# Patient Record
Sex: MALE | Race: WHITE | NOT HISPANIC OR LATINO | Employment: OTHER | URBAN - METROPOLITAN AREA
[De-identification: names, ages, dates, MRNs, and addresses within clinical notes are randomized per-mention and may not be internally consistent; named-entity substitution may affect disease eponyms.]

---

## 2023-09-28 ENCOUNTER — LAB REQUISITION (OUTPATIENT)
Dept: LAB | Facility: CLINIC | Age: 73
End: 2023-09-28
Payer: COMMERCIAL

## 2023-09-28 DIAGNOSIS — D48.9 NEOPLASM OF UNCERTAIN BEHAVIOR, UNSPECIFIED: ICD-10-CM

## 2023-09-28 PROCEDURE — 88305 TISSUE EXAM BY PATHOLOGIST: CPT | Mod: TC,ORL | Performed by: DERMATOLOGY

## 2023-09-28 PROCEDURE — 88305 TISSUE EXAM BY PATHOLOGIST: CPT | Mod: 26 | Performed by: DERMATOLOGY

## 2023-10-03 LAB
PATH REPORT.COMMENTS IMP SPEC: ABNORMAL
PATH REPORT.COMMENTS IMP SPEC: ABNORMAL
PATH REPORT.COMMENTS IMP SPEC: YES
PATH REPORT.FINAL DX SPEC: ABNORMAL
PATH REPORT.GROSS SPEC: ABNORMAL
PATH REPORT.MICROSCOPIC SPEC OTHER STN: ABNORMAL
PATH REPORT.RELEVANT HX SPEC: ABNORMAL

## 2024-01-29 ENCOUNTER — TRANSFERRED RECORDS (OUTPATIENT)
Dept: HEALTH INFORMATION MANAGEMENT | Facility: CLINIC | Age: 74
End: 2024-01-29
Payer: COMMERCIAL

## 2024-02-22 ENCOUNTER — TELEPHONE (OUTPATIENT)
Dept: ORTHOPEDICS | Facility: CLINIC | Age: 74
End: 2024-02-22
Payer: COMMERCIAL

## 2024-02-22 NOTE — TELEPHONE ENCOUNTER
Left Voicemail (1st Attempt) for the patient to call back and schedule the following:    Appointment type: New hip   Provider: Dr. Dudley  Return date: Next available

## 2024-02-22 NOTE — TELEPHONE ENCOUNTER
Writer received the following in an email from Dr. Dudley's admin:    Lauro Austin,     I just received a call from patient Kwabena Solorio, MRN [id number].  He has a referral from Dr. Seay in the system.  He was just calling inquiring about scheduling the appointment.  It looks like it is to Dr. Dudley.      His phone number is [phone].    - Norma    1/31/24 referral was for   Diagnosis   Z96.641 (ICD-10-CM) - History of total hip arthroplasty, right       Writer passing on to CC's to schedule this patient with Dr. Dudley.    Geovanna Gongora LPN

## 2024-03-05 ENCOUNTER — TRANSFERRED RECORDS (OUTPATIENT)
Dept: HEALTH INFORMATION MANAGEMENT | Facility: CLINIC | Age: 74
End: 2024-03-05
Payer: COMMERCIAL

## 2024-03-05 LAB
CREATININE (EXTERNAL): 0.9 MG/DL (ref 0.73–1.18)
GFR ESTIMATED (EXTERNAL): >60 ML/MIN/1.73M2
GLUCOSE (EXTERNAL): 88 MG/DL (ref 70–105)
POTASSIUM (EXTERNAL): 4.4 MMOL/L (ref 3.5–5.1)

## 2024-03-07 ENCOUNTER — TRANSCRIBE ORDERS (OUTPATIENT)
Dept: OTHER | Age: 74
End: 2024-03-07

## 2024-03-07 DIAGNOSIS — M41.50 DEGENERATIVE SCOLIOSIS IN ADULT PATIENT: ICD-10-CM

## 2024-03-07 DIAGNOSIS — M47.27 LUMBOSACRAL SPONDYLOSIS WITH RADICULOPATHY: Primary | ICD-10-CM

## 2024-03-07 DIAGNOSIS — M47.16 LUMBAR SPONDYLOSIS WITH MYELOPATHY: ICD-10-CM

## 2024-03-08 NOTE — PROGRESS NOTES
[facility]  Orthopaedic Surgery, Joint Replacement Consultation  by Rd Dudley M.D.    Kwabena Solorio MRN# [id number]    YOB: 1950     Requesting physician: No ref. provider found  No primary care provider on file.            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 He would benefit from operative management to convert to a different bearing surface and bone grafting of his pelvis defect.     Plan:  We discussed the imaging results and the diagnosis with the patient and family.  We discussed that the recommendation would be for operative treatment with conversion of his metal hip resurfacing to a total hip arthroplasty with a different bearing surface.  We would also try to bone graft the pelvis defect and debrided the soft tissue mass with this procedure, although we may need to stage this.  Patient understands and agrees with this plan.  We will work to get him scheduled for surgery.      Osvaldo Chan MD    Attending MD (Dr. Rd Dudley) Attestation :  This patient was seen and evaluated by me including a history, exam, and interpretation of all imaging and/or lab data.  I formulated the treatment plan along with either a physician's assistant (PA-C) or training physician (resident/fellow), who also saw the patient. That individual or a scribe has documented the visit in the attached note which I approve.    MD Tory Jesus Professor  Oncology and Adult Reconstructive Surgery  Dept Orthopaedic Surgery, [facility]  [phone] office, [phone] pager  [email or web address]           For additional information and frequently asked questions regarding joint replacements, scan the QR code image below on your phone camera or go to:  [email or web address]               History of Present Illness:   73 year old male presenting to clinic for evaluation and management of his right hip. He has a history of a right hip metal-on-metal resurfacing done in 2007.  He reports that the hip has been functioning well for him since that surgery.  More recently, he developed some lumbar spine related symptoms and when being worked up for this it was noted that there was an area of periacetabular osteolysis about the right hip.  He reports that he is very active lifestyle.  He does have some stiffness/soreness in the morning when he starts up however it does not bother him very much during the day.  He denies issues related to his wound such as drainage or erythema.  He does report some numbness tingling in both lower extremities associated with his lumbar spine issues.      Current symptoms:  Problem: Right hip pain   Onset and duration: last few months, but has had back problems for a few years.  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 Size 46 femoral head, 52-mm, acetabulum, Tobramycin Simplex cement.  ( Keyur Balderrama MD ) [facility]  3/18/2010, TKA, [facility]  3/22/2013, Angel/Akin Bunionectomy, bilateral feet (R. Sticha) [facility]  3/10/2014, screw removal right foot (R. Sticha) [facility]  5/2/2014, screw removal left foot, (R. Sticha), [facility]  2/27/2020, Right CMC arthroplasty and Left hand thumb trigger finger release (JAQUI Prasad), [facility]    date, procedure, (Surgeon), [facility]             "   Physical Exam:     EXAMINATION pertinent findings:   PSYCH: Pleasant, healthy-appearing, alert, oriented x3, cooperative. Normal mood and affect.  VITAL SIGNS: Height 1.64 m (5' 4.57\"), weight 78 kg (172 lb)..  Reviewed nursing intake notes.   Body mass index is 29.01 kg/m .  RESP: non labored breathing   ABD: benign, soft, non-tender, no acute peritoneal findings  SKIN: grossly normal   LYMPHATIC: grossly normal, no adenopathy, no extremity edema  NEURO: grossly normal , no motor deficits  VASCULAR: satisfactory perfusion of all extremities   MUSCULOSKELETAL:   He is ambulatory without antalgic gait. Incision is well healed without signs of infection.  He has full range of motion with hip flexion to 90 degrees without associated pain. Negative BARI and FADIR testing. Palpable pulses distally.                Data:   All laboratory data reviewed  All imaging studies reviewed by me    Imaging tests:    X-ray and CT imaging of the right hip were reviewed and this shows patient is status post right hip resurfacing.  There is osteolysis over the anterosuperior aspect of the acetabular cup.  There is an anterior based soft tissue mass likely associated with the metal-on-metal bearing.      DATA for DOCUMENTATION:         Past Medical History:   There is no problem list on file for this patient.    No past medical history on file.    Also see scanned health assessment forms.       Past Surgical History:   No past surgical history on file.         Social History:     Social History     Socioeconomic History    Marital status:      Spouse name: Not on file    Number of children: Not on file    Years of education: Not on file    Highest education level: Not on file   Occupational History    Not on file   Tobacco Use    Smoking status: Not on file    Smokeless tobacco: Not on file   Substance and Sexual Activity    Alcohol use: Not on file    Drug use: Not on file    Sexual activity: Not on file   Other Topics " Concern    Not on file   Social History Narrative    Not on file     Social Determinants of Health     Financial Resource Strain: Not on file   Food Insecurity: Not on file   Transportation Needs: Not on file   Physical Activity: Not on file   Stress: Not on file   Social Connections: Not on file   Interpersonal Safety: Not on file   Housing Stability: Not on file            Family History:     No family history on file.         Medications:     No current outpatient medications on file.     No current facility-administered medications for this visit.              Review of Systems:   A comprehensive 10 point review of systems (constitutional, ENT, cardiac, peripheral vascular, lymphatic, respiratory, GI, , Musculoskeletal, skin, Neurological) was performed and found to be negative except as described in this note.       HOOS Hip Dysfunction & Osteoarthritis Outcome Questionnaire         No data to display                       [unfilled]    KOOS Knee Survey Assessment         No data to display                       Promis 10 Assessment        3/21/2024    12:41 PM   PROMIS 10   In general, would you say your health is: Very good   In general, would you say your quality of life is: Good   In general, how would you rate your physical health? Very good   In general, how would you rate your mental health, including your mood and your ability to think? Very good   In general, how would you rate your satisfaction with your social activities and relationships? Very good   In general, please rate how well you carry out your usual social activities and roles Good   To what extent are you able to carry out your everyday physical activities such as walking, climbing stairs, carrying groceries, or moving a chair? Mostly   In the past 7 days, how often have you been bothered by emotional problems such as feeling anxious, depressed, or irritable? Never   In the past 7 days, how would you rate your fatigue on average? None    In the past 7 days, how would you rate your pain on average, where 0 means no pain, and 10 means worst imaginable pain? 1   In general, would you say your health is: 4   In general, would you say your quality of life is: 3   In general, how would you rate your physical health? 4   In general, how would you rate your mental health, including your mood and your ability to think? 4   In general, how would you rate your satisfaction with your social activities and relationships? 4   In general, please rate how well you carry out your usual social activities and roles. (This includes activities at home, at work and in your community, and responsibilities as a parent, child, spouse, employee, friend, etc.) 3   To what extent are you able to carry out your everyday physical activities such as walking, climbing stairs, carrying groceries, or moving a chair? 4   In the past 7 days, how often have you been bothered by emotional problems such as feeling anxious, depressed, or irritable? 1   In the past 7 days, how would you rate your fatigue on average? 1   In the past 7 days, how would you rate your pain on average, where 0 means no pain, and 10 means worst imaginable pain? 1   Global Mental Health Score 16   Global Physical Health Score 17   PROMIS TOTAL - SUBSCORES 33              Or[facility] [place] Knee Questionnaire         No data to display                         See intake form completed by patient

## 2024-03-16 NOTE — TELEPHONE ENCOUNTER
"Imaging DISC Received  April 12, 2024 10:01 AM ABT   Action: Imaging disc from [facility] received and uploaded to PACS.    06/22/20: XR Hip RT  06/22/20: XR Knee LT  06/23/22: XR Hip RT  06/23/22: XR Knee LT  01/29/24: XR Hip RT     Action March 20, 2024 9:20 AM MT   Action Taken Sent a request for imaging from [facility].  [employer] Tracking #: [id number]  Called [facility] and [facility] for imaging to be pushed.      Action March 21, 2024 9:26 AM MT   Action Taken Called [facility] to follow up, tt: rep Valeria will push imaging over again.        DIAGNOSIS: Ostial lysis around right acetabular component.  Z96.641 - History of total hip arthroplasty, right   APPOINTMENT DATE: 03/21/2024   NOTES STATUS DETAILS   OFFICE NOTE from referring provider Care Everywhere 1/29/2024 - Hang Seay MD -  [facility]   OFFICE NOTE from other specialist Care Everywhere 03/23/2022 - Keyur Balderrama MD -[facility]    05/21/2019 - Víctor Daniel DPT - [facility]   OPERATIVE REPORT Care Everywhere 03/27/2007 - RIGHT CHRISS  \"Click on the encounter for 03/02/2007 - Historic Notes - Generic Department - Keyur Balderrama      EMG (for Spine) Care Everywhere 12/21/2022 - [facility]   LABS     DEXA PACS [facility]:  03/18/2024 - Hips/Spine   MRI PACS [facility]:  01/22/2024 - L Spine  05/03/2018 - RT Hip     CT SCAN PACS [facility]:  02/02/2024 - Chest  02/02/2024 - Abd/Pel    [facility]:  05/18/2017 - Chest/Abd/Pel     XRAYS (IMAGES & REPORTS) PACS [facility]:  01/10/2024 - L Spine    [facility]: PENDING IMAGE DISK!  01/29/2024, 06/23/2022, 06/22/2020, 05/01/2018 - RT Hip       "

## 2024-03-21 ENCOUNTER — OFFICE VISIT (OUTPATIENT)
Dept: ORTHOPEDICS | Facility: CLINIC | Age: 74
End: 2024-03-21
Payer: COMMERCIAL

## 2024-03-21 ENCOUNTER — PRE VISIT (OUTPATIENT)
Dept: ORTHOPEDICS | Facility: CLINIC | Age: 74
End: 2024-03-21

## 2024-03-21 VITALS — BODY MASS INDEX: 28.66 KG/M2 | WEIGHT: 172 LBS | HEIGHT: 65 IN

## 2024-03-21 DIAGNOSIS — T84.098A MECHANICAL COMPLICATION OF PROSTHETIC HIP IMPLANT, INITIAL ENCOUNTER (H): Primary | ICD-10-CM

## 2024-03-21 DIAGNOSIS — Z96.649 MECHANICAL COMPLICATION OF PROSTHETIC HIP IMPLANT, INITIAL ENCOUNTER (H): Primary | ICD-10-CM

## 2024-03-21 PROCEDURE — 99204 OFFICE O/P NEW MOD 45 MIN: CPT | Mod: GC | Performed by: ORTHOPAEDIC SURGERY

## 2024-03-21 RX ORDER — FERROUS SULFATE 325(65) MG
325 TABLET ORAL
COMMUNITY
Start: 2023-01-27

## 2024-03-21 RX ORDER — NAPROXEN 250 MG/1
500 TABLET ORAL 2 TIMES DAILY
Status: ON HOLD | COMMUNITY
Start: 2024-03-12 | End: 2024-05-13

## 2024-03-21 RX ORDER — PREDNISONE 5 MG/1
5 TABLET ORAL
COMMUNITY
Start: 2024-02-06

## 2024-03-21 RX ORDER — SODIUM PHOSPHATE,MONO-DIBASIC 19G-7G/118
1 ENEMA (ML) RECTAL EVERY MORNING
COMMUNITY

## 2024-03-21 RX ORDER — ASCORBIC ACID 500 MG
500 TABLET ORAL EVERY MORNING
COMMUNITY

## 2024-03-21 RX ORDER — GABAPENTIN 300 MG/1
900 CAPSULE ORAL AT BEDTIME
Status: ON HOLD | COMMUNITY
Start: 2024-01-10 | End: 2024-05-13

## 2024-03-21 RX ORDER — AMOXICILLIN 500 MG/1
2000 CAPSULE ORAL ONCE
COMMUNITY
Start: 2024-01-26

## 2024-03-21 RX ORDER — AMPICILLIN TRIHYDRATE 500 MG
1000 CAPSULE ORAL
COMMUNITY

## 2024-03-21 RX ORDER — AMPICILLIN TRIHYDRATE 250 MG
1000 CAPSULE ORAL EVERY MORNING
COMMUNITY

## 2024-03-21 RX ORDER — PRAMIPEXOLE DIHYDROCHLORIDE 0.12 MG/1
0.25 TABLET ORAL AT BEDTIME
COMMUNITY
Start: 2023-10-27

## 2024-03-21 RX ORDER — KETOCONAZOLE 20 MG/G
CREAM TOPICAL
COMMUNITY
Start: 2023-09-28

## 2024-03-21 RX ORDER — LORAZEPAM 1 MG/1
.5-1 TABLET ORAL
Status: ON HOLD | COMMUNITY
Start: 2024-03-05 | End: 2024-05-13

## 2024-03-21 RX ORDER — PSYLLIUM SEED
1 PACKET (EA) ORAL EVERY MORNING
COMMUNITY

## 2024-03-21 ASSESSMENT — HOOS JR: HOOS JR TOTAL INTERVAL SCORE: 100

## 2024-03-21 NOTE — NURSING NOTE
Pre-Op Teaching was done in person at the clinic.    Teaching Flowsheet   Relevant Diagnosis: Pre-Op Teaching  Teaching Topic: Revision R CHRISS Pre-Op     Person(s) involved in teaching:   Patient and sister     Motivation Level:  Asks Questions: Yes  Eager to Learn: Yes  Cooperative: Yes  Receptive (willing/able to accept information): Yes  Any cultural factors/Caodaism beliefs that may influence understanding or compliance? No     Patient demonstrates understanding of the following:  Reason for the appointment, diagnosis and treatment plan: Yes  Knowledge of proper use of medications and conditions for which they are ordered (with special attention to potential side effects or drug interactions): Yes  Which situations necessitate calling provider and whom to contact: Yes- discussed the stoplight tool to help assist with this.      Teaching Concerns Addressed:      Proper use of surgical scrub explain: Yes    Nutritional needs and diet plan: Yes  Pain management techniques: Yes  Wound Care: Yes  How and/when to access community resources: Yes  Need for pre-op with in 30 days: Yes- asked that pre-op be done between 25-20 days before surgery is scheduled.   -I asked them to ensure they go over their daily medications during this visit and discuss what medications need to be stopped before surgery and when. If you are doing a pre-op with your PCP and they are not within the [facility], I ask them to fax it to our pre-op office. Patient verbalized understanding.      Instructional Materials Used/Given:  2 bottle of chlorhexidine, a surgery packet, and a joint booklet given to patient in clinic.      - Important contact info/ phone numbers: emphasizing clinic number and after hours number  - Map/ location of surgery  - Showering instructions  - Stop light tool  - Your Guide to Joint Replacement Booklet   - Antibiotic post op before every dentist appointment or procedure for the rest of their life.     Additionally  the following was discussed with patient:  - Sister, will be driving the patient to surgery and able to pick the patient up after discharge and staying with them for 4-5 days after surgery.  - Need to schedule a dentist appointment and get done any recommended dental work prior to surgery.   - Online joint replacement call and the use of [employer] to send educational messages. Asked patient to sign up for join class during visit and patient declined to sign up at this time and stated will sign up later. Sheet with sign up instruction given to patient.   - Told patient to check in with insurance to check about coverage.     Authorization to Share Protected Health Information- Person to person communication signed by patient and authorized the following person or people:   Earlene Thapa, sister: [phone]  Teresa Anna Marie, sister: [phone]  Kathrin Schultz, sister: [phone]    Discussed that TCU placement post-operatively cannot be guaranteed. We cannot arrange placement before. Eligibility is evaluated post-operatively. Bed availability is limited. I told patient if they are eligible and if they have insurance coverage, finding a bed/placement is not guaranteed. I told them the  at the hospital will work with TCUs to determine bed availability at time of discharge.  Because of this we need them to have plans arranged for after surgery to have someone who is  able to pick the patient up after discharge and staying with them for 4-5 days after surgery.        -Next step: Schedule a surgery date and schedule a Pre-Op appointment with PAC    Time spent with patient: 15 minutes.

## 2024-03-21 NOTE — LETTER
3/21/2024         RE: Kwabena Solorio  [address]  [address]  [facility] [address]        Dear Colleague,    Thank you for referring your patient, Kwabena Solorio, to the [facility] [place]. Please see a copy of my visit note below.        [facility]  Orthopaedic Surgery, Joint Replacement Consultation  by Rd Dudley M.D.    Kwabena Solorio MRN# [id number]    YOB: 1950     Requesting physician: No ref. provider found  No primary care provider on file.            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 He would benefit from operative management to convert to a different bearing surface and bone grafting of his pelvis defect.     Plan:  We discussed the imaging results and the diagnosis with the patient and family.  We discussed that the recommendation would be for operative treatment with conversion of his metal hip resurfacing to a total hip arthroplasty with a different bearing surface.  We would also try to bone graft the pelvis defect and debrided the soft tissue mass with this procedure, although we may need to stage this.  Patient understands and agrees with this plan.  We will work to get him scheduled for surgery.      Osvaldo Chan MD    Attending MD (Dr. Rd Dudley) Attestation :  This patient was seen and evaluated by me including a history, exam, and interpretation of all imaging and/or lab data.  I formulated the treatment plan along with either a physician's assistant (PA-C) or training physician (resident/fellow), who also saw the patient. That individual or a scribe has documented the visit in the attached note which I approve.    MD Tory Jesus Family Professor  Oncology and Adult Reconstructive Surgery  Dept Orthopaedic Surgery, [facility]  [phone] office,  [phone] pager  [email or web address]           For additional information and frequently asked questions regarding joint replacements, scan the QR code image below on your phone camera or go to:  [email or web address]              History of Present Illness:   73 year old male presenting to clinic for evaluation and management of his right hip. He has a history of a right hip metal-on-metal resurfacing done in 2007.  He reports that the hip has been functioning well for him since that surgery.  More recently, he developed some lumbar spine related symptoms and when being worked up for this it was noted that there was an area of periacetabular osteolysis about the right hip.  He reports that he is very active lifestyle.  He does have some stiffness/soreness in the morning when he starts up however it does not bother him very much during the day.  He denies issues related to his wound such as drainage or erythema.  He does report some numbness tingling in both lower extremities associated with his lumbar spine issues.      Current symptoms:  Problem: Right hip pain   Onset and duration: last few months, but has had back problems for a few years.  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 Size 46 femoral head, 52-mm, acetabulum, Tobramycin Simplex cement.  ( Keyur Balderrama MD ) [facility]  3/22/2013, Angel/Akin Bunionectomy, bilateral feet (MARIA A Valentin) [facility]  3/10/2014, screw removal right  "foot (MARIA A Valentin) [facility]  5/2/2014, screw removal left foot, (MARIA A Valentin), [facility]  2/27/2020, Right CMC arthroplasty and Left hand thumb trigger finger release (JAQUI Prasad[facility]    date, procedure, (Surgeon), hospital               Physical Exam:     EXAMINATION pertinent findings:   PSYCH: Pleasant, healthy-appearing, alert, oriented x3, cooperative. Normal mood and affect.  VITAL SIGNS: Height 1.64 m (5' 4.57\"), weight 78 kg (172 lb)..  Reviewed nursing intake notes.   Body mass index is 29.01 kg/m .  RESP: non labored breathing   ABD: benign, soft, non-tender, no acute peritoneal findings  SKIN: grossly normal   LYMPHATIC: grossly normal, no adenopathy, no extremity edema  NEURO: grossly normal , no motor deficits  VASCULAR: satisfactory perfusion of all extremities   MUSCULOSKELETAL:   He is ambulatory without antalgic gait. Incision is well healed without signs of infection.  He has full range of motion with hip flexion to 90 degrees without associated pain. Negative BARI and FADIR testing. Palpable pulses distally.                Data:   All laboratory data reviewed  All imaging studies reviewed by me    Imaging tests:    X-ray and CT imaging of the right hip were reviewed and this shows patient is status post right hip resurfacing.  There is osteolysis over the anterosuperior aspect of the acetabular cup.  There is an anterior based soft tissue mass likely associated with the metal-on-metal bearing.      DATA for DOCUMENTATION:         Past Medical History:   There is no problem list on file for this patient.    No past medical history on file.    Also see scanned health assessment forms.       Past Surgical History:   No past surgical history on file.         Social History:     Social History     Socioeconomic History    Marital status:      Spouse name: Not on file    Number of children: Not on file    Years of education: Not on file    Highest education level: Not on file "   Occupational History    Not on file   Tobacco Use    Smoking status: Not on file    Smokeless tobacco: Not on file   Substance and Sexual Activity    Alcohol use: Not on file    Drug use: Not on file    Sexual activity: Not on file   Other Topics Concern    Not on file   Social History Narrative    Not on file     Social Determinants of Health     Financial Resource Strain: Not on file   Food Insecurity: Not on file   Transportation Needs: Not on file   Physical Activity: Not on file   Stress: Not on file   Social Connections: Not on file   Interpersonal Safety: Not on file   Housing Stability: Not on file            Family History:     No family history on file.         Medications:     No current outpatient medications on file.     No current facility-administered medications for this visit.              Review of Systems:   A comprehensive 10 point review of systems (constitutional, ENT, cardiac, peripheral vascular, lymphatic, respiratory, GI, , Musculoskeletal, skin, Neurological) was performed and found to be negative except as described in this note.       HOOS Hip Dysfunction & Osteoarthritis Outcome Questionnaire         No data to display                       [unfilled]    KOOS Knee Survey Assessment         No data to display                       Promis 10 Assessment        3/21/2024    12:41 PM   PROMIS 10   In general, would you say your health is: Very good   In general, would you say your quality of life is: Good   In general, how would you rate your physical health? Very good   In general, how would you rate your mental health, including your mood and your ability to think? Very good   In general, how would you rate your satisfaction with your social activities and relationships? Very good   In general, please rate how well you carry out your usual social activities and roles Good   To what extent are you able to carry out your everyday physical activities such as walking, climbing stairs,  carrying groceries, or moving a chair? Mostly   In the past 7 days, how often have you been bothered by emotional problems such as feeling anxious, depressed, or irritable? Never   In the past 7 days, how would you rate your fatigue on average? None   In the past 7 days, how would you rate your pain on average, where 0 means no pain, and 10 means worst imaginable pain? 1   In general, would you say your health is: 4   In general, would you say your quality of life is: 3   In general, how would you rate your physical health? 4   In general, how would you rate your mental health, including your mood and your ability to think? 4   In general, how would you rate your satisfaction with your social activities and relationships? 4   In general, please rate how well you carry out your usual social activities and roles. (This includes activities at home, at work and in your community, and responsibilities as a parent, child, spouse, employee, friend, etc.) 3   To what extent are you able to carry out your everyday physical activities such as walking, climbing stairs, carrying groceries, or moving a chair? 4   In the past 7 days, how often have you been bothered by emotional problems such as feeling anxious, depressed, or irritable? 1   In the past 7 days, how would you rate your fatigue on average? 1   In the past 7 days, how would you rate your pain on average, where 0 means no pain, and 10 means worst imaginable pain? 1   Global Mental Health Score 16   Global Physical Health Score 17   PROMIS TOTAL - SUBSCORES 33              Orth[facility] [place] Knee Questionnaire         No data to display                         See intake form completed by patient

## 2024-03-22 NOTE — NURSING NOTE
- A call was placed to the patient.     - I let him know they were unable to do 3d reconstruction with previous CT so a new one is needed.     - I told him this was ordered and scheduled for the same day as his appointment with Dr. Merino April 2nd with an arrival time of 1:25pn. He was thankful this could be done the same day.     - Patient verbalized understanding of plan and all questions were answered. Call back number to clinic was given and patient was told to call if they had an further questions.

## 2024-03-26 ENCOUNTER — MYC MEDICAL ADVICE (OUTPATIENT)
Dept: ORTHOPEDICS | Facility: CLINIC | Age: 74
End: 2024-03-26
Payer: COMMERCIAL

## 2024-03-26 ENCOUNTER — TELEPHONE (OUTPATIENT)
Dept: ORTHOPEDICS | Facility: CLINIC | Age: 74
End: 2024-03-26
Payer: COMMERCIAL

## 2024-03-26 ENCOUNTER — HOSPITAL ENCOUNTER (INPATIENT)
Facility: CLINIC | Age: 74
Setting detail: SURGERY ADMIT
End: 2024-03-26
Attending: ORTHOPAEDIC SURGERY | Admitting: ORTHOPAEDIC SURGERY
Payer: COMMERCIAL

## 2024-03-26 DIAGNOSIS — Z96.641 STATUS POST TOTAL REPLACEMENT OF RIGHT HIP: ICD-10-CM

## 2024-03-26 DIAGNOSIS — Z96.649 STATUS POST REVISION OF TOTAL HIP REPLACEMENT: Primary | ICD-10-CM

## 2024-03-26 NOTE — TELEPHONE ENCOUNTER
Patient is scheduled for surgery with Dr. Dudley    Spoke with: patient    Date of Surgery: 4/23/24    Location: [facility]    Post op: 5/20/24    Pre op with Provider complete    H&P: Scheduled with PAC 4/3/24    Additional imaging/appointments: N/A    Surgery packet: received     Additional comments: on cx list for earlier surgery date        Phyllis Guevara CMA on 3/26/2024 at 11:47 AM

## 2024-03-26 NOTE — TELEPHONE ENCOUNTER
FUTURE VISIT INFORMATION      SURGERY INFORMATION:  Date: 24  Location: [facility]  Surgeon:  Rd Dudley MD   Anesthesia Type:  choice  Procedure: REVISION, TOTAL ARTHROPLASTY, HIP, possible debridement of anterior soft tissue mass, possible bone grafting of acetabular bone defect   Consult: ov 3/21/24    RECORDS REQUESTED FROM:       Primary Care Provider: Keyur Augustin MD - [facility]    Most recent EKG+ Tracin22- [facility]

## 2024-03-29 NOTE — TELEPHONE ENCOUNTER
"DIAGNOSIS: LUMBAR SPINE   APPOINTMENT DATE: 04/02/2024   NOTES STATUS DETAILS   OFFICE NOTE from referring provider Internal 03/21/2024 - Rd Dudley MD - [facility]   OFFICE NOTE from other specialist Care Everywhere 01/29/2024 - Hang Seay MD -  [facility]    03/23/2022 - Keyur Balderrama MD -[facility]    05/21/2019 - Víctor Daniel DPT - [facility]   OPERATIVE REPORT Care Everywhere 03/27/2007 - RIGHT CHRISS  \"Click on the encounter for 03/02/2007 - Historic Notes - Generic Department - Keyur Balderrama      EMG (for Spine) Care Everywhere 12/21/2022 - [facility]   LABS     DEXA PACS [facility]:  03/18/2024 - Hips/Spine   MRI PACS [facility]:  01/22/2024 - L Spine  05/03/2018 - RT Hip     CT SCAN PACS [facility]:  02/02/2024 - Chest  02/02/2024 - Abd/Pel    [facility]:  05/18/2017 - Chest/Abd/Pel     XRAYS (IMAGES & REPORTS) PACS [facility]:  01/10/2024 - L Spine         "

## 2024-04-01 DIAGNOSIS — M54.50 LUMBAR PAIN: Primary | ICD-10-CM

## 2024-04-02 ENCOUNTER — ANCILLARY PROCEDURE (OUTPATIENT)
Dept: CT IMAGING | Facility: CLINIC | Age: 74
End: 2024-04-02
Attending: ORTHOPAEDIC SURGERY
Payer: COMMERCIAL

## 2024-04-02 ENCOUNTER — OFFICE VISIT (OUTPATIENT)
Dept: ORTHOPEDICS | Facility: CLINIC | Age: 74
End: 2024-04-02
Payer: COMMERCIAL

## 2024-04-02 ENCOUNTER — PRE VISIT (OUTPATIENT)
Dept: ORTHOPEDICS | Facility: CLINIC | Age: 74
End: 2024-04-02

## 2024-04-02 ENCOUNTER — ANCILLARY PROCEDURE (OUTPATIENT)
Dept: GENERAL RADIOLOGY | Facility: CLINIC | Age: 74
End: 2024-04-02
Attending: ORTHOPAEDIC SURGERY
Payer: COMMERCIAL

## 2024-04-02 ENCOUNTER — DOCUMENTATION ONLY (OUTPATIENT)
Dept: OTHER | Facility: CLINIC | Age: 74
End: 2024-04-02

## 2024-04-02 VITALS — HEIGHT: 65 IN | BODY MASS INDEX: 28.32 KG/M2 | WEIGHT: 170 LBS

## 2024-04-02 DIAGNOSIS — Z96.649 MECHANICAL COMPLICATION OF PROSTHETIC HIP IMPLANT, INITIAL ENCOUNTER (H): ICD-10-CM

## 2024-04-02 DIAGNOSIS — M47.27 LUMBOSACRAL SPONDYLOSIS WITH RADICULOPATHY: ICD-10-CM

## 2024-04-02 DIAGNOSIS — M54.50 LUMBAR PAIN: ICD-10-CM

## 2024-04-02 DIAGNOSIS — M47.16 LUMBAR SPONDYLOSIS WITH MYELOPATHY: ICD-10-CM

## 2024-04-02 DIAGNOSIS — T84.098A MECHANICAL COMPLICATION OF PROSTHETIC HIP IMPLANT, INITIAL ENCOUNTER (H): ICD-10-CM

## 2024-04-02 DIAGNOSIS — M41.50 DEGENERATIVE SCOLIOSIS IN ADULT PATIENT: ICD-10-CM

## 2024-04-02 PROCEDURE — 73700 CT LOWER EXTREMITY W/O DYE: CPT | Mod: RT | Performed by: RADIOLOGY

## 2024-04-02 PROCEDURE — 99207 CT HIP RIGHT W/O CONTRAST: CPT | Performed by: RADIOLOGY

## 2024-04-02 PROCEDURE — 99214 OFFICE O/P EST MOD 30 MIN: CPT | Performed by: ORTHOPAEDIC SURGERY

## 2024-04-02 PROCEDURE — 72110 X-RAY EXAM L-2 SPINE 4/>VWS: CPT | Performed by: STUDENT IN AN ORGANIZED HEALTH CARE EDUCATION/TRAINING PROGRAM

## 2024-04-02 NOTE — LETTER
4/2/2024         RE: Kwabena Solorio  [address]  [address]  [facility] [address]    Dear Colleague,    Thank you for referring your patient, Kwabena Solorio, to the [facility] [place]. Please see a copy of my visit note below.    Spine Surgery Clinic Visit      Chief Complaint:   Consult (Lumbar pain referred by Dr. Dudley. Pt reports weakness in bilateral thighs the past few months. )      Interval HPI:  Symptom Profile Including: location of symptoms, onset, severity, exacerbating/alleviating factors, previous treatments:          Respectfully,  Aaron Lan DO  Spine Fellow      Spine Surgery Consultation    REFERRING PHYSICIAN: Keyur Augustin   PRIMARY CARE PHYSICIAN: Keyur Augustin           Chief Complaint:   Consult (Lumbar pain referred by Dr. Dudley. Pt reports weakness in bilateral thighs the past few months. )      History of Present Illness:  Symptom Profile Including: location of symptoms, onset, severity, exacerbating/alleviating factors, previous treatments:      Kwabena Solorio is a 73 year old male who presents today for evaluation of his bilateral buttock and left lower extremity radicular symptoms.  Is been going on for many months.  He has sought different modalities of treatment including anti-inflammatories, Tylenol, gabapentin she is also seeing a pain specialist who has recommended a spinal cord stimulator he has gone through the workup process for that but has not yet had a trial or implantation of a spinal cord stimulator.  His pain is predominantly in the left leg he denies any associated numbness or tingling or weakness.  He additionally has some back pain but he has been tolerating this for quite some time.  He is overall extremely active and over the years has been continuing to participate in marathons and cross-country skiing unfortunately was unable to participate this year due to increase in leg and buttock pain.  This pain is worse with  ambulation and somewhat improved by leaning forward.  Denies any bowel or bladder incontinence or saddle anesthesia at this time.  Has not had any balance issues.  He remains very active demonstrating his core strength by performing a plank on the floor during examination.            Past Medical History:   No past medical history on file.         Past Surgical History:   No past surgical history on file.         Social History:     Social History     Tobacco Use    Smoking status: Not on file    Smokeless tobacco: Not on file   Substance Use Topics    Alcohol use: Not on file            Family History:   No family history on file.         Allergies:   No Known Allergies         Medications:     Current Outpatient Medications   Medication Sig Dispense Refill    amoxicillin (AMOXIL) 500 MG capsule TAKE 4 CAPS 1 HOUR BEFORE DENTAL APPT      calcium-magnesium (CALMAG) 500-250 MG TABS per tablet Take 1 tablet by mouth 2 times daily      Cholecalciferol (D 1000) 25 MCG (1000 UT) CAPS Take 1,000 Units by mouth      ferrous sulfate (FEROSUL) 325 (65 Fe) MG tablet 1.5 pills daily.      gabapentin (NEURONTIN) 300 MG capsule Take 3 capsules by mouth at bedtime      glucosamine-chondroitin 500-400 MG CAPS per capsule Take 1 capsule by mouth every morning      ketoconazole (NIZORAL) 2 % external cream Apply thin layer twice daily as needed for rash on the face.      naproxen (NAPROSYN) 250 MG tablet       Omega-3 Fatty Acids (SUPER OMEGA-3) 1000 MG CAPS Take 1,000 mg by mouth      omeprazole (PRILOSEC) 20 MG DR capsule Take 1 capsule by mouth daily before breakfast      pramipexole (MIRAPEX) 0.125 MG tablet Take 0.125-0.25 mg by mouth      predniSONE (DELTASONE) 5 MG tablet Take 5.5 mg by mouth      Psyllium (METAMUCIL 4 IN 1 FIBER) 55.6 % POWD Mix 1 rounded teaspoon in at least 8 ounces of fluid and drink by mouth once daily.      vitamin B-12 (CYANOCOBALAMIN) 500 MCG tablet Take 1,000 mcg by mouth      vitamin C (ASCORBIC  "ACID) 500 MG tablet Take 500 mg by mouth      LORazepam (ATIVAN) 1 MG tablet Take 1 mg by mouth      LUTEIN PO Take 1 tablet by mouth every morning       No current facility-administered medications for this visit.             Review of Systems:   A focused musculoskeletal and neurologic ROS was performed with pertinent positives and negatives noted in the HPI.  Additional systems were also reviewed and are documented at the bottom of the note.         Physical Exam:   Vitals: Ht 1.64 m (5' 4.57\")   Wt 77.1 kg (170 lb)   BMI 28.67 kg/m    Musculoskeletal, Neurologic, and Spine:     Mild tenderness to palpation of the paraspinal musculature bilaterally  Patient has full active flexion extension of the spine without significant pain  Gross sensation intact bilateral lower extremities  Negative straight leg raise, seated  Patellar tendon reflex 1+ bilaterally no clonus    Performs a sit up and plank without difficulty during exam, ambulates without assistive device    Motor -        LOWER EXTREMITY Left Right   Hip flexion 5/5 5/5   Knee flexion 5/5 5/5   Knee extension 5/5 5/5   Ankle dorsiflexion 5/5 5/5   Ankle plantarflexion 5/5 5/5   Great toe extension 5/5 5/5            Imaging:   We ordered and independently reviewed new radiographs at this clinic visit. The results were discussed with the patient. Findings include:   Degenerative scoliosis of the lumbar spine with associated degenerative disc disease worse at L2 -3 3-4 and 4 -5.  No obvious instability on flexion-extension films today.  Vacuum disc at L4-5 and L5  S1          MRI of the spine, lumbar reviewed dated 1/31 demonstrates no obvious fractures dislocations.  There is some stenosis of the spine throughout with diffuse facet hypertrophy.  Of note the right L3-4 and 4 5 foramina have significant stenosis as well as the left L5-S1 foramina.  The right facet hypertrophy at the L3-4 level is probably the most significant and results and associated " central canal stenosis as well as foraminal.  The nerve root appears impinged at L5-S1 on the left there is a prominent osteophyte off the posterior aspect of the vertebral body that is impinging upon the nerve root.     Assessment and Plan:     73 year old male with spondylosis, lumbar spine stenosis, significant right L3-4 and 4 5 foraminal stenosis without obvious radiculopathy as well as left L5-S1 foraminal stenosis with radiculopathy.  Patient has tried multiple conservative measures but has not yet tried image guided injections.     L5-S1 transforaminal injection will need to be scheduled around his upcoming hip revision   Continue with core strengthening and home PT exercises as tolerated  Continue with gabapentin  Tylenol/ibuprofen as needed    Discussed with patient that we could try steroid injection to see what kind of relief he receives with a image guided injection.  This could be beneficial both on a therapeutic level and a diagnostic level to localize the L5-S1 level as a symptomatic level if we were able to correlate clinically his improvement or lack of improvement after injection.  Of note he is scheduled for a hip replacement on April 23 and would like to get this done after the hip replacement given associated increased chance of infection.  He would like to try and schedule this approximately 1 month after his hip replacement if possible.  Once he has his injection performed we can follow-up via a virtual visit 2 weeks after to evaluate how this done for him.    Respectfully,  Efren Merino MD  Spine Surgery   [facility]

## 2024-04-02 NOTE — PROGRESS NOTES
Spine Surgery Consultation    REFERRING PHYSICIAN: Keyur Augustin   PRIMARY CARE PHYSICIAN: Keyur Augustin           Chief Complaint:   Consult (Lumbar pain referred by Dr. Dudley. Pt reports weakness in bilateral thighs the past few months. )      History of Present Illness:  Symptom Profile Including: location of symptoms, onset, severity, exacerbating/alleviating factors, previous treatments:      Kwabena Solorio is a 73 year old male who presents today for evaluation of his bilateral buttock and left lower extremity radicular symptoms.  Is been going on for many months.  He has sought different modalities of treatment including anti-inflammatories, Tylenol, gabapentin she is also seeing a pain specialist who has recommended a spinal cord stimulator he has gone through the workup process for that but has not yet had a trial or implantation of a spinal cord stimulator.  His pain is predominantly in the left leg he denies any associated numbness or tingling or weakness.  He additionally has some back pain but he has been tolerating this for quite some time.  He is overall extremely active and over the years has been continuing to participate in marathons and cross-country skiing unfortunately was unable to participate this year due to increase in leg and buttock pain.  This pain is worse with ambulation and somewhat improved by leaning forward.  Denies any bowel or bladder incontinence or saddle anesthesia at this time.  Has not had any balance issues.  He remains very active demonstrating his core strength by performing a plank on the floor during examination.            Past Medical History:   No past medical history on file.         Past Surgical History:   No past surgical history on file.         Social History:     Social History     Tobacco Use    Smoking status: Not on file    Smokeless tobacco: Not on file   Substance Use Topics    Alcohol use: Not on file            Family History:   No family history  "on file.         Allergies:   No Known Allergies         Medications:     Current Outpatient Medications   Medication Sig Dispense Refill    amoxicillin (AMOXIL) 500 MG capsule TAKE 4 CAPS 1 HOUR BEFORE DENTAL APPT      calcium-magnesium (CALMAG) 500-250 MG TABS per tablet Take 1 tablet by mouth 2 times daily      Cholecalciferol (D 1000) 25 MCG (1000 UT) CAPS Take 1,000 Units by mouth      ferrous sulfate (FEROSUL) 325 (65 Fe) MG tablet 1.5 pills daily.      gabapentin (NEURONTIN) 300 MG capsule Take 3 capsules by mouth at bedtime      glucosamine-chondroitin 500-400 MG CAPS per capsule Take 1 capsule by mouth every morning      ketoconazole (NIZORAL) 2 % external cream Apply thin layer twice daily as needed for rash on the face.      naproxen (NAPROSYN) 250 MG tablet       Omega-3 Fatty Acids (SUPER OMEGA-3) 1000 MG CAPS Take 1,000 mg by mouth      omeprazole (PRILOSEC) 20 MG DR capsule Take 1 capsule by mouth daily before breakfast      pramipexole (MIRAPEX) 0.125 MG tablet Take 0.125-0.25 mg by mouth      predniSONE (DELTASONE) 5 MG tablet Take 5.5 mg by mouth      Psyllium (METAMUCIL 4 IN 1 FIBER) 55.6 % POWD Mix 1 rounded teaspoon in at least 8 ounces of fluid and drink by mouth once daily.      vitamin B-12 (CYANOCOBALAMIN) 500 MCG tablet Take 1,000 mcg by mouth      vitamin C (ASCORBIC ACID) 500 MG tablet Take 500 mg by mouth      LORazepam (ATIVAN) 1 MG tablet Take 1 mg by mouth      LUTEIN PO Take 1 tablet by mouth every morning       No current facility-administered medications for this visit.             Review of Systems:   A focused musculoskeletal and neurologic ROS was performed with pertinent positives and negatives noted in the HPI.  Additional systems were also reviewed and are documented at the bottom of the note.         Physical Exam:   Vitals: Ht 1.64 m (5' 4.57\")   Wt 77.1 kg (170 lb)   BMI 28.67 kg/m    Musculoskeletal, Neurologic, and Spine:     Mild tenderness to palpation of the " paraspinal musculature bilaterally  Patient has full active flexion extension of the spine without significant pain  Gross sensation intact bilateral lower extremities  Negative straight leg raise, seated  Patellar tendon reflex 1+ bilaterally no clonus    Performs a sit up and plank without difficulty during exam, ambulates without assistive device    Motor -        LOWER EXTREMITY Left Right   Hip flexion 5/5 5/5   Knee flexion 5/5 5/5   Knee extension 5/5 5/5   Ankle dorsiflexion 5/5 5/5   Ankle plantarflexion 5/5 5/5   Great toe extension 5/5 5/5            Imaging:   We ordered and independently reviewed new radiographs at this clinic visit. The results were discussed with the patient. Findings include:   Degenerative scoliosis of the lumbar spine with associated degenerative disc disease worse at L2 -3 3-4 and 4 -5.  No obvious instability on flexion-extension films today.  Vacuum disc at L4-5 and L5  S1          MRI of the spine, lumbar reviewed dated 1/31 demonstrates no obvious fractures dislocations.  There is some stenosis of the spine throughout with diffuse facet hypertrophy.  Of note the right L3-4 and 4 5 foramina have significant stenosis as well as the left L5-S1 foramina.  The right facet hypertrophy at the L3-4 level is probably the most significant and results and associated central canal stenosis as well as foraminal.  The nerve root appears impinged at L5-S1 on the left there is a prominent osteophyte off the posterior aspect of the vertebral body that is impinging upon the nerve root.     Assessment and Plan:     73 year old male with spondylosis, lumbar spine stenosis, significant right L3-4 and 4 5 foraminal stenosis without obvious radiculopathy as well as left L5-S1 foraminal stenosis with radiculopathy.  Patient has tried multiple conservative measures but has not yet tried image guided injections.     L5-S1 transforaminal injection will need to be scheduled around his upcoming hip  revision   Continue with core strengthening and home PT exercises as tolerated  Continue with gabapentin  Tylenol/ibuprofen as needed    Discussed with patient that we could try steroid injection to see what kind of relief he receives with a image guided injection.  This could be beneficial both on a therapeutic level and a diagnostic level to localize the L5-S1 level as a symptomatic level if we were able to correlate clinically his improvement or lack of improvement after injection.  Of note he is scheduled for a hip replacement on April 23 and would like to get this done after the hip replacement given associated increased chance of infection.  He would like to try and schedule this approximately 1 month after his hip replacement if possible.  Once he has his injection performed we can follow-up via a virtual visit 2 weeks after to evaluate how this done for him.      Respectfully,  Efren Merino MD  Spine Surgery  [facility]

## 2024-04-02 NOTE — NURSING NOTE
"Reason For Visit:   Chief Complaint   Patient presents with    Consult     Lumbar pain referred by Dr. Dudley. Pt reports weakness in bilateral thighs the past few months.        Primary MD: Keyur Augustin  Ref. MD: Toni Dudley    ?  No  Occupation na.  Currently working? No.  Work status?  Retired.  Date of injury: chronic  Type of injury: back.  Date of surgery: na  Type of surgery: na.  Smoker: No  Request smoking cessation information: No      Ht 1.64 m (5' 4.57\")   Wt 77.1 kg (170 lb)   BMI 28.67 kg/m      Pain Assessment  Patient Currently in Pain: Yes  0-10 Pain Scale: 6 (with walking)  Primary Pain Location: Back (lower back and glutes and someimtes radiating to left leg)  "

## 2024-04-02 NOTE — PROGRESS NOTES
Spine Surgery Clinic Visit      Chief Complaint:   Consult (Lumbar pain referred by Dr. Dudley. Pt reports weakness in bilateral thighs the past few months. )      Interval HPI:  Symptom Profile Including: location of symptoms, onset, severity, exacerbating/alleviating factors, previous treatments:          Respectfully,  Aaron Lan, DO  Spine Fellow

## 2024-04-03 ENCOUNTER — VIRTUAL VISIT (OUTPATIENT)
Dept: SURGERY | Facility: CLINIC | Age: 74
End: 2024-04-03
Payer: COMMERCIAL

## 2024-04-03 ENCOUNTER — PRE VISIT (OUTPATIENT)
Dept: SURGERY | Facility: CLINIC | Age: 74
End: 2024-04-03

## 2024-04-03 VITALS — WEIGHT: 170 LBS | BODY MASS INDEX: 28.32 KG/M2 | HEIGHT: 65 IN

## 2024-04-03 DIAGNOSIS — Z01.818 PREOP EXAMINATION: Primary | ICD-10-CM

## 2024-04-03 DIAGNOSIS — Z96.649 MECHANICAL COMPLICATION OF PROSTHETIC HIP IMPLANT, INITIAL ENCOUNTER (H): ICD-10-CM

## 2024-04-03 DIAGNOSIS — T84.098A MECHANICAL COMPLICATION OF PROSTHETIC HIP IMPLANT, INITIAL ENCOUNTER (H): ICD-10-CM

## 2024-04-03 PROCEDURE — 99205 OFFICE O/P NEW HI 60 MIN: CPT | Mod: 95 | Performed by: PHYSICIAN ASSISTANT

## 2024-04-03 RX ORDER — ZOLPIDEM TARTRATE 10 MG/1
5-10 TABLET ORAL
Status: ON HOLD | COMMUNITY
Start: 2024-03-05 | End: 2024-05-13

## 2024-04-03 ASSESSMENT — PAIN SCALES - GENERAL: PAINLEVEL: NO PAIN (1)

## 2024-04-03 ASSESSMENT — ENCOUNTER SYMPTOMS
SEIZURES: 0
DYSRHYTHMIAS: 1

## 2024-04-03 ASSESSMENT — LIFESTYLE VARIABLES: TOBACCO_USE: 0

## 2024-04-03 NOTE — PROGRESS NOTES
Kwabena is a 73 year old who is being evaluated via a billable video visit.    How would you like to obtain your AVS? MyChart  If the video visit is dropped, the invitation should be resent by: Text to cell phone: [phone]    Subjective   Kwabena is a 73 year old, presenting for the following health issues:  Pre-Op Exam      HPI         Physical Exam

## 2024-04-03 NOTE — OR NURSING
PRE-OP RN -     PLEASE ADVISE OR CIRCULATOR pt needs smaller diameter of catheter if placing carl catheter due to past trauma to urethra - per pt.      Pt has Pacemaker (will bring info card) - [employer]    Metal in right hip, left knee and bilateral feet

## 2024-04-03 NOTE — H&P
Pre-Operative H & P     CC:  Preoperative exam to assess for increased cardiopulmonary risk while undergoing surgery and anesthesia.    Date of Encounter: 4/3/2024  Primary Care Physician:  Keyur Augustin     Reason for visit:   Encounter Diagnoses   Name Primary?    Mechanical complication of prosthetic hip implant, initial encounter  (H24) Yes    Preop examination        HPI  Kwabena Solorio is a 73 year old male who presents for pre-operative H & P in preparation for  Procedure Information       Case: [id number] Date/Time: 04/23/24 1000    Procedures:       REVISION, TOTAL ARTHROPLASTY, HIP, (Right: Hip)      possible debridement of anterior soft tissue mass, possible bone grafting of acetabular bone defect (Right: Hip)    Anesthesia type: Choice    Diagnosis: Mechanical complication of prosthetic hip implant, initial encounter  (H24) [T84.098A, Z96.649]    Pre-op diagnosis: Mechanical complication of prosthetic hip implant, initial encounter  (H24) [T84.098A, Z96.649]    Location:  OR  /  OR    Providers: Rd Dudley MD            Patient is being evaluated for comorbid conditions of complete heart block, s/p [employer] pacemaker, right hydroneprhosis, lumbar scoliosis w/ radiculopathy, s/p left TKA, polymyalgia rheumatica, BPH.     Mr. Solorio is s/p right CHRISS in 2007. 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 He was counseled by Dr. Dudley. He would benefit from operative management to convert to a different bearing surface and bone grafting of his pelvis defect. He now presents for the above procedure.      History is obtained from the patient and chart review. He is accompanied by his sister.    Hx of abnormal bleeding or anti-platelet use: denies      Past Medical History  Past Medical History:   Diagnosis Date    BPH (benign prostatic hyperplasia)     Cardiac pacemaker in situ      Complete heart block (H)     Hydronephrosis, right     Lumbar radiculopathy     Lumbar scoliosis     Restless legs syndrome (RLS)        Past Surgical History  Past Surgical History:   Procedure Laterality Date    APPENDECTOMY      AS TOTAL KNEE ARTHROPLASTY Left 2010    CARPAL TUNNEL RELEASE RT/LT      CATARACT EXTRACTION      COLONOSCOPY      EP ICD      FOOT SURGERY Bilateral     HAND SURGERY      HC TOTAL HIP ARTHROPLASTY Right 2007    HEMORRHOIDECTOMY      HERNIA REPAIR      NASAL FRACTURE SURGERY      TONSILLECTOMY & ADENOIDECTOMY      urinary tract surgery         Prior to Admission Medications  Current Outpatient Medications   Medication Sig Dispense Refill    calcium-magnesium (CALMAG) 500-250 MG TABS per tablet Take 1 tablet by mouth every morning With ZINC      Cholecalciferol (D 1000) 25 MCG (1000 UT) CAPS Take 1,000 Units by mouth daily (with lunch)      ferrous sulfate (FEROSUL) 325 (65 Fe) MG tablet Take 325 mg by mouth daily (with breakfast)      gabapentin (NEURONTIN) 300 MG capsule Take 3 capsules by mouth at bedtime      glucosamine-chondroitin 500-400 MG CAPS per capsule Take 1 capsule by mouth every morning      ketoconazole (NIZORAL) 2 % external cream Apply thin layer twice daily as needed for rash on the face.      LORazepam (ATIVAN) 1 MG tablet Take 1 mg by mouth every 6 hours as needed for anxiety      LUTEIN PO Take 1 tablet by mouth every morning      naproxen (NAPROSYN) 250 MG tablet Take 250 mg by mouth 2 times daily (with meals)      Omega-3 Fatty Acids (SUPER OMEGA-3) 1000 MG CAPS Take 1,000 mg by mouth every morning      omeprazole (PRILOSEC) 20 MG DR capsule Take 1 capsule by mouth daily before breakfast      pramipexole (MIRAPEX) 0.125 MG tablet Take 0.125-0.25 mg by mouth at bedtime      predniSONE (DELTASONE) 5 MG tablet Take 5 mg by mouth daily (with lunch)      Psyllium (METAMUCIL 4 IN 1 FIBER) 55.6 % POWD Take 1 Scoop by mouth every morning      vitamin B-12 (CYANOCOBALAMIN)  500 MCG tablet Take 1,000 mcg by mouth daily (with lunch)      vitamin C (ASCORBIC ACID) 500 MG tablet Take 500 mg by mouth every morning      zolpidem (AMBIEN) 10 MG tablet Take 5-10 mg by mouth nightly as needed for sleep      amoxicillin (AMOXIL) 500 MG capsule TAKE 4 CAPS 1 HOUR BEFORE DENTAL APPT (Patient not taking: Reported on 4/3/2024)         Allergies  No Known Allergies    Social History  Social History     Socioeconomic History    Marital status:      Spouse name: Not on file    Number of children: Not on file    Years of education: Not on file    Highest education level: Not on file   Occupational History    Not on file   Tobacco Use    Smoking status: Never    Smokeless tobacco: Never   Substance and Sexual Activity    Alcohol use: Not Currently     Comment: quit 1990's    Drug use: Never    Sexual activity: Not on file   Other Topics Concern    Not on file   Social History Narrative    Not on file     Social Determinants of Health     Financial Resource Strain: Not on file   Food Insecurity: Not on file   Transportation Needs: Not on file   Physical Activity: Not on file   Stress: Not on file   Social Connections: Not on file   Interpersonal Safety: Not on file   Housing Stability: Not on file       Family History  Family History   Problem Relation Age of Onset    Anesthesia Reaction No family hx of     Deep Vein Thrombosis (DVT) No family hx of        Review of Systems  The complete review of systems is negative other than noted in the HPI or here.     Anesthesia Evaluation   Pt has had prior anesthetic.     No history of anesthetic complications       ROS/MED HX  ENT/Pulmonary:  - neg pulmonary ROS  (-) tobacco use, asthma, sleep apnea and recent URI   Neurologic: Comment: RLS     (-) no seizures and no CVA   Cardiovascular:     (+)  - -   -  - -              pacemaker, Reason placed: complete block. type: [employer],        dysrhythmias, 3rd Deg Heart Block,        Previous cardiac  testing   Echo: Date: 2021 Results:  * The estimated ejection fraction is 50%.     * Left ventricular systolic function is low normal.     * Normal right ventricular systolic function.     * There is mild mitral regurgitation with multiple regurgitant jets..     * The pulmonary artery systolic pressures are normal,     * estimated pulmonary arterial systolic pressure is  31 mmHg, plus RAP.     * The IVC is normal in size (< 2.1 cm), > 50% respiratory variance, RA   pressure normal at 3 mmHg.     * There is no pericardial effusion visualized.     Stress Test:  Date: Results:    ECG Reviewed:  Date: Results:    Cath:  Date: Results:      METS/Exercise Tolerance: 4 - Raking leaves, gardening Comment: Activity limited by back pain. Able to walk 30 min bid w/ walking sticks. Bicycles regularly.    Hematologic:     (+)       history of blood transfusion, no previous transfusion reaction,     (-) history of blood clots   Musculoskeletal: Comment: S/p right CHRISS    S/p left TKA    Has hardware in foot; s/p B/L surgeries    Polymyalgia rheumatica    Lumbar spondylosis & significant foraminal stenosis w/ radiculopathy. Followed by Dr. Merino.        GI/Hepatic:     (+) GERD, Asymptomatic on medication,               (-) liver disease   Renal/Genitourinary: Comment: Moderate right hydronephrosis, incidental finding. Under surveillance. Asymptomatic. Last seen by urology 3/28/24.    Baseline creatinine 0.8-0.9    Prior injury 2/2 carl insertion for knee surgery.          Endo:     (+)            Chronic steroid usage for Arthritis. Date most recently used: daily prednisone for PMR.     (-) Type II DM   Psychiatric/Substance Use:  - neg psychiatric ROS     Infectious Disease:  - neg infectious disease ROS     Malignancy:   (+) Malignancy, History of Skin.Skin CA Remission status post Surgery.      Other:  - neg other ROS          Virtual visit -  No vitals were obtained    Physical Exam  Constitutional: Awake, alert,  cooperative, no apparent distress, and appears stated age.  HENT: Normocephalic  Respiratory: non labored breathing   Neurologic: Awake, alert, oriented to name, place and time.   Neuropsychiatric: Calm, cooperative. Normal affect.      Prior Labs/Diagnostic Studies   All labs and imaging personally reviewed     Echocardiogram -  please see in ROS above       The patient's records and results personally reviewed by this provider.     Outside records reviewed from: Care Everywhere      Assessment    Kwabena Solorio is a 73 year old male seen as a PAC referral for risk assessment and optimization for anesthesia.    Plan/Recommendations  Pt will be optimized for the proposed procedure.  See below for details on the assessment, risk, and preoperative recommendations    NEUROLOGY  - No history of TIA, CVA or seizure    -Post Op delirium risk factors:  No risk identified    ENT  - No current airway concerns.  Will need to be reassessed day of surgery.  Mallampati: Unable to assess  TM: Unable to assess    CARDIAC  - No history of CAD, Hypertension, and Afib  - complete heart block, s/p [employer] pacemaker.  - METS (Metabolic Equivalents)  Activity limited by back pain. Able to walk 30 min bid w/ walking sticks. Bicycles regularly.     Patient performs 4 or more METS exercise without symptoms            Total Score: 0      RCRI-Very low risk: Class 1 0.4% complication rate            Total Score: 0        PULMONARY  DEQUAN Low Risk            Total Score: 2    DEQUAN: Over 50 ys old    DEQUAN: Male      - Denies asthma or inhaler use  - Tobacco History    History   Smoking Status    Never   Smokeless Tobacco    Never       GI  - GERD  Controlled on medications: prilosec  PONV Medium Risk  Total Score: 2           1 AN PONV: Patient is not a current smoker    1 AN PONV: Intended Post Op Opioids        /RENAL  - Baseline Creatinine  0.8-0.9  - Hx prior urethral injury 2/2 carl insertion for knee surgery. Typically uses  "pediatric sized catheter.  - Moderate right hydronephrosis, incidental finding. Under surveillance. Asymptomatic. Last seen by urology 3/28/24.      ENDOCRINE    - BMI: Estimated body mass index is 28.67 kg/m  as calculated from the following:    Height as of this encounter: 1.64 m (5' 4.57\").    Weight as of this encounter: 77.1 kg (170 lb).  Overweight (BMI 25.0-29.9)  - No history of Diabetes Mellitus  - On daily prednisone. Consider stress dose in perioperative period, if indicated.      HEME  VTE Low Risk 0.5%            Total Score: 3    VTE: Greater than 59 yrs old    VTE: Male      - No history of abnormal bleeding or antiplatelet use.      MSK  Patient is NOT Frail            Total Score: 0      - S/p right CHRISS  - S/p left TKA  - Has hardware in foot; s/p B/L surgeries  - Polymyalgia rheumatica, on prednisone  - Lumbar spondylosis & significant foraminal stenosis w/ radiculopathy. Followed by Dr. Merino.      The patient is aware that the final anesthesia plan will be decided by the assigned anesthesia provider on the date of service.      The patient is optimized for their procedure. AVS with information on surgery time/arrival time, meds and NPO status given by nursing staff. No further diagnostic testing indicated.    Please refer to the physical examination documented by the anesthesiologist in the anesthesia record on the day of surgery.    Video-Visit Details    Type of service:  Video Visit    Provider received verbal consent for a Video Visit from the patient? Yes   Video Start Time:  0933  Video End Time: 1010    Originating Location (pt. Location): Home    Distant Location (provider location):  Off-site  Mode of Communication:  Video Conference via V[employer]er  On the day of service:     Prep time: 14 minutes  Visit time: 37 minutes  Documentation time: 13 minutes  ------------------------------------------  Total time: 64 minutes      Geovanna Romero PA-C  Preoperative Assessment Center  [facility]" [facility]  Clinic and Surgery Center  Phone: [phone]  Fax: [phone]

## 2024-04-03 NOTE — PATIENT INSTRUCTIONS
Preparing for Your Surgery      Name:  Kwabena Solorio   MRN:  [id number]   :  1950   Today's Date:  4/3/2024       Arriving for surgery:  Surgery date:  24  Arrival time:  07:30 am    Please come to:     Please come to:      [facility] [address]  [address] [place]  [address]  The Green Ramp for patients and visitors is located beneath the [facility]. The parking facility entrance is at the intersection of [address] [place] [address] and [address]. Patients and visitors who self-park will receive the reduced hospital parking rate (no ticket validation needed).  [employer] parking, located at the [facility] main entrance on [address] [place] [address], is available Monday - Friday from 7 am to 3:30 pm.  Discounted parking pass options can be purchased from  attendants during business hours.  -Check in at the security desk in the [facility] ([facility]) Lobby. They will direct you to the correct elevators.  -Proceed to the 3rd floor, check in at the Adult Surgery Waiting Lounge. [phone]  If you are in need of directions, a wheelchair or escort please stop at the Information Desk in the lobby.  Inform the information person that you are here for surgery; a wheelchair and escort to [address] will be provided.   An escort to the Adult Surgery Waiting Lounge will be provided.    What can I eat or drink?  -  You may eat and drink normally up to 8 hours prior to arrival time. (Until 11:30 pm the evening prior to your surgery)  -  You may have clear liquids until 2 hours prior to arrival time. (Until 05:30 am)    Examples of clear liquids:  Water  Clear broth  Juices (apple, white grape, white cranberry  and cider) without pulp  Noncarbonated, powder based beverages  (lemonade and Papa-Aid)  Sodas (Sprite, 7-Up, ginger ale and seltzer)  Coffee or tea (without milk or  cream)  Gatorade    -  No Alcohol or cannabis products for at least 24 hours before surgery.     Which medicines can I take?    Hold Aspirin for 7 days before surgery.   Hold Multivitamins for 7 days before surgery.  Hold Supplements (including Omega 3 Fatty Acids) for 7 days before surgery.  Hold Ibuprofen (Advil, Motrin) for 1 day(s) before surgery--unless otherwise directed by surgeon.  Hold Naproxen (Aleve) for 4 days before surgery.    -  PLEASE TAKE these medications the day of surgery:  Gabapentin (Neurontin)  Omeprazole (Prilosec)  Prednisone (Deltasone)    How do I prepare myself?  - Please take 2 showers (one the night prior to surgery and one the morning of surgery) using Scrubcare or Hibiclens soap.    Use this soap only from the neck to your toes.     Leave the soap on your skin for one minute--then rinse thoroughly.      You may use your own shampoo and conditioner. No other hair products.   - Please remove all jewelry and body piercings.  - No lotions, deodorants or fragrance.  - No makeup or fingernail polish.   - Bring your ID and insurance card.    -If you use a CPAP machine, please bring the CPAP machine, tubing, and mask to hospital.    -If you have a Deep Brain Stimulator, Spinal Cord Stimulator, or any Neuro Stimulator device---you must bring the remote control to the hospital.      ALL PATIENTS GOING HOME THE SAME DAY OF SURGERY ARE REQUIRED TO HAVE A RESPONSIBLE ADULT TO DRIVE AND BE IN ATTENDANCE WITH THEM FOR 24 HOURS FOLLOWING SURGERY.    Covid testing policy as of 12/06/2022  Your surgeon will notify and schedule you for a COVID test if one is needed before surgery--please direct any questions or COVID symptoms to your surgeon      Questions or Concerns:    - For any questions regarding the day of surgery or your hospital stay, please contact the Pre Admission Nursing Office at [phone].       - If you have health changes between today and your surgery, please call your surgeon.        - For questions after surgery, please call your surgeons office.           Current Visitor Guidelines    You may have 2 visitors in the pre op area.    Visiting hours: 8 a.m. to 8:30 p.m.    Patients confirmed or suspected to have symptoms of COVID 19 or flu:     No visitors allowed for adult patients.   Children (under age 18) can have 1 named visitor.     People who are sick or showing symptoms of COVID 19 or flu:    Are not allowed to visit patients--we can only make exceptions in special situations.       Please follow these guidelines for your visit:          Please maintain social distance          Masking is optional--however at times you may be asked to wear a mask for the safety of yourself and others     Clean your hands with alcohol hand . Do this when you arrive at and leave the building and patient room,    And again after you touch your mask or anything in the room.     Go directly to and from the room you are visiting.     Stay in the patient s room during your visit. Limit going to other places in the hospital as much as possible     Leave bags and jackets at home or in the car.     For everyone s health, please don t come and go during your visit. That includes for smoking   during your visit.

## 2024-04-04 ENCOUNTER — PREP FOR PROCEDURE (OUTPATIENT)
Dept: PALLIATIVE MEDICINE | Facility: CLINIC | Age: 74
End: 2024-04-04
Payer: COMMERCIAL

## 2024-04-04 DIAGNOSIS — M47.27 SPONDYLOSIS OF LUMBOSACRAL SPINE WITH RADICULOPATHY: Primary | ICD-10-CM

## 2024-04-04 DIAGNOSIS — M47.16 LUMBAR SPONDYLOSIS WITH MYELOPATHY: ICD-10-CM

## 2024-04-04 DIAGNOSIS — M41.50 DEGENERATIVE SCOLIOSIS IN ADULT PATIENT: ICD-10-CM

## 2024-04-04 NOTE — TELEPHONE ENCOUNTER
Scheduled for LUMBAR RADHA with Dr. Dietz on 5/23/24 AT 11AM at [facility].   Advised to arrive one hour early and have a .  Order sent to Dr. Dietz for signature so case can be added  Bhavani See CMA-Surgery Scheduler

## 2024-04-08 ENCOUNTER — HOSPITAL ENCOUNTER (OUTPATIENT)
Facility: CLINIC | Age: 74
End: 2024-04-08
Attending: ANESTHESIOLOGY | Admitting: ANESTHESIOLOGY
Payer: COMMERCIAL

## 2024-04-15 RX ORDER — TRANEXAMIC ACID 650 MG/1
1950 TABLET ORAL ONCE
Status: CANCELLED | OUTPATIENT
Start: 2024-04-15 | End: 2024-04-15

## 2024-04-15 RX ORDER — CEFAZOLIN SODIUM/WATER 2 G/20 ML
2 SYRINGE (ML) INTRAVENOUS SEE ADMIN INSTRUCTIONS
Status: CANCELLED | OUTPATIENT
Start: 2024-04-15

## 2024-04-15 RX ORDER — ACETAMINOPHEN 325 MG/1
975 TABLET ORAL ONCE
Status: CANCELLED | OUTPATIENT
Start: 2024-04-15 | End: 2024-04-15

## 2024-04-15 RX ORDER — CEFAZOLIN SODIUM/WATER 2 G/20 ML
2 SYRINGE (ML) INTRAVENOUS
Status: CANCELLED | OUTPATIENT
Start: 2024-04-15

## 2024-04-15 RX ORDER — CELECOXIB 200 MG/1
400 CAPSULE ORAL ONCE
Status: CANCELLED | OUTPATIENT
Start: 2024-04-15 | End: 2024-04-15

## 2024-04-16 ENCOUNTER — PREP FOR PROCEDURE (OUTPATIENT)
Dept: OTHER | Facility: CLINIC | Age: 74
End: 2024-04-16
Payer: COMMERCIAL

## 2024-04-17 NOTE — PROGRESS NOTES
SURGERY PLAN (PRE-OP PLAN)     Patient Position (indicated by x):     Supine      Supine with torso rolled up on a bump      Floppy lateral on torso length bean bag      Lateral decubitus, bean bag, full length   X  Lateral decubitus, Wixson hip positioner      Safety paddle side supports x 2 clamped to side rail      Lithotomy, both legs in yellow padded leg apodaca      Lithotomy, single leg in yellow padded leg apodaca      Prone on blanket rolls/round gel pad      Prone on Antonio (arched) frame on Stanislav table      Single thigh in orange arthroscopy clamp      Beach chair semi recumbent      Spider limb positioner      Arm out on radiolucent arm table      Split drape with top bar      Revision CHRISS drape with plastic side bags for leg      Extremity drape      Shoulder pack drape      Laparotomy drape      Lomeli catheter            Part 1: Extraction setup (indicated by x):                                                                     x Pulse lavage: - shower spray tip                           - Long intramedullary tip   x CamVac disposable plastic suction tip (debris)   X  Osteotomes     Power drill, oscillating saw     Reciprocating saw (double edged serrated saw blade)     Tibia implant extractor (Innomed)   x Biomet offset bone impactor (white handle)     IM canal cement removal tools (Deneen set)   x Angled curettes (Dudley set)     Flexible osteotomes     Single prong PCL retractor   x Large hammer   x Triple antibiotic irrigation x 3 liters     Metal ruler (6 inch)   x Betadine prep solution, 1 bottle, full strength, sterile      Acetabular reamers      [employer] explant system       X  S[employer]N Boyd Hip Resurfacing Acetabular Cup Extraction Kit      Universal stem extractor      Dot extractor set   X  Suction tip with debris trap (clear plastic disposable)   X  Biomet offset implant impactor (white handle in Dudley's cart)      Deneen IM canal set      Specimens and cultures (indicated by x):    X5  Tissue cultures, aerobic and anaerobic without gram stain      Frozen section      pathology specimens - fresh      pathology specimens - formalin         Part 2: 2nd surgeon will make the cement mold on back table while 1st surgeon debrides the knee  (indicated by x):  x Rush rods and moira cutter   x Metal ruler   x Cement mixing bowl ([employer] green non-vacuum)    x Cement mixing tower and injector gun   x New drapes/hip stockinette (large white cotton)/large Ioban drape   x New Gowns/Gloves for surgical team   x suction   x Irrigation bulb    x Bovie   x Coban wrap   x Major Ortho instrument pan   x Scalpel #10 blade   x KEYUR #15 drain, small bulb reservoir   x Sutures: 0 PDS x 2 (taper needle), 2-0 PDS x 2, 3-0 PDS skin closure, 2-0 silk for anchoring drain.   X   Dall Miles beaded cerclage cable, green cable  x2 , insertion instruments HOLD      Implant Extraction/Cement spacer tools (indicated by x):     x  G21 spacer    x  Low viscosity PMMA w/ tobramycin and Vanco 3 g /package PMMA      Yo Rods, large + moira cutter         Plan: Removal of right hip resurfacing arthroplasty, Irrigation and debridement, and Insertion of antibiotic spacer     Osvaldo Chan MD  Adult Joint Reconstruction Fellow  Dept Orthopaedic Surgery, [facility]

## 2024-04-18 ENCOUNTER — TRANSFERRED RECORDS (OUTPATIENT)
Dept: HEALTH INFORMATION MANAGEMENT | Facility: CLINIC | Age: 74
End: 2024-04-18
Payer: COMMERCIAL

## 2024-04-19 ENCOUNTER — TELEPHONE (OUTPATIENT)
Dept: ORTHOPEDICS | Facility: CLINIC | Age: 74
End: 2024-04-19
Payer: COMMERCIAL

## 2024-04-19 NOTE — TELEPHONE ENCOUNTER
Contacted patient to advise that per Dr. Dudley he will have to defer his surgery until a later date due to his healing wounds on his feet.  Patient stated that he did have some drainage on the dressings yesterday.  This morning he had clean and dry dressings.  Patient also has an upcoming steroid injection into his spine that might have to be rescheduled.      Advised that Jaleesa will give him a call to get the surgery rescheduled early next week when she is back in the office.

## 2024-04-19 NOTE — TELEPHONE ENCOUNTER
----- Message from Rd Dudley MD sent at 4/18/2024  6:00 PM CDT -----  Regarding: RE: Wounds  We should defer his 4/23 surgery until his nailbeds have completely healed.  This likely will take a month.  Please re-schedule and notify patient.  Thank you  ----- Message -----  From: Vania Renee ATC  Sent: 4/18/2024   4:15 PM CDT  To: Kaz Bowser PA-C; Violetta Nguyen RN; #  Subject: FW: Wounds                                       Hi Jaleesa Mccurdy is out and April is covering for her but I wanted to also put this on your radar.  Let us know if there is anything that we can to do help.  I have included Dr. Dudley.  He is on his virtual visits right now and I am head ing out here in a min.    Vania  ----- Message -----  From: Aaron Crow ATC  Sent: 4/18/2024   3:49 PM CDT  To: Vania Renee ATC; Faith Ayala LPN  Subject: FW: Wounds                                         ----- Message -----  From: Hesham Johnson RN  Sent: 4/18/2024   3:29 PM CDT  To: Jaleesa Manley RN; [facility]  Subject: Wounds                                           Hello,     I spoke with Kwabena today and he mentioned that he had 3 toe nails on his left foot permanently removed in [place] on 3/19/24. He is experiencing weeping from the toe nail bed as expected per [facility] podiatry. The drainage is clear and thin with no redness or swelling to the area. He feels well otherwise. He's been doing BID dressing changes with silver sulfADIAZINE. Sounds like it is healing but healing slowly.  He is not my patient but he was given my contact info. I wanted to make sure that Dr. Dudley was aware but It appears that Jaleesa is OOO. Is someone able to pass this along for review and follow up with the patient regarding any guidance from Dr. Dudley?    Revision R-CHRISS  DOS 4/23/24      Thanks,   Hesham Johnson  Ortho Navigator   [phone]

## 2024-04-24 ENCOUNTER — TELEPHONE (OUTPATIENT)
Dept: ORTHOPEDICS | Facility: CLINIC | Age: 74
End: 2024-04-24
Payer: COMMERCIAL

## 2024-04-24 DIAGNOSIS — T56.894A TOXIC EFFECT OF OTHER METALS, UNDETERMINED, INITIAL ENCOUNTER: ICD-10-CM

## 2024-04-24 DIAGNOSIS — T56.2X4A TOXIC EFFECT OF CHROMIUM AND ITS COMPOUNDS, UNDETERMINED, INITIAL ENCOUNTER: ICD-10-CM

## 2024-04-24 DIAGNOSIS — Z96.641 STATUS POST TOTAL REPLACEMENT OF RIGHT HIP: Primary | ICD-10-CM

## 2024-04-24 NOTE — TELEPHONE ENCOUNTER
- A call was placed to the patient.     - Appointment scheduled for tomorrow with Kaz for aspiration @ 12 and lab appointment scheduled as well.     - Lab orders placed.     - Patient was hoping to reschedule spine injection. I told him I would reach out to Dr. Wilber Garcia nurse and ask her to send a message to reschedule.     - Patient verbalized understanding of plan and all questions were answered. Call back number to clinic was given and patient was told to call if they had an further questions.

## 2024-04-24 NOTE — TELEPHONE ENCOUNTER
Patient is rescheduled for surgery with Dr. Dudley    Spoke with: patient    Date of Surgery: 5/21/24    Location: [place]    Post op: 6/10/24    Pre op with Provider complete    H&P: Scheduled with PAC 4/30/24    Additional imaging/appointments: N/A    Surgery packet: received     Additional comments: N/A        Phyllis Guevara CMA on 4/24/2024 at 10:30 AM

## 2024-04-24 NOTE — RESULT ENCOUNTER NOTE
Dear Kwabena Solorio:    I did review your CT scan which is useful for planning your surgery.  In addition, it would best to also obtain blood tests for metal ion levels (Co, Chr, Ti) and also aspirate your right hip for joint fluid testing as well (cell count, diff, cultures, alpha defensin, Co/Chr/Ti levels) .    @Jaleesa, would you please arrange above at least 2 weeks before his upcoming surgery date?    Best regards,    Rd Dudley MD  [facility] Family Professor  Oncology and Adult Reconstructive Surgery  Dept Orthopaedic Surgery, [facility]  [phone] office  [email or web address]

## 2024-04-24 NOTE — TELEPHONE ENCOUNTER
FUTURE VISIT INFORMATION      SURGERY INFORMATION:  Date: 24  Location: [facility] or  Surgeon:  Rd Dudley MD   Anesthesia Type:  choice  Procedure: REVISION, TOTAL ARTHROPLASTY, HIP, possible debridement of anterior soft tissue mass, possible bone grafting of acetabular bone defect     RECORDS REQUESTED FROM:         Primary Care Provider: Keyur Augustin MD - [facility]     Most recent EKG+ Tracin22- Radha

## 2024-04-24 NOTE — TELEPHONE ENCOUNTER
----- Message from Rd Dudley MD sent at 4/24/2024 11:33 AM CDT -----  Dear Kwabena Solorio:    I did review your CT scan which is useful for planning your surgery.  In addition, it would best to also obtain blood tests for metal ion levels (Co, Chr, Ti) and also aspirate your right hip for joint fluid testing as well (cell count, diff, cultures, alpha defensin, Co/Chr/Ti levels) .    @Jaleesa, would you please arrange above at least 2 weeks before his upcoming surgery date?    Best regards,    MD Tory Jesus Family Professor  Oncology and Adult Reconstructive Surgery  Dept Orthopaedic Surgery, [facility]  [phone] office  [email or web address]

## 2024-04-25 ENCOUNTER — LAB (OUTPATIENT)
Dept: LAB | Facility: CLINIC | Age: 74
End: 2024-04-25
Payer: COMMERCIAL

## 2024-04-25 ENCOUNTER — OFFICE VISIT (OUTPATIENT)
Dept: ORTHOPEDICS | Facility: CLINIC | Age: 74
End: 2024-04-25
Payer: COMMERCIAL

## 2024-04-25 DIAGNOSIS — Z96.641 STATUS POST TOTAL REPLACEMENT OF RIGHT HIP: Primary | ICD-10-CM

## 2024-04-25 DIAGNOSIS — T56.894A TOXIC EFFECT OF OTHER METALS, UNDETERMINED, INITIAL ENCOUNTER: ICD-10-CM

## 2024-04-25 DIAGNOSIS — T56.2X4A TOXIC EFFECT OF CHROMIUM AND ITS COMPOUNDS, UNDETERMINED, INITIAL ENCOUNTER: ICD-10-CM

## 2024-04-25 DIAGNOSIS — Z96.641 STATUS POST TOTAL REPLACEMENT OF RIGHT HIP: ICD-10-CM

## 2024-04-25 LAB
APPEARANCE FLD: ABNORMAL
CELL COUNT BODY FLUID SOURCE: ABNORMAL
COLOR FLD: ABNORMAL
LYMPHOCYTES NFR FLD MANUAL: 2 %
Lab: NORMAL
MONOS+MACROS NFR FLD MANUAL: 9 %
NEUTS BAND NFR FLD MANUAL: 89 %
PERFORMING LABORATORY: NORMAL
SPECIMEN STATUS: NORMAL
TEST NAME: NORMAL
WBC # FLD AUTO: ABNORMAL /UL

## 2024-04-25 PROCEDURE — 89050 BODY FLUID CELL COUNT: CPT | Performed by: PHYSICIAN ASSISTANT

## 2024-04-25 PROCEDURE — 83018 HEAVY METAL QUAN EACH NES: CPT | Performed by: PATHOLOGY

## 2024-04-25 PROCEDURE — 99000 SPECIMEN HANDLING OFFICE-LAB: CPT | Performed by: PATHOLOGY

## 2024-04-25 PROCEDURE — 87102 FUNGUS ISOLATION CULTURE: CPT | Performed by: PHYSICIAN ASSISTANT

## 2024-04-25 PROCEDURE — 99214 OFFICE O/P EST MOD 30 MIN: CPT | Mod: 25 | Performed by: PHYSICIAN ASSISTANT

## 2024-04-25 PROCEDURE — 87075 CULTR BACTERIA EXCEPT BLOOD: CPT | Performed by: PHYSICIAN ASSISTANT

## 2024-04-25 PROCEDURE — 20611 DRAIN/INJ JOINT/BURSA W/US: CPT | Mod: RT | Performed by: PHYSICIAN ASSISTANT

## 2024-04-25 PROCEDURE — 82495 ASSAY OF CHROMIUM: CPT | Mod: 90 | Performed by: PATHOLOGY

## 2024-04-25 PROCEDURE — 36415 COLL VENOUS BLD VENIPUNCTURE: CPT | Performed by: PATHOLOGY

## 2024-04-25 PROCEDURE — 87186 SC STD MICRODIL/AGAR DIL: CPT | Performed by: PHYSICIAN ASSISTANT

## 2024-04-25 RX ORDER — LIDOCAINE HYDROCHLORIDE 10 MG/ML
5 INJECTION, SOLUTION INFILTRATION; PERINEURAL
Status: DISCONTINUED | OUTPATIENT
Start: 2024-04-25 | End: 2024-05-08

## 2024-04-25 RX ORDER — SILVER SULFADIAZINE 1 %
CREAM (GRAM) TOPICAL 2 TIMES DAILY
Status: ON HOLD | COMMUNITY
Start: 2024-03-19 | End: 2024-05-13

## 2024-04-25 RX ADMIN — LIDOCAINE HYDROCHLORIDE 5 ML: 10 INJECTION, SOLUTION INFILTRATION; PERINEURAL at 14:22

## 2024-04-25 NOTE — PROGRESS NOTES
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 Dudley.           Data:   All imaging studies reviewed by me            Procedure:   The injection was performed using sterile technique.  Under ultrasound guidance a 3.5 inch 18-gauge needle was used to enter the femoracetabular joint.  Anterior approach was used, needle placement was visualized and documented with ultrasound.  Ultrasound was necessary due to ensure intra-articular positioning and to avoid vital adjacent structures.  Aspiration performed long axis to the probe.  The needle was repositioned 3 times a liter of the obtain 1 mL of cloudy synovial fluid. images were permanently stored for the patient's record.    There were complications.  The patient tolerated the procedure well.  There was negligible bleeding     Large Joint Injection/Arthocentesis: R hip joint    Date/Time: 4/25/2024 2:22 PM    Performed by: Kaz Bowser PA-C  Authorized by: Kaz Bowser PA-C    Indications:  Pain  Needle Size:  18 G  Approach:  Anterior  Location:  Hip      Site:  R hip joint  Medications:  5 mL lidocaine 1 %  Aspirate amount (mL):  1  Aspirate comment:  Cloudy synovial fluid  Aspirate analysis: sent for lab analysis    Outcome:  Tolerated well, no immediate complications  Procedure discussed: discussed risks, benefits, and alternatives    Consent Given by:  Patient  Timeout: timeout called immediately prior to procedure    Prep: patient was prepped and draped in usual sterile fashion              Assessment and Plan:   Assessment:  73 year old male s/p metal on metal hip resurfacing in 2007 now with  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 Dudley.  Patient tolerated the procedure well     Plan:  After consent was given the procedure was performed as above.  The patient tolerated it well.  A Band-Aid was replaced which the patient can remove this evening and start showering tomorrrow.  I instructed them to watch for signs of infection including erythema, discharge and increased pain.  If they have any concerns I instructed them to call.  All questions were answered and we will reach out to the patient with lab results of the aspiration.     Kaz oBwser PA-C  Physician Assistant   Oncology and Adult Reconstructive Surgery  Dept Orthopaedic Surgery, [facility]

## 2024-04-25 NOTE — NURSING NOTE
[facility] [address] [address]  [phone]  Dept: [phone]  ______________________________________________________________________________    Patient: Kwabena Solorio   : 1950   MRN: [id number]   2024    INVASIVE PROCEDURE SAFETY CHECKLIST    Date: 2024   Procedure:Right hip aspiration USG  Patient Name: Kwabena Solorio  MRN: [id number]  YOB: 1950    Action: Complete sections as appropriate. Any discrepancy results in a HARD COPY until resolved.     PRE PROCEDURE:  Patient ID verified with 2 identifiers (name and  or MRN): Yes  Procedure and site verified with patient/designee (when able): Yes  Accurate consent documentation in medical record: Yes  H&P (or appropriate assessment) documented in medical record: NA  H&P must be up to 20 days prior to procedure and updates within 24 hours of procedure as applicable: NA  Relevant diagnostic and radiology test results appropriately labeled and displayed as applicable: Yes  Procedure site(s) marked with provider initials: NA    TIMEOUT:  Time-Out performed immediately prior to starting procedure, including verbal and active participation of all team members addressing the following:Yes  * Correct patient identify  * Confirmed that the correct side and site are marked  * An accurate procedure consent form  * Agreement on the procedure to be done  * Correct patient position  * Relevant images and results are properly labeled and appropriately displayed  * The need to administer antibiotics or fluids for irrigation purposes during the procedure as applicable   * Safety precautions based on patient history or medication use    DURING PROCEDURE: Verification of correct person, site, and procedures any time the responsibility for care of the patient is transferred to another member of the care team.       The following medications were given:         Prior  to injection, verified patient identity using patient's name and date of birth.  Due to injection administration, patient instructed to remain in clinic for 15 minutes  afterwards, and to report any adverse reaction to me immediately.    Aspiration was preformed   Medication Name: Lidocaine NDC 11819-808-49      Scribed by Vania Renee, KATIANA for Kaz Bowser on April 25, 2024 at 2:01p based on the provider's statements to me.     Vania Renee, ATC

## 2024-04-25 NOTE — LETTER
4/25/2024         RE: Kwabena Solorio  [address]   [address]  [place] [address]        Dear Colleague,    Thank you for referring your patient, Kwabena Solorio, to the [facility] [place]. Please see a copy of my visit note below.        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 Dudley.           Data:   All imaging studies reviewed by me            Procedure:   The injection was performed using sterile technique.  Under ultrasound guidance a 3.5 inch 18-gauge needle was used to enter the femoracetabular joint.  Anterior approach was used, needle placement was visualized and documented with ultrasound.  Ultrasound was necessary due to ensure intra-articular positioning and to avoid vital adjacent structures.  Aspiration performed long axis to the probe.  The needle was repositioned 3 times a liter of the obtain 1 mL of cloudy synovial fluid. images were permanently stored for the patient's record.    There were complications.  The patient tolerated the procedure well.  There was negligible bleeding     Large Joint Injection/Arthocentesis: R hip joint    Date/Time: 4/25/2024 2:22 PM    Performed by: Kaz Bowser PA-C  Authorized by: Kaz Bowser PA-C    Indications:  Pain  Needle Size:  18 G  Approach:  Anterior  Location:  Hip      Site:  R hip joint  Medications:  5 mL lidocaine 1 %  Aspirate amount (mL):  1  Aspirate comment:  Cloudy synovial fluid  Aspirate analysis: sent for lab analysis    Outcome:  Tolerated well, no immediate complications  Procedure discussed: discussed risks, benefits, and alternatives    Consent  Given by:  Patient  Timeout: timeout called immediately prior to procedure    Prep: patient was prepped and draped in usual sterile fashion              Assessment and Plan:   Assessment:  73 year old male s/p metal on metal hip resurfacing in 2007 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 Dudley.  Patient tolerated the procedure well     Plan:  After consent was given the procedure was performed as above.  The patient tolerated it well.  A Band-Aid was replaced which the patient can remove this evening and start showering tomorrrow.  I instructed them to watch for signs of infection including erythema, discharge and increased pain.  If they have any concerns I instructed them to call.  All questions were answered and we will reach out to the patient with lab results of the aspiration.     Kaz Bowser PA-C  Physician Assistant   Oncology and Adult Reconstructive Surgery  Dept Orthopaedic Surgery, [facility]

## 2024-04-27 ENCOUNTER — MYC MEDICAL ADVICE (OUTPATIENT)
Dept: ORTHOPEDICS | Facility: CLINIC | Age: 74
End: 2024-04-27
Payer: COMMERCIAL

## 2024-04-27 LAB
COBALT SERPL-MCNC: 4.2 UG/L
CR SERPL-MCNC: 3.7 UG/L

## 2024-04-29 ENCOUNTER — VIRTUAL VISIT (OUTPATIENT)
Dept: ORTHOPEDICS | Facility: CLINIC | Age: 74
End: 2024-04-29
Payer: COMMERCIAL

## 2024-04-29 ENCOUNTER — PREP FOR PROCEDURE (OUTPATIENT)
Dept: ORTHOPEDICS | Facility: CLINIC | Age: 74
End: 2024-04-29
Payer: COMMERCIAL

## 2024-04-29 VITALS — WEIGHT: 170 LBS | HEIGHT: 65 IN | BODY MASS INDEX: 28.32 KG/M2

## 2024-04-29 DIAGNOSIS — T84.50XA INFECTION OF PROSTHETIC JOINT, INITIAL ENCOUNTER (H): Primary | ICD-10-CM

## 2024-04-29 DIAGNOSIS — Z96.649 INFECTION OF PROSTHETIC TOTAL HIP JOINT, SUBSEQUENT ENCOUNTER: Primary | ICD-10-CM

## 2024-04-29 DIAGNOSIS — T84.59XD INFECTION OF PROSTHETIC TOTAL HIP JOINT, SUBSEQUENT ENCOUNTER: Primary | ICD-10-CM

## 2024-04-29 PROCEDURE — 99215 OFFICE O/P EST HI 40 MIN: CPT | Mod: 95 | Performed by: ORTHOPAEDIC SURGERY

## 2024-04-29 RX ORDER — CELECOXIB 100 MG/1
400 CAPSULE ORAL ONCE
Status: CANCELLED | OUTPATIENT
Start: 2024-04-29 | End: 2024-04-29

## 2024-04-29 RX ORDER — ACETAMINOPHEN 325 MG/1
975 TABLET ORAL ONCE
Status: CANCELLED | OUTPATIENT
Start: 2024-04-29 | End: 2024-04-29

## 2024-04-29 RX ORDER — TRANEXAMIC ACID 650 MG/1
1950 TABLET ORAL ONCE
Status: CANCELLED | OUTPATIENT
Start: 2024-04-29 | End: 2024-04-29

## 2024-04-29 RX ORDER — CEFAZOLIN SODIUM 2 G/50ML
2 SOLUTION INTRAVENOUS SEE ADMIN INSTRUCTIONS
Status: CANCELLED | OUTPATIENT
Start: 2024-04-29

## 2024-04-29 RX ORDER — CEFAZOLIN SODIUM 2 G/50ML
2 SOLUTION INTRAVENOUS
Status: CANCELLED | OUTPATIENT
Start: 2024-04-29

## 2024-04-29 ASSESSMENT — PAIN SCALES - GENERAL: PAINLEVEL: NO PAIN (0)

## 2024-04-29 NOTE — LETTER
4/29/2024       RE: Kwabena Solorio  [address]  [address]  [facility] [address]    Dear Colleague,    Thank you for referring your patient, Kwabena Solorio, to the [facility] [place]. Please see a copy of my visit note below.    Virtual Visit Details    Type of service:  Video Visit   Video Start Time:  1200  Video End Time: 1230  Non video work time 15 min  Originating Location (pt. Location): Home    Distant Location (provider location):  On-site  Platform used for Video Visit: [facility]  Orthopaedic Surgery, Joint Replacement Consultation  by Rd Dudley M.D.     Kwabena Solorio MRN# [id number]     YOB: 1950      Requesting physician: No ref. provider found  No primary care provider on file.      Background history:  DX:  PMR on prednisone     TREATMENTS:  10/9/1979, [facility]  3/27/07, Right hip arthroplasty, [place] Hip Resurfacing. Size 46 femoral head, 52-mm, acetabulum, Tobramycin Simplex cement.  ( Keyur Balderrama MD ) [facility]  3/22/2013, Angel/Akin Bunionectomy, bilateral feet (R. Sticha) [facility]  3/10/2014, screw removal right foot (R. Sticha) [facility]  5/2/2014, screw removal left foot, (R. Sticha), [facility]  2/27/2020, Right CMC arthroplasty and Left hand thumb trigger finger release (JAQUI Prasad), [facility]  4/25/2024, R hip aspirate: WBC 21k, PMNs 89%, Culture Enterococcus faecalis, serum Chr 3.7 nl, serum Co 4.2 elevated.      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    Given this finding it is my recommendation that he undergo a two-stage exchange arthroplasty.  This would involve removal of his BHR surface replacement and placement of a temporary unipolar antibiotic spacer implant and maintaining a  toe-touch weightbearing status.  I outlined the course of treatment for prolonged antibiotic therapy followed by an antibiotic holiday followed by repeat aspiration and testing and then finally proceeding with second stage reimplantation as long as his infection has been cleared.    They have a good understanding all questions along the risk benefits and alternatives were discussed today.  Will plan on proceeding with surgery as soon as possible.  Meanwhile patient developed problems such as fever, chills, swelling, delirium, drainage or increased pain, I instructed him to report to the emergency room and notify us.    MD Tory Jesus Family Professor  Oncology and Adult Reconstructive Surgery  Dept Orthopaedic Surgery, [facility]  [phone] office, [phone] pager  [email or web address]

## 2024-04-29 NOTE — NURSING NOTE
Is the patient currently in the state of [place]? YES    Visit mode:VIDEO    If the visit is dropped, the patient can be reconnected by: VIDEO VISIT: Send to e-mail at: [email or web address]    Will anyone else be joining the visit? YES: How would they like to receive their invitation? Text to cell phone: [phone] and [phone].   (If patient encounters technical issues they should call [phone]    How would you like to obtain your AVS? MyChart    Are changes needed to the allergy or medication list? No    Are refills needed on medications prescribed by this physician? NO    Reason for visit: JOYCE PALACIOS

## 2024-04-29 NOTE — TELEPHONE ENCOUNTER
- A call was placed to the patient.     - Appointment scheduled for today @ 12:00 for a video visit with Dr. Dudley.      - Patient verbalized understanding of plan and all questions were answered. Call back number to clinic was given and patient was told to call if they had an further questions.

## 2024-04-29 NOTE — PROGRESS NOTES
Virtual Visit Details    Type of service:  Video Visit   Video Start Time:  1200  Video End Time: 1230  Non video work time 15 min  Originating Location (pt. Location): Home    Distant Location (provider location):  On-site  Platform used for Video Visit: [facility]  Orthopaedic Surgery, Joint Replacement Consultation  by Rd Dudley M.D.     Kwabena Solorio MRN# [id number]     YOB: 1950      Requesting physician: No ref. provider found  No primary care provider on file.      Background history:  DX:  PMR on prednisone     TREATMENTS:  10/9/1979, left knee arthroscpy medial menisectomy, ([place]), [facility]  3/27/07, Right hip arthroplasty, [place] Hip Resurfacing. Size 46 femoral head, 52-mm, acetabulum, Tobramycin Simplex cement.  ( Keyur Balderrama MD ) [facility]  3/18/2010, L TKA, Jaquan NexGen LPS Flex, 10mm insert, (Keyur Balderrama) [facility]  3/22/2013, Angel/Akin Bunionectomy, bilateral feet (R. Sticha) [facility]  3/10/2014, screw removal right foot ([facility]. Sticha[facility]  5/2/2014, screw removal left foot, (R. Sticha), [facility]  2/27/2020, Right CMC arthroplasty and Left hand thumb trigger finger release (JAQUI Prasad), [facility]  4/25/2024, R hip aspirate: WBC 21k, PMNs 89%, Culture Enterococcus faecalis, serum Chr 3.7 nl, serum Co 4.2 elevated.      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    Given this finding it is my recommendation that he undergo a two-stage exchange arthroplasty.  This would involve removal of his BHR surface replacement and placement of a temporary unipolar antibiotic spacer implant and maintaining a toe-touch weightbearing status.  I outlined the course of treatment for prolonged antibiotic therapy followed by an antibiotic holiday followed by repeat aspiration and testing and  then finally proceeding with second stage reimplantation as long as his infection has been cleared.    They have a good understanding all questions along the risk benefits and alternatives were discussed today.  Will plan on proceeding with surgery as soon as possible.  Meanwhile patient developed problems such as fever, chills, swelling, delirium, drainage or increased pain, I instructed him to report to the emergency room and notify us.    Rd Dudley MD  [facility] Family Professor  Oncology and Adult Reconstructive Surgery  Dept Orthopaedic Surgery, [facility]  [phone] office, [phone] pager  [email or web address]

## 2024-04-30 ENCOUNTER — PRE VISIT (OUTPATIENT)
Dept: SURGERY | Facility: CLINIC | Age: 74
End: 2024-04-30

## 2024-04-30 ENCOUNTER — ANESTHESIA EVENT (OUTPATIENT)
Dept: SURGERY | Facility: CLINIC | Age: 74
DRG: 466 | End: 2024-04-30
Payer: COMMERCIAL

## 2024-04-30 ENCOUNTER — VIRTUAL VISIT (OUTPATIENT)
Dept: SURGERY | Facility: CLINIC | Age: 74
End: 2024-04-30
Payer: COMMERCIAL

## 2024-04-30 VITALS — WEIGHT: 170 LBS | HEIGHT: 65 IN | BODY MASS INDEX: 28.32 KG/M2

## 2024-04-30 DIAGNOSIS — Z01.818 PREOP EXAMINATION: Primary | ICD-10-CM

## 2024-04-30 DIAGNOSIS — T84.098D MECHANICAL COMPLICATION OF PROSTHETIC HIP IMPLANT, SUBSEQUENT ENCOUNTER: ICD-10-CM

## 2024-04-30 DIAGNOSIS — Z96.649 MECHANICAL COMPLICATION OF PROSTHETIC HIP IMPLANT, SUBSEQUENT ENCOUNTER: ICD-10-CM

## 2024-04-30 LAB — BACTERIA SNV CULT: ABNORMAL

## 2024-04-30 PROCEDURE — 99204 OFFICE O/P NEW MOD 45 MIN: CPT | Mod: 95 | Performed by: NURSE PRACTITIONER

## 2024-04-30 ASSESSMENT — ENCOUNTER SYMPTOMS
ORTHOPNEA: 0
DYSRHYTHMIAS: 1

## 2024-04-30 NOTE — H&P
Pre-Operative H & P     CC:  Preoperative exam to assess for increased cardiopulmonary risk while undergoing surgery and anesthesia.    Date of Encounter: 4/30/2024  Primary Care Physician:  Keyur Augustin     Reason for visit:   Encounter Diagnoses   Name Primary?     Preop examination Yes     Mechanical complication of prosthetic hip implant, subsequent encounter        HPI  Kwabena Solorio is a 73 year old male who presents for pre-operative H & P in preparation for  Procedure Information       Case: [id number] Date/Time: 05/07/24 1430    Procedures:       explant Right surface replacement Total Hip Arthroplasty , (Right: Hip)      vanco/tobra cement spacer implantation (Right: Hip)    Anesthesia type: Choice    Diagnosis: Infection of prosthetic total hip joint, subsequent encounter [T84.59XD, Z96.649]    Pre-op diagnosis: Infection of prosthetic total hip joint, subsequent encounter [T84.59XD, Z96.649]    Location: [facility] / [facility]    Providers: Rd Dudley MD            Kwabena Solorio is a 73 year old male with pacemaker in place for complete heart block, polymyalgia rheumatica, RLS, GERD, BPH, chronic back pain, insomnia and history of skin cancer that has a 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  He has very minimal complaints in regards to the joint., but an abnormality was originally noted this past winter on lumbar imaging done at an outside facility.  He was subsequently referred to [facility] ortho and a CT of the right hip on 4/2/24 noted a right acetabular osteolysis and a large right iliopsoas bursal effusion.  More recent testing as ordered by Dr. Dudley has confirmed prosthetic joint infection.  He consulted with Dr. Dudley virtually yesterday to discuss.  The above listed procedure has now been planned.     History is obtained from the patient and chart review    Hx of abnormal bleeding or anti-platelet use: none      Past Medical History  Past Medical History:    Diagnosis Date     BPH (benign prostatic hyperplasia)      Cardiac pacemaker in situ      Complete heart block (H)      Hydronephrosis, right      Lumbar radiculopathy      Lumbar scoliosis      Restless legs syndrome (RLS)        Past Surgical History  Past Surgical History:   Procedure Laterality Date     APPENDECTOMY       AS TOTAL KNEE ARTHROPLASTY Left 2010     CARPAL TUNNEL RELEASE RT/LT       CATARACT EXTRACTION       COLONOSCOPY       EP ICD       FOOT SURGERY Bilateral      HAND SURGERY       HC TOTAL HIP ARTHROPLASTY Right 2007     HEMORRHOIDECTOMY       HERNIA REPAIR       NASAL FRACTURE SURGERY       TONSILLECTOMY & ADENOIDECTOMY       urinary tract surgery         Prior to Admission Medications  Current Outpatient Medications   Medication Sig Dispense Refill     calcium-magnesium (CALMAG) 500-250 MG TABS per tablet Take 1 tablet by mouth every morning With ZINC       Cholecalciferol (D 1000) 25 MCG (1000 UT) CAPS Take 1,000 Units by mouth daily (with lunch)       ferrous sulfate (FEROSUL) 325 (65 Fe) MG tablet Take 325 mg by mouth daily (with breakfast)       gabapentin (NEURONTIN) 300 MG capsule Take 3 capsules by mouth at bedtime       glucosamine-chondroitin 500-400 MG CAPS per capsule Take 1 capsule by mouth every morning       LORazepam (ATIVAN) 1 MG tablet Take 1 mg by mouth every 6 hours as needed for anxiety       LUTEIN PO Take 1 tablet by mouth every morning       naproxen (NAPROSYN) 250 MG tablet Take 250 mg by mouth 2 times daily (with meals)       Omega-3 Fatty Acids (SUPER OMEGA-3) 1000 MG CAPS Take 1,000 mg by mouth every morning       omeprazole (PRILOSEC) 20 MG DR capsule Take 1 capsule by mouth daily before breakfast       pramipexole (MIRAPEX) 0.125 MG tablet Take 0.125-0.25 mg by mouth at bedtime       predniSONE (DELTASONE) 5 MG tablet Take 5 mg by mouth daily (with lunch)       Psyllium (METAMUCIL 4 IN 1 FIBER) 55.6 % POWD Take 1 Scoop by mouth every morning       vitamin B-12  (CYANOCOBALAMIN) 500 MCG tablet Take 1,000 mcg by mouth daily (with lunch)       vitamin C (ASCORBIC ACID) 500 MG tablet Take 500 mg by mouth every morning       zolpidem (AMBIEN) 10 MG tablet Take 5-10 mg by mouth nightly as needed for sleep       amoxicillin (AMOXIL) 500 MG capsule Dental       ketoconazole (NIZORAL) 2 % external cream Apply thin layer twice daily as needed for rash on the face.       SSD 1 % external cream          Allergies  No Known Allergies    Social History  Social History     Socioeconomic History     Marital status: Single     Spouse name: Not on file     Number of children: 0     Years of education: Not on file     Highest education level: Not on file   Occupational History     Occupation: retired   Tobacco Use     Smoking status: Never     Passive exposure: Past     Smokeless tobacco: Never   Substance and Sexual Activity     Alcohol use: Not Currently     Comment: quit 1990's     Drug use: Never     Sexual activity: Not on file   Other Topics Concern     Not on file   Social History Narrative     Not on file     Social Determinants of Health     Financial Resource Strain: Not on file   Food Insecurity: Not on file   Transportation Needs: Not on file   Physical Activity: Not on file   Stress: Not on file   Social Connections: Not on file   Interpersonal Safety: Not on file   Housing Stability: Not on file       Family History  Family History   Problem Relation Age of Onset     Anesthesia Reaction No family hx of      Deep Vein Thrombosis (DVT) No family hx of        Review of Systems  The complete review of systems is negative other than noted in the HPI or here.   Anesthesia Evaluation   Pt has had prior anesthetic.     No history of anesthetic complications       ROS/MED HX  ENT/Pulmonary:  - neg pulmonary ROS  (-) recent URI   Neurologic: Comment: RLS        Cardiovascular:     (+)  - -   -  - -              pacemaker, Reason placed: complete heart block. type: [employer]  [employer] MRI, settings: DDDR ,       dysrhythmias, 3rd Deg Heart Block,        Previous cardiac testing   Echo: Date: 2021 Results:  * The estimated ejection fraction is 50%.     * Left ventricular systolic function is low normal.     * Normal right ventricular systolic function.     * There is mild mitral regurgitation with multiple regurgitant jets..     * The pulmonary artery systolic pressures are normal,     * estimated pulmonary arterial systolic pressure is  31 mmHg, plus RAP.     * The IVC is normal in size (< 2.1 cm), > 50% respiratory variance, RA   pressure normal at 3 mmHg.     * There is no pericardial effusion visualized.     Stress Test:  Date: Results:    ECG Reviewed:  Date: Results:    Cath:  Date: Results:   (-) ALMARAZ and orthopnea/PND   METS/Exercise Tolerance: 4 - Raking leaves, gardening Comment: Activity limited by back pain. Able to walk 30 min bid w/ walking sticks. Bicycles regularly - up to 30-60 mins BID       MXP4 skis in the winter       Hematologic:     (+)      anemia, history of blood transfusion, no previous transfusion reaction,     (-) history of blood clots   Musculoskeletal: Comment: S/p right CHRISS 2007- confirmed infection.  Rare hip/groin pain.      S/p left TKA    Has hardware in foot; s/p B/L surgeries    Polymyalgia rheumatica - on prednisone    Chronic back pain - Lumbar spondylosis & significant foraminal stenosis w/ radiculopathy. Followed by Dr. Merino.        GI/Hepatic: Comment: Intermittent constipation    (+) GERD, Asymptomatic on medication,               (-) liver disease   Renal/Genitourinary: Comment: Moderate right hydronephrosis, incidental finding. Under surveillance. Asymptomatic. Last seen by urology 3/28/24.    Baseline creatinine 0.8-0.9    Prior injury 2/2 carl insertion for knee surgery.        (+)        BPH,      Endo:     (+)            Chronic steroid usage for Arthritis. Date most recently used: daily prednisone for PMR.     (-) Type II  DM   Psychiatric/Substance Use: Comment: insomnia      Infectious Disease:  - neg infectious disease ROS     Malignancy:   (+) Malignancy, History of Skin.Skin CA Remission status post Surgery.      Other: Comment: Left foot - three toenails removed 3/19/24.  The big toe still has a little drainage.  Using SSD cream and dry dressing.  Was discussed with Dr. Dudley by patient at most recent visit.    - neg other ROS    (+)  , H/O Chronic Pain,         Virtual visit -  No vitals were obtained    Physical Exam  Constitutional: Awake, alert, cooperative, no apparent distress, and appears stated age.  Eyes: Pupils equal  HENT: Normocephalic  Respiratory: non labored breathing   Neurologic: Awake, alert, oriented to name, place and time.   Neuropsychiatric: Calm, cooperative. Normal affect.      Prior Labs/Diagnostic Studies   All labs and imaging personally reviewed     EKG/ stress test - if available please see in ROS above     LIVER FUNCTION PANEL  Order: [id number]   suggestion  Information displayed in this report may not trend or trigger automated decision support.     Component  Ref Range & Units 2 wk ago   Albumin  3.5 - 5.0 g/dL 3.7   Total Protein  6.4 - 8.3 g/dL 6.1 Low    Globulin  g/dL 2.4   Albumin/Globulin Ratio 1.5   Aspartate Aminotransferase (AST)  5 - 34 U/L 37 High    Alanine Aminotransferase (ALT)  0 - 55 U/L 32   Alkaline Phosphatase  40 - 150 U/L 74   Bilirubin, Total  0.2 - 1.2 mg/dL 0.3   Bilirubin, Indirect  0.0 - 0.9 mg/dL >=0.2   Bilirubin, Direct  0.0 - 0.5 mg/dL <=0.1     Specimen Collected: 04/10/24  9:51 AM    Performed by: [facility] Last Resulted: 04/10/24 10:20 AM   Received From: [facility]  Result Received: 04/18/24  3:23 PM    View Encounter        Received Information  BASIC METABOLIC PANEL  Order: [id number]   suggestion  Information displayed in this report may not trend or trigger automated decision support.     Component  Ref Range & Units 2 wk ago    Sodium  136 - 145 mmol/L 138   Potassium  3.5 - 5.1 mmol/L 4.0   Chloride  98 - 107 mmol/L 105   CO2  20 - 31 mmol/L 25   Anion Gap  10.0 - 20.0 mmol/L 12.0   Creatinine  0.73 - 1.18 mg/dL 0.90   Blood Urea Nitrogen  7.0 - 26.0 mg/dL 22.2   BUN/Creatinine Ratio  ratio 24.67   Calcium, Total  8.4 - 10.2 mg/dL 8.8   Glucose  70 - 105 mg/dL 87   eGFR  >=60 mL/min/1.73m(2) >60   Comment: Calculation based on the Chronic Kidney Disease Epidemiology Collaboration (CKD-EPI) equation refit without adjustment for race.   eCrCl (Rx) - Adults  mL/min 81.7   Fasting Status Yes     Specimen Collected: 04/10/24  9:51 AM    Performed by: [facility] Last Resulted: 04/10/24 10:20 AM   Received From: [facility]  Result Received: 04/18/24  3:23 PM    View Encounter        Received Information   Contains abnormal data COMPLETE BLOOD COUNT AND DIFFERENTIAL  Order: [id number]   suggestion  Information displayed in this report may not trend or trigger automated decision support.     Component  Ref Range & Units 2 wk ago   WBC Count, Corrected  4.5 - 11.0 10(3)/uL 5.0   RBC Count  4.50 - 5.90 10(6)/uL 4.08 Low    Hemoglobin  13.5 - 17.5 g/dL 12.8 Low    Hematocrit  37.0 - 53.0 % 36.6 Low    MCV  80.0 - 100.0 fL 89.7   MCH  26.0 - 34.0 pg 31.4   MCHC  32.0 - 36.0 g/dL 35.0   RDW  11.5 - 13.1 % 12.6   Platelets  150 - 450 10(3)/uL 267   MPV  6.5 - 10.0 fL 8.7   % Neutrophils  35.0 - 77.0 % 62.2   % Lymphocytes  24.0 - 44.0 % 23.8 Low    % Monocytes  7.0 - 13.0 % 10.6   % Eosinophils  0.0 - 3.0 % 2.6   % Basophils  0.0 - 1.0 % 0.4   % Immature Granulocytes  % 0.4   Abs Neutrophils  1.5 - 8.5 10(3)/uL 3.1   Abs Lymphocytes  1.1 - 5.0 10(3)/uL 1.2   Abs Monocytes  0.1 - 0.7 10(3)/uL 0.5   Abs Eosinophils  0.0 - 0.3 10(3)/uL 0.1   Abs Basophils  0.0 - 0.1 10(3)/uL 0.0   Abs Immature Granulocytes  10(3)/uL 0.02     Specimen Collected: 04/10/24  9:51 AM    Performed by: [facility] Last Resulted: 04/10/24  9:56  "AM   [facility]        The patient's records and results personally reviewed by this provider.     Outside records reviewed from: Care Everywhere      Assessment    Kwabena Solorio is a 73 year old male seen as a PAC referral for risk assessment and optimization for anesthesia.    Plan/Recommendations  Pt will be optimized for the proposed procedure.  See below for details on the assessment, risk, and preoperative recommendations    NEUROLOGY  - No history of TIA, CVA or seizure    -Post Op delirium risk factors:  Age    ENT  - No current airway concerns.  Will need to be reassessed day of surgery.  Mallampati: Unable to assess  TM: Unable to assess    CARDIAC  - No history of CAD, Hypertension, and Afib  - complete heart block, s/p [employer] pacemaker.   - last echocardiogram as above.    - METS (Metabolic Equivalents)  Patient performs 4 or more METS exercise without symptoms             Total Score: 0      RCRI-Very low risk: Class 1 0.4% complication rate             Total Score: 0        PULMONARY    DEQUAN Low Risk             Total Score: 2    DEQUAN: Over 50 ys old    DEQUAN: Male      - Denies asthma or inhaler use  - Tobacco History    History   Smoking Status     Never   Smokeless Tobacco     Never       GI  - GERD is well controlled with prilosec.   PONV Medium Risk  Total Score: 2           1 AN PONV: Patient is not a current smoker    1 AN PONV: Intended Post Op Opioids        /RENAL  - Hx prior urethral injury 2/2 carl insertion for knee surgery. Typically uses pediatric sized catheter.  - Moderate right hydronephrosis, incidental finding. Under surveillance. Asymptomatic. Last seen by urology 3/28/24.  - BPH - no meds and thinks he may have slight retention. Monitor closely post-op.     ENDOCRINE    - BMI: Estimated body mass index is 28.67 kg/m  as calculated from the following:    Height as of this encounter: 1.64 m (5' 4.57\").    Weight as of this encounter: " 77.1 kg (170 lb).  Overweight (BMI 25.0-29.9)  - No history of Diabetes Mellitus    - on daily prednisone for PMR.  Stress dose steroids as per anesthesia if indicated.    HEME  VTE Low Risk 0.5%             Total Score: 3    VTE: Greater than 59 yrs old    VTE: Male      - No history of abnormal bleeding or antiplatelet use.  - Chronic anemia  Recommend perioperative use of blood conservation techniques intraoperatively and close monitoring for postoperative bleeding.  - will order updated CBC and T&S for DOS based on patient's home location.     MSK  Patient is NOT Frail             Total Score: 0      - S/p right CHRISS - with noted prosthesis complication/infection.  Surgery planned as above.   - S/p left TKA  - Has hardware in foot; s/p B/L surgeries  - Polymyalgia rheumatica, on prednisone  - chronic back pain - Lumbar spondylosis & significant foraminal stenosis w/ radiculopathy. Followed by Dr. Merino.  Consider cautious positioning.       Skin  - left great toe nailbed with ongoing drainage s/p prior nail removal.  Dr. Dudley aware per patient report.  He has been applying SSD cream and a dry dressing daily .  Nursing to monitor and manage during admission.  Consult wound care if indicated.         Different anesthesia methods/types have been discussed with the patient, but they are aware that the final plan will be decided by the assigned anesthesia provider on the date of service.    The patient is optimized for their procedure. AVS with information on surgery time/arrival time, meds and NPO status given by nursing staff. No further diagnostic testing indicated.    Please refer to the physical examination documented by the anesthesiologist in the anesthesia record on the day of surgery.    Video-Visit Details    Type of service:  Video Visit    Provider received verbal consent for a Video Visit from the patient? Yes   Video Start Time:  1054  Video End Time: 1121    Originating Location (pt. Location):  Home    Distant Location (provider location):  Off-site  Mode of Communication:  Video Conference via Automation Alley    On the day of service:     Prep time: 11 minutes  Visit time: 27 minutes  Documentation time: 15 minutes  ------------------------------------------  Total time: 53 minutes      GIORGI Andre CNP  Preoperative Assessment Center  [facility]  Clinic and Surgery Center  Phone: [phone]  Fax: [phone]

## 2024-04-30 NOTE — ADDENDUM NOTE
Addended by: NEHEMIAS ZELAYA on: 4/30/2024 11:41 AM     Modules accepted: Orders, Level of Service

## 2024-04-30 NOTE — PATIENT INSTRUCTIONS
Preparing for Your Surgery      Name:  Kwabena Solorio   MRN:  [id number]   :  1950   Today's Date:  2024       Arriving for surgery:  Surgery date:  24  Arrival time:  12.00PM    Please come to:     Please come to:       [facility] [address]   [address] [place]  [address]     The Green Ramp for patients and visitors is beneath the [facility]. The parking facility entrance is at the intersection of [address] [place] [address] and [address]. Patients and visitors who self-park will receive the reduced hospital parking rate (no ticket validation needed).     [employer] parking, located at the [facility] main entrance on [address] [place] [address], is available Monday - Friday from 7 am to 3:30 pm.     Discounted parking pass options can be purchased from  attendants during business hours.     -Check in at the security desk in the [facility] ([facility] [place])   Lobby. They will direct you to the correct elevators.   -Proceed to the 3rd floor, Adult Surgery Waiting Lounge. [phone]     If you need directions, a wheelchair or escort please stop at the Information Desk in the lobby.  Inform the information person you are here for surgery; a wheelchair and escort to [address] will be provided.   An escort to the Adult Surgery Waiting Lounge will be provided. .    What can I eat or drink?  -  You may eat and drink normally up to 8 hours prior to arrival time. (Until 4.00AM)  -  You may have clear liquids until 2 hours prior to arrival time. (Until 10.00AM)    Examples of clear liquids:  Water  Clear broth  Juices (apple, white grape, white cranberry  and cider) without pulp  Noncarbonated, powder based beverages  (lemonade and Papa-Aid)  Sodas (Sprite, 7-Up, ginger ale and seltzer)  Coffee or tea (without milk or cream)  Gatorade    -  No Alcohol or cannabis products for at  least 24 hours before surgery.     Which medicines can I take?    Hold Aspirin for 7 days before surgery.   Hold Multivitamins for 7 days before surgery.  Hold Supplements for 7 days before surgery.  Hold Ibuprofen (Advil, Motrin) for 3 day(s) before surgery--unless otherwise directed by surgeon.  Hold Naproxen (Naprosyn, Aleve) for 4 days before surgery.    -  DO NOT take these medications the day of surgery:  Vitamins, Supplements. Iron, External cream, lotions, oils.    -  PLEASE TAKE these medications the day of surgery:  Gabapentin, Prednisone.     How do I prepare myself?  - Please take 2 showers (one the night prior to surgery and one the morning of surgery) using Scrubcare or Hibiclens soap.    Use this soap only from the neck to your toes.     Leave the soap on your skin for one minute--then rinse thoroughly.      You may use your own shampoo and conditioner. No other hair products.   - Please remove all jewelry and body piercings.  - No lotions, deodorants or fragrance.  - No makeup or fingernail polish.   - Bring your ID and insurance card.    -If you use a CPAP machine, please bring the CPAP machine, tubing, and mask to hospital.    -If you have a Deep Brain Stimulator, Spinal Cord Stimulator, or any Neuro Stimulator device---you must bring the remote control to the hospital.      ALL PATIENTS GOING HOME THE SAME DAY OF SURGERY ARE REQUIRED TO HAVE A RESPONSIBLE ADULT TO DRIVE AND BE IN ATTENDANCE WITH THEM FOR 24 HOURS FOLLOWING SURGERY.    Covid testing policy as of 12/06/2022  Your surgeon will notify and schedule you for a COVID test if one is needed before surgery--please direct any questions or COVID symptoms to your surgeon      Questions or Concerns:    - For any questions regarding the day of surgery or your hospital stay, please contact the Pre Admission Nursing Office at [phone].       - If you have health changes between today and your surgery, please call your surgeon.       - For  questions after surgery, please call your surgeons office.           Current Visitor Guidelines    You may have 2 visitors in the pre op area.    Visiting hours: 8 a.m. to 8:30 p.m.    Patients confirmed or suspected to have symptoms of COVID 19 or flu:     No visitors allowed for adult patients.   Children (under age 18) can have 1 named visitor.     People who are sick or showing symptoms of COVID 19 or flu:    Are not allowed to visit patients--we can only make exceptions in special situations.       Please follow these guidelines for your visit:          Please maintain social distance          Masking is optional--however at times you may be asked to wear a mask for the safety of yourself and others     Clean your hands with alcohol hand . Do this when you arrive at and leave the building and patient room,    And again after you touch your mask or anything in the room.     Go directly to and from the room you are visiting.     Stay in the patient s room during your visit. Limit going to other places in the hospital as much as possible     Leave bags and jackets at home or in the car.     For everyone s health, please don t come and go during your visit. That includes for smoking   during your visit.           OPTIMAL RECOVERY AFTER SURGERY        Begin hydrating yourself by drinking at least 8-10 glasses of clear liquids for 24 hours before surgery:      Suggested clear liquids:   Water    Clear Juices   Clear Broth   Non- carbonated beverages    (Crystal Light or Papa Aid)   Sodas    (Sprite, 7 up, ginger ale, seltzer)   Gatorade              Drink clear liquids up until 4 hours before your surgery.       We would like you to purchase a drink such as Gatorade or Ensure Clear (not the milkshake type).  Drink this before bedtime and the morning of surgery drink between 8-10 ounces or until you feel hydrated.        Keeping well hydrated leads to your veins being plump, you wake up faster, and you are  less likely to be nauseated. Start drinking water as soon as you can after surgery and advance to clear liquids and food as tolerated.  IV fluids contain salt, drinking fluids will minimize the amount of IV fluids you need and decrease the amount of salt you get.                 The most common reason for the patient to be readmitted is dehydration. Staying hydrated after you go home from the hospital is very important.  Ensure or Ensure Clear are good options to keep you hydrated.

## 2024-05-03 LAB — MISCELLANEOUS TEST 1 (ARUP): NORMAL

## 2024-05-06 NOTE — PROGRESS NOTES
SURGERY PLAN (PRE-OP PLAN)    Hold antibiotics until cultures obtained  TXA OK.      Patient Position (indicated by x):    Supine     Supine with torso rolled up on a bump     Floppy lateral on torso length bean bag     Lateral decubitus, bean bag, full length   x  Lateral decubitus, Wixson hip positioner     Safety paddle side supports x 2 clamped to side rail     Lithotomy, both legs in yellow padded leg apodaca     Lithotomy, single leg in yellow padded leg apodaca     Prone on blanket rolls/round gel pad     Prone on Antonio (arched) frame on Stanislav table     Single thigh in orange arthroscopy clamp     Beach chair semi recumbent     Spider limb positioner     Arm out on radiolucent arm table     Split drape with top bar   x  Revision CHRISS drape with plastic side bags for leg     Extremity drape     Shoulder pack drape     Laparotomy drape     Lomeli catheter          General Equipment Requests (indicated by x):      C-Arm with C-Armor drape     C-Arm (video capable, Akita 9900 model)     O-Arm with Stealth imaging     Fracture Table     Stanislav XR Table     SurgiGraphic 6000 (diving board) fluoro table     Cell saver     Luis Enrique Biopsy trephine set w/ K-wire & pituitary rongeurs     Small pituitary rongeur   x  Luis Enrique's angled curettes, narrow shaft   x Reciprocating saw, single and double blade options     Midas Vini Medtronic román, electric motor     Phenol 5%     [place] BMAC stem cell   x  Vancomycin 12 grams powder bottle     Zometa 4 mg vials     Depo Medrol steroid     Blunt Pelvic Retractor (.55, Blunt Hohmann with  slight bend)     (1) Portable hand held radiation detector machine for sentinel node biopsy and (2) Lymphazurin   x  [place] Osteotomes     Trauma/Fixation Requests (indicated by x):    Large Frag AO plates     Small Frag AO plates     Locking periarticular plates - AO     Locking periarticular prox humerus plate - Estes/Nephew     Pelvic recon plates (curved & straight), 3.5 & 4.5 mm     Jaquan  diaz-prosthetic fracture plate     DHS hip screw     George Gamma nail     AO, DCS 95 degree plate/screw     AO, 95 degree condylar blade plate     AO, 3.0, 3.5, 4.0, 4.5 mm Cannulated screws     AO, 6.5, 7.0, 7.3 mm Cannulated screws- Stainless Steel     AO, 6.5 mm Cannulated screws- Titanium     Jaquan cerclage cable plates     AO IM nails     AO Large Ex Fix     AO Small Ex Fix     Large pelvic bone clamps     Large fx reduction forcepts - AO     Bone clamps - Large (Stanislav, Whitney, Tatum)     Bone clamps - Medium (Stanislav)     Carbofix carbon fiber plates for:          CHRISS Requests (indicated by x):    Biomet ECHO Bimetric ingrowth stem     Biomet Integral cemented stem     Biomet RB cup, 28+32 heads     Biomet Bipolar cup     Biomet Constrained Freedom liner with heads     [place] BHR resurfacing CHRISS with "[employer] navigation unit (need 2 weeks advance notice)     [email or web address] S-ROM long stems     George Restoration modular long stem     Biomet SONJA long stems, both interlocked and splined stems     Biomet Regenerex TM cup + augments     George Tritanium TM cup +  augments     George GAP II acet cage + cemented poly cup     Biomet OSS prox femur replacement CHRISS     Biomet OSS Compress fixation     IM flexible reamers + guidewire, < 9 mm     IM flexible reamers + guidewire, > 9 mm   X  X  X  X  X    Jaquan Spacer One femoral stem or G21 femoral stem molds  Tobra PMMA x 4 packs  Cement gun  Jaquan cup explant osteotomes, 28 mm and 46 mm centering head with 52 mm short and long blades       Allograft: distal femur     Allograft: proximal tibia     Bone mill     Allograft cancellous chips     Allograft cancellous crushed     Debbie Young Troch Claw + cable, insertion instruments     Debbie Young beaded cerclage cable, green cable  x2 , insertion instruments     Jaquan CTR Troch cable plate     AO pelvic instruments and clamps (angled) Blunt Hohmann & angled Osborne, large bone reduction forceps       [place] specific Requests (indicated by x):   x Pistol  Pituitary with teeth    Extra large hammer    Small bone drill with Phraxis drill adapter    [place] west non-curved, flat blade retractor set    Metal cup (not plastic)    Long Shepherds crook type retractor    Drill bit set - box    Long flat rectangular femoral elevator    Small bone hook    Large black handle bone hook    Dall Miles cerclage cable with insertion instruments, in room (on standby)               Specimens and cultures (indicated by x):   x  Tissue cultures, aerobic and anaerobic without gram stain x 5     Frozen section     pathology specimens - fresh     pathology specimens - formalin           Infected CHRISS removal and articulating PMMA spacer (2 set ups) revised 2018-03-20    Patient Position (indicated by x):  x  Supine with bump under buttock   x  Revision CHRISS drape        x  Extremity drape     Lomeli catheter           Part 1: Extraction setup (indicated by x):        x Pulse lavage: - shower spray tip                          - Long intramedullary tip   x CamVac disposable plastic suction tip (debris)    Intramedullary flexible reamers   x Power drill, oscillating saw   x Reciprocating saw (double edged serrated saw blade)    Tibia implant extractor (Innomed)   x Biomet offset bone impactor (white handle)    IM canal cement removal tools (Harvey set)   x Angled curettes (Dudley set)    Flexible osteotomes   x Single prong PCL retractor   x Large hammer   x Triple antibiotic irrigation x 3 liters   x Metal ruler (6 inch)   x Betadine prep solution, 1 bottle, full strength, sterile   x  90 degree Angled Alireza retractors             Specimens and cultures (indicated by x):   x  Tissue cultures, aerobic and anaerobic without gram stain     Frozen section     pathology specimens - fresh     pathology specimens - formalin           Part 2: 2nd surgeon will make the cement mold on back table while 1st surgeon debrides the knee  (indicated by  "x):  x Rush rods and moira cutter   x Big metal specimen cup   x Vancomycin powder 6 vials (1g/vial powder)   x Palacos LV+G, Low viscosity gentamycin PMMA x 6 boxes   x Biomet spacer 1 articulating spacer molds   xxx Biomet TKA femoral trials   x Metal ruler    Cement mixing bowl (DeepFlex green non-vacuum)    Cement mixing tower and injector gun (for stemmed implants)   x New drapes/hip stockinette (large white cotton)/large Ioban drape   x New Gowns/Gloves for surgical team   x suction   x Irrigation bulb    x Bovie   x Coban wrap   x Major Ortho instrument pan   x Single prong PCL retractor   x Angled 90 degree Alireza reactors   x Reciprocating saw blade (to cut out PCL box). Surgeon can hold the \"dirty\" saw using gloved hand with new sterile saw blade attached   x Scalpel #10 blade   x KEYUR #15 drain, small bulb reservoir   x Sutures: 0 PDS x 2 (taper needle), 2-0 PDS x 2, 3-0 PDS skin closure, 2-0 silk for anchoring drain.     Leopold intramedullary long handle cement removal tools - for stemmed implants       Implant Extraction/Cement spacer tools (indicated by x):     x  Biomet Articulating knee spacer molds with trials   x  Palacos low viscosity PMMA w/ tobramycin and Vanco 1 g vial/package PMMA     Yo Rods, large + moira cutter        "

## 2024-05-07 ENCOUNTER — APPOINTMENT (OUTPATIENT)
Dept: GENERAL RADIOLOGY | Facility: CLINIC | Age: 74
DRG: 466 | End: 2024-05-07
Attending: STUDENT IN AN ORGANIZED HEALTH CARE EDUCATION/TRAINING PROGRAM
Payer: COMMERCIAL

## 2024-05-07 ENCOUNTER — HOSPITAL ENCOUNTER (INPATIENT)
Facility: CLINIC | Age: 74
LOS: 6 days | Discharge: NURSING, REHAB OR LONG-TERM CARE | DRG: 466 | End: 2024-05-13
Attending: ORTHOPAEDIC SURGERY | Admitting: ORTHOPAEDIC SURGERY
Payer: COMMERCIAL

## 2024-05-07 ENCOUNTER — ANESTHESIA (OUTPATIENT)
Dept: SURGERY | Facility: CLINIC | Age: 74
DRG: 466 | End: 2024-05-07
Payer: COMMERCIAL

## 2024-05-07 DIAGNOSIS — F51.01 PRIMARY INSOMNIA: ICD-10-CM

## 2024-05-07 DIAGNOSIS — L29.9 ITCHING: ICD-10-CM

## 2024-05-07 DIAGNOSIS — T84.51XD INFECTION ASSOCIATED WITH INTERNAL RIGHT HIP PROSTHESIS, SUBSEQUENT ENCOUNTER: ICD-10-CM

## 2024-05-07 DIAGNOSIS — K59.01 SLOW TRANSIT CONSTIPATION: ICD-10-CM

## 2024-05-07 DIAGNOSIS — R21 RASH: ICD-10-CM

## 2024-05-07 DIAGNOSIS — Z96.649 INFECTION OF PROSTHETIC TOTAL HIP JOINT, SUBSEQUENT ENCOUNTER: Primary | ICD-10-CM

## 2024-05-07 DIAGNOSIS — M79.2 NEUROPATHIC PAIN: ICD-10-CM

## 2024-05-07 DIAGNOSIS — T84.59XD INFECTION OF PROSTHETIC TOTAL HIP JOINT, SUBSEQUENT ENCOUNTER: Primary | ICD-10-CM

## 2024-05-07 PROBLEM — T84.51XA INFECTION OF RIGHT PROSTHETIC HIP JOINT (H): Status: ACTIVE | Noted: 2024-05-07

## 2024-05-07 LAB
ABO/RH(D): NORMAL
ANTIBODY SCREEN: NEGATIVE
ERYTHROCYTE [DISTWIDTH] IN BLOOD BY AUTOMATED COUNT: 12.2 % (ref 10–15)
ERYTHROCYTE [DISTWIDTH] IN BLOOD BY AUTOMATED COUNT: 12.3 % (ref 10–15)
GLUCOSE BLDC GLUCOMTR-MCNC: 115 MG/DL (ref 70–99)
GLUCOSE BLDC GLUCOMTR-MCNC: 115 MG/DL (ref 70–99)
HCT VFR BLD AUTO: 30.9 % (ref 40–53)
HCT VFR BLD AUTO: 39.2 % (ref 40–53)
HGB BLD-MCNC: 10.7 G/DL (ref 13.3–17.7)
HGB BLD-MCNC: 13.8 G/DL (ref 13.3–17.7)
MCH RBC QN AUTO: 30.5 PG (ref 26.5–33)
MCH RBC QN AUTO: 30.8 PG (ref 26.5–33)
MCHC RBC AUTO-ENTMCNC: 34.6 G/DL (ref 31.5–36.5)
MCHC RBC AUTO-ENTMCNC: 35.2 G/DL (ref 31.5–36.5)
MCV RBC AUTO: 87 FL (ref 78–100)
MCV RBC AUTO: 89 FL (ref 78–100)
PLATELET # BLD AUTO: 297 10E3/UL (ref 150–450)
PLATELET # BLD AUTO: 305 10E3/UL (ref 150–450)
RBC # BLD AUTO: 3.47 10E6/UL (ref 4.4–5.9)
RBC # BLD AUTO: 4.52 10E6/UL (ref 4.4–5.9)
SPECIMEN EXPIRATION DATE: NORMAL
WBC # BLD AUTO: 17.6 10E3/UL (ref 4–11)
WBC # BLD AUTO: 8.7 10E3/UL (ref 4–11)

## 2024-05-07 PROCEDURE — 0SP90JZ REMOVAL OF SYNTHETIC SUBSTITUTE FROM RIGHT HIP JOINT, OPEN APPROACH: ICD-10-PCS | Performed by: ORTHOPAEDIC SURGERY

## 2024-05-07 PROCEDURE — 99100 ANES PT EXTEME AGE<1 YR&>70: CPT | Performed by: STUDENT IN AN ORGANIZED HEALTH CARE EDUCATION/TRAINING PROGRAM

## 2024-05-07 PROCEDURE — 250N000011 HC RX IP 250 OP 636: Performed by: NURSE ANESTHETIST, CERTIFIED REGISTERED

## 2024-05-07 PROCEDURE — C1776 JOINT DEVICE (IMPLANTABLE): HCPCS | Performed by: ORTHOPAEDIC SURGERY

## 2024-05-07 PROCEDURE — 360N000077 HC SURGERY LEVEL 4, PER MIN: Performed by: ORTHOPAEDIC SURGERY

## 2024-05-07 PROCEDURE — 272N000001 HC OR GENERAL SUPPLY STERILE: Performed by: ORTHOPAEDIC SURGERY

## 2024-05-07 PROCEDURE — 83605 ASSAY OF LACTIC ACID: CPT | Performed by: STUDENT IN AN ORGANIZED HEALTH CARE EDUCATION/TRAINING PROGRAM

## 2024-05-07 PROCEDURE — 27134 REVISE HIP JOINT REPLACEMENT: CPT | Mod: RT | Performed by: ORTHOPAEDIC SURGERY

## 2024-05-07 PROCEDURE — 88304 TISSUE EXAM BY PATHOLOGIST: CPT | Mod: 26 | Performed by: PATHOLOGY

## 2024-05-07 PROCEDURE — 86900 BLOOD TYPING SEROLOGIC ABO: CPT | Performed by: NURSE PRACTITIONER

## 2024-05-07 PROCEDURE — 27134 REVISE HIP JOINT REPLACEMENT: CPT | Performed by: STUDENT IN AN ORGANIZED HEALTH CARE EDUCATION/TRAINING PROGRAM

## 2024-05-07 PROCEDURE — 250N000009 HC RX 250: Performed by: NURSE ANESTHETIST, CERTIFIED REGISTERED

## 2024-05-07 PROCEDURE — 88311 DECALCIFY TISSUE: CPT | Mod: 26 | Performed by: PATHOLOGY

## 2024-05-07 PROCEDURE — 250N000011 HC RX IP 250 OP 636: Mod: JZ | Performed by: STUDENT IN AN ORGANIZED HEALTH CARE EDUCATION/TRAINING PROGRAM

## 2024-05-07 PROCEDURE — 999N000065 XR PELVIS PORT 1/2 VIEWS

## 2024-05-07 PROCEDURE — 85014 HEMATOCRIT: CPT | Performed by: NURSE PRACTITIONER

## 2024-05-07 PROCEDURE — 258N000003 HC RX IP 258 OP 636: Performed by: NURSE ANESTHETIST, CERTIFIED REGISTERED

## 2024-05-07 PROCEDURE — 250N000009 HC RX 250: Performed by: ORTHOPAEDIC SURGERY

## 2024-05-07 PROCEDURE — 258N000001 HC RX 258: Performed by: ORTHOPAEDIC SURGERY

## 2024-05-07 PROCEDURE — 250N000011 HC RX IP 250 OP 636: Performed by: ORTHOPAEDIC SURGERY

## 2024-05-07 PROCEDURE — 88304 TISSUE EXAM BY PATHOLOGIST: CPT | Mod: TC | Performed by: ORTHOPAEDIC SURGERY

## 2024-05-07 PROCEDURE — 250N000013 HC RX MED GY IP 250 OP 250 PS 637: Performed by: PHYSICIAN ASSISTANT

## 2024-05-07 PROCEDURE — C1713 ANCHOR/SCREW BN/BN,TIS/BN: HCPCS | Performed by: ORTHOPAEDIC SURGERY

## 2024-05-07 PROCEDURE — P9045 ALBUMIN (HUMAN), 5%, 250 ML: HCPCS | Mod: JZ | Performed by: STUDENT IN AN ORGANIZED HEALTH CARE EDUCATION/TRAINING PROGRAM

## 2024-05-07 PROCEDURE — 250N000011 HC RX IP 250 OP 636: Performed by: PHYSICIAN ASSISTANT

## 2024-05-07 PROCEDURE — 82565 ASSAY OF CREATININE: CPT | Performed by: STUDENT IN AN ORGANIZED HEALTH CARE EDUCATION/TRAINING PROGRAM

## 2024-05-07 PROCEDURE — 36415 COLL VENOUS BLD VENIPUNCTURE: CPT | Performed by: NURSE PRACTITIONER

## 2024-05-07 PROCEDURE — 85018 HEMOGLOBIN: CPT | Performed by: STUDENT IN AN ORGANIZED HEALTH CARE EDUCATION/TRAINING PROGRAM

## 2024-05-07 PROCEDURE — P9045 ALBUMIN (HUMAN), 5%, 250 ML: HCPCS | Mod: JZ | Performed by: NURSE ANESTHETIST, CERTIFIED REGISTERED

## 2024-05-07 PROCEDURE — 250N000011 HC RX IP 250 OP 636: Performed by: ANESTHESIOLOGY

## 2024-05-07 PROCEDURE — 87070 CULTURE OTHR SPECIMN AEROBIC: CPT | Performed by: ORTHOPAEDIC SURGERY

## 2024-05-07 PROCEDURE — 250N000025 HC SEVOFLURANE, PER MIN: Performed by: ORTHOPAEDIC SURGERY

## 2024-05-07 PROCEDURE — 87075 CULTR BACTERIA EXCEPT BLOOD: CPT | Performed by: ORTHOPAEDIC SURGERY

## 2024-05-07 PROCEDURE — 370N000017 HC ANESTHESIA TECHNICAL FEE, PER MIN: Performed by: ORTHOPAEDIC SURGERY

## 2024-05-07 PROCEDURE — 710N000011 HC RECOVERY PHASE 1, LEVEL 3, PER MIN: Performed by: ORTHOPAEDIC SURGERY

## 2024-05-07 PROCEDURE — 3E033XZ INTRODUCTION OF VASOPRESSOR INTO PERIPHERAL VEIN, PERCUTANEOUS APPROACH: ICD-10-PCS

## 2024-05-07 PROCEDURE — 0SR90EZ REPLACEMENT OF RIGHT HIP JOINT WITH ARTICULATING SPACER, OPEN APPROACH: ICD-10-PCS | Performed by: ORTHOPAEDIC SURGERY

## 2024-05-07 PROCEDURE — 99100 ANES PT EXTEME AGE<1 YR&>70: CPT | Performed by: NURSE ANESTHETIST, CERTIFIED REGISTERED

## 2024-05-07 PROCEDURE — 27134 REVISE HIP JOINT REPLACEMENT: CPT | Performed by: NURSE ANESTHETIST, CERTIFIED REGISTERED

## 2024-05-07 PROCEDURE — 88311 DECALCIFY TISSUE: CPT | Mod: TC | Performed by: ORTHOPAEDIC SURGERY

## 2024-05-07 PROCEDURE — 120N000002 HC R&B MED SURG/OB UMMC

## 2024-05-07 PROCEDURE — 999N000141 HC STATISTIC PRE-PROCEDURE NURSING ASSESSMENT: Performed by: ORTHOPAEDIC SURGERY

## 2024-05-07 DEVICE — IMPLANTABLE DEVICE
Type: IMPLANTABLE DEVICE | Site: HIP | Status: FUNCTIONAL
Brand: G3A 40 BONE CEMENT

## 2024-05-07 DEVICE — DISPOSABLE CUSTOM MODULAR SPACER MOLD
Type: IMPLANTABLE DEVICE | Site: HIP | Status: NON-FUNCTIONAL
Brand: SPACEFLEX HIP
Removed: 2024-08-30

## 2024-05-07 RX ORDER — DEXAMETHASONE SODIUM PHOSPHATE 4 MG/ML
4 INJECTION, SOLUTION INTRA-ARTICULAR; INTRALESIONAL; INTRAMUSCULAR; INTRAVENOUS; SOFT TISSUE
Status: CANCELLED | OUTPATIENT
Start: 2024-05-07

## 2024-05-07 RX ORDER — FENTANYL CITRATE 50 UG/ML
25 INJECTION, SOLUTION INTRAMUSCULAR; INTRAVENOUS EVERY 5 MIN PRN
Status: DISCONTINUED | OUTPATIENT
Start: 2024-05-07 | End: 2024-05-08 | Stop reason: HOSPADM

## 2024-05-07 RX ORDER — CEFAZOLIN SODIUM/WATER 2 G/20 ML
2 SYRINGE (ML) INTRAVENOUS SEE ADMIN INSTRUCTIONS
Status: DISCONTINUED | OUTPATIENT
Start: 2024-05-07 | End: 2024-05-07 | Stop reason: HOSPADM

## 2024-05-07 RX ORDER — OXYCODONE HYDROCHLORIDE 5 MG/1
10 TABLET ORAL
Status: CANCELLED | OUTPATIENT
Start: 2024-05-07

## 2024-05-07 RX ORDER — PHENYLEPHRINE HCL IN 0.9% NACL 50MG/250ML
.5-1.25 PLASTIC BAG, INJECTION (ML) INTRAVENOUS CONTINUOUS
Status: DISCONTINUED | OUTPATIENT
Start: 2024-05-08 | End: 2024-05-08 | Stop reason: HOSPADM

## 2024-05-07 RX ORDER — OXYCODONE HYDROCHLORIDE 5 MG/1
5 TABLET ORAL EVERY 4 HOURS PRN
Status: DISCONTINUED | OUTPATIENT
Start: 2024-05-07 | End: 2024-05-13 | Stop reason: HOSPADM

## 2024-05-07 RX ORDER — NALOXONE HYDROCHLORIDE 0.4 MG/ML
0.1 INJECTION, SOLUTION INTRAMUSCULAR; INTRAVENOUS; SUBCUTANEOUS
Status: CANCELLED | OUTPATIENT
Start: 2024-05-07

## 2024-05-07 RX ORDER — DEXAMETHASONE SODIUM PHOSPHATE 4 MG/ML
4 INJECTION, SOLUTION INTRA-ARTICULAR; INTRALESIONAL; INTRAMUSCULAR; INTRAVENOUS; SOFT TISSUE
Status: DISCONTINUED | OUTPATIENT
Start: 2024-05-07 | End: 2024-05-08 | Stop reason: HOSPADM

## 2024-05-07 RX ORDER — MAGNESIUM HYDROXIDE 1200 MG/15ML
LIQUID ORAL PRN
Status: DISCONTINUED | OUTPATIENT
Start: 2024-05-07 | End: 2024-05-07 | Stop reason: HOSPADM

## 2024-05-07 RX ORDER — HYDROMORPHONE HYDROCHLORIDE 1 MG/ML
0.2 INJECTION, SOLUTION INTRAMUSCULAR; INTRAVENOUS; SUBCUTANEOUS
Status: DISCONTINUED | OUTPATIENT
Start: 2024-05-07 | End: 2024-05-13 | Stop reason: HOSPADM

## 2024-05-07 RX ORDER — ACETAMINOPHEN 325 MG/1
975 TABLET ORAL ONCE
Status: COMPLETED | OUTPATIENT
Start: 2024-05-07 | End: 2024-05-07

## 2024-05-07 RX ORDER — PRAMIPEXOLE DIHYDROCHLORIDE 0.12 MG/1
.125-.25 TABLET ORAL AT BEDTIME
Status: DISCONTINUED | OUTPATIENT
Start: 2024-05-08 | End: 2024-05-13 | Stop reason: HOSPADM

## 2024-05-07 RX ORDER — VANCOMYCIN HYDROCHLORIDE 1 G/20ML
INJECTION, POWDER, LYOPHILIZED, FOR SOLUTION INTRAVENOUS PRN
Status: DISCONTINUED | OUTPATIENT
Start: 2024-05-07 | End: 2024-05-07 | Stop reason: HOSPADM

## 2024-05-07 RX ORDER — ONDANSETRON 4 MG/1
4 TABLET, ORALLY DISINTEGRATING ORAL EVERY 30 MIN PRN
Status: DISCONTINUED | OUTPATIENT
Start: 2024-05-07 | End: 2024-05-08 | Stop reason: HOSPADM

## 2024-05-07 RX ORDER — FENTANYL CITRATE 50 UG/ML
INJECTION, SOLUTION INTRAMUSCULAR; INTRAVENOUS PRN
Status: DISCONTINUED | OUTPATIENT
Start: 2024-05-07 | End: 2024-05-07

## 2024-05-07 RX ORDER — LIDOCAINE HYDROCHLORIDE 20 MG/ML
INJECTION, SOLUTION INFILTRATION; PERINEURAL PRN
Status: DISCONTINUED | OUTPATIENT
Start: 2024-05-07 | End: 2024-05-07

## 2024-05-07 RX ORDER — HYDROMORPHONE HYDROCHLORIDE 1 MG/ML
0.4 INJECTION, SOLUTION INTRAMUSCULAR; INTRAVENOUS; SUBCUTANEOUS
Status: DISCONTINUED | OUTPATIENT
Start: 2024-05-07 | End: 2024-05-13 | Stop reason: HOSPADM

## 2024-05-07 RX ORDER — ACETAMINOPHEN 500 MG
500-1000 TABLET ORAL EVERY 8 HOURS PRN
Status: ON HOLD | COMMUNITY
End: 2024-05-13

## 2024-05-07 RX ORDER — ONDANSETRON 2 MG/ML
INJECTION INTRAMUSCULAR; INTRAVENOUS PRN
Status: DISCONTINUED | OUTPATIENT
Start: 2024-05-07 | End: 2024-05-07

## 2024-05-07 RX ORDER — OXYCODONE HYDROCHLORIDE 5 MG/1
10 TABLET ORAL EVERY 4 HOURS PRN
Status: DISCONTINUED | OUTPATIENT
Start: 2024-05-07 | End: 2024-05-13 | Stop reason: HOSPADM

## 2024-05-07 RX ORDER — ONDANSETRON 2 MG/ML
4 INJECTION INTRAMUSCULAR; INTRAVENOUS EVERY 30 MIN PRN
Status: DISCONTINUED | OUTPATIENT
Start: 2024-05-07 | End: 2024-05-08 | Stop reason: HOSPADM

## 2024-05-07 RX ORDER — OXYCODONE HYDROCHLORIDE 5 MG/1
5 TABLET ORAL
Status: CANCELLED | OUTPATIENT
Start: 2024-05-07

## 2024-05-07 RX ORDER — HYDROMORPHONE HYDROCHLORIDE 1 MG/ML
0.2 INJECTION, SOLUTION INTRAMUSCULAR; INTRAVENOUS; SUBCUTANEOUS EVERY 5 MIN PRN
Status: DISCONTINUED | OUTPATIENT
Start: 2024-05-07 | End: 2024-05-08 | Stop reason: HOSPADM

## 2024-05-07 RX ORDER — ONDANSETRON 2 MG/ML
4 INJECTION INTRAMUSCULAR; INTRAVENOUS EVERY 30 MIN PRN
Status: CANCELLED | OUTPATIENT
Start: 2024-05-07

## 2024-05-07 RX ORDER — DEXAMETHASONE SODIUM PHOSPHATE 4 MG/ML
INJECTION, SOLUTION INTRA-ARTICULAR; INTRALESIONAL; INTRAMUSCULAR; INTRAVENOUS; SOFT TISSUE PRN
Status: DISCONTINUED | OUTPATIENT
Start: 2024-05-07 | End: 2024-05-07

## 2024-05-07 RX ORDER — CELECOXIB 200 MG/1
400 CAPSULE ORAL ONCE
Status: COMPLETED | OUTPATIENT
Start: 2024-05-07 | End: 2024-05-07

## 2024-05-07 RX ORDER — CEFAZOLIN SODIUM/WATER 2 G/20 ML
2 SYRINGE (ML) INTRAVENOUS
Status: COMPLETED | OUTPATIENT
Start: 2024-05-07 | End: 2024-05-07

## 2024-05-07 RX ORDER — EPHEDRINE SULFATE 50 MG/ML
INJECTION, SOLUTION INTRAMUSCULAR; INTRAVENOUS; SUBCUTANEOUS PRN
Status: DISCONTINUED | OUTPATIENT
Start: 2024-05-07 | End: 2024-05-07

## 2024-05-07 RX ORDER — FENTANYL CITRATE 50 UG/ML
50 INJECTION, SOLUTION INTRAMUSCULAR; INTRAVENOUS EVERY 5 MIN PRN
Status: DISCONTINUED | OUTPATIENT
Start: 2024-05-07 | End: 2024-05-08 | Stop reason: HOSPADM

## 2024-05-07 RX ORDER — SODIUM CHLORIDE, SODIUM LACTATE, POTASSIUM CHLORIDE, CALCIUM CHLORIDE 600; 310; 30; 20 MG/100ML; MG/100ML; MG/100ML; MG/100ML
INJECTION, SOLUTION INTRAVENOUS CONTINUOUS PRN
Status: DISCONTINUED | OUTPATIENT
Start: 2024-05-07 | End: 2024-05-07

## 2024-05-07 RX ORDER — ONDANSETRON 4 MG/1
4 TABLET, ORALLY DISINTEGRATING ORAL EVERY 6 HOURS PRN
Status: DISCONTINUED | OUTPATIENT
Start: 2024-05-07 | End: 2024-05-13 | Stop reason: HOSPADM

## 2024-05-07 RX ORDER — ONDANSETRON 4 MG/1
4 TABLET, ORALLY DISINTEGRATING ORAL EVERY 30 MIN PRN
Status: CANCELLED | OUTPATIENT
Start: 2024-05-07

## 2024-05-07 RX ORDER — HYDROMORPHONE HYDROCHLORIDE 1 MG/ML
0.4 INJECTION, SOLUTION INTRAMUSCULAR; INTRAVENOUS; SUBCUTANEOUS EVERY 5 MIN PRN
Status: DISCONTINUED | OUTPATIENT
Start: 2024-05-07 | End: 2024-05-08 | Stop reason: HOSPADM

## 2024-05-07 RX ORDER — TRANEXAMIC ACID 650 MG/1
1950 TABLET ORAL ONCE
Status: COMPLETED | OUTPATIENT
Start: 2024-05-07 | End: 2024-05-07

## 2024-05-07 RX ORDER — GABAPENTIN 300 MG/1
900 CAPSULE ORAL AT BEDTIME
Status: DISCONTINUED | OUTPATIENT
Start: 2024-05-08 | End: 2024-05-13 | Stop reason: HOSPADM

## 2024-05-07 RX ORDER — SODIUM CHLORIDE, SODIUM LACTATE, POTASSIUM CHLORIDE, CALCIUM CHLORIDE 600; 310; 30; 20 MG/100ML; MG/100ML; MG/100ML; MG/100ML
INJECTION, SOLUTION INTRAVENOUS CONTINUOUS
Status: DISCONTINUED | OUTPATIENT
Start: 2024-05-07 | End: 2024-05-08 | Stop reason: HOSPADM

## 2024-05-07 RX ORDER — NALOXONE HYDROCHLORIDE 0.4 MG/ML
0.1 INJECTION, SOLUTION INTRAMUSCULAR; INTRAVENOUS; SUBCUTANEOUS
Status: DISCONTINUED | OUTPATIENT
Start: 2024-05-07 | End: 2024-05-08 | Stop reason: HOSPADM

## 2024-05-07 RX ORDER — PROPOFOL 10 MG/ML
INJECTION, EMULSION INTRAVENOUS PRN
Status: DISCONTINUED | OUTPATIENT
Start: 2024-05-07 | End: 2024-05-07

## 2024-05-07 RX ORDER — ONDANSETRON 2 MG/ML
4 INJECTION INTRAMUSCULAR; INTRAVENOUS EVERY 6 HOURS PRN
Status: DISCONTINUED | OUTPATIENT
Start: 2024-05-07 | End: 2024-05-13 | Stop reason: HOSPADM

## 2024-05-07 RX ADMIN — FENTANYL CITRATE 100 MCG: 50 INJECTION INTRAMUSCULAR; INTRAVENOUS at 17:34

## 2024-05-07 RX ADMIN — Medication 10 MG: at 19:01

## 2024-05-07 RX ADMIN — Medication 50 MG: at 17:36

## 2024-05-07 RX ADMIN — HYDROMORPHONE HYDROCHLORIDE 0.4 MG: 1 INJECTION, SOLUTION INTRAMUSCULAR; INTRAVENOUS; SUBCUTANEOUS at 23:53

## 2024-05-07 RX ADMIN — PHENYLEPHRINE HYDROCHLORIDE 150 MCG: 10 INJECTION INTRAVENOUS at 19:57

## 2024-05-07 RX ADMIN — ACETAMINOPHEN 975 MG: 325 TABLET, FILM COATED ORAL at 13:18

## 2024-05-07 RX ADMIN — LIDOCAINE HYDROCHLORIDE 100 MG: 20 INJECTION, SOLUTION INFILTRATION; PERINEURAL at 17:34

## 2024-05-07 RX ADMIN — PHENYLEPHRINE HYDROCHLORIDE 150 MCG: 10 INJECTION INTRAVENOUS at 20:22

## 2024-05-07 RX ADMIN — Medication 20 MG: at 18:15

## 2024-05-07 RX ADMIN — Medication 10 MG: at 19:34

## 2024-05-07 RX ADMIN — ALBUMIN HUMAN: 0.05 INJECTION, SOLUTION INTRAVENOUS at 20:15

## 2024-05-07 RX ADMIN — SODIUM CHLORIDE, POTASSIUM CHLORIDE, SODIUM LACTATE AND CALCIUM CHLORIDE: 600; 310; 30; 20 INJECTION, SOLUTION INTRAVENOUS at 20:15

## 2024-05-07 RX ADMIN — EPHEDRINE SULFATE 10 MG: 5 INJECTION INTRAVENOUS at 21:29

## 2024-05-07 RX ADMIN — DEXAMETHASONE SODIUM PHOSPHATE 4 MG: 4 INJECTION, SOLUTION INTRA-ARTICULAR; INTRALESIONAL; INTRAMUSCULAR; INTRAVENOUS; SOFT TISSUE at 18:10

## 2024-05-07 RX ADMIN — PHENYLEPHRINE HYDROCHLORIDE 150 MCG: 10 INJECTION INTRAVENOUS at 21:50

## 2024-05-07 RX ADMIN — Medication 10 MG: at 19:54

## 2024-05-07 RX ADMIN — PHENYLEPHRINE HYDROCHLORIDE 200 MCG: 10 INJECTION INTRAVENOUS at 21:55

## 2024-05-07 RX ADMIN — PHENYLEPHRINE HYDROCHLORIDE 100 MCG: 10 INJECTION INTRAVENOUS at 17:52

## 2024-05-07 RX ADMIN — FENTANYL CITRATE 50 MCG: 50 INJECTION INTRAMUSCULAR; INTRAVENOUS at 22:13

## 2024-05-07 RX ADMIN — ONDANSETRON 4 MG: 2 INJECTION INTRAMUSCULAR; INTRAVENOUS at 20:47

## 2024-05-07 RX ADMIN — PHENYLEPHRINE HYDROCHLORIDE 0.4 MCG/KG/MIN: 10 INJECTION INTRAVENOUS at 18:07

## 2024-05-07 RX ADMIN — PHENYLEPHRINE HYDROCHLORIDE 100 MCG: 10 INJECTION INTRAVENOUS at 17:34

## 2024-05-07 RX ADMIN — CELECOXIB 400 MG: 200 CAPSULE ORAL at 13:17

## 2024-05-07 RX ADMIN — Medication 10 MG: at 20:09

## 2024-05-07 RX ADMIN — SODIUM CHLORIDE, POTASSIUM CHLORIDE, SODIUM LACTATE AND CALCIUM CHLORIDE: 600; 310; 30; 20 INJECTION, SOLUTION INTRAVENOUS at 17:25

## 2024-05-07 RX ADMIN — PHENYLEPHRINE HYDROCHLORIDE 100 MCG: 10 INJECTION INTRAVENOUS at 19:30

## 2024-05-07 RX ADMIN — Medication 2 G: at 18:58

## 2024-05-07 RX ADMIN — PROPOFOL 120 MG: 10 INJECTION, EMULSION INTRAVENOUS at 17:34

## 2024-05-07 RX ADMIN — ALBUMIN HUMAN: 0.05 INJECTION, SOLUTION INTRAVENOUS at 17:57

## 2024-05-07 RX ADMIN — PHENYLEPHRINE HYDROCHLORIDE 150 MCG: 10 INJECTION INTRAVENOUS at 19:29

## 2024-05-07 RX ADMIN — FENTANYL CITRATE 50 MCG: 50 INJECTION INTRAMUSCULAR; INTRAVENOUS at 22:05

## 2024-05-07 RX ADMIN — SUGAMMADEX 200 MG: 100 INJECTION, SOLUTION INTRAVENOUS at 21:15

## 2024-05-07 RX ADMIN — HYDROMORPHONE HYDROCHLORIDE 0.3 MG: 1 INJECTION, SOLUTION INTRAMUSCULAR; INTRAVENOUS; SUBCUTANEOUS at 20:47

## 2024-05-07 RX ADMIN — PHENYLEPHRINE HYDROCHLORIDE 200 MCG: 10 INJECTION INTRAVENOUS at 21:49

## 2024-05-07 RX ADMIN — EPHEDRINE SULFATE 5 MG: 5 INJECTION INTRAVENOUS at 17:52

## 2024-05-07 RX ADMIN — EPHEDRINE SULFATE 5 MG: 5 INJECTION INTRAVENOUS at 17:55

## 2024-05-07 RX ADMIN — HYDROMORPHONE HYDROCHLORIDE 0.2 MG: 1 INJECTION, SOLUTION INTRAMUSCULAR; INTRAVENOUS; SUBCUTANEOUS at 19:57

## 2024-05-07 RX ADMIN — PHENYLEPHRINE HYDROCHLORIDE 150 MCG: 10 INJECTION INTRAVENOUS at 21:44

## 2024-05-07 RX ADMIN — EPHEDRINE SULFATE 5 MG: 5 INJECTION INTRAVENOUS at 18:27

## 2024-05-07 RX ADMIN — TRANEXAMIC ACID 1950 MG: 650 TABLET ORAL at 13:16

## 2024-05-07 RX ADMIN — PHENYLEPHRINE HYDROCHLORIDE 100 MCG: 10 INJECTION INTRAVENOUS at 21:27

## 2024-05-07 RX ADMIN — ALBUMIN HUMAN 25 G: 0.05 INJECTION, SOLUTION INTRAVENOUS at 22:44

## 2024-05-07 RX ADMIN — HYDROMORPHONE HYDROCHLORIDE 0.4 MG: 1 INJECTION, SOLUTION INTRAMUSCULAR; INTRAVENOUS; SUBCUTANEOUS at 22:25

## 2024-05-07 RX ADMIN — PHENYLEPHRINE HYDROCHLORIDE 150 MCG: 10 INJECTION INTRAVENOUS at 20:15

## 2024-05-07 ASSESSMENT — ENCOUNTER SYMPTOMS
DYSRHYTHMIAS: 1
ORTHOPNEA: 0

## 2024-05-07 ASSESSMENT — ACTIVITIES OF DAILY LIVING (ADL)
ADLS_ACUITY_SCORE: 18
ADLS_ACUITY_SCORE: 16
ADLS_ACUITY_SCORE: 18
ADLS_ACUITY_SCORE: 20
ADLS_ACUITY_SCORE: 18

## 2024-05-07 NOTE — OR NURSING
Has had issues with urinary catheters in past. Prior insertion caused false passage and therefore has required catheter to be a small (child) size per patient.

## 2024-05-07 NOTE — ANESTHESIA PROCEDURE NOTES
Airway       Patient location during procedure: OR       Procedure Start/Stop Times: 5/7/2024 5:40 PM  Staff -        CRNA: Tara Guardado APRN CRNA       Performed By: CRNA  Consent for Airway        Urgency: elective  Indications and Patient Condition       Indications for airway management: diaz-procedural       Induction type:intravenous       Mask difficulty assessment: 1 - vent by mask    Final Airway Details       Final airway type: endotracheal airway       Successful airway: ETT - single and Oral  Endotracheal Airway Details        ETT size (mm): 8.0       Cuffed: yes       Successful intubation technique: direct laryngoscopy       DL Blade Type: MAC 4       Grade View of Cords: 3       Adjucts: stylet       Position: Right       Measured from: lips       Secured at (cm): 24       Bite block used: None    Post intubation assessment        Placement verified by: capnometry, equal breath sounds and chest rise        Number of attempts at approach: 3       Secured with: tape and commercial tube apodaca       Ease of procedure: easy       Dentition: Intact (Not so easy over the Stanislav table, use Glide Scope next time)    Medication(s) Administered   Medication Administration Time: 5/7/2024 5:40 PM

## 2024-05-07 NOTE — ANESTHESIA PREPROCEDURE EVALUATION
Pre-Operative H & P     CC:  Preoperative exam to assess for increased cardiopulmonary risk while undergoing surgery and anesthesia.    Date of Encounter: 4/30/2024  Primary Care Physician:  Keyur Augustin     Reason for visit:   Encounter Diagnosis   Name Primary?     Infection of prosthetic total hip joint, subsequent encounter        HPI  Kwabena Solorio is a 73 year old male who presents for pre-operative H & P in preparation for  Procedure Information       Case: [id number] Date/Time: 05/07/24 1430    Procedures:       Explant Right surface replacement Total Hip Arthroplasty , (Right: Hip)      vanco/tobra cement spacer implantation (Right: Hip)    Anesthesia type: Choice    Diagnosis: Infection of prosthetic total hip joint, subsequent encounter [T84.59XD, Z96.649]    Pre-op diagnosis: Infection of prosthetic total hip joint, subsequent encounter [T84.59XD, Z96.649]    Location:  [facility] 14 / [facility]    Providers: Rd Dudley MD            Kwabena Solorio is a 73 year old male with pacemaker in place for complete heart block, polymyalgia rheumatica, RLS, GERD, BPH, chronic back pain, insomnia and history of skin cancer that has a 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  He has very minimal complaints in regards to the joint., but an abnormality was originally noted this past winter on lumbar imaging done at an outside facility.  He was subsequently referred to [facility] ortho and a CT of the right hip on 4/2/24 noted a right acetabular osteolysis and a large right iliopsoas bursal effusion.  More recent testing as ordered by Dr. Dudley has confirmed prosthetic joint infection.  He consulted with Dr. Dudley virtually yesterday to discuss.  The above listed procedure has now been planned.     History is obtained from the patient and chart review    Hx of abnormal bleeding or anti-platelet use: none      Past Medical History  Past Medical History:   Diagnosis Date     BPH (benign  prostatic hyperplasia)      Cardiac pacemaker in situ      Complete heart block (H)      Hydronephrosis, right      Lumbar radiculopathy      Lumbar scoliosis      Restless legs syndrome (RLS)        Past Surgical History  Past Surgical History:   Procedure Laterality Date     APPENDECTOMY       AS TOTAL KNEE ARTHROPLASTY Left 2010     CARPAL TUNNEL RELEASE RT/LT       CATARACT EXTRACTION       COLONOSCOPY       EP ICD       FOOT SURGERY Bilateral      HAND SURGERY       HC TOTAL HIP ARTHROPLASTY Right 2007     HEMORRHOIDECTOMY       HERNIA REPAIR       NASAL FRACTURE SURGERY       TONSILLECTOMY & ADENOIDECTOMY       urinary tract surgery         Prior to Admission Medications  No current outpatient medications on file.       Allergies  No Known Allergies    Social History  Social History     Socioeconomic History     Marital status: Single     Spouse name: Not on file     Number of children: 0     Years of education: Not on file     Highest education level: Not on file   Occupational History     Occupation: retired   Tobacco Use     Smoking status: Never     Passive exposure: Past     Smokeless tobacco: Never   Substance and Sexual Activity     Alcohol use: Not Currently     Comment: quit 1990's     Drug use: Never     Sexual activity: Not on file   Other Topics Concern     Not on file   Social History Narrative     Not on file     Social Determinants of Health     Financial Resource Strain: Not on file   Food Insecurity: Not on file   Transportation Needs: Not on file   Physical Activity: Not on file   Stress: Not on file   Social Connections: Not on file   Interpersonal Safety: Not on file   Housing Stability: Not on file       Family History  Family History   Problem Relation Age of Onset     Anesthesia Reaction No family hx of      Deep Vein Thrombosis (DVT) No family hx of        Review of Systems  The complete review of systems is negative other than noted in the HPI or here.   Anesthesia Evaluation   Pt has  had prior anesthetic.     No history of anesthetic complications       ROS/MED HX  ENT/Pulmonary:  - neg pulmonary ROS  (-) recent URI   Neurologic: Comment: RLS        Cardiovascular:     (+)  - -   -  - -              pacemaker, Reason placed: complete heart block. type: [employer] (formerly [employer]olade MRI, settings: DDDR ,       dysrhythmias, 3rd Deg Heart Block,        Previous cardiac testing   Echo: Date: 2021 Results:  * The estimated ejection fraction is 50%.     * Left ventricular systolic function is low normal.     * Normal right ventricular systolic function.     * There is mild mitral regurgitation with multiple regurgitant jets..     * The pulmonary artery systolic pressures are normal,     * estimated pulmonary arterial systolic pressure is  31 mmHg, plus RAP.     * The IVC is normal in size (< 2.1 cm), > 50% respiratory variance, RA   pressure normal at 3 mmHg.     * There is no pericardial effusion visualized.     Stress Test:  Date: Results:    ECG Reviewed:  Date: Results:    Cath:  Date: Results:   (-) ALMARAZ and orthopnea/PND   METS/Exercise Tolerance: 4 - Raking leaves, gardening Comment: Activity limited by back pain. Able to walk 30 min bid w/ walking sticks. Bicycles regularly - up to 30-60 mins BID       Pro[employer] skis in the winter       Hematologic:     (+)      anemia, history of blood transfusion, no previous transfusion reaction,     (-) history of blood clots   Musculoskeletal: Comment: S/p right CHRISS 2007- confirmed infection.  Rare hip/groin pain.      S/p left TKA    Has hardware in foot; s/p B/L surgeries    Polymyalgia rheumatica - on prednisone    Chronic back pain - Lumbar spondylosis & significant foraminal stenosis w/ radiculopathy. Followed by Dr. Merino.        GI/Hepatic: Comment: Intermittent constipation    (+) GERD, Asymptomatic on medication,               (-) liver disease   Renal/Genitourinary: Comment: Moderate right hydronephrosis, incidental finding. Under  surveillance. Asymptomatic. Last seen by urology 3/28/24.    Baseline creatinine 0.8-0.9    Prior injury 2/2 carl insertion for knee surgery.        (+)        BPH,      Endo:     (+)            Chronic steroid usage for Arthritis. Date most recently used: daily prednisone for PMR.     (-) Type II DM   Psychiatric/Substance Use: Comment: insomnia      Infectious Disease:  - neg infectious disease ROS     Malignancy:   (+) Malignancy, History of Skin.Skin CA Remission status post Surgery.      Other: Comment: Left foot - three toenails removed 3/19/24.  The big toe still has a little drainage.  Using SSD cream and dry dressing.  Was discussed with Dr. Dudley by patient at most recent visit.    - neg other ROS    (+)  , H/O Chronic Pain,         Virtual visit -  No vitals were obtained    Physical Exam  Constitutional: Awake, alert, cooperative, no apparent distress, and appears stated age.  Eyes: Pupils equal  HENT: Normocephalic  Respiratory: non labored breathing   Neurologic: Awake, alert, oriented to name, place and time.   Neuropsychiatric: Calm, cooperative. Normal affect.      Prior Labs/Diagnostic Studies   All labs and imaging personally reviewed     EKG/ stress test - if available please see in ROS above     LIVER FUNCTION PANEL  Order: [id number]   suggestion  Information displayed in this report may not trend or trigger automated decision support.     Component  Ref Range & Units 2 wk ago   Albumin  3.5 - 5.0 g/dL 3.7   Total Protein  6.4 - 8.3 g/dL 6.1 Low    Globulin  g/dL 2.4   Albumin/Globulin Ratio 1.5   Aspartate Aminotransferase (AST)  5 - 34 U/L 37 High    Alanine Aminotransferase (ALT)  0 - 55 U/L 32   Alkaline Phosphatase  40 - 150 U/L 74   Bilirubin, Total  0.2 - 1.2 mg/dL 0.3   Bilirubin, Indirect  0.0 - 0.9 mg/dL >=0.2   Bilirubin, Direct  0.0 - 0.5 mg/dL <=0.1     Specimen Collected: 04/10/24  9:51 AM    Performed by: MARCELO GOULD Last Resulted: 04/10/24 10:20 AM   [facility]  Result Received: 04/18/24  3:23 PM    View Encounter        Received Information  BASIC METABOLIC PANEL  Order: [id number]   suggestion  Information displayed in this report may not trend or trigger automated decision support.     Component  Ref Range & Units 2 wk ago   Sodium  136 - 145 mmol/L 138   Potassium  3.5 - 5.1 mmol/L 4.0   Chloride  98 - 107 mmol/L 105   CO2  20 - 31 mmol/L 25   Anion Gap  10.0 - 20.0 mmol/L 12.0   Creatinine  0.73 - 1.18 mg/dL 0.90   Blood Urea Nitrogen  7.0 - 26.0 mg/dL 22.2   BUN/Creatinine Ratio  ratio 24.67   Calcium, Total  8.4 - 10.2 mg/dL 8.8   Glucose  70 - 105 mg/dL 87   eGFR  >=60 mL/min/1.73m(2) >60   Comment: Calculation based on the Chronic Kidney Disease Epidemiology Collaboration (CKD-EPI) equation refit without adjustment for race.   eCrCl (Rx) - Adults  mL/min 81.7   Fasting Status Yes     Specimen Collected: 04/10/24  9:51 AM    Performed by: MARCELO GOULD Last Resulted: 04/10/24 10:20 AM   Received From: [employer]  Result Received: 04/18/24  3:23 PM    View Encounter        Received Information   Contains abnormal data COMPLETE BLOOD COUNT AND DIFFERENTIAL  Order: [id number]   suggestion  Information displayed in this report may not trend or trigger automated decision support.     Component  Ref Range & Units 2 wk ago   WBC Count, Corrected  4.5 - 11.0 10(3)/uL 5.0   RBC Count  4.50 - 5.90 10(6)/uL 4.08 Low    Hemoglobin  13.5 - 17.5 g/dL 12.8 Low    Hematocrit  37.0 - 53.0 % 36.6 Low    MCV  80.0 - 100.0 fL 89.7   MCH  26.0 - 34.0 pg 31.4   MCHC  32.0 - 36.0 g/dL 35.0   RDW  11.5 - 13.1 % 12.6   Platelets  150 - 450 10(3)/uL 267   MPV  6.5 - 10.0 fL 8.7   % Neutrophils  35.0 - 77.0 % 62.2   % Lymphocytes  24.0 - 44.0 % 23.8 Low    % Monocytes  7.0 - 13.0 % 10.6   % Eosinophils  0.0 - 3.0 % 2.6   % Basophils  0.0 - 1.0 % 0.4   % Immature Granulocytes  % 0.4   Abs Neutrophils  1.5 - 8.5 10(3)/uL 3.1   Abs  Lymphocytes  1.1 - 5.0 10(3)/uL 1.2   Abs Monocytes  0.1 - 0.7 10(3)/uL 0.5   Abs Eosinophils  0.0 - 0.3 10(3)/uL 0.1   Abs Basophils  0.0 - 0.1 10(3)/uL 0.0   Abs Immature Granulocytes  10(3)/uL 0.02     Specimen Collected: 04/10/24  9:51 AM    Performed by: [facility] Last Resulted: 04/10/24  9:56 AM   Received From: [facility]        The patient's records and results personally reviewed by this provider.     Outside records reviewed from: Care Everywhere      Assessment    Kwabena Solorio is a 73 year old male seen as a PAC referral for risk assessment and optimization for anesthesia.    Plan/Recommendations  Pt will be optimized for the proposed procedure.  See below for details on the assessment, risk, and preoperative recommendations    NEUROLOGY  - No history of TIA, CVA or seizure    -Post Op delirium risk factors:  Age    ENT  - No current airway concerns.  Will need to be reassessed day of surgery.  Mallampati: Unable to assess  TM: Unable to assess    CARDIAC  - No history of CAD, Hypertension, and Afib  - complete heart block, s/p [employer] pacemaker.   - last echocardiogram as above.    - METS (Metabolic Equivalents)  Patient performs 4 or more METS exercise without symptoms             Total Score: 0      RCRI-Very low risk: Class 1 0.4% complication rate             Total Score: 0        PULMONARY    DEQUAN Low Risk             Total Score: 2    DEQUAN: Over 50 ys old    DEQUAN: Male      - Denies asthma or inhaler use  - Tobacco History    History   Smoking Status     Never   Smokeless Tobacco     Never       GI  - GERD is well controlled with prilosec.   PONV Medium Risk  Total Score: 2           1 AN PONV: Patient is not a current smoker    1 AN PONV: Intended Post Op Opioids        /RENAL  - Hx prior urethral injury 2/2 carl insertion for knee surgery. Typically uses pediatric sized catheter.  - Moderate right hydronephrosis, incidental finding. Under surveillance.  "Asymptomatic. Last seen by urology 3/28/24.  - BPH - no meds and thinks he may have slight retention. Monitor closely post-op.     ENDOCRINE    - BMI: Estimated body mass index is 28.25 kg/m  as calculated from the following:    Height as of this encounter: 1.651 m (5' 5\").    Weight as of this encounter: 77 kg (169 lb 12.1 oz).  Overweight (BMI 25.0-29.9)  - No history of Diabetes Mellitus    - on daily prednisone for PMR.  Stress dose steroids as per anesthesia if indicated.    HEME  VTE Low Risk 0.5%             Total Score: 3    VTE: Greater than 59 yrs old    VTE: Male      - No history of abnormal bleeding or antiplatelet use.  - Chronic anemia  Recommend perioperative use of blood conservation techniques intraoperatively and close monitoring for postoperative bleeding.  - will order updated CBC and T&S for DOS based on patient's home location.     MSK  Patient is NOT Frail             Total Score: 0      - S/p right CHRISS - with noted prosthesis complication/infection.  Surgery planned as above.   - S/p left TKA  - Has hardware in foot; s/p B/L surgeries  - Polymyalgia rheumatica, on prednisone  - chronic back pain - Lumbar spondylosis & significant foraminal stenosis w/ radiculopathy. Followed by Dr. Merino.  Consider cautious positioning.       Skin  - left great toe nailbed with ongoing drainage s/p prior nail removal.  Dr. Dudley aware per patient report.  He has been applying SSD cream and a dry dressing daily .  Nursing to monitor and manage during admission.  Consult wound care if indicated.         Different anesthesia methods/types have been discussed with the patient, but they are aware that the final plan will be decided by the assigned anesthesia provider on the date of service.    The patient is optimized for their procedure. AVS with information on surgery time/arrival time, meds and NPO status given by nursing staff. No further diagnostic testing indicated.    Please refer to the physical " examination documented by the anesthesiologist in the anesthesia record on the day of surgery.    Video-Visit Details    Type of service:  Video Visit    Provider received verbal consent for a Video Visit from the patient? Yes   Video Start Time:  1054  Video End Time: 1121    Originating Location (pt. Location): Home    Distant Location (provider location):  Off-site  Mode of Communication:  Video Conference via [employer]    On the day of service:     Prep time: 11 minutes  Visit time: 27 minutes  Documentation time: 15 minutes  ------------------------------------------  Total time: 53 minutes      GIORGI Andre CNP  Preoperative Assessment Center  [facility]  Clinic and Surgery Center  Phone: [phone]  Fax: [phone]    Physical Exam    Airway        Mallampati: II   TM distance: > 3 FB   Neck ROM: full   Mouth opening: > 3 cm    Respiratory Devices and Support         Dental       (+) Minor Abnormalities - some fillings, tiny chips      Cardiovascular   cardiovascular exam normal       Rhythm and rate: regular and normal     Pulmonary   pulmonary exam normal        breath sounds clear to auscultation       Anesthesia Plan    ASA Status:  2    NPO Status:  NPO Appropriate    Anesthesia Type: General.     - Airway: ETT   Induction: Intravenous, Propofol.   Maintenance: Balanced.        Consents    Anesthesia Plan(s) and associated risks, benefits, and realistic alternatives discussed. Questions answered and patient/representative(s) expressed understanding.     - Discussed: Risks, Benefits and Alternatives for BOTH SEDATION and the PROCEDURE were discussed     - Discussed with:  Patient, Spouse      - Extended Intubation/Ventilatory Support Discussed: No.      - Patient is DNR/DNI Status: No     Use of blood products discussed: Yes.     - Discussed with: Patient.     - Consented: consented to blood products     Postoperative Care    Pain management: Multi-modal analgesia.    PONV prophylaxis: Ondansetron (or other 5HT-3), Dexamethasone or Solumedrol     Comments:

## 2024-05-08 ENCOUNTER — APPOINTMENT (OUTPATIENT)
Dept: GENERAL RADIOLOGY | Facility: CLINIC | Age: 74
DRG: 466 | End: 2024-05-08
Payer: COMMERCIAL

## 2024-05-08 ENCOUNTER — APPOINTMENT (OUTPATIENT)
Dept: PHYSICAL THERAPY | Facility: CLINIC | Age: 74
DRG: 466 | End: 2024-05-08
Attending: ORTHOPAEDIC SURGERY
Payer: COMMERCIAL

## 2024-05-08 PROBLEM — T84.59XD INFECTION OF PROSTHETIC TOTAL HIP JOINT, SUBSEQUENT ENCOUNTER: Status: ACTIVE | Noted: 2024-05-08

## 2024-05-08 PROBLEM — Z96.649 INFECTION OF PROSTHETIC TOTAL HIP JOINT, SUBSEQUENT ENCOUNTER: Status: ACTIVE | Noted: 2024-05-08

## 2024-05-08 LAB
ALBUMIN SERPL BCG-MCNC: 3.6 G/DL (ref 3.5–5.2)
ALP SERPL-CCNC: 43 U/L (ref 40–150)
ALT SERPL W P-5'-P-CCNC: 17 U/L (ref 0–70)
ANION GAP SERPL CALCULATED.3IONS-SCNC: 10 MMOL/L (ref 7–15)
ANION GAP SERPL CALCULATED.3IONS-SCNC: 9 MMOL/L (ref 7–15)
AST SERPL W P-5'-P-CCNC: 26 U/L (ref 0–45)
ATRIAL RATE - MUSE: 60 BPM
BILIRUB SERPL-MCNC: 0.8 MG/DL
BUN SERPL-MCNC: 12.9 MG/DL (ref 8–23)
BUN SERPL-MCNC: 15.2 MG/DL (ref 8–23)
CALCIUM SERPL-MCNC: 7.9 MG/DL (ref 8.8–10.2)
CALCIUM SERPL-MCNC: 8 MG/DL (ref 8.8–10.2)
CHLORIDE SERPL-SCNC: 99 MMOL/L (ref 98–107)
CHLORIDE SERPL-SCNC: 99 MMOL/L (ref 98–107)
CORTIS SERPL-MCNC: 3.2 UG/DL
CREAT SERPL-MCNC: 0.75 MG/DL (ref 0.67–1.17)
CREAT SERPL-MCNC: 0.82 MG/DL (ref 0.67–1.17)
CRP SERPL-MCNC: 7.97 MG/L
DEPRECATED HCO3 PLAS-SCNC: 24 MMOL/L (ref 22–29)
DEPRECATED HCO3 PLAS-SCNC: 24 MMOL/L (ref 22–29)
DIASTOLIC BLOOD PRESSURE - MUSE: NORMAL MMHG
EGFRCR SERPLBLD CKD-EPI 2021: >90 ML/MIN/1.73M2
EGFRCR SERPLBLD CKD-EPI 2021: >90 ML/MIN/1.73M2
ERYTHROCYTE [DISTWIDTH] IN BLOOD BY AUTOMATED COUNT: 12.3 % (ref 10–15)
ERYTHROCYTE [DISTWIDTH] IN BLOOD BY AUTOMATED COUNT: 12.5 % (ref 10–15)
ERYTHROCYTE [DISTWIDTH] IN BLOOD BY AUTOMATED COUNT: 12.6 % (ref 10–15)
GLUCOSE BLDC GLUCOMTR-MCNC: 116 MG/DL (ref 70–99)
GLUCOSE BLDC GLUCOMTR-MCNC: 116 MG/DL (ref 70–99)
GLUCOSE BLDC GLUCOMTR-MCNC: 120 MG/DL (ref 70–99)
GLUCOSE BLDC GLUCOMTR-MCNC: 122 MG/DL (ref 70–99)
GLUCOSE BLDC GLUCOMTR-MCNC: 150 MG/DL (ref 70–99)
GLUCOSE SERPL-MCNC: 114 MG/DL (ref 70–99)
GLUCOSE SERPL-MCNC: 128 MG/DL (ref 70–99)
HCT VFR BLD AUTO: 24.9 % (ref 40–53)
HCT VFR BLD AUTO: 25.9 % (ref 40–53)
HCT VFR BLD AUTO: 29.1 % (ref 40–53)
HGB BLD-MCNC: 10.1 G/DL (ref 13.3–17.7)
HGB BLD-MCNC: 8.6 G/DL (ref 13.3–17.7)
HGB BLD-MCNC: 9 G/DL (ref 13.3–17.7)
HGB BLD-MCNC: 9.3 G/DL (ref 13.3–17.7)
HOLD SPECIMEN: NORMAL
INTERPRETATION ECG - MUSE: NORMAL
LACTATE SERPL-SCNC: 1.1 MMOL/L (ref 0.7–2)
LACTATE SERPL-SCNC: 1.7 MMOL/L (ref 0.7–2)
MAGNESIUM SERPL-MCNC: 1.3 MG/DL (ref 1.7–2.3)
MAGNESIUM SERPL-MCNC: 3.2 MG/DL (ref 1.7–2.3)
MCH RBC QN AUTO: 30.6 PG (ref 26.5–33)
MCH RBC QN AUTO: 31.1 PG (ref 26.5–33)
MCH RBC QN AUTO: 31.3 PG (ref 26.5–33)
MCHC RBC AUTO-ENTMCNC: 34.5 G/DL (ref 31.5–36.5)
MCHC RBC AUTO-ENTMCNC: 34.7 G/DL (ref 31.5–36.5)
MCHC RBC AUTO-ENTMCNC: 34.7 G/DL (ref 31.5–36.5)
MCV RBC AUTO: 88 FL (ref 78–100)
MCV RBC AUTO: 90 FL (ref 78–100)
MCV RBC AUTO: 91 FL (ref 78–100)
P AXIS - MUSE: 30 DEGREES
PHOSPHATE SERPL-MCNC: 3.6 MG/DL (ref 2.5–4.5)
PLATELET # BLD AUTO: 217 10E3/UL (ref 150–450)
PLATELET # BLD AUTO: 267 10E3/UL (ref 150–450)
PLATELET # BLD AUTO: 275 10E3/UL (ref 150–450)
POTASSIUM SERPL-SCNC: 3.7 MMOL/L (ref 3.4–5.3)
POTASSIUM SERPL-SCNC: 4.1 MMOL/L (ref 3.4–5.3)
PR INTERVAL - MUSE: 208 MS
PROCALCITONIN SERPL IA-MCNC: 0.08 NG/ML
PROT SERPL-MCNC: 4.7 G/DL (ref 6.4–8.3)
QRS DURATION - MUSE: 160 MS
QT - MUSE: 490 MS
QTC - MUSE: 490 MS
R AXIS - MUSE: -86 DEGREES
RBC # BLD AUTO: 2.75 10E6/UL (ref 4.4–5.9)
RBC # BLD AUTO: 2.89 10E6/UL (ref 4.4–5.9)
RBC # BLD AUTO: 3.3 10E6/UL (ref 4.4–5.9)
SODIUM SERPL-SCNC: 131 MMOL/L (ref 135–145)
SODIUM SERPL-SCNC: 132 MMOL/L (ref 135–145)
SODIUM SERPL-SCNC: 133 MMOL/L (ref 135–145)
SYSTOLIC BLOOD PRESSURE - MUSE: NORMAL MMHG
T AXIS - MUSE: 94 DEGREES
TROPONIN T SERPL HS-MCNC: 24 NG/L
TROPONIN T SERPL HS-MCNC: 29 NG/L
VENTRICULAR RATE- MUSE: 60 BPM
WBC # BLD AUTO: 11.1 10E3/UL (ref 4–11)
WBC # BLD AUTO: 11.5 10E3/UL (ref 4–11)
WBC # BLD AUTO: 17.9 10E3/UL (ref 4–11)

## 2024-05-08 PROCEDURE — 93010 ELECTROCARDIOGRAM REPORT: CPT | Performed by: INTERNAL MEDICINE

## 2024-05-08 PROCEDURE — 258N000003 HC RX IP 258 OP 636

## 2024-05-08 PROCEDURE — 84145 PROCALCITONIN (PCT): CPT

## 2024-05-08 PROCEDURE — 99292 CRITICAL CARE ADDL 30 MIN: CPT

## 2024-05-08 PROCEDURE — 82040 ASSAY OF SERUM ALBUMIN: CPT

## 2024-05-08 PROCEDURE — 200N000002 HC R&B ICU UMMC

## 2024-05-08 PROCEDURE — 71045 X-RAY EXAM CHEST 1 VIEW: CPT

## 2024-05-08 PROCEDURE — 250N000009 HC RX 250

## 2024-05-08 PROCEDURE — 71045 X-RAY EXAM CHEST 1 VIEW: CPT | Mod: 26 | Performed by: RADIOLOGY

## 2024-05-08 PROCEDURE — 85018 HEMOGLOBIN: CPT

## 2024-05-08 PROCEDURE — 87040 BLOOD CULTURE FOR BACTERIA: CPT

## 2024-05-08 PROCEDURE — 97110 THERAPEUTIC EXERCISES: CPT | Mod: GP

## 2024-05-08 PROCEDURE — 250N000012 HC RX MED GY IP 250 OP 636 PS 637: Performed by: STUDENT IN AN ORGANIZED HEALTH CARE EDUCATION/TRAINING PROGRAM

## 2024-05-08 PROCEDURE — 36415 COLL VENOUS BLD VENIPUNCTURE: CPT

## 2024-05-08 PROCEDURE — 83735 ASSAY OF MAGNESIUM: CPT

## 2024-05-08 PROCEDURE — 258N000003 HC RX IP 258 OP 636: Performed by: STUDENT IN AN ORGANIZED HEALTH CARE EDUCATION/TRAINING PROGRAM

## 2024-05-08 PROCEDURE — 86140 C-REACTIVE PROTEIN: CPT

## 2024-05-08 PROCEDURE — 250N000013 HC RX MED GY IP 250 OP 250 PS 637: Performed by: STUDENT IN AN ORGANIZED HEALTH CARE EDUCATION/TRAINING PROGRAM

## 2024-05-08 PROCEDURE — 93005 ELECTROCARDIOGRAM TRACING: CPT

## 2024-05-08 PROCEDURE — 84295 ASSAY OF SERUM SODIUM: CPT

## 2024-05-08 PROCEDURE — 85027 COMPLETE CBC AUTOMATED: CPT

## 2024-05-08 PROCEDURE — 84484 ASSAY OF TROPONIN QUANT: CPT

## 2024-05-08 PROCEDURE — 97530 THERAPEUTIC ACTIVITIES: CPT | Mod: GP

## 2024-05-08 PROCEDURE — 250N000013 HC RX MED GY IP 250 OP 250 PS 637

## 2024-05-08 PROCEDURE — 99207 PR NO BILLABLE SERVICE THIS VISIT: CPT

## 2024-05-08 PROCEDURE — 84100 ASSAY OF PHOSPHORUS: CPT

## 2024-05-08 PROCEDURE — 97162 PT EVAL MOD COMPLEX 30 MIN: CPT | Mod: GP

## 2024-05-08 PROCEDURE — 82533 TOTAL CORTISOL: CPT

## 2024-05-08 PROCEDURE — 99291 CRITICAL CARE FIRST HOUR: CPT

## 2024-05-08 PROCEDURE — 83605 ASSAY OF LACTIC ACID: CPT

## 2024-05-08 PROCEDURE — 250N000011 HC RX IP 250 OP 636

## 2024-05-08 PROCEDURE — 250N000011 HC RX IP 250 OP 636: Performed by: STUDENT IN AN ORGANIZED HEALTH CARE EDUCATION/TRAINING PROGRAM

## 2024-05-08 PROCEDURE — 99223 1ST HOSP IP/OBS HIGH 75: CPT | Performed by: INTERNAL MEDICINE

## 2024-05-08 RX ORDER — ASPIRIN 81 MG/1
81 TABLET ORAL 2 TIMES DAILY
Status: DISCONTINUED | OUTPATIENT
Start: 2024-05-08 | End: 2024-05-08

## 2024-05-08 RX ORDER — ASPIRIN 81 MG/1
162 TABLET ORAL 2 TIMES DAILY
Status: DISCONTINUED | OUTPATIENT
Start: 2024-05-08 | End: 2024-05-08

## 2024-05-08 RX ORDER — ACETAMINOPHEN 325 MG/1
975 TABLET ORAL EVERY 8 HOURS
Status: COMPLETED | OUTPATIENT
Start: 2024-05-08 | End: 2024-05-10

## 2024-05-08 RX ORDER — PANTOPRAZOLE SODIUM 40 MG/1
40 TABLET, DELAYED RELEASE ORAL
Status: DISCONTINUED | OUTPATIENT
Start: 2024-05-08 | End: 2024-05-13 | Stop reason: HOSPADM

## 2024-05-08 RX ORDER — NALOXONE HYDROCHLORIDE 0.4 MG/ML
0.2 INJECTION, SOLUTION INTRAMUSCULAR; INTRAVENOUS; SUBCUTANEOUS
Status: DISCONTINUED | OUTPATIENT
Start: 2024-05-08 | End: 2024-05-13 | Stop reason: HOSPADM

## 2024-05-08 RX ORDER — PROCHLORPERAZINE MALEATE 5 MG
5 TABLET ORAL EVERY 6 HOURS PRN
Status: DISCONTINUED | OUTPATIENT
Start: 2024-05-08 | End: 2024-05-13 | Stop reason: HOSPADM

## 2024-05-08 RX ORDER — NICOTINE POLACRILEX 4 MG
15-30 LOZENGE BUCCAL
Status: DISCONTINUED | OUTPATIENT
Start: 2024-05-08 | End: 2024-05-13 | Stop reason: HOSPADM

## 2024-05-08 RX ORDER — SODIUM CHLORIDE, SODIUM LACTATE, POTASSIUM CHLORIDE, CALCIUM CHLORIDE 600; 310; 30; 20 MG/100ML; MG/100ML; MG/100ML; MG/100ML
INJECTION, SOLUTION INTRAVENOUS CONTINUOUS
Status: DISCONTINUED | OUTPATIENT
Start: 2024-05-08 | End: 2024-05-08

## 2024-05-08 RX ORDER — AMOXICILLIN 250 MG
1 CAPSULE ORAL 2 TIMES DAILY
Status: DISCONTINUED | OUTPATIENT
Start: 2024-05-08 | End: 2024-05-08

## 2024-05-08 RX ORDER — DEXTROSE MONOHYDRATE 25 G/50ML
25-50 INJECTION, SOLUTION INTRAVENOUS
Status: DISCONTINUED | OUTPATIENT
Start: 2024-05-08 | End: 2024-05-13 | Stop reason: HOSPADM

## 2024-05-08 RX ORDER — ASCORBIC ACID 500 MG
500 TABLET ORAL EVERY MORNING
Status: DISCONTINUED | OUTPATIENT
Start: 2024-05-08 | End: 2024-05-13 | Stop reason: HOSPADM

## 2024-05-08 RX ORDER — NALOXONE HYDROCHLORIDE 0.4 MG/ML
0.4 INJECTION, SOLUTION INTRAMUSCULAR; INTRAVENOUS; SUBCUTANEOUS
Status: DISCONTINUED | OUTPATIENT
Start: 2024-05-08 | End: 2024-05-13 | Stop reason: HOSPADM

## 2024-05-08 RX ORDER — SENNOSIDES 8.6 MG
1300 CAPSULE ORAL
Status: ON HOLD | COMMUNITY
End: 2024-05-13

## 2024-05-08 RX ORDER — CEFAZOLIN SODIUM 1 G/50ML
1750 SOLUTION INTRAVENOUS EVERY 24 HOURS
Status: DISCONTINUED | OUTPATIENT
Start: 2024-05-08 | End: 2024-05-08

## 2024-05-08 RX ORDER — BISACODYL 10 MG
10 SUPPOSITORY, RECTAL RECTAL DAILY PRN
Status: DISCONTINUED | OUTPATIENT
Start: 2024-05-10 | End: 2024-05-13 | Stop reason: HOSPADM

## 2024-05-08 RX ORDER — MAGNESIUM SULFATE HEPTAHYDRATE 40 MG/ML
4 INJECTION, SOLUTION INTRAVENOUS ONCE
Qty: 100 ML | Refills: 0 | Status: COMPLETED | OUTPATIENT
Start: 2024-05-08 | End: 2024-05-08

## 2024-05-08 RX ORDER — LIDOCAINE 40 MG/G
CREAM TOPICAL
Status: DISCONTINUED | OUTPATIENT
Start: 2024-05-08 | End: 2024-05-09

## 2024-05-08 RX ORDER — CALCIUM CARBONATE 500(1250)
500 TABLET ORAL EVERY MORNING
Status: DISCONTINUED | OUTPATIENT
Start: 2024-05-08 | End: 2024-05-13 | Stop reason: HOSPADM

## 2024-05-08 RX ORDER — ACETAMINOPHEN 325 MG/1
650 TABLET ORAL EVERY 4 HOURS PRN
Status: DISCONTINUED | OUTPATIENT
Start: 2024-05-10 | End: 2024-05-13 | Stop reason: HOSPADM

## 2024-05-08 RX ORDER — FERROUS SULFATE 325(65) MG
325 TABLET ORAL
Status: DISCONTINUED | OUTPATIENT
Start: 2024-05-08 | End: 2024-05-11

## 2024-05-08 RX ORDER — CEFAZOLIN SODIUM 1 G/3ML
1 INJECTION, POWDER, FOR SOLUTION INTRAMUSCULAR; INTRAVENOUS EVERY 8 HOURS
Qty: 10 ML | Refills: 0 | Status: COMPLETED | OUTPATIENT
Start: 2024-05-08 | End: 2024-05-08

## 2024-05-08 RX ORDER — DOCUSATE SODIUM 100 MG/1
200 CAPSULE, LIQUID FILLED ORAL
Status: ON HOLD | COMMUNITY
End: 2024-05-13

## 2024-05-08 RX ORDER — LIDOCAINE 40 MG/G
CREAM TOPICAL
Status: DISCONTINUED | OUTPATIENT
Start: 2024-05-08 | End: 2024-05-13 | Stop reason: HOSPADM

## 2024-05-08 RX ORDER — POLYETHYLENE GLYCOL 3350 17 G/17G
17 POWDER, FOR SOLUTION ORAL DAILY
Status: DISCONTINUED | OUTPATIENT
Start: 2024-05-08 | End: 2024-05-10

## 2024-05-08 RX ORDER — LIDOCAINE HYDROCHLORIDE 20 MG/ML
JELLY TOPICAL EVERY 4 HOURS PRN
Status: DISCONTINUED | OUTPATIENT
Start: 2024-05-08 | End: 2024-05-13 | Stop reason: HOSPADM

## 2024-05-08 RX ORDER — SODIUM CHLORIDE 9 MG/ML
INJECTION, SOLUTION INTRAVENOUS CONTINUOUS
Status: DISCONTINUED | OUTPATIENT
Start: 2024-05-08 | End: 2024-05-09

## 2024-05-08 RX ORDER — VANCOMYCIN HYDROCHLORIDE 1 G/200ML
1000 INJECTION, SOLUTION INTRAVENOUS EVERY 12 HOURS
Qty: 200 ML | Refills: 0 | Status: COMPLETED | OUTPATIENT
Start: 2024-05-08 | End: 2024-05-08

## 2024-05-08 RX ORDER — AMPICILLIN 2 G/1
2 INJECTION, POWDER, FOR SOLUTION INTRAVENOUS EVERY 4 HOURS
Status: DISCONTINUED | OUTPATIENT
Start: 2024-05-08 | End: 2024-05-13 | Stop reason: HOSPADM

## 2024-05-08 RX ORDER — OMEGA-3 FATTY ACIDS/FISH OIL 300-1000MG
400-800 CAPSULE ORAL DAILY PRN
Status: ON HOLD | COMMUNITY
End: 2024-05-13

## 2024-05-08 RX ORDER — MAGNESIUM OXIDE 400 MG/1
400 TABLET ORAL DAILY
Status: DISCONTINUED | OUTPATIENT
Start: 2024-05-08 | End: 2024-05-13 | Stop reason: HOSPADM

## 2024-05-08 RX ORDER — VITAMIN B COMPLEX
1000 TABLET ORAL
Status: DISCONTINUED | OUTPATIENT
Start: 2024-05-08 | End: 2024-05-13 | Stop reason: HOSPADM

## 2024-05-08 RX ORDER — ASPIRIN 81 MG/1
162 TABLET ORAL DAILY
Status: DISCONTINUED | OUTPATIENT
Start: 2024-05-08 | End: 2024-05-13 | Stop reason: HOSPADM

## 2024-05-08 RX ORDER — PREDNISONE 5 MG/1
5 TABLET ORAL
Status: DISCONTINUED | OUTPATIENT
Start: 2024-05-08 | End: 2024-05-13 | Stop reason: HOSPADM

## 2024-05-08 RX ORDER — AMOXICILLIN 250 MG
2 CAPSULE ORAL 2 TIMES DAILY
Status: DISCONTINUED | OUTPATIENT
Start: 2024-05-08 | End: 2024-05-10

## 2024-05-08 RX ORDER — PHENYLEPHRINE HCL IN 0.9% NACL 50MG/250ML
.5-1.25 PLASTIC BAG, INJECTION (ML) INTRAVENOUS CONTINUOUS
Status: DISCONTINUED | OUTPATIENT
Start: 2024-05-08 | End: 2024-05-08

## 2024-05-08 RX ADMIN — SENNOSIDES AND DOCUSATE SODIUM 2 TABLET: 8.6; 5 TABLET ORAL at 19:46

## 2024-05-08 RX ADMIN — POLYETHYLENE GLYCOL 3350 17 G: 17 POWDER, FOR SOLUTION ORAL at 08:28

## 2024-05-08 RX ADMIN — AMPICILLIN SODIUM 2 G: 2 INJECTION, POWDER, FOR SOLUTION INTRAMUSCULAR; INTRAVENOUS at 19:50

## 2024-05-08 RX ADMIN — GABAPENTIN 900 MG: 300 CAPSULE ORAL at 21:19

## 2024-05-08 RX ADMIN — OXYCODONE HYDROCHLORIDE 5 MG: 5 TABLET ORAL at 02:32

## 2024-05-08 RX ADMIN — CEFAZOLIN 1 G: 1 INJECTION, POWDER, FOR SOLUTION INTRAMUSCULAR; INTRAVENOUS at 12:29

## 2024-05-08 RX ADMIN — PRAMIPEXOLE DIHYDROCHLORIDE 0.25 MG: 0.12 TABLET ORAL at 21:18

## 2024-05-08 RX ADMIN — HYDROMORPHONE HYDROCHLORIDE 0.2 MG: 1 INJECTION, SOLUTION INTRAMUSCULAR; INTRAVENOUS; SUBCUTANEOUS at 16:42

## 2024-05-08 RX ADMIN — MAGNESIUM SULFATE HEPTAHYDRATE 4 G: 40 INJECTION, SOLUTION INTRAVENOUS at 06:17

## 2024-05-08 RX ADMIN — SENNOSIDES AND DOCUSATE SODIUM 1 TABLET: 50; 8.6 TABLET ORAL at 08:29

## 2024-05-08 RX ADMIN — ASPIRIN 81 MG: 81 TABLET, COATED ORAL at 12:28

## 2024-05-08 RX ADMIN — ASPIRIN 81 MG: 81 TABLET, COATED ORAL at 08:29

## 2024-05-08 RX ADMIN — SODIUM CHLORIDE, POTASSIUM CHLORIDE, SODIUM LACTATE AND CALCIUM CHLORIDE: 600; 310; 30; 20 INJECTION, SOLUTION INTRAVENOUS at 02:16

## 2024-05-08 RX ADMIN — GABAPENTIN 900 MG: 300 CAPSULE ORAL at 01:41

## 2024-05-08 RX ADMIN — ONDANSETRON 4 MG: 2 INJECTION INTRAMUSCULAR; INTRAVENOUS at 03:19

## 2024-05-08 RX ADMIN — HYDROMORPHONE HYDROCHLORIDE 0.4 MG: 1 INJECTION, SOLUTION INTRAMUSCULAR; INTRAVENOUS; SUBCUTANEOUS at 03:39

## 2024-05-08 RX ADMIN — OXYCODONE HYDROCHLORIDE 5 MG: 5 TABLET ORAL at 08:54

## 2024-05-08 RX ADMIN — AMPICILLIN SODIUM 2 G: 2 INJECTION, POWDER, FOR SOLUTION INTRAMUSCULAR; INTRAVENOUS at 23:23

## 2024-05-08 RX ADMIN — PREDNISONE 5 MG: 5 TABLET ORAL at 12:27

## 2024-05-08 RX ADMIN — SODIUM CHLORIDE: 9 INJECTION, SOLUTION INTRAVENOUS at 12:06

## 2024-05-08 RX ADMIN — SODIUM CHLORIDE 500 ML: 9 INJECTION, SOLUTION INTRAVENOUS at 15:32

## 2024-05-08 RX ADMIN — MAGNESIUM OXIDE TAB 400 MG (241.3 MG ELEMENTAL MG) 400 MG: 400 (241.3 MG) TAB at 08:29

## 2024-05-08 RX ADMIN — VANCOMYCIN HYDROCHLORIDE 1000 MG: 1 INJECTION, SOLUTION INTRAVENOUS at 06:21

## 2024-05-08 RX ADMIN — Medication 0.5 MCG/KG/MIN: at 03:30

## 2024-05-08 RX ADMIN — ACETAMINOPHEN 975 MG: 325 TABLET, FILM COATED ORAL at 01:41

## 2024-05-08 RX ADMIN — PRAMIPEXOLE DIHYDROCHLORIDE 0.12 MG: 0.12 TABLET ORAL at 03:33

## 2024-05-08 RX ADMIN — PANTOPRAZOLE SODIUM 40 MG: 40 TABLET, DELAYED RELEASE ORAL at 08:29

## 2024-05-08 RX ADMIN — FERROUS SULFATE TAB 325 MG (65 MG ELEMENTAL FE) 325 MG: 325 (65 FE) TAB at 08:29

## 2024-05-08 RX ADMIN — OXYCODONE HYDROCHLORIDE AND ACETAMINOPHEN 500 MG: 500 TABLET ORAL at 08:29

## 2024-05-08 RX ADMIN — ACETAMINOPHEN 975 MG: 325 TABLET, FILM COATED ORAL at 08:31

## 2024-05-08 RX ADMIN — CALCIUM 500 MG: 500 TABLET ORAL at 08:29

## 2024-05-08 RX ADMIN — SODIUM CHLORIDE: 9 INJECTION, SOLUTION INTRAVENOUS at 21:40

## 2024-05-08 RX ADMIN — CEFAZOLIN 1 G: 1 INJECTION, POWDER, FOR SOLUTION INTRAMUSCULAR; INTRAVENOUS at 02:20

## 2024-05-08 RX ADMIN — ACETAMINOPHEN 975 MG: 325 TABLET, FILM COATED ORAL at 16:42

## 2024-05-08 RX ADMIN — PROCHLORPERAZINE EDISYLATE 5 MG: 5 INJECTION INTRAMUSCULAR; INTRAVENOUS at 04:11

## 2024-05-08 RX ADMIN — SODIUM CHLORIDE, POTASSIUM CHLORIDE, SODIUM LACTATE AND CALCIUM CHLORIDE 1000 ML: 600; 310; 30; 20 INJECTION, SOLUTION INTRAVENOUS at 05:19

## 2024-05-08 RX ADMIN — Medication 1000 UNITS: at 12:28

## 2024-05-08 RX ADMIN — HYDROMORPHONE HYDROCHLORIDE 0.2 MG: 1 INJECTION, SOLUTION INTRAMUSCULAR; INTRAVENOUS; SUBCUTANEOUS at 21:19

## 2024-05-08 ASSESSMENT — ACTIVITIES OF DAILY LIVING (ADL)
ADLS_ACUITY_SCORE: 31
ADLS_ACUITY_SCORE: 20
DIFFICULTY_EATING/SWALLOWING: NO
ADLS_ACUITY_SCORE: 26
ADLS_ACUITY_SCORE: 30
TOILETING_ISSUES: NO
ADLS_ACUITY_SCORE: 30
ADLS_ACUITY_SCORE: 31
ADLS_ACUITY_SCORE: 26
DIFFICULTY_COMMUNICATING: NO
ADLS_ACUITY_SCORE: 30
DEPENDENT_IADLS:: INDEPENDENT
ADLS_ACUITY_SCORE: 31
HEARING_DIFFICULTY_OR_DEAF: NO
WALKING_OR_CLIMBING_STAIRS_DIFFICULTY: NO
WEAR_GLASSES_OR_BLIND: NO
ADLS_ACUITY_SCORE: 26
FALL_HISTORY_WITHIN_LAST_SIX_MONTHS: NO
CHANGE_IN_FUNCTIONAL_STATUS_SINCE_ONSET_OF_CURRENT_ILLNESS/INJURY: NO
ADLS_ACUITY_SCORE: 26
DRESSING/BATHING_DIFFICULTY: NO
DOING_ERRANDS_INDEPENDENTLY_DIFFICULTY: NO
ADLS_ACUITY_SCORE: 31
ADLS_ACUITY_SCORE: 31
ADLS_ACUITY_SCORE: 26
ADLS_ACUITY_SCORE: 20
ADLS_ACUITY_SCORE: 31
ADLS_ACUITY_SCORE: 20
ADLS_ACUITY_SCORE: 27
ADLS_ACUITY_SCORE: 26
ADLS_ACUITY_SCORE: 26
CONCENTRATING,_REMEMBERING_OR_MAKING_DECISIONS_DIFFICULTY: NO
ADLS_ACUITY_SCORE: 30

## 2024-05-08 NOTE — PHARMACY-ADMISSION MEDICATION HISTORY
Pharmacist Admission Medication History    Admission medication history is complete. The information provided in this note is only as accurate as the sources available at the time of the update.    Information Source(s): Patient and CareEverywhere/SureScripts via in-person    Pertinent Information:   -Confirmed prednisone dose with patient.   -Patient was a good historian with his medications & recited them all from memory    Changes made to PTA medication list:  Added: docusate, Voltaren gel PRN, ibuprofen PRN, scheduled Tylenol (per pt)  Deleted: None  Changed: adjusted PRN Tylenol dose (per pt); dosing info added for amoxicillin (per fill hx); lorazepam from 1mg q6h PRN to 0.5-1mg HS PRN (per pt, fill hx); naproxen from 250mg BID to 500mg BID (per pt, fill hx); pramiprexole from 1-2 tabs HS to 2 tabs HS (per pt, fill hx); added instructions to the silver sulfadiazine cream (per pt, fill hx)    Allergies reviewed with patient and updates made in EHR: yes    Medication History Completed By: Marisela Andre RPH 5/8/2024 12:13 PM    PTA Med List   Medication Sig Last Dose    acetaminophen (TYLENOL 8 HOUR ARTHRITIS PAIN) 650 MG CR tablet Take 1,300 mg by mouth daily (with lunch) Past Week    acetaminophen (TYLENOL) 500 MG tablet Take 500-1,000 mg by mouth every 8 hours as needed for mild pain 5/6/2024 at 2100    amoxicillin (AMOXIL) 500 MG capsule Take 2,000 mg by mouth once Prior to dental appointments PRN at PRN    calcium-magnesium (CALMAG) 500-250 MG TABS per tablet Take 1 tablet by mouth every morning With ZINC Past Month    Cholecalciferol (D 1000) 25 MCG (1000 UT) CAPS Take 1,000 Units by mouth daily (with lunch) Past Month    diclofenac (VOLTAREN) 1 % topical gel Apply 2 g topically 2 times daily as needed for moderate pain Applies before activity to right foot or in the evening to tingling hands     docusate sodium (COLACE) 100 MG capsule Take 200 mg by mouth daily (with lunch) Past Week    ferrous sulfate  (FEROSUL) 325 (65 Fe) MG tablet Take 325 mg by mouth daily (with breakfast) Past Month    gabapentin (NEURONTIN) 300 MG capsule Take 900 capsules by mouth at bedtime 5/7/2024 at 0830    glucosamine-chondroitin 500-400 MG CAPS per capsule Take 1 capsule by mouth every morning Past Month    ibuprofen (ADVIL/MOTRIN) 200 MG capsule Take 400-800 mg by mouth daily as needed for moderate pain Past Week    ketoconazole (NIZORAL) 2 % external cream Apply thin layer twice daily as needed for rash on the face. PRN at PRN    LORazepam (ATIVAN) 1 MG tablet Take 0.5-1 mg by mouth nightly as needed for anxiety or sleep 5/6/2024 at 2130    LUTEIN PO Take 1 tablet by mouth every morning Past Month    naproxen (NAPROSYN) 250 MG tablet Take 500 mg by mouth 2 times daily At noon and bedtime Past Week    Omega-3 Fatty Acids (SUPER OMEGA-3) 1000 MG CAPS Take 1,000 mg by mouth every morning Past Month    omeprazole (PRILOSEC) 20 MG DR capsule Take 1 capsule by mouth daily before breakfast 5/6/2024 at 0830    pramipexole (MIRAPEX) 0.125 MG tablet Take 0.25 mg by mouth at bedtime 5/6/2024 at 2130    predniSONE (DELTASONE) 5 MG tablet Take 5 mg by mouth daily (with lunch) 5/7/2024 at 0830    Psyllium (METAMUCIL 4 IN 1 FIBER) 55.6 % POWD Take 1 Scoop by mouth every morning Past Week    SSD 1 % external cream Apply topically 2 times daily To toenails Past Week    vitamin B-12 (CYANOCOBALAMIN) 500 MCG tablet Take 1,000 mcg by mouth daily (with lunch) Past Month    vitamin C (ASCORBIC ACID) 500 MG tablet Take 500 mg by mouth every morning Past Month    zolpidem (AMBIEN) 10 MG tablet Take 5-10 mg by mouth nightly as needed for sleep Past Week at 2130

## 2024-05-08 NOTE — PROGRESS NOTES
05/08/24 1000   Appointment Info   Signing Clinician's Name / Credentials (PT) Sofi Bernstein DPT   Rehab Comments (PT) R hip spacer, PHP, TTWB   Living Environment   People in Home alone  (2 big dogs)   Current Living Arrangements house   Home Accessibility stairs to enter home;stairs within home   Number of Stairs, Main Entrance other (see comments)  (2 COURTNEY from garage; 3 step to enter kitchen w/ rails here only; 4 steps up to main level; no rails)   Number of Stairs, Within Home, Primary greater than 10 stairs  (12-13 bedroom is downstairs)   Stair Railings, Within Home, Primary railings on both sides of stairs   Transportation Anticipated family or friend will provide   Living Environment Comments lives alone - wants nursing home in town d/t living alone; narrow doorways and halls, not wheelchair accessible   Self-Care   Usual Activity Tolerance good  (no AD - no problems prior)   Current Activity Tolerance poor  (fear of mobility, dizzy)   Regular Exercise Yes   Activity/Exercise Type biking  (very active prior)   Equipment Currently Used at Home walker, rolling;crutches  (states has leg  at home; no AD use prior to this surgery; standard and FWW and 4WW available - states walkers does not fit through doors at home - has used crutches)   Fall history within last six months no   Activity/Exercise/Self-Care Comment ind baseline   General Information   Onset of Illness/Injury or Date of Surgery 05/07/24   Referring Physician Osvaldo Chan MD   Patient/Family Therapy Goals Statement (PT) get EOB to use urinal or use commode   Pertinent History of Current Problem (include personal factors and/or comorbidities that impact the POC) Kwabena Solorio is a 73 year old male s/p Procedure(s):  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 Dudley.   Existing Precautions/Restrictions fall;no hip IR;no hip ADD past midline;90 degree hip  flexion;weight bearing   Weight-Bearing Status - LUE full weight-bearing   Weight-Bearing Status - RUE full weight-bearing   Weight-Bearing Status - LLE full weight-bearing   Weight-Bearing Status - RLE toe touch weight-bearing   General Observations Pt supine in bed, most recent BP: 98/65 (77), fearful of mobility   Cognition   Affect/Mental Status (Cognition) WFL   Pain Assessment   Patient Currently in Pain Yes, see Vital Sign flowsheet  (3/10)   Integumentary/Edema   Integumentary/Edema Comments pt w/ post op incision and dressing in place, drain present   Posture    Posture Forward head position;Protracted shoulders   Range of Motion (ROM)   Range of Motion ROM deficits secondary to surgical procedure;ROM deficits secondary to pain   Strength (Manual Muscle Testing)   Strength (Manual Muscle Testing) strength is WFL   Strength Comments very active baseline   Bed Mobility   Comment, (Bed Mobility) modA to R LE for bed mob   Transfers   Comment, (Transfers) CGA for STS at FWW   Gait/Stairs (Locomotion)   Comment, (Gait/Stairs) NT - did not progress to functional amb d/t orthostatic hypotension and sx   Balance   Balance no deficits were identified   Sensory Examination   Sensory Perception Comments baseline neuropathy in toes - worse when immobile   Clinical Impression   Criteria for Skilled Therapeutic Intervention Yes, treatment indicated   PT Diagnosis (PT) impaired functional mobility   Influenced by the following impairments pain, post op prec, TTWB, orthostatic hypotension   Functional limitations due to impairments impaired bed mob, transfers, amb, stairs   Clinical Presentation (PT Evaluation Complexity) evolving   Clinical Presentation Rationale pt in ICU w/ evolving pressor needs d/t hypotension   Clinical Decision Making (Complexity) moderate complexity   Planned Therapy Interventions (PT) bed mobility training;gait training;home exercise program;patient/family education;ROM (range of motion);stair  "training;strengthening;transfer training;progressive activity/exercise;risk factor education;home program guidelines   Risk & Benefits of therapy have been explained evaluation/treatment results reviewed;care plan/treatment goals reviewed;risks/benefits reviewed;current/potential barriers reviewed;participants voiced agreement with care plan;participants included;patient   Clinical Impression Comments Pt w/ excellent baseline strength and motivation to participate in PT despite fear around mobility. Anticipate d/t pt living alone and non-accessible home set up that he will benefit from TCU on DC, pt very agreeable to plan.   PT Total Evaluation Time   PT Eval, Moderate Complexity Minutes (80031) 8   Physical Therapy Goals   PT Frequency Daily   PT Predicted Duration/Target Date for Goal Attainment 05/22/24   PT Goals Bed Mobility;Transfers;Gait;Stairs   PT: Bed Mobility Independent;Supine to/from sit;Within precautions   PT: Transfers Modified independent;Sit to/from stand;Bed to/from chair;Assistive device;Within precautions   PT: Gait Modified independent;Assistive device;Rolling walker;Crutches;Within precautions;100 feet  (FWW v crutches pending DC dispo - FWW will not work well in home)   PT: Stairs Modified independent;Assistive device;Within precautions;Greater than 10 stairs  (COURTNEY no rails; stairs in home B rails)   Interventions   Interventions Quick Adds Therapeutic Procedure;Therapeutic Activity   Therapeutic Procedure/Exercise   Ther. Procedure: strength, endurance, ROM, flexibillity Minutes (13136) 13   Symptoms Noted During/After Treatment increased pain   Treatment Detail/Skilled Intervention Pt supine in bed on PT arrival w/ hip abd wedge in place, agreeable to complete supine TE prior to mobilizing. Completed x10 to R LE as follows: AP, QS, GS, HS, AAROM heel slides to ~35* hip flexion. Pt does have some \"clunking\" felt w/ some increased pain, when reduced isometric contraction improved comfort. " Assured pt that a degree of clunking w/ spacer is normal sensation.   Therapeutic Activity   Therapeutic Activities: dynamic activities to improve functional performance Minutes (49025) 23   Symptoms Noted During/After Treatment Dizziness;Significant change in vital signs   Treatment Detail/Skilled Intervention Pt supine in bed w/ most recent BP 98/65 (77). Pt educated on posterior hip prec as well as TTWB and typical progression in rehab expected. Educated on goals for session and pt agreeable to trial despite fear of mobility post op. Pt completes sup>sit EOB w/ modA to R LE and VC to guide progression to EOB, less pain than pt anticipated. Seated BP: 87/62 (72) w/ continued dizziness felt but not necessarily increased compared to supine. Pt sitting EOB for several minutes w/ good tolerance, requests to use urinal seated as has been struggling w/ this in bed, able to ind scoot forward to EOB and use this ind. PT then encouraged pt to attempt STS as tolerating well, pt agreeable. Completes STS at FWW w/ CGAx1 and able to maintain NWB during transfer, once standing able to place down into TTWB. Excellent static standing balance, pt does have dizziness so cued to take hop back and to his R to prep for transfer back to bed, pt able to hop 1x each direction w/ excellent maintenance of NWB on R LE. Standing BP: 66/54 (60). Pt sits EOB, able to ind scoot self back via pushing from L LE, pt then requires modA to R LE for sit>sup. Pt laid flat in supine and BP taken: 73/51 (59). Nursing alerted immediately and plans to titrate pressors, pt feeling less sx once supine in bed and still. Pt still pleasant and conversive throughout. Left supine in bed w/ needs met. Pt very hopeful for rehab stay d/t lack of home accessibility and living alone.   PT Discharge Planning   PT Plan progress ind w/ bed mob (trial leg ), transfers, SPT/short amb distance hopping w/ FWW   PT Discharge Recommendation (DC Rec) Transitional Care  Facility   PT Rationale for DC Rec Pt w/ excellent baseline strength and motivation to participate in PT despite fear around mobility. Anticipate d/t pt living alone and non-accessible home set up that he will benefit from TCU on DC, pt very agreeable to plan.   PT Brief overview of current status Pt w/ Ax1 for bed mob, initial transfers w/ FWW   Total Session Time   Timed Code Treatment Minutes 36   Total Session Time (sum of timed and untimed services) 44

## 2024-05-08 NOTE — CONSULTS
Care Management Initial Consult    General Information  Assessment completed with: Patient, Family,    Type of CM/SW Visit: Initial Assessment    Primary Care Provider verified and updated as needed: Yes   Readmission within the last 30 days: no previous admission in last 30 days      Reason for Consult: discharge planning  Advance Care Planning: Advance Care Planning Reviewed: present on chart          Communication Assessment  Patient's communication style: spoken language (English or Bilingual)    Hearing Difficulty or Deaf: no   Wear Glasses or Blind: no    Cognitive  Cognitive/Neuro/Behavioral: WDL                      Living Environment:   People in home: alone     Current living Arrangements: house      Able to return to prior arrangements: yes       Family/Social Support:  Care provided by: self  Provides care for: no one  Marital Status: Single  Sibling(s), Neighbor, Other (specify) (Rastafarian member)          Description of Support System: Supportive, Involved         Current Resources:   Patient receiving home care services: No     Community Resources:    Equipment currently used at home: none  Supplies currently used at home:      Employment/Financial:  Employment Status: retired        Financial Concerns: none   Referral to Financial Worker: No       Does the patient's insurance plan have a 3 day qualifying hospital stay waiver?  No    Lifestyle & Psychosocial Needs:  Social Determinants of Health     Food Insecurity: Not on file   Depression: Not at risk (4/29/2024)    PHQ-2     PHQ-2 Score: 1   Housing Stability: Not on file   Tobacco Use: Low Risk  (5/7/2024)    Patient History     Smoking Tobacco Use: Never     Smokeless Tobacco Use: Never     Passive Exposure: Past   Financial Resource Strain: Not on file   Alcohol Use: Not on file   Transportation Needs: Not on file   Physical Activity: Not on file   Interpersonal Safety: Not on file   Stress: Not on file   Social Connections: Not on file   Health  Literacy: Not on file       Functional Status:  Prior to admission patient needed assistance:   Dependent ADLs:: Independent  Dependent IADLs:: Independent       Mental Health Status:  Mental Health Status: No Current Concerns       Chemical Dependency Status:  Chemical Dependency Status: No Current Concerns             Values/Beliefs:  Spiritual, Cultural Beliefs, Jain Practices, Values that affect care: yes (Pt identifies as Shinto)               Additional Information:  SW met with Pt and Pt's sisters at bedside. Pt is a self identified retired bachelor who lives in a [place] area home in [facility] with his two dogs. Pt identified  no financial concerns.  Pt identified no concerns around ADLs or IADLs prior to hospitalization. Prior to hospitalization Pt did not utilize any DME and did not have any home health services.  Pt identified that the local nursing home in [place] is saving a bed for his TCU stay. Pt inquired about transportation options and SW identified a variety of options. SW encouraged Pt to call his insurance plan to explore what transportation coverage may be available. SW identified that care management can't guarantee a price or cost of transport.  SW will continue to follow Pt  for safe discharge  placement and planning.          Rich Licona, [facility]. Gould HospitalSW  [facility] [place] [facility]   Ph: [phone]  Pager: [phone]

## 2024-05-08 NOTE — ANESTHESIA CARE TRANSFER NOTE
Patient: Kwabena Solorio    Procedure: Procedure(s):  Explant Right surface replacement Total Hip Arthroplasty ,  vanco/tobra cement spacer implantation       Diagnosis: Infection of prosthetic total hip joint, subsequent encounter [T84.59XD, Z96.649]  Diagnosis Additional Information: No value filed.    Anesthesia Type:   General     Note:    Oropharynx: oropharynx clear of all foreign objects and spontaneously breathing  Level of Consciousness: drowsy  Oxygen Supplementation: face mask  Level of Supplemental Oxygen (L/min / FiO2): 6  Independent Airway: airway patency satisfactory and stable  Dentition: dentition unchanged  Vital Signs Stable: post-procedure vital signs reviewed and stable  Report to RN Given: handoff report given  Patient transferred to: PACU    Handoff Report: Identifed the Patient, Identified the Reponsible Provider, Reviewed the pertinent medical history, Discussed the surgical course, Reviewed Intra-OP anesthesia mangement and issues during anesthesia, Set expectations for post-procedure period and Allowed opportunity for questions and acknowledgement of understanding      Vitals:  Vitals Value Taken Time   /57 05/07/24 2203   Temp 36.6  C (97.9  F) 05/07/24 2145   Pulse 64 05/07/24 2205   Resp 12 05/07/24 2205   SpO2 100 % 05/07/24 2205   Vitals shown include unfiled device data.    Electronically Signed By: GIORGI Temple CRNA  May 7, 2024  10:07 PM

## 2024-05-08 NOTE — H&P
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 Hypotensive post-operatively requiring admission to ICU.     CHANGES and MAJOR THINGS TODAY:   - Admit to ICU      PLAN:    Neuro:  # Acute pain, post-op    # Chronic back pain 2/2 severe degenerative scoliosis   - Tylenol 975 mg q 8 hours x 3 days  - Oxycodone 5 - 10 mg q 4 hours PRN  - Dilaudid 0.2 - 0.4 mg q 2 hours PRN     # PMR  - PTA Prednisone, currently 5 mg daily    # RLS  # Insomnia  - PTA Gabapentin 900 mg daily at bedtime  - PTA Mirapex at bedtime       Pulmonary:  # No active issues  - Supplemental O2 for goal SpO2 > 92%  - Wean O2 as able  - Pulm hygiene: Encourage cough & deep breathing, IS q 2 hours while awake      Cardiovascular:  # Hypotension post-op  # Troponinemia  # Hx CHB s/p pacemaker   Noted to be hypotensive post operatively requiring phenylephrine infusion. Received 500 mL bolus LR, 500 mL bolus 5% albumin. Consider hypovolemia 2/2 acute blood loss vs distributive in setting of PJI hardware removal.   - Last TTE  2021, LVEF 50%, normal RV systolic function, mild MR  - Lactic acid normal  - Trend hgb level, monitor drain output  - Give additional 1L LR bolus  - Re-start peripheral Phenylephrine  - MAP goal > 65 mmHg  - Consider TTE in AM if remains on pressors  - Troponin slightly elevated at 24, consider 2/2 demand ischemia  - Trend troponin to peak      GI/Nutrition:  # Nausea,  intermittent  # GERD  - PPI daily   - ADAT: Regular diet  - Bowel regimen: Miralax daily, Senna-doc BID  - Zofran PRN       Renal/Fluids/Electrolytes:  # Right Hydronephrosis, chronic  # BPH  # Hyponatremia  # Hypomagnesemia  # Hypocalcemia  Baseline creat 0.8-0.9. Incidental finding of moderate R hydronephrosis and proximal hydroureter on CT A/P (done for neutropenia) on 2/2024. Urology eval 3/28/2024, underwent MAG3 renogram with plan for urology follow up on 6/4/24.     - BMP, Mg, Phos in AM  - High ICU Electrolyte replacement protocol   - Strict I&O monitoring   - Trend Na level       Endocrine:  # Chronic steroid use 2/2 PMR   # Stress-induced hyperglycemia  - Check AM cortisol level   - Hypoglycemia protocol   - Currently no indication for sliding scale insulin    ID:  # Right hip prosthetic joint infection s/p explant R surface replacement CHRISS, right hip vanco/tobra cement spacer  # Enterococcus PJI   - Evaluated by ortho 1/2024 for hip pain, referred to [facility] Ortho/reconstructive team for eval of diaz-acetabular osteolysis and soft tissue reaction 2/2 metal on metal bearing. 4/25/24 had arthrocentesis of R hip joint which grew enterococcus faecalis.   - ID consult in regard to PJI, recs appreciated  - On Vanco + Cefazolin   - Procal negative, CRP 7.97  - Follow temp, WBC curve  - 5/7 Intra-op tissue cultures in process   - Send blood cultures    Hematology:    # Anemia, acute blood loss   # Hx iron deficiency anemia  EBL ~ 1.2L, small amount of sanguinous drainage from R hip drain  - Repeat Hgb at 0800  - Transfuse for Hgb < 7 or active signs of bleeding  - Hold PTA ferrous sulfate    Musculoskeletal:  # Hx Right total hip arthroplasty 2007  # PJI, right total hip s/p explant R surface replacement CHRISS, right hip vanco/tobra cement spacer  # Lumbosacral spondylosis with radiculopathy  # Degenerative scoliosis  # Lumbar spondylosis with myelopathy  - Ortho recs:   Activity: Up with assist and assistive devices  "as needed until independent. Posterior hip precautions to operative hip x 3 months:  1) No hip flexion beyond 90 degrees  2) No adduction beyond midline  3) No internal rotation beyond neutral   Weight bearing status: Toe touch weight bearing  Antibiotics: Vancomycin x 24 hours and Ancef. ID recommendations.  DVT prophylaxis: Mechanical while in hospital with Aspirin 162mg daily x 4 weeks, starting morning of POD 1  Elevation: Elevate heels off of bed on pillows   Wound Care: Aquacel x 5-7 days.    Drains: Document output per shift, Ortho will discontinue in 2 weeks  Pain management: Orals PRN, IV for breakthrough only  Physical Therapy: Mobilization, ROM, ADL's, Posterior hip precautions  Occupational Therapy: ADL's     Skin:  # Surgical incision  - Wound care per ortho recs      General Cares/Prophylaxis:    DVT Prophylaxis: Pneumatic Compression Devices  GI Prophylaxis: PPI  Restraints: None  Family Communication: Patient declined family update at this time  Code Status: FULL    Lines/tubes/drains:  - PIVs      Disposition:  - [facility] ICU      GIORGI Woods CNP    45 minutes of critical care time     Clinically Significant Risk Factors Present on Admission                       # Overweight: Estimated body mass index is 28.25 kg/m  as calculated from the following:    Height as of this encounter: 1.651 m (5' 5\").    Weight as of this encounter: 77 kg (169 lb 12.1 oz).           # Anemia: based on hgb <11             -----------------------------------------------------------------------    HISTORY PRESENTING ILLNESS: Kwabena Solorio is a 73 year old male with PMH complete heart block s/p PPM, polymyalgia rheumatica, RLS, GERD, BPH, chronic back pain, insomnia, hx of right total hip arthroplasty (2007) who was diagnosed with enterococcus faecium prosthetic joint infection of R CHRISS 4/2024, admitted on 5/7/2024 for scheduled explant of right surface replacement total hip arthroplasty with antibiotic spacer " "implantation. Noted to be hypotensive in PACU post-operatively requiring phenylephrine infusion, received 500 mL bolus LR and 500 mL bolus 5% Albumin. Initially R hip drain to suction with ~150 mL output, removed from suction. Hgb stable. Phenylephrine stopped around midnight and patient admitted to Ortho unit.     Shortly following admission to Ortho, became more hypotensive, feeling lightheaded and nauseas. Transferred to ICU for closer hemodynamic monitoring. Upon arrival to ICU, SBP 60-70s with MAP 50s. 1L bolus LR administered and started back on phenylephrine infusion. Reported feeling dizzy, nauseas, and just \"not right\". 5/10 low back/hip pain, no chest pain, chest tightness, or shortness of breath. No abdominal pain. No emesis.     REVIEW OF SYSTEMS: Negative except for above findings.     PAST MEDICAL HISTORY:   Past Medical History:   Diagnosis Date    BPH (benign prostatic hyperplasia)     Cardiac pacemaker in situ     Complete heart block (H)     Hydronephrosis, right     Lumbar radiculopathy     Lumbar scoliosis     Restless legs syndrome (RLS)      SURGICAL HISTORY:  Past Surgical History:   Procedure Laterality Date    APPENDECTOMY      AS TOTAL KNEE ARTHROPLASTY Left 2010    CARPAL TUNNEL RELEASE RT/LT      CATARACT EXTRACTION      COLONOSCOPY      EP ICD      FOOT SURGERY Bilateral     HAND SURGERY      HC TOTAL HIP ARTHROPLASTY Right 2007    HEMORRHOIDECTOMY      HERNIA REPAIR      NASAL FRACTURE SURGERY      TONSILLECTOMY & ADENOIDECTOMY      urinary tract surgery       SOCIAL HISTORY:  Social History     Socioeconomic History    Marital status: Single     Spouse name: None    Number of children: 0    Years of education: None    Highest education level: None   Occupational History    Occupation: retired   Tobacco Use    Smoking status: Never     Passive exposure: Past    Smokeless tobacco: Never   Substance and Sexual Activity    Alcohol use: Not Currently     Comment: quit 1990's    Drug use: " Never     FAMILY HISTORY:   Family History   Problem Relation Age of Onset    Anesthesia Reaction No family hx of     Deep Vein Thrombosis (DVT) No family hx of      ALLERGIES:   No Known Allergies  MEDICATIONS:  Current Facility-Administered Medications   Medication Dose Route Frequency Provider Last Rate Last Admin    [START ON 5/10/2024] acetaminophen (TYLENOL) tablet 650 mg  650 mg Oral Q4H PRN Osvaldo Chan MD        acetaminophen (TYLENOL) tablet 975 mg  975 mg Oral Q8H Osvaldo Chan MD   975 mg at 05/08/24 0141    aspirin EC tablet 81 mg  81 mg Oral BID Osvaldo Chan MD        benzocaine-menthol (CHLORASEPTIC) 6-10 MG lozenge 1 lozenge  1 lozenge Buccal Q1H PRN Osvaldo Chan MD        [START ON 5/10/2024] bisacodyl (DULCOLAX) suppository 10 mg  10 mg Rectal Daily PRN Osvaldo Chan MD        calcium-magnesium (CALMAG) 500-250 MG per tablet 1 tablet  1 tablet Oral QAM Osvaldo Chan MD        ceFAZolin (ANCEF) 1 g vial to attach to  ml bag for ADULT or 50 ml bag for PEDS  1 g Intravenous Q8H Osvaldo Chan MD        ferrous sulfate (FEROSUL) tablet 325 mg  325 mg Oral Daily with breakfast Osvaldo Chan MD        gabapentin (NEURONTIN) capsule 900 mg  900 mg Oral At Bedtime Osvaldo Chan MD   900 mg at 05/08/24 0141    HYDROmorphone (PF) (DILAUDID) injection 0.2 mg  0.2 mg Intravenous Q2H PRN Osvaldo Chan MD        Or    HYDROmorphone (PF) (DILAUDID) injection 0.4 mg  0.4 mg Intravenous Q2H PRN Osvaldo Chan MD        lactated ringers infusion   Intravenous Continuous Osvaldo Chan MD 75 mL/hr at 05/08/24 0216 New Bag at 05/08/24 0216    lidocaine (LMX4) cream   Topical Q1H PRN Osvaldo Chan MD        lidocaine 1 % 0.1-1 mL  0.1-1 mL Other Q1H PRN Osvaldo Chan MD        [START ON 5/9/2024] magnesium hydroxide (MILK OF MAGNESIA) suspension 30 mL  30 mL Oral Daily PRN Osvaldo Chan MD        naloxone (NARCAN) injection  0.2 mg  0.2 mg Intravenous Q2 Min PRN Osvaldo Chan MD        Or    naloxone (NARCAN) injection 0.4 mg  0.4 mg Intravenous Q2 Min PRN Osvaldo Chan MD        Or    naloxone (NARCAN) injection 0.2 mg  0.2 mg Intramuscular Q2 Min PRN Osvaldo Chan MD        Or    naloxone (NARCAN) injection 0.4 mg  0.4 mg Intramuscular Q2 Min PRN Osvaldo Chan MD        ondansetron (ZOFRAN ODT) ODT tab 4 mg  4 mg Oral Q6H PRN Osvaldo Chan MD        Or    ondansetron (ZOFRAN) injection 4 mg  4 mg Intravenous Q6H PRN Osvaldo Chan MD        oxyCODONE (ROXICODONE) tablet 5 mg  5 mg Oral Q4H PRN Osvaldo Chan MD        Or    oxyCODONE (ROXICODONE) tablet 10 mg  10 mg Oral Q4H PRN Osvaldo Chan MD        pantoprazole (PROTONIX) EC tablet 40 mg  40 mg Oral QAM AC Osvaldo Chan MD        polyethylene glycol (MIRALAX) Packet 17 g  17 g Oral Daily Osvaldo Chan MD        pramipexole (MIRAPEX) tablet 0.125-0.25 mg  0.125-0.25 mg Oral At Bedtime sOvaldo Chan MD        predniSONE (DELTASONE) tablet 5 mg  5 mg Oral Daily with lunch Osvaldo Chan MD        prochlorperazine (COMPAZINE) injection 5 mg  5 mg Intravenous Q6H PRN Osvaldo Chan MD        Or    prochlorperazine (COMPAZINE) tablet 5 mg  5 mg Oral Q6H PRN Osvaldo Chan MD        senna-docusate (SENOKOT-S/PERICOLACE) 8.6-50 MG per tablet 1 tablet  1 tablet Oral BID Osvaldo Chan MD        sodium chloride (PF) 0.9% PF flush 3 mL  3 mL Intracatheter Q8H Osvaldo Chan MD   3 mL at 05/08/24 0146    sodium chloride (PF) 0.9% PF flush 3 mL  3 mL Intracatheter q1 min prn Osvaldo Chan MD        vancomycin (VANCOCIN) 1,000 mg in 200 mL dextrose intermittent infusion  1,000 mg Intravenous Q12H Osvaldo Chan MD        vitamin C (ASCORBIC ACID) tablet 500 mg  500 mg Oral QAM Osvaldo Chan MD        Vitamin D3 (CHOLECALCIFEROL) tablet 1,000 Units  1,000 Units Oral Daily with lunch  Osvaldo Chan MD           PHYSICAL EXAMINATION:  Temp:  [97.5  F (36.4  C)-98.2  F (36.8  C)] 97.5  F (36.4  C)  Pulse:  [60-85] 69  Resp:  [8-23] 15  BP: ()/() 104/58  SpO2:  [93 %-100 %] 100 %  General: Resting in bed, NAD  HEENT: Normocephalic, atraumatic. PERRL. Mucous membranes dry, intact. Sclera non-icteric.   Neuro: A&Ox3, hand grasp 5/5 bilaterally, intermittent tingling to L hand  Pulm/Resp: Clear breath sounds bilaterally without rhonchi, crackles or wheeze, breathing non-labored  CV: RRR, S1S2, no murmur, rub or gallop.   Abdomen: Soft, non-distended, non-tender, hypoactive bowel sounds.   Ext: Pulses 1+ radial, pedal bilaterally. No edema.   Incisions/Skin: Pale, warm. Right hip incision dressing intact, KEYUR drain with small amount of sanguineous output    LABS: Reviewed.   Arterial Blood Gases   No lab results found in last 7 days.  Complete Blood Count   Recent Labs   Lab 05/07/24  2356 05/07/24 2209 05/07/24  1246   WBC 17.9* 17.6* 8.7   HGB 10.1* 10.7* 13.8    305 297     Basic Metabolic Panel  Recent Labs   Lab 05/07/24  2356 05/07/24  2212 05/07/24  1239   *  --   --    POTASSIUM 3.7  --   --    CHLORIDE 99  --   --    CO2 24  --   --    BUN 12.9  --   --    CR 0.75  --   --    * 115* 115*     Liver Function Tests  No lab results found in last 7 days.  Coagulation Profile  No lab results found in last 7 days.    IMAGING:  Recent Results (from the past 24 hour(s))   XR Pelvis Port 1/2 Views    Narrative    EXAM: XR PELVIS PORT 1/2 VIEWS  LOCATION: [facility]  DATE: 5/7/2024    INDICATION: s p explant and spacer placement on 5 7  COMPARISON: None.      Impression    IMPRESSION: Post right femoral acetabular arthroplasty. Surgical drain in position at the right hip. Bones are demineralized. No acute fracture detected. No dislocation.

## 2024-05-08 NOTE — PROGRESS NOTES
"Ortho POC Note    Date: 05/07/24  Surgery: I&D, explant R hip. Placement of Prostalac cement spacer  Surgeon: Dr. Dudley    Patient seen in PACU. Currently on phenylephrine. Anesethesia recommends fluids. Hgb 10.7.    He is mentating well. Feels a bit tired and slightly dizzy.      RLE  5/5 strength in EHL, FHL, AT, GSC.   Drain with 150 ml sanguinous output.   Feels \"tingling\" in both feet  DP palpable and 1+    Imaging: PACU AP pelvis reveals cement spacer in good position. Large defects of acetabulum consistent with intra-op    A/P  73M POD0 from explant of R hip implants and placement of prostalac spacer. Purulence deep to acetabular component and metallosis suspicious for ALTR.     - Take drain off suction  - repeat hgb around 0100 if unable to come off pressors and goes to ICU   - let me know if he needs ICU for close HD monitoring and pressors - I can sign out to ICU team    Noni Bianchi MD, PGY-4  Orthopedics  Pager: [phone]    "

## 2024-05-08 NOTE — CONSULTS
General ID [facility] Service: Consult Note     Patient:  Kwabena Solorio, Date of birth 1950, Medical record number [id number]  Date of Visit:  05/08/2024  Reason for consult: PJI         Assessment and Recommendations:     ID Problem list:  1. Right hip PJI  i. 4/25/24 fluid aspiration +E.faecalis  ii. S/p 5/7/24 right CHRISS explant with antibiotic spacer placement (Dr. Dudley)  2. History of right CHRISS (2007)  3. History of PMR (on chronic prednisone 5 mg/d)      Discussion:  Given the 4/25 right hip fluid cultures were +E.faecalis (amp-susceptible), would be reasonable at this time to narrow the empiric IV vancomycin and cefazolin to IV ampicillin 2g q4hr while awaiting further tissue culture data.     Recs:  1. Change from vancomycin/cefazolin to IV ampicillin 2g q4hr  2. Follow up pending 5/7 OR cultures to further guide antibiotic therapy  3. Surgical management as per ortho        Thank you for allowing us to participate in the care of this patient. [facility] ID will continue to follow. Can see [facility] schedule for paging details if questions.     80 minutes spent by me on the date of the encounter doing chart review, history and exam, documentation and further activities per the note       Zeyad House MD    Infectious Diseases   05/08/2024           History of Present Illness:     73M with PMH including right CHRISS (2007), PPM, PMR (on prednisone 5 mg/d), BPH, chronic back pain, who was admitted 5/7 for right CHRISS explantation.    Per patient report, he was first noted to have signs of bone deterioration around his right CHRISS on imaging that he was getting to work-up his chronic lower back pain. He saw ortho for this who decided to aspirate his right hip on 4/25 which had 20k WBCs (89% neutrophils) and was +E.faecalis (amp-susceptible) on fluid culture. Thus he was scheduled for right CHRISS explant and antibiotic spacer which was performed by Dr. Dudley on 5/7; post-op course complicated by hypotension  requiring phenylephirine for which he was transferred to [facility] ICU post-operatively.    Patient otherwise notes that he didn't actually have any chronic right hip pain prior to finding out about his PJI this past month, just the chronic back pain radiating down both of his legs. He was actually quite active prior - biking, walking his dogs, etc. No fevers/chills, no night sweats, no GI symptoms prior. No recent antibiotics prior to this hospitalization. No known antibiotic allergies.           Review of Systems:   ROS as above.       Past Medical History:     Past Medical History:   Diagnosis Date     BPH (benign prostatic hyperplasia)      Cardiac pacemaker in situ      Complete heart block (H)      Hydronephrosis, right      Lumbar radiculopathy      Lumbar scoliosis      Restless legs syndrome (RLS)      Past Surgical History:   Procedure Laterality Date     APPENDECTOMY       AS TOTAL KNEE ARTHROPLASTY Left 2010     CARPAL TUNNEL RELEASE RT/LT       CATARACT EXTRACTION       COLONOSCOPY       EP ICD       FOOT SURGERY Bilateral      HAND SURGERY       HC TOTAL HIP ARTHROPLASTY Right 2007     HEMORRHOIDECTOMY       HERNIA REPAIR       NASAL FRACTURE SURGERY       TONSILLECTOMY & ADENOIDECTOMY       urinary tract surgery       Otherwise as per HPI      Allergies:    No Known Allergies         Current Antimicrobials:   IV vancomycin  IV cefazolin       Family History:   Reviewed and noncontributory       Social History:     Social History     Tobacco Use     Smoking status: Never     Passive exposure: Past     Smokeless tobacco: Never   Substance Use Topics     Alcohol use: Not Currently     Comment: quit 1990's     Drug use: Never     Otherwise as per HPI         Physical Exam:   Ranges forvital signs:  Temp:  [97.5  F (36.4  C)-99.9  F (37.7  C)] 97.6  F (36.4  C)  Pulse:  [51-85] 66  Resp:  [8-23] 13  BP: ()/() 125/72  SpO2:  [93 %-100 %] 98 %  GENERAL:  lying in bed in no acute distress.   ENT:   Head is normocephalic, atraumatic.   EYES:  Eyes have anicteric sclerae.   LUNGS:  Unlabored breathing on room air.  ABDOMEN:  Non-distended, soft, nontender.  MSKEXT/SKIN: +right hip swollen, dressing without any surrounding erythema, +drain with serosanguinous ouptut  NEUROLOGIC:  Awake, alert, interactive.         Laboratory Data:     Inflammatory Markers    Recent Labs   Lab Test 24   CRPI 7.97*       Hematology Studies    Recent Labs   Lab Test 24  1411 24  0824  2356 24  2209 24  1246   WBC 11.1*  --  11.5* 17.9* 17.6* 8.7   HGB 9.0* 9.3* 8.6* 10.1* 10.7* 13.8   MCV 90  --  91 88 89 87     --  217 275 305 297       Metabolic Studies     Recent Labs   Lab Test 24   * 132* 133*   POTASSIUM  --  4.1 3.7   CHLORIDE  --  99 99   CO2  --     BUN  --  15.2 12.9   CR  --  0.82 0.75   GFRESTIMATED  --  >90 >90       Hepatic Studies    Recent Labs   Lab Test 24   BILITOTAL 0.8   ALKPHOS 43   ALBUMIN 3.6   AST 26   ALT 17       Microbiology:  Culture   Date Value Ref Range Status   2024 No growth, less than 1 day  Preliminary   2024 No growth, less than 1 day  Preliminary   2024 No growth, less than 1 day  Preliminary   2024 No growth, less than 1 day  Preliminary   2024 No growth, less than 1 day  Preliminary   2024 No growth, less than 1 day  Preliminary   2024 No anaerobic organisms isolated after 12 days  Preliminary   2024 1+ Enterococcus faecalis (A)  Final   2024 No growth after 12 days  Preliminary            Imagin/7/24 xray pelvis report:  IMPRESSION: Post right femoral acetabular arthroplasty. Surgical drain in position at the right hip. Bones are demineralized. No acute fracture detected. No dislocation.

## 2024-05-08 NOTE — PLAN OF CARE
Goal Outcome Evaluation:      Problem: Adult Inpatient Plan of Care  Goal: Absence of Hospital-Acquired Illness or Injury  Intervention: Prevent Infection  Recent Flowsheet Documentation  Taken 5/8/2024 0800 by Nancy Lambert RN  Infection Prevention:   hand hygiene promoted   rest/sleep promoted   single patient room provided         10A ICU End of Shift Summary.     Changes this shift: Weaned off vasopressor (phenylephrine). Vascular access consulted to place PICC line, unable to place (see vascular access note). New PIV placed to left forearm. Patient unable to fully empty bladder, post void residuals performed, last check 380 - no need for straight cath unless greater than 500 ml per order.     Neuro: Alert and oriented x4, has numbness and tingling to lower extremities (baseline per patient).   Cardiac: Paced rhythm with permanent DDD pacemaker. Weaned off Gerardo this shift with goal of MAP >65.   Respiratory: Lungs clear on room air.       GI/: No BM this shift. Able to void via urinal sitting at side of bed, post void residuals still showing urine being retained. Prepared to straight cath with pediatric sized catheter (per patient history) and lidocaine gel, however patient was able to void right before procedure and PVR was less than 500 not requiring cath at that time per order.   Diet/appetite: Regular diet, tolerating well.   Pain: Endorsing pain to right hip incision area. Improved with prn medication and repositioning, along with abduction leg pillow.   Skin: No acute changes/concerns  LDA's: Vascular access unable to place PICC, 2 PIVs in place.  Activities: 1 person assist to sit at edge of bed to void in urinal, worked with PT this shift.     Drips: NS at 75 ml/hr       Plan: Continue with antibiotics, give fluid if low BP instead of vasopressors, continue with mobility restrictions and PT. Continue to monitor voiding and bladder scans. Continue plan of care.

## 2024-05-08 NOTE — OR NURSING
"PACU to Inpatient Nursing Handoff    Patient Kwabena Solorio is a 73 year old male who speaks English.   Procedure Procedure(s):  Explant Right surface replacement Total Hip Arthroplasty ,  vanco/tobra cement spacer implantation   Surgeon(s) Primary: Rd Dudley MD  Resident - Assisting: Noni Bianchi MD  Fellow - Assisting: Osvaldo Chan MD     No Known Allergies    Isolation  No active isolations     Past Medical History   has a past medical history of BPH (benign prostatic hyperplasia), Cardiac pacemaker in situ, Complete heart block (H), Hydronephrosis, right, Lumbar radiculopathy, Lumbar scoliosis, and Restless legs syndrome (RLS).    Anesthesia General   Dermatome Level     Preop Meds acetaminophen (Tylenol) - time given: 1318   Nerve block Not applicable   Intraop Meds dexamethasone (Decadron)  fentanyl (Sublimaze): 100 mcg total  hydromorphone (Dilaudid): 0.5 mg total  ondansetron (Zofran): last given at 2047   Local Meds No   Antibiotics cefazolin (Ancef) - last given at 1858     Pain Patient Currently in Pain: yes   PACU meds  fentanyl (Sublimaze): 100 mcg (total dose) last given at 2213   hydromorphone (Dilaudid): 0.8 mg (total dose) last given at 2353   Phenylephrine drip at 0.7 mcg/kg/min   PCA / epidural No   Capnography     Telemetry ECG Rhythm: AV (Dual) paced rhythm   Inpatient Telemetry Monitor Ordered? No        Labs Glucose Lab Results   Component Value Date     05/07/2024       Hgb Lab Results   Component Value Date    HGB 10.7 05/07/2024       INR No results found for: \"INR\"   PACU Imaging Completed     Wound/Incision Incision/Surgical Site 05/07/24 Right;Lateral Hip (Active)   Incision Assessment WDL 05/07/24 2230   Dressing Other (Comment) 05/07/24 2120   Incision Drainage Amount None 05/07/24 2230   Dressing Intervention Clean, dry, intact 05/07/24 2230   Number of days: 0      CMS        Equipment ice pack   Other LDA       IV Access Peripheral IV 05/07/24 Left;Posterior " Hand (Active)   Site Assessment [facility] 05/07/24 2200   Line Status Saline locked 05/07/24 2200   Dressing Transparent 05/07/24 2200   Dressing Status clean;dry;intact 05/07/24 2200   Line Intervention Flushed 05/07/24 2200   Phlebitis Scale 0-->no symptoms 05/07/24 2200   Infiltration? no 05/07/24 2200   Number of days: 0       Peripheral IV 05/07/24 Left Hand (Active)   Site Assessment [facility] 05/07/24 2200   Line Status Infusing 05/07/24 2200   Dressing Transparent 05/07/24 2200   Dressing Status clean;dry;intact 05/07/24 2200   Phlebitis Scale 0-->no symptoms 05/07/24 2200   Infiltration? no 05/07/24 2200   Number of days: 0      Blood Products Albumin EBL 1200 mL   Intake/Output Date 05/07/24 0700 - 05/08/24 0659   Shift 2578-3396 4815-7822 2827-2283 24 Hour Total   INTAKE   I.V.  2300  2300   Colloid  1250  1250   Shift Total(mL/kg)  3550(46.1)  3550(46.1)   OUTPUT   Drains  150  150   Blood  1200  1200   Shift Total(mL/kg)  1350(17.53)  1350(17.53)   Weight (kg) 77 77 77 77      Drains / [place] Closed/Suction Drain 1 Right Thigh Bulb 15 Serbian (Active)   Site Description [facility] 05/07/24 2230   Dressing Status Normal: Clean, Dry & Intact 05/07/24 2230   Drainage Appearance Serosanguenous 05/07/24 2230   Status To bulb suction 05/07/24 2230   Output (ml) 150 ml 05/07/24 2232   Number of days: 0      Time of void PreOp Time of Void Prior to Procedure: 1520 (05/07/24 1520)    PostOp Urine Occurrence: 1 (05/07/24 1520)    Diapered? No   Bladder Scan     PO    N/A     Vitals    B/P: 123/79  T: 97.9  F (36.6  C)    Temp src: Axillary  P:  Pulse: 69 (05/07/24 2230)          R: 10  O2:  SpO2: 99 %    O2 Device: Nasal cannula (05/07/24 2215)    Oxygen Delivery: 2 LPM (05/07/24 2215)         Family/support present Sisters went to Hotel   Patient belongings     Patient transported on bed   DC meds/scripts (obs/outpt) Not applicable   Inpatient Pain Meds Released? Yes       Special needs/considerations Had LR bolus of 500cc in PACU    Tasks needing completion Receiving 500ml of Albumin in PACU       Parvin Puckett RN

## 2024-05-08 NOTE — BRIEF OP NOTE
[facility]    Brief Operative Note    Pre-operative diagnosis: Infection of prosthetic total hip joint, subsequent encounter [T84.59XD, Z96.649]  Post-operative diagnosis Same as pre-operative diagnosis    Procedure: Explant Right surface replacement Total Hip Arthroplasty ,, Right - Hip  vanco/tobra cement spacer implantation, Right - Hip    Surgeon: Surgeons and Role:  Panel 1:     * Rd Dudley MD - Primary     * Noni Bianchi MD - Resident - Assisting     * Osvaldo Chan MD - Fellow - Assisting  Panel 2:     * Rd Dudley MD - Primary     * Noni Bianchi MD - Resident - Assisting     * Osvlado Chan MD - Fellow - Assisting  Anesthesia: General   Estimated Blood Loss: 400 ml    Drains: Hemovac  Specimens:   ID Type Source Tests Collected by Time Destination   1 : JC ACETABULAR TISSUE Tissue Hip, Right SURGICAL PATHOLOGY EXAM Rd Dudley MD 5/7/2024  7:33 PM    A : RIGHT HIP A Tissue Hip, Right ANAEROBIC BACTERIAL CULTURE ROUTINE, AEROBIC BACTERIAL CULTURE ROUTINE Rd Dudley MD 5/7/2024  6:35 PM    B : RIGHT HIP B Tissue Hip, Right ANAEROBIC BACTERIAL CULTURE ROUTINE, AEROBIC BACTERIAL CULTURE ROUTINE Rd Dudley MD 5/7/2024  6:47 PM    C : RIGHT HIP C Tissue Hip, Right ANAEROBIC BACTERIAL CULTURE ROUTINE, AEROBIC BACTERIAL CULTURE ROUTINE Rd Dudley MD 5/7/2024  7:07 PM    D : RIGHT HIP D Tissue Hip, Right ANAEROBIC BACTERIAL CULTURE ROUTINE, AEROBIC BACTERIAL CULTURE ROUTINE Rd Dudley MD 5/7/2024  7:08 PM    E : RIGHT HIP E Tissue Hip, Right ANAEROBIC BACTERIAL CULTURE ROUTINE, AEROBIC BACTERIAL CULTURE ROUTINE Rd Dudley MD 5/7/2024  7:09 PM    F : RIGHT HIP F Tissue Hip, Right ANAEROBIC BACTERIAL CULTURE ROUTINE, AEROBIC BACTERIAL CULTURE ROUTINE Rd Dudley MD 5/7/2024  7:20 PM      Findings:   See operative report  Complications: None.  Implants:   Implant Name Type Inv. Item Serial No.   Lot No. LRB No. Used Action   IMP SPACER G21 CEMENT 10MM SPACEFLEX HIP MTL STEM [id number] - [id number] Total Joint Component/Insert IMP SPACER G21 CEMENT 10MM SPACEFLEX HIP MTL STEM [id number] 10  [employer] [id number] Right 1 Implanted   CEMENT BONE GENTAMICIN LOW VISCOSITY 1X35.3GR [id number] - [id number] Cement, Bone CEMENT BONE GENTAMICIN LOW VISCOSITY 1X35.3GR [id number]  [employer] [id number] Right 1 Implanted   CEMENT BONE GENTAMICIN LOW VISCOSITY 1X35.3GR [id number] - [id number] Cement, Bone CEMENT BONE GENTAMICIN LOW VISCOSITY 1X35.3GR 814385  [employer] [id number] Right 1 Implanted   CEMENT BONE GENTAMICIN LOW VISCOSITY 1X35.3GR 994967 - [id number] Cement, Bone CEMENT BONE GENTAMICIN LOW VISCOSITY 1X35.3GR [id number]  [employer] [id number] Right 1 Implanted     POST OPERATIVE PLAN    Assessment/Plan: Kwabena Solorio is a 73 year old male s/p Procedure(s):  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 Dudley.    Activity: Up with assist and assistive devices as needed until independent. Posterior hip precautions to operative hip x 3 months:  1) No hip flexion beyond 90 degrees  2) No adduction beyond midline  3) No internal rotation beyond neutral   Weight bearing status: Toe touch weight bearing  Antibiotics: Vancomycin x 24 hours and Ancef. ID recommendations.  Diet: Begin with clear fluids and progress diet as tolerated. Bowel regimen. Anti-emetics PRN.    DVT prophylaxis: Mechanical while in hospital with Aspirin 162mg daily x 4 weeks, starting morning of POD 1  Elevation: Elevate heels off of bed on pillows   Wound Care: Aquacel x 5-7 days.    Drains: Document output per shift, Ortho will discontinue on in 2 weeks  Pain management: Orals PRN, IV for breakthrough only  X-rays: AP Pelvis and lateral in PACU  Physical Therapy: Mobilization, ROM, ADL's, Posterior hip precautions  Occupational Therapy: ADL's  Labs: Trend Hgb on POD #1,  2  Consults: PT, OT. Hospitalist, appreciate assistance in caring for this patient throughout the perioperative period    Future Appointments   Date Time Provider Department Center   5/8/2024  8:45 AM Sofi Bernstein, PT [facility] [place]   5/8/2024 10:00 AM Cira Raman OTR [facility] [place]   5/8/2024  1:30 PM Sofi Bernstein, PT [facility] [place]   6/10/2024  1:30 PM [facility]       Disposition: Pending progress with therapies, pain control on orals, and medical stability, anticipate discharge to Home vs TCU on POD #1-2.    Osvaldo Chan MD  Adult Reconstruction Fellow

## 2024-05-08 NOTE — PROGRESS NOTES
"Pt arrived to the unit at 0108 was A&O x4. Pt reported \" Not feeling well\" was clammy, lightheaded & nauseous. BP was 102/58 & he was on 500 LR bolus' & BP kept fluctuating & got down to 83/54. Ortho & Medicine were paged. Pt reported pain of 5/10 & scheduled Tylenol, Gabapentin & PRN Oxycodone was given. Hospitalist came to see the pt & he put an order to transfer pt to ICU to continue monitoring his BP closely & give him the needed medications. Report was given to ICU VERÓNICA Ortiz & pt was transferred at 0300.     O2: SpO2 > 95% and stable on 2 LPM of O2 via NC d/t de-sating to high 80's. LS clear and equal bilaterally. Denies chest pain and SOB.    Last BM: 5/7/2024 per pt, denies abdominal discomfort. BS active    Activity: Bedrest, not able to reposition d/t dizziness    Skin: Visible skin intact except R hip surgical incision.   Couldn't do full skin assessment d/t pt not feeling well.   Pain: Pain managed with scheduled Tylenol, PRN Oxycodone & Ice packs.    CMS: Intact, AOx4. Baseline numbness and tingling to BLE.    Dressing: CDI   Diet: Regular diet. Pt reported getting nausea w/ movement. Pt had Vanilla ice cream & tolerated well.    LDA: R PIV  infusing LR at 75 mL/hr. L PIV SL.    Equipment: IV pole, personal belongings,    Additional Info: Pt's Pramipexole was given to ICU VERÓNICA Ortiz.      "

## 2024-05-08 NOTE — PLAN OF CARE
10A ICU End of Shift Summary.     Changes this shift: Patient transferred from  ortho. Nauseated and given IV zofran and compazine. IV dilaudid given for pain to right hip, lower back, and buttocks. 1L bolus of LR given for hypotension and Phenyl drip started. Mag. replaced    Neuro: AO x 4. Pupils PERRLA 3mm. Pain to right hip with radiation to lower back and buttocks. RASS of 0  Cardiac: NSR. Pacemaker in place. Hypotensive; on Phenyl  Respiratory: On room air. EtCO2 monitoring in place   GI/: Due to void  Diet/appetite: Regular  Pain: 5/10 to right hip. Radiation to lower back and buttocks  Skin: Right hip incision. Redness to buttocks  LDA's: Right PIV. Left PIV. KEYUR drain right hip to gravity  Activities: Supine in bed currently. Head of bed 20 degrees    Drips:   Phenyl @ 0.5  LR @ 75mL/hr       Plan: Gain consistently stable blood pressures. Ween Phenyl if possible. Manage pain adequately per patient pain scoring

## 2024-05-08 NOTE — PROGRESS NOTES
"Orthopaedic Surgery Progress Note     Subjective: Transferred to SICU overnight due to hypotension. On phenylephrine this morning at 0.5. Pain well controlled. Tolerating diet. Voiding spontaneously. Denies fever or chills, CP, SOB, numbness or tingling, motor dysfunction or weakness.     Objective: BP 97/83 (BP Location: Left arm)   Pulse 63   Temp 99.7  F (37.6  C) (Oral)   Resp 12   Ht 1.676 m (5' 6\")   Wt 80.2 kg (176 lb 12.9 oz)   SpO2 97%   BMI 28.54 kg/m      Drain: 85 overnight    General: NAD, alert and oriented, cooperative with exam.   Cardio: RRR, extremities wwp.   Respiratory: Non-labored breathing.  MSK:   RLE: Toes wwp, DP 2+, bcr in all toes. Compartments soft and tolerates passive stretch of toes. +EHL/FHL/GSC/TA. SILT SP/DP/Sa/Oliveira/T. Dressing c/d/i. Drain with serosanguinous output.    Labs: Hgb pending    Cultures: pending    Assessment and Plan:   Kwabena Solorio is a 73 year old male s/p Procedure(s):  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 Dudley.     Activity: Up with assist and assistive devices as needed until independent. Posterior hip precautions to operative hip x 3 months:  1) No hip flexion beyond 90 degrees  2) No adduction beyond midline  3) No internal rotation beyond neutral   Weight bearing status: Toe touch weight bearing  Antibiotics: Vancomycin x 24 hours and Ancef. ID recommendations.  Diet: Begin with clear fluids and progress diet as tolerated. Bowel regimen. Anti-emetics PRN.    DVT prophylaxis: Mechanical while in hospital with Aspirin 162mg daily x 4 weeks, starting morning of POD 1  Elevation: Elevate heels off of bed on pillows   Wound Care: Aquacel x 5-7 days.    Drains: Document output per shift, Ortho will discontinue on in 2 weeks  Pain management: Orals PRN, IV for breakthrough only  X-rays: AP Pelvis and lateral in PACU  Physical Therapy: Mobilization, ROM, ADL's, Posterior hip " precautions  Occupational Therapy: ADL's  Labs: Trend Hgb on POD #1, 2  Consults: PT, OT. Hospitalist, appreciate assistance in caring for this patient throughout the perioperative period            Future Appointments   Date Time Provider Department Center   5/8/2024  8:45 AM Sofi Bernstein, PT [facility] [place]   5/8/2024 10:00 AM Cira Raman OTR [facility] [place]   5/8/2024  1:30 PM Sofi Bernstein, PT [facility] [place]   6/10/2024  1:30 PM Kaz Bowser, NEVIN [facility]         Disposition: Pending progress with therapies, pain control on orals, and medical stability, anticipate discharge to Home vs TCU on POD #2-3    Pedro Pablo Jay MD  Orthopedic Surgery Resident, PGY-4

## 2024-05-08 NOTE — PROGRESS NOTES
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 Hypotensive post-operatively requiring admission to ICU.     CHANGES and MAJOR THINGS TODAY:   - Serial CBC plt w/ diff  - LR-->NS infusion (discontinue when taking adequate PO)  - Cortisol & LA WNL  - Continue IV vancomycin  - Awaiting ID recommendations  - PICC line ordered w/ likely need of long-term abx      PLAN:    Neuro:  # Acute pain, post-op    # Chronic back pain 2/2 severe degenerative scoliosis   - Tylenol 975 mg q 8 hours x 3 days  - Oxycodone 5 - 10 mg q 4 hours PRN  - Dilaudid 0.2 - 0.4 mg q 2 hours PRN      # PMR  - PTA Prednisone, currently 5 mg daily     # RLS  # Insomnia  - PTA Gabapentin 900 mg daily at bedtime  - PTA Mirapex at bedtime      Pulmonary:  # No active concerns  - Supplemental oxygen to keep saturation above 92 %.  - Incentive spirometer every 15- 30 minutes when awake.    Cardiovascular:  # Undifferentiated shock; likely hypovolemic vs vasoplegia  # Mild Troponinemia likely 2/2 demand ischemia  # Hx CHB s/p pacemaker   Noted to be hypotensive post operatively requiring phenylephrine infusion. Patient as of now has received ~ 3.5L IVF resuscitation.    - Last TTE  2021, LVEF 50%, normal RV systolic function, mild MR  - Lactic acid normal  - Trend hgb level, monitor drain output  - Continue peripheral Phenylephrine  - MAP goal > 65 mmHg  - Troponin trending flat; will stop checking    GI/Nutrition:  # Nausea - improved  # GERD  - PPI daily   - ADAT: Regular diet  - Bowel regimen: Miralax daily, Senna-doc BID  -  Zofran PRN     Renal/Fluids/Electrolytes:  # Right hydronephrosis 2/2 non-obstructive collecting system  # BPH  # Hyponatremia 2/2 iatrogenic (LR IVF resuscitation efforts)  Baseline creat 0.8-0.9. Incidental finding of moderate R hydronephrosis and proximal hydroureter on CT A/P (done for neutropenia) on 2/2024. Urology eval 3/28/2024, underwent MAG3 renogram with plan for urology follow up on 6/4/24. Patient does endorse not be able to properly drain bladder with voiding occurrences.   - Will continue to monitor intake and output.  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 Monitor and trend.   - downtrend in all cell lines likely 2/2 dilutional component with IVF resuscitation efforts  - Serial CBC plt w/ diff  - hemovac drain in place    Musculoskeletal:  # Weakness and deconditioning of critical illness   # S/p right hip surface replacement explant & abx spacer placement  - Physical and occupational therapy consult   - Orthopaedics primary     Ortho recs 5/8/24  Activity: Up with assist and assistive devices as needed until independent. Posterior hip precautions to operative hip x 3 months:  1) No hip flexion beyond 90 degrees  2) No adduction beyond midline  3) No internal rotation beyond neutral   Weight bearing status: Toe touch weight bearing  Antibiotics: Vancomycin x 24 hours and Ancef. ID  "recommendations.  Diet: Begin with clear fluids and progress diet as tolerated. Bowel regimen. Anti-emetics PRN.    DVT prophylaxis: Mechanical while in hospital with Aspirin 162mg daily x 4 weeks, starting morning of POD 1  Elevation: Elevate heels off of bed on pillows   Wound Care: Aquacel x 5-7 days.    Drains: Document output per shift, Ortho will discontinue on in 2 weeks  Pain management: Orals PRN, IV for breakthrough only  X-rays: AP Pelvis and lateral in PACU  Physical Therapy: Mobilization, ROM, ADL's, Posterior hip precautions  Occupational Therapy: ADL's  Labs: Trend Hgb on POD #1, 2  Consults: PT, OT. Hospitalist, appreciate assistance in caring for this patient throughout the perioperative period      Skin:  # Right hip incision  - Right hip dressing CDI  - Diligent RN cares managed per ortho    General Cares/Prophylaxis:    DVT Prophylaxis: Pneumatic Compression Devices  GI Prophylaxis: PPI  Restraints: NA  Family Communication: Patient will update  Code Status: Full    Lines/tubes/drains:  - PIV    Disposition:  - Medical ICU     Patient seen and findings/plan discussed with medical ICU staff, Dr. Dodd.    Billing: This patient is critically ill: Yes. Total critical care time today 40 min. This does not include time spent on procedures or teaching.    GIORGI Gonzales CNP    Clinically Significant Risk Factors Present on Admission          # Hypocalcemia: Lowest Ca = 7.9 mg/dL in last 2 days, will monitor and replace as appropriate   # Hypomagnesemia: Lowest Mg = 1.3 mg/dL in last 2 days, will replace as needed         # Circulatory Shock: required vasopressors within past 24 hours       # Overweight: Estimated body mass index is 28.54 kg/m  as calculated from the following:    Height as of this encounter: 1.676 m (5' 6\").    Weight as of this encounter: 80.2 kg (176 lb 12.9 oz).           # Anemia: based on hgb <11             ====================================  INTERVAL HISTORY:   Continues " to require phenylephrine despite IVF resuscitation efforts; likely multifactorial.     OBJECTIVE:   1. VITAL SIGNS:   Temp:  [97.5  F (36.4  C)-99.9  F (37.7  C)] 97.6  F (36.4  C)  Pulse:  [60-85] 63  Resp:  [8-23] 14  BP: ()/() 117/65  SpO2:  [93 %-100 %] 97 %  Resp: 14    2. INTAKE/ OUTPUT:   I/O last 3 completed shifts:  In: 4785.8 [I.V.:3535.8]  Out: 1435 [Drains:235; Blood:1200]    3. PHYSICAL EXAMINATION:    GEN: not in distress  EYES: PERRL, Anicteric sclera.   HEENT:  Normocephalic, atraumatic, trachea midline,  Pupils PERRLA  CV: RRR, no gallops, rubs, or murmurs  PULM/CHEST: Clear breath sounds bilaterally without rhonchi, crackles or wheeze, symmetric chest rise  GI: normal bowel sounds, soft, non-tender, no rebound tenderness or guarding, no masses  : Voids spontaneously  EXTREMITIES: No peripheral edema, moving all extremities, peripheral pulses intact  NEURO: Cranial nerves II-XII grossly intact, BLE numbness present  SKIN: R hip dressing CDI  PSYCH:  Affect: appropriate        4. LABS:   Arterial Blood Gases   No lab results found in last 7 days.  Complete Blood Count   Recent Labs   Lab 05/08/24  0817 05/08/24  0328 05/07/24  2356 05/07/24  2209 05/07/24  1246   WBC  --  11.5* 17.9* 17.6* 8.7   HGB 9.3* 8.6* 10.1* 10.7* 13.8   PLT  --  217 275 305 297     Basic Metabolic Panel  Recent Labs   Lab 05/08/24  0817 05/08/24  0743 05/08/24  0328 05/08/24  0313 05/07/24  2356   *  --  132*  --  133*   POTASSIUM  --   --  4.1  --  3.7   CHLORIDE  --   --  99  --  99   CO2  --   --  24  --  24   BUN  --   --  15.2  --  12.9   CR  --   --  0.82  --  0.75   GLC  --  150* 128* 122* 114*     Liver Function Tests  Recent Labs   Lab 05/08/24  0328   AST 26   ALT 17   ALKPHOS 43   BILITOTAL 0.8   ALBUMIN 3.6     Coagulation Profile  No lab results found in last 7 days.    5. RADIOLOGY:   Recent Results (from the past 24 hour(s))   XR Pelvis Port 1/2 Views    Narrative    EXAM: XR PELVIS PORT 1/2  VIEWS  LOCATION: [facility]  DATE: 5/7/2024    INDICATION: s p explant and spacer placement on 5 7  COMPARISON: None.      Impression    IMPRESSION: Post right femoral acetabular arthroplasty. Surgical drain in position at the right hip. Bones are demineralized. No acute fracture detected. No dislocation.

## 2024-05-08 NOTE — PHARMACY-VANCOMYCIN DOSING SERVICE
Pharmacy Vancomycin Initial Note  Date of Service May 8, 2024  Patient's  1950  73 year old, male    Indication: Bone and Joint Infection    Current estimated CrCl = Estimated Creatinine Clearance: 77.5 mL/min (based on SCr of 0.82 mg/dL).    Creatinine for last 3 days  2024: 11:56 PM Creatinine 0.75 mg/dL  2024:  3:28 AM Creatinine 0.82 mg/dL    Recent Vancomycin Level(s) for last 3 days  No results found for requested labs within last 3 days.      Vancomycin IV Administrations (past 72 hours)                     vancomycin (VANCOCIN) 1,000 mg in 200 mL dextrose intermittent infusion (mg) 1,000 mg New Bag 24    vancomycin (VANCOCIN) topical powder (g) 10 g Given 24                    Nephrotoxins and other renal medications (From now, onward)      None            Contrast Orders - past 72 hours (72h ago, onward)      None            [employer] Prediction of Planned Initial Vancomycin Regimen  Loading dose: N/A  Regimen: 1750 mg IV every 24 hours.  Start time: 19:00 on 2024, pt already received a vanco dose this morning at 06:21am.  Exposure target: AUC24 (range)400-600 mg/L.hr   AUC24,ss: 488 mg/L.hr  Probability of AUC24 > 400: 72 %  Ctrough,ss: 12.8 mg/L  Probability of Ctrough,ss > 20: 17 %  Probability of nephrotoxicity (Lodise MARISELA ): 8 %        Plan:  Start vancomycin 1750 mg IV q24h (21.8mg/kg/dose). Opted for this dose vs 1000mg Q12H since the predicted AUC was closer to the middle of the goal range & lower toxicity percentage.   Vancomycin monitoring method: AUC  Vancomycin therapeutic monitoring goal: 400-600 mg*h/L  Pharmacy will check vancomycin levels as appropriate in 1-3 Days.    Serum creatinine levels will be ordered every 48 hours.      Marisela Andre, PharmD, BCPS

## 2024-05-08 NOTE — OP NOTE
Date of Surgery:   5/7/2024     Preop Dx:   Infection of right hip resurfacing arthroplasty     Postop Dx:   Same     Procedure:  Explant of right hip resurfacing arthroplasty  Manufacture of vanco/tobra cement spacer  Revision to new vanco/tobra cement articulating spacer insertion     Attending Surgeon:  Rd Dudley MD     Assistants:  MD Noni Wall MD     Anesthesia:   General Anesthesia with ET tube     Complications:   None     EBL:   400cc     IV fluids:   Per anesthesia records     Components used:  G21 10mm Spaceflex hip antibiotic spacer with gentamicin and vancomycin cement     Findings:  Extensive synovitis. Several periacetabular bony defects with tissue related to ALTR and/or infection. Tissue sent to lab for culture.     Indications for procedure:  73 year old male s/p metal on metal hip resurfacing in 2007 now with diaz-acetabular osteolysis and soft tissue reaction related to his metal on metal bearing. As part of his workup he received a hip aspiration which grew Enterococcus on culture.    We recommended surgical treatment in the form of explant of hardware, irrigation and debridement, and placement of antibiotic spacer.  The risks, benefits, and alternatives to surgery were explained at length with the patient.  The risks include but are not limited to pain, bleeding, infection, damage to surrounding structures, loosening, mechanical hardware failure, dislocation, leg-length discrepancy, blood clots, COVID-19, the need for future procedures, as well as the risks of anesthesia itself.  The patient understood these risks, agreed to have surgery, and voluntarily signed a written informed consent.     Description of Procedure:  The patient was identified in the preoperative holding area by Dr. Dudley, and the Right lower extremity was then marked.  The patient was then greeted by the anesthesia team who brought the patient back to the operating theater.  The patient was then  transferred onto the operating table in the supine position.  General anesthesia was induced without any complications.  The patient was then positioned lateral decubitus position with the Right hip pointing up. All of the bony prominences were well-padded.  The Right lower extremity was then prepped and draped in usual sterile fashion.  A timeout was performed confirming the patient name, date of birth, procedure to be performed, as well as laterality.  Perioperative antibiotics were held until the intra-operative cultures were obtained.    A minimal incision posterior lateral approach to the hip joint was then performed making a curvilinear incision over the greater trochanter and taking this down through the subcutaneous tissue. The incision was located several centimeters posterior to his previous anterior incision. The gluteus hetal was then split in line with its fibers. After internal rotation of the femur, the posterior tissues were detached and the soft tissue capsule was divided from the posterior aspect of the femoral neck. The piriformis was not identified and was likely altered due to his previous surgery.  At this point, the capsule was incised superiorly and inferiorly as a rhomboid shaped trap-door. We encountered extensive synovitis but no gross purulence upon opening the capsule. The hip joint was then dislocated. A femoral neck osteotomy was performed at the mid portion of the femoral neck. This was done with a reciprocating saw around the peg for the femoral component. The femoral neck and implant were then removed.    We took time to perform an extensive debridement of the scar and synovitis surrounding the joint and to expose the acetabular cup. We were able to expose the perimeter of the acetabular implant. We used the acetabular osteotome to remove the implant with minimal associated bone loss. The cup had been well fixed in some places but did appear to have a decrease in bony fixation in  others. Upon explant of the acetabular implant, we were able to inspect the bony acetabulum and noted several contained bony defects. We debrided dark tissue that may be consistent with ALTR and there was also fluid that may be consistent with infection. The tissue from the bony defects were removed with pituitary and curettes and sent for lab analysis. The acetabulum and the rest of the wound was then washed thoroughly with 3L saline solution. Two betadine soaked laps were placed in the wound and the surgical extremity was re-draped with new clean drapes and ioban.    All of the OR staff then re-scrubbed into new clean gowns and the wound was again inspected and the betadine soaked laps were removed. The femur was prepared with a canal finder and the smallest sized broach for the acetabular spacer system. We then had a surgeon prepare the spacer mold on the back table. Cement containing gentamicin and with vancomycin added in was mixed and injected into the spacer mold. Once the cement had cured the spacer was removed and the surfaces smoothed over to allow for ease of insertion.    The femur was again exposed and the antibiotic spacer was gently impacted into the femoral canal. We were satisfied with the rotational stability of the implant. The hemiarthroplasty construct was reduced into the acetabulum and the stability was deemed to be adequate.     A thorough irrigation of the wound was now performed. A deep drain was placed. Wound closure was accomplished by reapproximating the posterior capsular soft tissues. The remainder of the wound was closed in layers with PDS sutures using standard technique. Sterile dressing was applied.    The patient was then awoken from anesthesia without any complications.  The patient was then transferred onto the hospital bed and taken to the PACU without any complications.  Multiple sponge and needle counts were performed and were correct at the end of the case.  Dr. Dudley was  present and participated throughout the key portions of the procedure.      Postoperative plan:  Activity: Up with assist and assistive devices as needed until independent. Posterior hip precautions to operative hip x 3 months:  1) No hip flexion beyond 90 degrees  2) No adduction beyond midline  3) No internal rotation beyond neutral   Weight bearing status: Toe touch weight bearing  Antibiotics: Vancomycin x 24 hours and Ancef. ID recommendations.  Diet: Begin with clear fluids and progress diet as tolerated. Bowel regimen. Anti-emetics PRN.    DVT prophylaxis: Mechanical while in hospital with Aspirin 162mg daily x 4 weeks, starting morning of POD 1  Elevation: Elevate heels off of bed on pillows   Wound Care: Aquacel x 5-7 days.    Drains: Document output per shift, Ortho will discontinue on in 2 weeks  Pain management: Orals PRN, IV for breakthrough only  X-rays: AP Pelvis and lateral in PACU  Physical Therapy: Mobilization, ROM, ADL's, Posterior hip precautions  Occupational Therapy: ADL's  Labs: Trend Hgb on POD #1, 2  Consults: PT, OT. Hospitalist, appreciate assistance in caring for this patient throughout the perioperative period    Osvaldo Chan MD  Adult Reconstructive Surgery Fellow  Department of Orthopaedic Surgery, [facility]    Attending MD (Dr. Rd Dudley) Attestation:  I was present during the key portions of the procedure and I was immediately available for the entire procedure between opening and closing.    MD Tory Jesus Family Professor  Oncology and Adult Reconstructive Surgery  Dept Orthopaedic Surgery, [facility]

## 2024-05-08 NOTE — CONSULTS
Vascular access team consulted to place a 4F DL PICC.     One attempt to RUE, unable to pass catheter into SVC.    VAT will defer to IR for PICC placement.

## 2024-05-09 ENCOUNTER — APPOINTMENT (OUTPATIENT)
Dept: OCCUPATIONAL THERAPY | Facility: CLINIC | Age: 74
DRG: 466 | End: 2024-05-09
Attending: STUDENT IN AN ORGANIZED HEALTH CARE EDUCATION/TRAINING PROGRAM
Payer: COMMERCIAL

## 2024-05-09 ENCOUNTER — APPOINTMENT (OUTPATIENT)
Dept: PHYSICAL THERAPY | Facility: CLINIC | Age: 74
DRG: 466 | End: 2024-05-09
Attending: ORTHOPAEDIC SURGERY
Payer: COMMERCIAL

## 2024-05-09 LAB
ANION GAP SERPL CALCULATED.3IONS-SCNC: 5 MMOL/L (ref 7–15)
ATRIAL RATE - MUSE: 52 BPM
BACTERIA SNV CULT: NORMAL
BASOPHILS # BLD AUTO: 0 10E3/UL (ref 0–0.2)
BASOPHILS NFR BLD AUTO: 1 %
BUN SERPL-MCNC: 14.6 MG/DL (ref 8–23)
CALCIUM SERPL-MCNC: 8 MG/DL (ref 8.8–10.2)
CHLORIDE SERPL-SCNC: 103 MMOL/L (ref 98–107)
CREAT SERPL-MCNC: 0.9 MG/DL (ref 0.67–1.17)
DEPRECATED HCO3 PLAS-SCNC: 27 MMOL/L (ref 22–29)
DIASTOLIC BLOOD PRESSURE - MUSE: NORMAL MMHG
EGFRCR SERPLBLD CKD-EPI 2021: 89 ML/MIN/1.73M2
EOSINOPHIL # BLD AUTO: 0 10E3/UL (ref 0–0.7)
EOSINOPHIL NFR BLD AUTO: 1 %
ERYTHROCYTE [DISTWIDTH] IN BLOOD BY AUTOMATED COUNT: 12.8 % (ref 10–15)
GLUCOSE BLDC GLUCOMTR-MCNC: 106 MG/DL (ref 70–99)
GLUCOSE BLDC GLUCOMTR-MCNC: 95 MG/DL (ref 70–99)
GLUCOSE BLDC GLUCOMTR-MCNC: 97 MG/DL (ref 70–99)
GLUCOSE SERPL-MCNC: 100 MG/DL (ref 70–99)
HCT VFR BLD AUTO: 23.6 % (ref 40–53)
HCT VFR BLD AUTO: 23.8 % (ref 40–53)
HGB BLD-MCNC: 7.9 G/DL (ref 13.3–17.7)
HGB BLD-MCNC: 7.9 G/DL (ref 13.3–17.7)
IMM GRANULOCYTES # BLD: 0 10E3/UL
IMM GRANULOCYTES NFR BLD: 0 %
INTERPRETATION ECG - MUSE: NORMAL
LYMPHOCYTES # BLD AUTO: 1.1 10E3/UL (ref 0.8–5.3)
LYMPHOCYTES NFR BLD AUTO: 19 %
MAGNESIUM SERPL-MCNC: 1.9 MG/DL (ref 1.7–2.3)
MCH RBC QN AUTO: 30.6 PG (ref 26.5–33)
MCHC RBC AUTO-ENTMCNC: 33.5 G/DL (ref 31.5–36.5)
MCV RBC AUTO: 92 FL (ref 78–100)
MONOCYTES # BLD AUTO: 0.5 10E3/UL (ref 0–1.3)
MONOCYTES NFR BLD AUTO: 9 %
NEUTROPHILS # BLD AUTO: 3.9 10E3/UL (ref 1.6–8.3)
NEUTROPHILS NFR BLD AUTO: 70 %
NRBC # BLD AUTO: 0 10E3/UL
NRBC BLD AUTO-RTO: 0 /100
P AXIS - MUSE: 35 DEGREES
PHOSPHATE SERPL-MCNC: 2.9 MG/DL (ref 2.5–4.5)
PLATELET # BLD AUTO: 173 10E3/UL (ref 150–450)
POTASSIUM SERPL-SCNC: 3.8 MMOL/L (ref 3.4–5.3)
PR INTERVAL - MUSE: 174 MS
QRS DURATION - MUSE: 186 MS
QT - MUSE: 506 MS
QTC - MUSE: 470 MS
R AXIS - MUSE: -82 DEGREES
RBC # BLD AUTO: 2.58 10E6/UL (ref 4.4–5.9)
SODIUM SERPL-SCNC: 135 MMOL/L (ref 135–145)
SYSTOLIC BLOOD PRESSURE - MUSE: NORMAL MMHG
T AXIS - MUSE: 106 DEGREES
VENTRICULAR RATE- MUSE: 52 BPM
WBC # BLD AUTO: 5.6 10E3/UL (ref 4–11)

## 2024-05-09 PROCEDURE — 250N000011 HC RX IP 250 OP 636: Performed by: STUDENT IN AN ORGANIZED HEALTH CARE EDUCATION/TRAINING PROGRAM

## 2024-05-09 PROCEDURE — 83735 ASSAY OF MAGNESIUM: CPT

## 2024-05-09 PROCEDURE — 250N000013 HC RX MED GY IP 250 OP 250 PS 637

## 2024-05-09 PROCEDURE — 85018 HEMOGLOBIN: CPT

## 2024-05-09 PROCEDURE — 84100 ASSAY OF PHOSPHORUS: CPT

## 2024-05-09 PROCEDURE — 97535 SELF CARE MNGMENT TRAINING: CPT | Mod: GO

## 2024-05-09 PROCEDURE — 250N000012 HC RX MED GY IP 250 OP 636 PS 637: Performed by: STUDENT IN AN ORGANIZED HEALTH CARE EDUCATION/TRAINING PROGRAM

## 2024-05-09 PROCEDURE — 120N000002 HC R&B MED SURG/OB UMMC

## 2024-05-09 PROCEDURE — 250N000013 HC RX MED GY IP 250 OP 250 PS 637: Performed by: STUDENT IN AN ORGANIZED HEALTH CARE EDUCATION/TRAINING PROGRAM

## 2024-05-09 PROCEDURE — 99233 SBSQ HOSP IP/OBS HIGH 50: CPT | Performed by: INTERNAL MEDICINE

## 2024-05-09 PROCEDURE — 258N000003 HC RX IP 258 OP 636

## 2024-05-09 PROCEDURE — 36415 COLL VENOUS BLD VENIPUNCTURE: CPT

## 2024-05-09 PROCEDURE — 80048 BASIC METABOLIC PNL TOTAL CA: CPT

## 2024-05-09 PROCEDURE — 85025 COMPLETE CBC W/AUTO DIFF WBC: CPT

## 2024-05-09 PROCEDURE — 97530 THERAPEUTIC ACTIVITIES: CPT | Mod: GP

## 2024-05-09 PROCEDURE — 97165 OT EVAL LOW COMPLEX 30 MIN: CPT | Mod: GO

## 2024-05-09 PROCEDURE — 250N000011 HC RX IP 250 OP 636

## 2024-05-09 RX ORDER — POTASSIUM CHLORIDE 1.5 G/1.58G
20 POWDER, FOR SOLUTION ORAL ONCE
Status: COMPLETED | OUTPATIENT
Start: 2024-05-09 | End: 2024-05-09

## 2024-05-09 RX ORDER — LORAZEPAM 0.5 MG/1
0.5 TABLET ORAL
Status: DISCONTINUED | OUTPATIENT
Start: 2024-05-09 | End: 2024-05-13 | Stop reason: HOSPADM

## 2024-05-09 RX ORDER — ZOLPIDEM TARTRATE 5 MG/1
5 TABLET ORAL
Status: DISCONTINUED | OUTPATIENT
Start: 2024-05-09 | End: 2024-05-13 | Stop reason: HOSPADM

## 2024-05-09 RX ORDER — LIDOCAINE 40 MG/G
CREAM TOPICAL
Status: DISCONTINUED | OUTPATIENT
Start: 2024-05-09 | End: 2024-05-09

## 2024-05-09 RX ORDER — SODIUM CHLORIDE 9 MG/ML
INJECTION, SOLUTION INTRAVENOUS CONTINUOUS
Status: ACTIVE | OUTPATIENT
Start: 2024-05-09 | End: 2024-05-09

## 2024-05-09 RX ORDER — MAGNESIUM SULFATE HEPTAHYDRATE 40 MG/ML
2 INJECTION, SOLUTION INTRAVENOUS ONCE
Status: COMPLETED | OUTPATIENT
Start: 2024-05-09 | End: 2024-05-09

## 2024-05-09 RX ADMIN — HYDROMORPHONE HYDROCHLORIDE 0.2 MG: 1 INJECTION, SOLUTION INTRAMUSCULAR; INTRAVENOUS; SUBCUTANEOUS at 01:55

## 2024-05-09 RX ADMIN — PREDNISONE 5 MG: 5 TABLET ORAL at 11:48

## 2024-05-09 RX ADMIN — ASPIRIN 162 MG: 81 TABLET, COATED ORAL at 08:34

## 2024-05-09 RX ADMIN — SODIUM CHLORIDE: 9 INJECTION, SOLUTION INTRAVENOUS at 13:04

## 2024-05-09 RX ADMIN — OXYCODONE HYDROCHLORIDE 5 MG: 5 TABLET ORAL at 05:20

## 2024-05-09 RX ADMIN — ZOLPIDEM TARTRATE 5 MG: 5 TABLET, COATED ORAL at 23:00

## 2024-05-09 RX ADMIN — OXYCODONE HYDROCHLORIDE 5 MG: 5 TABLET ORAL at 16:16

## 2024-05-09 RX ADMIN — SENNOSIDES AND DOCUSATE SODIUM 2 TABLET: 8.6; 5 TABLET ORAL at 20:11

## 2024-05-09 RX ADMIN — ACETAMINOPHEN 975 MG: 325 TABLET, FILM COATED ORAL at 01:58

## 2024-05-09 RX ADMIN — ACETAMINOPHEN 975 MG: 325 TABLET, FILM COATED ORAL at 18:08

## 2024-05-09 RX ADMIN — AMPICILLIN SODIUM 2 G: 2 INJECTION, POWDER, FOR SOLUTION INTRAMUSCULAR; INTRAVENOUS at 03:45

## 2024-05-09 RX ADMIN — PANTOPRAZOLE SODIUM 40 MG: 40 TABLET, DELAYED RELEASE ORAL at 08:35

## 2024-05-09 RX ADMIN — Medication 1000 UNITS: at 11:48

## 2024-05-09 RX ADMIN — OXYCODONE HYDROCHLORIDE 5 MG: 5 TABLET ORAL at 11:48

## 2024-05-09 RX ADMIN — CALCIUM 500 MG: 500 TABLET ORAL at 08:35

## 2024-05-09 RX ADMIN — AMPICILLIN SODIUM 2 G: 2 INJECTION, POWDER, FOR SOLUTION INTRAMUSCULAR; INTRAVENOUS at 20:11

## 2024-05-09 RX ADMIN — AMPICILLIN SODIUM 2 G: 2 INJECTION, POWDER, FOR SOLUTION INTRAMUSCULAR; INTRAVENOUS at 16:16

## 2024-05-09 RX ADMIN — POLYETHYLENE GLYCOL 3350 17 G: 17 POWDER, FOR SOLUTION ORAL at 08:36

## 2024-05-09 RX ADMIN — POTASSIUM CHLORIDE 20 MEQ: 1.5 POWDER, FOR SOLUTION ORAL at 08:35

## 2024-05-09 RX ADMIN — GABAPENTIN 900 MG: 300 CAPSULE ORAL at 22:43

## 2024-05-09 RX ADMIN — ACETAMINOPHEN 975 MG: 325 TABLET, FILM COATED ORAL at 08:45

## 2024-05-09 RX ADMIN — AMPICILLIN SODIUM 2 G: 2 INJECTION, POWDER, FOR SOLUTION INTRAMUSCULAR; INTRAVENOUS at 13:05

## 2024-05-09 RX ADMIN — SENNOSIDES AND DOCUSATE SODIUM 2 TABLET: 8.6; 5 TABLET ORAL at 08:34

## 2024-05-09 RX ADMIN — MAGNESIUM SULFATE HEPTAHYDRATE 2 G: 40 INJECTION, SOLUTION INTRAVENOUS at 08:32

## 2024-05-09 RX ADMIN — OXYCODONE HYDROCHLORIDE AND ACETAMINOPHEN 500 MG: 500 TABLET ORAL at 08:35

## 2024-05-09 RX ADMIN — AMPICILLIN SODIUM 2 G: 2 INJECTION, POWDER, FOR SOLUTION INTRAMUSCULAR; INTRAVENOUS at 08:52

## 2024-05-09 ASSESSMENT — ACTIVITIES OF DAILY LIVING (ADL)
ADLS_ACUITY_SCORE: 29
ADLS_ACUITY_SCORE: 30
ADLS_ACUITY_SCORE: 29
ADLS_ACUITY_SCORE: 29
ADLS_ACUITY_SCORE: 30
ADLS_ACUITY_SCORE: 29
IADL_COMMENTS: WILL HAVE ASSIST WITH IADLS
ADLS_ACUITY_SCORE: 29

## 2024-05-09 NOTE — PROGRESS NOTES
Care Management Follow Up    Length of Stay (days): 2    Expected Discharge Date: 05/11/2024     Concerns to be Addressed: discharge planning     Patient plan of care discussed at interdisciplinary rounds: Yes    Anticipated Discharge Disposition: Transitional Care     Anticipated Discharge Services: Transportation Services  Anticipated Discharge DME: Other (see comment) (TBD)    Patient/family educated on Medicare website which has current facility and service quality ratings: no (Pt identified bed hold at [facility])  Education Provided on the Discharge Plan: Yes  Patient/Family in Agreement with the Plan: yes    Referrals Placed by CM/SW:    Private pay costs discussed: Not applicable    Additional Information:  CORDELL  spoke with Pt  and Pt's  family. At this  time the plan is for Pt's family [facility]. CORDELL spoke with Kiley in admission at [facility] in [place],  [phone] who identified  that they are happy to accept Pt however, their pharmacy typically requires 48 hours from time of script being sent to be able to fill IV meds. Pt's IV antibiotics will need to be sent 48 hours prior to admission likely making a Saturday admission difficult. If Pt's IV-Abx scripts/order can be signed SW can send these on Friday 5/10. All other IV medications will need to be switched to oral medications prior to admission to SNF. CORDELL will continue to follow for safe discharge placement and planning.         Rich Licona, [facility]  10 ICU & [place] [facility] ED   Ph: [phone]  Pager: [phone]

## 2024-05-09 NOTE — PROGRESS NOTES
05/09/24 1135   Appointment Info   Signing Clinician's Name / Credentials (OT) Maria T Iniguez MA OTR/L   Rehab Comments (OT) R post prec, TTWB   Living Environment   People in Home alone   Current Living Arrangements house   Home Accessibility stairs to enter home;stairs within home   Transportation Anticipated family or friend will provide   Living Environment Comments Pt reports many stairs and barriers to dc home. Has tub/shower in basement. Standard toilet main level, lower toilet downstairs.   Self-Care   Usual Activity Tolerance good   Current Activity Tolerance fair   Regular Exercise Yes   Activity/Exercise Type biking   Equipment Currently Used at Home none   Activity/Exercise/Self-Care Comment very active and IND at baseline   Instrumental Activities of Daily Living (IADL)   IADL Comments will have assist with IADLs   General Information   Onset of Illness/Injury or Date of Surgery 05/07/24   Referring Physician Dr. Dudley   Patient/Family Therapy Goal Statement (OT) To go to rehab and stay in nursing home until spacer is out   Additional Occupational Profile Info/Pertinent History of Current Problem Kwabena Solorio is a 73 year old male s/p Procedure(s):  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 Dudley.   Existing Precautions/Restrictions fall;no hip IR;no hip ADD past midline;90 degree hip flexion;no pivoting or twisting;weight bearing   Right Lower Extremity (Weight-bearing Status) toe touch weight-bearing (TTWB)   Cognitive Status Examination   Orientation Status orientation to person, place and time   Visual Perception   Visual Impairment/Limitations WNL   Sensory   Sensory Quick Adds sensation intact   Pain Assessment   Patient Currently in Pain Yes, see Vital Sign flowsheet   Range of Motion Comprehensive   Comment, General Range of Motion B UE's WFL   Strength Comprehensive (MMT)   Comment, General Manual Muscle Testing  (MMT) Assessment B UE's WFL   Bed Mobility   Comment (Bed Mobility) NA during eval   Sit-Stand Transfer   Sit-Stand [place] (Transfers) contact guard;set up;verbal cues   Assistive Device (Sit-Stand Transfers) walker, front-wheeled   Clinical Impression   Criteria for Skilled Therapeutic Interventions Met (OT) Yes, treatment indicated   OT Diagnosis Decreased IND with ADLs   OT Problem List-Impairments impacting ADL problems related to;activity tolerance impaired;pain;post-surgical precautions   Planned Therapy Interventions (OT) ADL retraining   Clinical Decision Making Complexity (OT) problem focused assessment/low complexity   Risk & Benefits of therapy have been explained evaluation/treatment results reviewed;care plan/treatment goals reviewed;patient;sibling   OT Total Evaluation Time   OT Eval, Low Complexity Minutes (69090) 6   OT Goals   Therapy Frequency (OT) 6 times/week   OT Predicted Duration/Target Date for Goal Attainment 05/16/24   OT Goals Lower Body Dressing;Toilet Transfer/Toileting;OT Goal 1   OT: Hygiene/Grooming supervision/stand-by assist;within precautions;from wheelchair   OT: Lower Body Dressing Supervision/stand-by assist;using adaptive equipment;within precautions   OT: Toilet Transfer/Toileting Supervision/stand-by assist;toilet transfer;cleaning and garment management;using adaptive equipment;within precautions   Self-Care/Home Management   Self-Care/Home Mgmt/ADL, Compensatory, Meal Prep Minutes (44444) 28   Symptoms Noted During/After Treatment (Meal Preparation/Planning Training) fatigue;increased pain   Treatment Detail/Skilled Intervention Pt sitting up in recliner upon arrival, sister present throughout. Educated on post op precautions and implications for ADL. Faciliated discussion on dc planning as pt appears quite concerned about returning to home, reporting many stairs to navigate, narrow bathroom, tub/shower, etc. Explained some DME options to assist with tub transfer  including extended tub bench, however opted to defer further education to TCU to focus on progression of basic functional mobility and tasks. Faciliated education on LE dressing technique with use of AE, pt verbalized understanding but did not practice this date. Pt slightly impulsive with standing as stood prior to OT being ready, ex gait belt not on, but stood with CGA. Pt maintains NWB R LE as he is fearful he will not be able to  what TTWB is and will put more weight into leg than TTWB. Pt began taking a few hops foward with CGA using FWW, but limited by lines so advised to return to recliner. Pt left in recliner with needs met upon exit.   OT Discharge Planning   OT Plan OT: commode transfers, LE dressing- bring in AE   OT Discharge Recommendation (DC Rec) Transitional Care Facility   OT Rationale for DC Rec Pt is well below his baseline and lives alone with 2 big dogs, has many stairs around home to navigate. Recommend TCU at this time to maximize safety and IND with functional mobility and ADLs/IADLs.   OT Brief overview of current status CGA STS with few shorts hops foward, limited by lines   Total Session Time   Timed Code Treatment Minutes 28   Total Session Time (sum of timed and untimed services) 34

## 2024-05-09 NOTE — PROGRESS NOTES
Goal Outcome Evaluation     ICU End of Shift Summary:     General:   Neuro: A&Ox4, follows commands, afebrile  Pulm/Resp: room air, clear lung sounds. No issues  CV: Normotensive, permanent pacemaker set to DDD; off phenyl since 5/8  GI: no BM this shift so far  : difficulty voiding, PVR's with every void, urology consulted  Access: PIV x1 L FA  Skin: R hip incision with abx spacer in place  Drains: R KEYUR drain, serosanguinous fluid      Major changes this shift:   -worked with OT/PT  -transfer to MS  -IR to place PICC for abx  -IV fluids turned off      NPO @ [facility] for IR procedure

## 2024-05-09 NOTE — PROGRESS NOTES
Hospitalist acceptance note     72 yo male s/p explant of right total hip arthroplasty with antibiotic spacer implantation.   PMH of PPM  Required initiation of vasopressor medications for postoperative hypotension.  Infectious disease consulted and patient currently getting vancomycin.  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  Postoperative hypotension requiring vasopressor initiation.  Weaned off vasopressor and currently hemodynamically stable on IV fluids.  History of complete heart block status post permanent pacemaker placement.  Being monitored on telemetry.  Acute postoperative pain.  History of degenerative joint disease/scoliosis, chronic back pain.  Currently on Dilaudid IV as needed, oxycodone 5 to 10 mg every 4 hours as needed and Tylenol 975 mg every 8 hours for 3 days.  Polymyalgia rheumatica on prednisone 5 mg which is resumed.    Restless leg syndrome  Insomnia.  Continue gabapentin 100 mg daily at bedtime, PTA Mirapex at bedtime continued    Undifferentiated shock suspected to be due to hypovolemia versus vasoplegia.  Weaned off phenylephrine infusion  Troponin level elevated but suspected to be due to demand ischemia secondary to hypovolemia  Nausea without vomiting, improved  Gastroesophageal reflux disease on PPI daily.Zofran as needed.    Right hydronephrosis secondary to an obstructed collecting system   benign prostatic hyperplasia  Hyponatremia secondary to iatrogenic, lactated Ringer's IV fluid resuscitation  Stress-induced hyperglycemia  Right hip prosthetic joint infection status post explantation  History of Enterococcus prosthetic joint infection found on  "4/25/2024.  Leukocytosis.  ID consulted.  On vancomycin and cefazolin.  Continue to monitor fever curve and WBC count  Acute blood loss anemia.  Transfuse if hemoglobin below 7.0.  Hemovac drain in place  Weakness and deconditioning of critical illness    Vital signs:  Temp: 98  F (36.7  C) Temp src: Oral BP: 124/77 Pulse: 64   Resp: 14 SpO2: 99 % O2 Device: None (Room air) Oxygen Delivery: 2 LPM Height: 167.6 cm (5' 6\") Weight: 79.8 kg (175 lb 14.8 oz)  Estimated body mass index is 28.4 kg/m  as calculated from the following:    Height as of this encounter: 1.676 m (5' 6\").    Weight as of this encounter: 79.8 kg (175 lb 14.8 oz).        Labs  Lab Results   Component Value Date    WBC 5.6 05/09/2024     Lab Results   Component Value Date    RBC 2.58 05/09/2024     Lab Results   Component Value Date    HGB 7.9 05/09/2024     Lab Results   Component Value Date    HCT 23.8 05/09/2024     No components found for: \"MCT\"  Lab Results   Component Value Date    MCV 92 05/09/2024     Lab Results   Component Value Date    MCH 30.6 05/09/2024     Lab Results   Component Value Date    MCHC 33.5 05/09/2024     Lab Results   Component Value Date    RDW 12.8 05/09/2024     Lab Results   Component Value Date     05/09/2024     Last Comprehensive Metabolic Panel:  Lab Results   Component Value Date     05/09/2024    POTASSIUM 3.8 05/09/2024    CHLORIDE 103 05/09/2024    CO2 27 05/09/2024    ANIONGAP 5 (L) 05/09/2024     (H) 05/09/2024    GLC 95 05/09/2024    BUN 14.6 05/09/2024    CR 0.90 05/09/2024    GFRESTIMATED 89 05/09/2024    LIDYA 8.0 (L) 05/09/2024       Enzo Bianchi  "

## 2024-05-09 NOTE — PROGRESS NOTES
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 Hypotensive post-operatively requiring admission to ICU.     CHANGES and MAJOR THINGS TODAY:   - Serial CBC plt w/ diff  - Continue Ampicillin  - Intra-op cultures & BC pending  - Urology consulted 2/2 complex history in high post-void residuals  - IR consulted for PICC placement in setting of failed placement with vascular access (will need long-term IV abx)    PLAN:    Neuro:  # Acute pain, post-op    # Chronic back pain 2/2 severe degenerative scoliosis   - Tylenol 975 mg q 8 hours x 3 days  - Oxycodone 5 - 10 mg q 4 hours PRN  - Dilaudid 0.2 - 0.4 mg q 2 hours PRN      # PMR  - PTA Prednisone, currently 5 mg daily     # RLS  # Insomnia  - PTA Gabapentin 900 mg daily at bedtime  - PTA Mirapex at bedtime      Pulmonary:  # No active concerns  - Supplemental oxygen to keep saturation above 92 %.  - Incentive spirometer every 15- 30 minutes when awake.    Cardiovascular:  # Undifferentiated shock; likely hypovolemic vs vasoplegia - resolved  # Mild Troponinemia likely 2/2 demand ischemia  # Hx CHB s/p pacemaker   Noted to be hypotensive post operatively requiring phenylephrine infusion. Patient as of now has received ~ 3.5L IVF resuscitation.    - Last TTE  2021, LVEF 50%, normal RV systolic function, mild MR  - Lactic acid normal  - Trend hgb level, monitor drain output  - Phenylephrine off 5/8/24  - MAP goal > 65 mmHg  - Troponin trending flat; will stop checking    GI/Nutrition:  # Nausea - improved  # GERD  - PPI daily   - ADAT: Regular diet  -  Bowel regimen: Miralax daily, Senna-doc BID  - Zofran PRN     Renal/Fluids/Electrolytes:  # Right hydronephrosis 2/2 non-obstructive collecting system  # BPH  # Hyponatremia 2/2 iatrogenic (LR IVF resuscitation efforts) - resolved  Baseline creat 0.8-0.9. Incidental finding of moderate R hydronephrosis and proximal hydroureter on CT A/P (done for neutropenia) on 2/2024. Urology eval 3/28/2024, underwent MAG3 renogram with plan for urology follow up on 6/4/24. Patient does endorse not be able to properly drain bladder with voiding occurrences. Post void residuals have been ~ 500 mL  - Will continue to monitor intake and output.  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 Monitor and trend.   - downtrend in all cell lines likely 2/2 dilutional component with IVF resuscitation efforts  - Serial CBC plt w/ diff  - hemovac drain in place    Musculoskeletal:  # Weakness and deconditioning of critical illness   # S/p right hip surface replacement explant & abx spacer placement  - Physical and occupational therapy consult   - Orthopaedics primary     Ortho recs 5/8/24  Activity: Up with assist and assistive devices as needed until independent. Posterior hip precautions to operative hip x 3 months:  1) No hip flexion beyond 90 degrees  2) No adduction beyond midline  3) No internal rotation beyond neutral   Weight  "bearing status: Toe touch weight bearing  Antibiotics: Vancomycin x 24 hours and Ancef. ID recommendations.  Diet: Begin with clear fluids and progress diet as tolerated. Bowel regimen. Anti-emetics PRN.    DVT prophylaxis: Mechanical while in hospital with Aspirin 162mg daily x 4 weeks, starting morning of POD 1  Elevation: Elevate heels off of bed on pillows   Wound Care: Aquacel x 5-7 days.    Drains: Document output per shift, Ortho will discontinue on in 2 weeks  Pain management: Orals PRN, IV for breakthrough only  X-rays: AP Pelvis and lateral in PACU  Physical Therapy: Mobilization, ROM, ADL's, Posterior hip precautions  Occupational Therapy: ADL's  Labs: Trend Hgb on POD #1, 2  Consults: PT, OT. Hospitalist, appreciate assistance in caring for this patient throughout the perioperative period      Skin:  # Right hip incision  - Right hip dressing CDI  - Diligent RN cares managed per ortho    General Cares/Prophylaxis:    DVT Prophylaxis: Pneumatic Compression Devices  GI Prophylaxis: PPI  Restraints: NA  Family Communication: Patient will update  Code Status: Full    Lines/tubes/drains:  - PIV  - PICC ordered    Disposition:  - Transfer to general medicine    Patient seen and findings/plan discussed with medical ICU staff, Dr. Dodd.    Billing: This patient is critically ill: No. Total critical care time today 40 min. This does not include time spent on procedures or teaching.    GIORGI Gonzales CNP    Clinically Significant Risk Factors          # Hypocalcemia: Lowest Ca = 7.9 mg/dL in last 2 days, will monitor and replace as appropriate   # Hypomagnesemia: Lowest Mg = 1.3 mg/dL in last 2 days, will replace as needed              # Overweight: Estimated body mass index is 28.4 kg/m  as calculated from the following:    Height as of this encounter: 1.676 m (5' 6\").    Weight as of this encounter: 79.8 kg (175 lb 14.8 oz)., PRESENT ON ADMISSION     # Financial/Environmental Concerns: none          "         ====================================  INTERVAL HISTORY:   NAEON    OBJECTIVE:   1. VITAL SIGNS:   Temp:  [97.8  F (36.6  C)-98.8  F (37.1  C)] 98  F (36.7  C)  Pulse:  [51-93] 64  Resp:  [8-20] 17  BP: ()/() 108/90  SpO2:  [90 %-99 %] 97 %  Resp: 17    2. INTAKE/ OUTPUT:   I/O last 3 completed shifts:  In: 2207.39 [P.O.:120; I.V.:1587.39; IV Piggyback:500]  Out: 3155 [Urine:2975; Drains:180]    3. PHYSICAL EXAMINATION:    GEN: not in distress  EYES: PERRL, Anicteric sclera.   HEENT:  Normocephalic, atraumatic, trachea midline,  Pupils PERRLA  CV: RRR, no gallops, rubs, or murmurs  PULM/CHEST: Clear breath sounds bilaterally without rhonchi, crackles or wheeze, symmetric chest rise  GI: normal bowel sounds, soft, non-tender, no rebound tenderness or guarding, no masses  : Voids spontaneously  EXTREMITIES: No peripheral edema, moving all extremities, peripheral pulses intact  NEURO: Cranial nerves II-XII grossly intact, BLE numbness present  SKIN: R hip dressing CDI  PSYCH:  Affect: appropriate        4. LABS:   Arterial Blood Gases   No lab results found in last 7 days.  Complete Blood Count   Recent Labs   Lab 05/09/24  0535 05/08/24  1411 05/08/24  0817 05/08/24  0328 05/07/24  2356   WBC 5.6 11.1*  --  11.5* 17.9*   HGB 7.9* 9.0* 9.3* 8.6* 10.1*    267  --  217 275     Basic Metabolic Panel  Recent Labs   Lab 05/09/24  0535 05/09/24  0358 05/09/24  0216 05/08/24  1206 05/08/24  0817 05/08/24  0743 05/08/24  0328 05/08/24  0313 05/07/24  2356     --   --   --  131*  --  132*  --  133*   POTASSIUM 3.8  --   --   --   --   --  4.1  --  3.7   CHLORIDE 103  --   --   --   --   --  99  --  99   CO2 27  --   --   --   --   --  24  --  24   BUN 14.6  --   --   --   --   --  15.2  --  12.9   CR 0.90  --   --   --   --   --  0.82  --  0.75   GLC 95  100* 97 106*   < >  --    < > 128*   < > 114*    < > = values in this interval not displayed.     Liver Function Tests  Recent Labs   Lab  05/08/24  0328   AST 26   ALT 17   ALKPHOS 43   BILITOTAL 0.8   ALBUMIN 3.6     Coagulation Profile  No lab results found in last 7 days.    5. RADIOLOGY:   Recent Results (from the past 24 hour(s))   XR Chest Port 1 View    Narrative    Exam:  Chest X-ray 5/8/2024 10:47 AM    History: Pulmonary edema?    Comparison: CT chest 2/2/24    Findings:   Single portable AP view of the chest. Left chest wall implantable  cardiac defibrillator with leads overlying the right atrium and right  ventricle. Mild cardiomegaly. No focal airspace opacity. Prominent  pulmonary vascularity. No pleural effusion or pneumothorax. Visualized  upper abdomen is unremarkable. No acute osseous abnormality. Old right  clavicle fracture.      Impression    Impression:   1.  No evidence of pulmonary edema or focal pulmonary opacity.   2.  Cardiomegaly.    ERIKA MAKI MD         SYSTEM ID:  [id number]

## 2024-05-09 NOTE — PROGRESS NOTES
General ID [facility] Service: Follow up Note     Patient:  Kwabena Solorio, Date of birth 1950, Medical record number [id number]  Date of Visit:  05/09/2024  Reason for consult: PJI         Assessment and Recommendations:     ID Problem list:  Right hip PJI  4/25/24 fluid aspiration +E.faecalis (amp-susceptible)  S/p 5/7/24 right CHRISS explant with antibiotic spacer placement (Dr. Dudley)  OR cultures from 5/7/24 preliminarily with E.faecalis (susceptibility pending) and additional GPCs in pairs/chains  History of right CHRISS (2007)  History of PMR (on chronic prednisone 5 mg/d)      Discussion:  Given the 4/25 right hip fluid cultures were +E.faecalis (amp-susceptible), would be reasonable at this time to continue on IV ampicillin 2g q4hr while awaiting further tissue culture data. Anticipate on discharge will need to continue on IV ampicillin for likely 6-wk course (or until follows up in ID clinic to further adjust regimen) either at 2g q4hr intermittent dosing or 12g q24hr continuous infusion ampicillin dosing.     Recs:  Continue IV ampicillin 2g q4hr  Anticipate on discharge will need to continue on IV ampicillin for likely 6 wk course either at 2g q4hr intermittent dosing or 12g q24hr continuous infusion    Follow up pending 5/7 OR cultures to further guide antibiotic therapy - prelim with E.faecalis  Surgical management as per ortho        Thank you for allowing us to participate in the care of this patient. [facility] ID will continue to follow. Can see [facility] schedule for paging details if questions.     50 minutes spent by me on the date of the encounter doing chart review, history and exam, documentation and further activities per the note       Zeyad House MD    Infectious Diseases   05/09/2024          Interval events:     Patient reports feeling overall unchanged today - right hip pain well controlled. No fevers/chills. Was able to be weaned off pressors yesterday, but remains in ICU today. No  "GI complaints.        Initial HPI:     As per initial ID consult note from 5/8/24:    \"73M with PMH including right CHRISS (2007), PPM, PMR (on prednisone 5 mg/d), BPH, chronic back pain, who was admitted 5/7 for right CHRISS explantation.    Per patient report, he was first noted to have signs of bone deterioration around his right CHRISS on imaging that he was getting to work-up his chronic lower back pain. He saw ortho for this who decided to aspirate his right hip on 4/25 which had 20k WBCs (89% neutrophils) and was +E.faecalis (amp-susceptible) on fluid culture. Thus he was scheduled for right CHRISS explant and antibiotic spacer which was performed by Dr. Dudley on 5/7; post-op course complicated by hypotension requiring phenylephirine for which he was transferred to [facility] ICU post-operatively.    Patient otherwise notes that he didn't actually have any chronic right hip pain prior to finding out about his PJI this past month, just the chronic back pain radiating down both of his legs. He was actually quite active prior - biking, walking his dogs, etc. No fevers/chills, no night sweats, no GI symptoms prior. No recent antibiotics prior to this hospitalization. No known antibiotic allergies.\"           Review of Systems:   ROS as above.       Past Medical History:     Past Medical History:   Diagnosis Date    BPH (benign prostatic hyperplasia)     Cardiac pacemaker in situ     Complete heart block (H)     Hydronephrosis, right     Lumbar radiculopathy     Lumbar scoliosis     Restless legs syndrome (RLS)      Past Surgical History:   Procedure Laterality Date    APPENDECTOMY      AS TOTAL KNEE ARTHROPLASTY Left 2010    CARPAL TUNNEL RELEASE RT/LT      CATARACT EXTRACTION      COLONOSCOPY      EP ICD      FOOT SURGERY Bilateral     HAND SURGERY      HC TOTAL HIP ARTHROPLASTY Right 2007    HEMORRHOIDECTOMY      HERNIA REPAIR      IRRIGATION AND DEBRIDEMENT HIP, PLACE ANTIBIOTIC CEMENT BEADS / SPACER Right 5/7/2024    " Procedure: vancomycin and tobramycin impregnated cement spacer implantation;  Surgeon: Rd Dudley MD;  Location: [facility] OR    NASAL FRACTURE SURGERY      REMOVE HARDWARE ARTHROPLASTY HIP Right 5/7/2024    Procedure: Explant Right surface replacement Total Hip Arthroplasty ,;  Surgeon: Rd Dudley MD;  Location: [facility] OR    TONSILLECTOMY & ADENOIDECTOMY      urinary tract surgery       Otherwise as per HPI      Allergies:    No Known Allergies         Current Antimicrobials:   IV ampicillin 2g q4h       Family History:   Reviewed and noncontributory       Social History:     Social History     Tobacco Use    Smoking status: Never     Passive exposure: Past    Smokeless tobacco: Never   Substance Use Topics    Alcohol use: Not Currently     Comment: quit 1990's    Drug use: Never     Otherwise as per HPI         Physical Exam:   Ranges forvital signs:  Temp:  [97.8  F (36.6  C)-98.8  F (37.1  C)] 98  F (36.7  C)  Pulse:  [56-93] 64  Resp:  [8-20] 14  BP: ()/(57-99) 124/77  SpO2:  [90 %-99 %] 99 %  GENERAL:  lying in bed in no acute distress.   ENT:  Head is normocephalic, atraumatic.   EYES:  Eyes have anicteric sclerae.   LUNGS:  Unlabored breathing on room air.  ABDOMEN:  Non-distended appearing  MSKEXT/SKIN: +right hip swollen, dressing without any surrounding erythema, +drain with serosanguinous ouptut  NEUROLOGIC:  Awake, alert, interactive.         Laboratory Data:     Inflammatory Markers    Recent Labs   Lab Test 05/08/24  0328   CRPI 7.97*       Hematology Studies    Recent Labs   Lab Test 05/09/24  1333 05/09/24  0535 05/08/24  1411 05/08/24  0817 05/08/24  0328 05/07/24  2356 05/07/24  2209 05/07/24  1246   WBC  --  5.6 11.1*  --  11.5* 17.9* 17.6* 8.7   HGB 7.9* 7.9* 9.0* 9.3* 8.6* 10.1* 10.7* 13.8   MCV  --  92 90  --  91 88 89 87   PLT  --  173 267  --  217 275 305 297       Metabolic Studies     Recent Labs   Lab Test 05/09/24  0535 05/08/24  0817 05/08/24  0328 05/07/24  2356    131*  132* 133*   POTASSIUM 3.8  --  4.1 3.7   CHLORIDE 103  --  99 99   CO2 27  --  24 24   BUN 14.6  --  15.2 12.9   CR 0.90  --  0.82 0.75   GFRESTIMATED 89  --  >90 >90       Hepatic Studies    Recent Labs   Lab Test 24  0328   BILITOTAL 0.8   ALKPHOS 43   ALBUMIN 3.6   AST 26   ALT 17       Microbiology:  Culture   Date Value Ref Range Status   2024 No growth after 1 day  Preliminary   2024 No growth after 1 day  Preliminary   2024 No anaerobic organisms isolated after 1 day  Preliminary   2024 Culture in progress  Preliminary   2024 (A)  Preliminary    Isolated in broth only Gram positive cocci in pairs and chains     Comment:     On day 2 of incubation   2024 No anaerobic organisms isolated after 1 day  Preliminary   2024 Culture in progress  Preliminary   2024 1+ Enterococcus faecalis (A)  Preliminary   2024 No anaerobic organisms isolated after 1 day  Preliminary   2024 No growth after 1 day  Preliminary   2024 No anaerobic organisms isolated after 1 day  Preliminary   2024 Culture in progress  Preliminary   2024 1+ Enterococcus faecalis (A)  Preliminary   2024 No anaerobic organisms isolated after 1 day  Preliminary   2024 Culture in progress  Preliminary   2024 1+ Enterococcus faecalis (A)  Preliminary   2024 No anaerobic organisms isolated after 1 day  Preliminary   2024 1+ Enterococcus faecalis (A)  Preliminary     Comment:     Not isolated or reported on routine aerobic culture   2024 No growth after 1 day  Preliminary   2024 No anaerobic organisms isolated  Final   2024 1+ Enterococcus faecalis (A)  Final   2024 No growth after 13 days  Preliminary            Imagin/7/24 xray pelvis report:  IMPRESSION: Post right femoral acetabular arthroplasty. Surgical drain in position at the right hip. Bones are demineralized. No acute fracture detected. No dislocation.

## 2024-05-09 NOTE — PROGRESS NOTES
"Orthopaedic Surgery Progress Note     Subjective: Remains in ICU, off pressors. Pain controlled. Drain placed back to suction this morning    Objective: /70   Pulse 64   Temp 98  F (36.7  C) (Oral)   Resp (!) 8   Ht 1.676 m (5' 6\")   Wt 79.8 kg (175 lb 14.8 oz)   SpO2 99%   BMI 28.40 kg/m      Drain: 85 overnight    General: NAD, alert and oriented, cooperative with exam.   Cardio: RRR, extremities wwp.   Respiratory: Non-labored breathing.  MSK:   RLE: Toes wwp, DP 2+, bcr in all toes. Compartments soft and tolerates passive stretch of toes. +EHL/FHL/GSC/TA. SILT SP/DP/Sa/Oliveira/T. Dressing c/d/i. Drain with serosanguinous output.    Labs: Hgb 7.9 this morning    Cultures: pending, ngtd    Assessment and Plan:   Kwabena Solorio is a 73 year old male s/p Procedure(s):  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 Dudley.     Activity: Up with assist and assistive devices as needed until independent. Posterior hip precautions to operative hip x 3 months:  1) No hip flexion beyond 90 degrees  2) No adduction beyond midline  3) No internal rotation beyond neutral   Weight bearing status: Toe touch weight bearing  Antibiotics: Vancomycin x 24 hours and Ancef. ID recommendations.  Diet: Begin with clear fluids and progress diet as tolerated. Bowel regimen. Anti-emetics PRN.    DVT prophylaxis: Mechanical while in hospital with Aspirin 162mg daily x 4 weeks, starting morning of POD 1  Elevation: Elevate heels off of bed on pillows   Wound Care: Aquacel x 5-7 days.    Drains: Document output per shift, Ortho will discontinue on in 2 weeks  Pain management: Orals PRN, IV for breakthrough only  X-rays: AP Pelvis and lateral in PACU  Physical Therapy: Mobilization, ROM, ADL's, Posterior hip precautions  Occupational Therapy: ADL's  Labs: Trend Hgb on POD #1, 2  Consults: PT, OT. Hospitalist, appreciate assistance in caring for this patient " throughout the perioperative period            Future Appointments   Date Time Provider Department Center   5/8/2024  8:45 AM Sofi Bernstein, PT [facility] [place]   5/8/2024 10:00 AM Cira Raman OTR [facility] [place]   5/8/2024  1:30 PM Sofi Bernstein, PT [facility] [place]   6/10/2024  1:30 PM Kaz Bowser, [facility]         Disposition: Pending progress with therapies, pain control on orals, and medical stability, anticipate discharge to Home vs TCU on POD #2-3    Pedro Pablo Jay MD  Orthopedic Surgery Resident, PGY-4

## 2024-05-09 NOTE — CONSULTS
"Consult received for Vascular access care for PICC placement.  [facility] unable to pass P[facility] to [facility] yesterday, defer to IR for placement.  Provider notified for IR consult order.    For additional needs place \"Nursing to Consult for Vascular Access\" [id number] order in EPIC.  "

## 2024-05-09 NOTE — CONSULTS
"    Interventional Radiology  [facility] Consult Note  05/09/24   12:55 PM    Consult Requested: central access for antibiotics    Recommendations/Plan:  Patient is on IR schedule 5/10/24 for a single lumen TCVC placement for antibiotics.   Labs WNL for procedure.  Orders entered for procedure.   Medications to be held include: none  Consent will be done prior to procedure.     Please contact the IR charge RN at [phone] for estimated time of procedure.     Case and imaging discussed with IR attending, Dr. Estrella. Recommendations were reviewed with the requesting team.    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 Hypotensive post-operatively requiring admission to ICU.     Expected date of discharge:  TBD    Vitals:   /77 (BP Location: Left arm)   Pulse 64   Temp 98  F (36.7  C) (Oral)   Resp 14   Ht 1.676 m (5' 6\")   Wt 79.8 kg (175 lb 14.8 oz)   SpO2 99%   BMI 28.40 kg/m      Pertinent Labs:   Lab Results   Component Value Date    WBC 5.6 05/09/2024    WBC 11.1 (H) 05/08/2024    WBC 11.5 (H) 05/08/2024     Lab Results   Component Value Date    HGB 7.9 05/09/2024    HGB 9.0 05/08/2024    HGB 9.3 05/08/2024     Lab Results   Component Value Date     05/09/2024     05/08/2024     05/08/2024     No results found for: \"INR\", \"PTT\"  Lab Results   Component Value Date    POTASSIUM 3.8 05/09/2024        COVID-19 Antibody Results, Testing for Immunity           No data to display              COVID-19 PCR Results          1/2/2023    14:51   COVID-19 PCR Results   COVID-19 Virus by PCR (External Result) Negative          Details          This result " is from an external source.               Keith Holland PA-C  Interventional Radiology  Pager: [phone]

## 2024-05-09 NOTE — PLAN OF CARE
10A ICU End of Shift Summary.     Neuro: A&O x4; afebrile; baseline N/T that hasn't worsened per pt   Cardiac: permanent pace maker set to DDD; normotensive and off of   phenylephrine since   Respiratory: LS clear in all fields; no reports of SOB or increased WOB    GI/: pt has difficulty voiding and needs pediatric straight catheter to be used d/t   history of urethral trauma and BPH; pt voided x3 during shift and has not   needed intervention; pt pivoted to bedside commode and had a smear of BM  Diet/appetite: Regular diet, tolerated well   Pain: pt c/o pain throughout night, managed with IV dilaudid   Skin: R hip incision site covered with dressing; R KEYUR site covered with dressing   LDA's: R hand PIV and L forearm PIV infusing   Activities: pivots to bedside commode with gait belt and walker     Drips: NS- 75mL/hr        Plan: continue POC, manage pain, and monitor BP       Goal Outcome Evaluation:    Problem: Adult Inpatient Plan of Care  Goal: Absence of Hospital-Acquired Illness or Injury  Outcome: Progressing  Intervention: Identify and Manage Fall Risk  Recent Flowsheet Documentation  Taken 5/9/2024 0400 by Evan Cortes, RN  Safety Promotion/Fall Prevention:   activity supervised   assistive device/personal items within reach   clutter free environment maintained   lighting adjusted   nonskid shoes/slippers when out of bed   patient and family education   room door open   room near nurse's station   room organization consistent   safety round/check completed  Taken 5/9/2024 0000 by Evan Cortes, RN  Safety Promotion/Fall Prevention:   activity supervised   assistive device/personal items within reach   clutter free environment maintained   lighting adjusted   nonskid shoes/slippers when out of bed   patient and family education   room door open   room near nurse's station   room organization consistent   safety round/check completed  Taken 5/8/2024 2000 by Evan Cortes RN  Safety Promotion/Fall  Prevention:   activity supervised   assistive device/personal items within reach   clutter free environment maintained   lighting adjusted   nonskid shoes/slippers when out of bed   patient and family education   room door open   room near nurse's station   room organization consistent   safety round/check completed  Intervention: Prevent Skin Injury  Recent Flowsheet Documentation  Taken 5/9/2024 0600 by Evan Cortes RN  Body Position: weight shifting  Taken 5/9/2024 0400 by Evan Cortes RN  Body Position: weight shifting  Taken 5/9/2024 0200 by Evan Cortes RN  Body Position:   weight shifting   supine  Taken 5/9/2024 0000 by Evan Cortes RN  Body Position: weight shifting  Taken 5/8/2024 2236 by Evan Cortes RN  Body Position: sitting up in bed  Taken 5/8/2024 2000 by Evan Cortes RN  Body Position:   sitting up in bed   weight shifting  Intervention: Prevent and Manage VTE (Venous Thromboembolism) Risk  Recent Flowsheet Documentation  Taken 5/9/2024 0400 by Evan Cortes RN  VTE Prevention/Management: SCDs (sequential compression devices) on  Taken 5/9/2024 0000 by Evan Cortes RN  VTE Prevention/Management: SCDs (sequential compression devices) on  Taken 5/8/2024 2000 by Evan Cortes RN  VTE Prevention/Management: SCDs (sequential compression devices) on  Intervention: Prevent Infection  Recent Flowsheet Documentation  Taken 5/9/2024 0400 by Evan Cortes RN  Infection Prevention:   cohorting utilized   equipment surfaces disinfected   hand hygiene promoted   personal protective equipment utilized   rest/sleep promoted   single patient room provided   visitors restricted/screened  Taken 5/9/2024 0000 by Evan Cortes RN  Infection Prevention:   cohorting utilized   equipment surfaces disinfected   hand hygiene promoted   personal protective equipment utilized   rest/sleep promoted   single patient room provided   visitors restricted/screened  Taken 5/8/2024 2000 by Evan Cortes  RN  Infection Prevention:   cohorting utilized   equipment surfaces disinfected   hand hygiene promoted   personal protective equipment utilized   rest/sleep promoted   single patient room provided   visitors restricted/screened  Goal: Optimal Comfort and Wellbeing  Outcome: Progressing  Intervention: Monitor Pain and Promote Comfort  Recent Flowsheet Documentation  Taken 5/9/2024 0605 by Evan Cortes RN  Pain Management Interventions:   medication (see MAR)   quiet environment facilitated   relaxation techniques promoted   repositioned   rest  Taken 5/9/2024 0400 by Evan Cortes RN  Pain Management Interventions:   MD notified (comment)   distraction   emotional support   repositioned  Taken 5/9/2024 0210 by Evan Cortes RN  Pain Management Interventions:   medication (see MAR)   care clustered   distraction   emotional support   premedicated for activity   relaxation techniques promoted   repositioned  Taken 5/9/2024 0000 by Evan Cortes RN  Pain Management Interventions:   MD notified (comment)   distraction   emotional support   repositioned  Taken 5/8/2024 2134 by Evan Cortes RN  Pain Management Interventions:   medication (see MAR)   care clustered   distraction   emotional support   premedicated for activity   relaxation techniques promoted   repositioned  Taken 5/8/2024 2000 by Evan Cortes RN  Pain Management Interventions:   MD notified (comment)   distraction   emotional support   repositioned  Intervention: Provide Person-Centered Care  Recent Flowsheet Documentation  Taken 5/9/2024 0400 by Evan Cortes RN  Trust Relationship/Rapport:   emotional support provided   empathic listening provided   questions answered   questions encouraged   reassurance provided   thoughts/feelings acknowledged   care explained   choices provided  Taken 5/9/2024 0000 by Evan Cortes RN  Trust Relationship/Rapport:   emotional support provided   empathic listening provided   questions answered   questions  encouraged   reassurance provided   thoughts/feelings acknowledged   care explained   choices provided  Taken 5/8/2024 2000 by Evan Cortes, RN  Trust Relationship/Rapport:   emotional support provided   empathic listening provided   questions answered   questions encouraged   reassurance provided   thoughts/feelings acknowledged   care explained   choices provided

## 2024-05-10 ENCOUNTER — APPOINTMENT (OUTPATIENT)
Dept: INTERVENTIONAL RADIOLOGY/VASCULAR | Facility: CLINIC | Age: 74
DRG: 466 | End: 2024-05-10
Attending: PHYSICIAN ASSISTANT
Payer: COMMERCIAL

## 2024-05-10 ENCOUNTER — APPOINTMENT (OUTPATIENT)
Dept: PHYSICAL THERAPY | Facility: CLINIC | Age: 74
DRG: 466 | End: 2024-05-10
Attending: ORTHOPAEDIC SURGERY
Payer: COMMERCIAL

## 2024-05-10 ENCOUNTER — APPOINTMENT (OUTPATIENT)
Dept: OCCUPATIONAL THERAPY | Facility: CLINIC | Age: 74
DRG: 466 | End: 2024-05-10
Attending: ORTHOPAEDIC SURGERY
Payer: COMMERCIAL

## 2024-05-10 LAB
ANION GAP SERPL CALCULATED.3IONS-SCNC: 5 MMOL/L (ref 7–15)
BACTERIA TISS BX CULT: ABNORMAL
BACTERIA TISS BX CULT: ABNORMAL
BASOPHILS # BLD AUTO: 0 10E3/UL (ref 0–0.2)
BASOPHILS NFR BLD AUTO: 0 %
BUN SERPL-MCNC: 11.9 MG/DL (ref 8–23)
CALCIUM SERPL-MCNC: 7.9 MG/DL (ref 8.8–10.2)
CHLORIDE SERPL-SCNC: 104 MMOL/L (ref 98–107)
CREAT SERPL-MCNC: 0.82 MG/DL (ref 0.67–1.17)
DEPRECATED HCO3 PLAS-SCNC: 28 MMOL/L (ref 22–29)
EGFRCR SERPLBLD CKD-EPI 2021: >90 ML/MIN/1.73M2
EOSINOPHIL # BLD AUTO: 0.1 10E3/UL (ref 0–0.7)
EOSINOPHIL NFR BLD AUTO: 2 %
ERYTHROCYTE [DISTWIDTH] IN BLOOD BY AUTOMATED COUNT: 12.5 % (ref 10–15)
GLUCOSE SERPL-MCNC: 96 MG/DL (ref 70–99)
HCT VFR BLD AUTO: 22.6 % (ref 40–53)
HGB BLD-MCNC: 7.6 G/DL (ref 13.3–17.7)
IMM GRANULOCYTES # BLD: 0 10E3/UL
IMM GRANULOCYTES NFR BLD: 0 %
LYMPHOCYTES # BLD AUTO: 1.2 10E3/UL (ref 0.8–5.3)
LYMPHOCYTES NFR BLD AUTO: 24 %
MAGNESIUM SERPL-MCNC: 1.8 MG/DL (ref 1.7–2.3)
MCH RBC QN AUTO: 31.3 PG (ref 26.5–33)
MCHC RBC AUTO-ENTMCNC: 33.6 G/DL (ref 31.5–36.5)
MCV RBC AUTO: 93 FL (ref 78–100)
MONOCYTES # BLD AUTO: 0.4 10E3/UL (ref 0–1.3)
MONOCYTES NFR BLD AUTO: 8 %
NEUTROPHILS # BLD AUTO: 3.2 10E3/UL (ref 1.6–8.3)
NEUTROPHILS NFR BLD AUTO: 66 %
NRBC # BLD AUTO: 0 10E3/UL
NRBC BLD AUTO-RTO: 0 /100
PATH REPORT.COMMENTS IMP SPEC: NORMAL
PATH REPORT.COMMENTS IMP SPEC: NORMAL
PATH REPORT.FINAL DX SPEC: NORMAL
PATH REPORT.GROSS SPEC: NORMAL
PATH REPORT.MICROSCOPIC SPEC OTHER STN: NORMAL
PATH REPORT.RELEVANT HX SPEC: NORMAL
PHOSPHATE SERPL-MCNC: 3.2 MG/DL (ref 2.5–4.5)
PHOTO IMAGE: NORMAL
PLATELET # BLD AUTO: 174 10E3/UL (ref 150–450)
POTASSIUM SERPL-SCNC: 3.9 MMOL/L (ref 3.4–5.3)
RBC # BLD AUTO: 2.43 10E6/UL (ref 4.4–5.9)
SODIUM SERPL-SCNC: 137 MMOL/L (ref 135–145)
WBC # BLD AUTO: 5 10E3/UL (ref 4–11)

## 2024-05-10 PROCEDURE — 99152 MOD SED SAME PHYS/QHP 5/>YRS: CPT

## 2024-05-10 PROCEDURE — 0JH63XZ INSERTION OF TUNNELED VASCULAR ACCESS DEVICE INTO CHEST SUBCUTANEOUS TISSUE AND FASCIA, PERCUTANEOUS APPROACH: ICD-10-PCS | Performed by: RADIOLOGY

## 2024-05-10 PROCEDURE — 250N000013 HC RX MED GY IP 250 OP 250 PS 637: Performed by: INTERNAL MEDICINE

## 2024-05-10 PROCEDURE — 85025 COMPLETE CBC W/AUTO DIFF WBC: CPT

## 2024-05-10 PROCEDURE — 84100 ASSAY OF PHOSPHORUS: CPT

## 2024-05-10 PROCEDURE — 80048 BASIC METABOLIC PNL TOTAL CA: CPT

## 2024-05-10 PROCEDURE — 120N000002 HC R&B MED SURG/OB UMMC

## 2024-05-10 PROCEDURE — 250N000011 HC RX IP 250 OP 636: Performed by: RADIOLOGY

## 2024-05-10 PROCEDURE — 36558 INSERT TUNNELED CV CATH: CPT | Mod: GC | Performed by: RADIOLOGY

## 2024-05-10 PROCEDURE — 250N000013 HC RX MED GY IP 250 OP 250 PS 637: Performed by: STUDENT IN AN ORGANIZED HEALTH CARE EDUCATION/TRAINING PROGRAM

## 2024-05-10 PROCEDURE — 250N000012 HC RX MED GY IP 250 OP 636 PS 637: Performed by: STUDENT IN AN ORGANIZED HEALTH CARE EDUCATION/TRAINING PROGRAM

## 2024-05-10 PROCEDURE — 99233 SBSQ HOSP IP/OBS HIGH 50: CPT | Performed by: INTERNAL MEDICINE

## 2024-05-10 PROCEDURE — 250N000013 HC RX MED GY IP 250 OP 250 PS 637

## 2024-05-10 PROCEDURE — 77001 FLUOROGUIDE FOR VEIN DEVICE: CPT | Mod: 26 | Performed by: RADIOLOGY

## 2024-05-10 PROCEDURE — 250N000009 HC RX 250: Performed by: RADIOLOGY

## 2024-05-10 PROCEDURE — 97535 SELF CARE MNGMENT TRAINING: CPT | Mod: GO

## 2024-05-10 PROCEDURE — 02H633Z INSERTION OF INFUSION DEVICE INTO RIGHT ATRIUM, PERCUTANEOUS APPROACH: ICD-10-PCS | Performed by: RADIOLOGY

## 2024-05-10 PROCEDURE — 97530 THERAPEUTIC ACTIVITIES: CPT | Mod: GP

## 2024-05-10 PROCEDURE — 272N000504 HC NEEDLE CR4

## 2024-05-10 PROCEDURE — 83735 ASSAY OF MAGNESIUM: CPT

## 2024-05-10 PROCEDURE — 99232 SBSQ HOSP IP/OBS MODERATE 35: CPT | Performed by: INTERNAL MEDICINE

## 2024-05-10 PROCEDURE — 99152 MOD SED SAME PHYS/QHP 5/>YRS: CPT | Mod: GC | Performed by: RADIOLOGY

## 2024-05-10 PROCEDURE — 250N000011 HC RX IP 250 OP 636

## 2024-05-10 PROCEDURE — 36415 COLL VENOUS BLD VENIPUNCTURE: CPT

## 2024-05-10 PROCEDURE — C1751 CATH, INF, PER/CENT/MIDLINE: HCPCS

## 2024-05-10 PROCEDURE — 76937 US GUIDE VASCULAR ACCESS: CPT | Mod: 26 | Performed by: RADIOLOGY

## 2024-05-10 PROCEDURE — 97116 GAIT TRAINING THERAPY: CPT | Mod: GP

## 2024-05-10 PROCEDURE — 76937 US GUIDE VASCULAR ACCESS: CPT

## 2024-05-10 PROCEDURE — C1769 GUIDE WIRE: HCPCS

## 2024-05-10 RX ORDER — NALOXONE HYDROCHLORIDE 0.4 MG/ML
0.4 INJECTION, SOLUTION INTRAMUSCULAR; INTRAVENOUS; SUBCUTANEOUS
Status: DISCONTINUED | OUTPATIENT
Start: 2024-05-10 | End: 2024-05-10

## 2024-05-10 RX ORDER — HEPARIN SODIUM,PORCINE 10 UNIT/ML
5-20 VIAL (ML) INTRAVENOUS EVERY 24 HOURS
Status: DISCONTINUED | OUTPATIENT
Start: 2024-05-10 | End: 2024-05-13 | Stop reason: HOSPADM

## 2024-05-10 RX ORDER — BISACODYL 5 MG
5 TABLET, DELAYED RELEASE (ENTERIC COATED) ORAL DAILY
Status: DISCONTINUED | OUTPATIENT
Start: 2024-05-10 | End: 2024-05-13 | Stop reason: HOSPADM

## 2024-05-10 RX ORDER — FLUMAZENIL 0.1 MG/ML
0.2 INJECTION, SOLUTION INTRAVENOUS
Status: DISCONTINUED | OUTPATIENT
Start: 2024-05-10 | End: 2024-05-10

## 2024-05-10 RX ORDER — METHOCARBAMOL 500 MG/1
500 TABLET, FILM COATED ORAL 4 TIMES DAILY PRN
Status: DISCONTINUED | OUTPATIENT
Start: 2024-05-10 | End: 2024-05-13 | Stop reason: HOSPADM

## 2024-05-10 RX ORDER — HEPARIN SODIUM,PORCINE 10 UNIT/ML
5-20 VIAL (ML) INTRAVENOUS
Status: DISCONTINUED | OUTPATIENT
Start: 2024-05-10 | End: 2024-05-13 | Stop reason: HOSPADM

## 2024-05-10 RX ORDER — POLYETHYLENE GLYCOL 3350 17 G/17G
17 POWDER, FOR SOLUTION ORAL 3 TIMES DAILY
Status: DISCONTINUED | OUTPATIENT
Start: 2024-05-10 | End: 2024-05-13 | Stop reason: HOSPADM

## 2024-05-10 RX ORDER — HEPARIN SODIUM,PORCINE 10 UNIT/ML
5 VIAL (ML) INTRAVENOUS
Status: DISCONTINUED | OUTPATIENT
Start: 2024-05-10 | End: 2024-05-10

## 2024-05-10 RX ORDER — NALOXONE HYDROCHLORIDE 0.4 MG/ML
0.2 INJECTION, SOLUTION INTRAMUSCULAR; INTRAVENOUS; SUBCUTANEOUS
Status: DISCONTINUED | OUTPATIENT
Start: 2024-05-10 | End: 2024-05-10

## 2024-05-10 RX ORDER — FENTANYL CITRATE 50 UG/ML
25-50 INJECTION, SOLUTION INTRAMUSCULAR; INTRAVENOUS EVERY 5 MIN PRN
Status: DISCONTINUED | OUTPATIENT
Start: 2024-05-10 | End: 2024-05-10

## 2024-05-10 RX ORDER — AMOXICILLIN 250 MG
3 CAPSULE ORAL 2 TIMES DAILY
Status: DISCONTINUED | OUTPATIENT
Start: 2024-05-10 | End: 2024-05-13 | Stop reason: HOSPADM

## 2024-05-10 RX ORDER — MAGNESIUM OXIDE 400 MG/1
400 TABLET ORAL EVERY 4 HOURS
Status: COMPLETED | OUTPATIENT
Start: 2024-05-10 | End: 2024-05-10

## 2024-05-10 RX ADMIN — LIDOCAINE HYDROCHLORIDE 10 ML: 10 INJECTION, SOLUTION EPIDURAL; INFILTRATION; INTRACAUDAL; PERINEURAL at 09:16

## 2024-05-10 RX ADMIN — ASPIRIN 162 MG: 81 TABLET, COATED ORAL at 08:13

## 2024-05-10 RX ADMIN — ACETAMINOPHEN 975 MG: 325 TABLET, FILM COATED ORAL at 16:34

## 2024-05-10 RX ADMIN — METHOCARBAMOL 500 MG: 500 TABLET ORAL at 01:42

## 2024-05-10 RX ADMIN — SENNOSIDES AND DOCUSATE SODIUM 3 TABLET: 50; 8.6 TABLET ORAL at 20:18

## 2024-05-10 RX ADMIN — AMPICILLIN SODIUM 2 G: 2 INJECTION, POWDER, FOR SOLUTION INTRAMUSCULAR; INTRAVENOUS at 23:59

## 2024-05-10 RX ADMIN — POLYETHYLENE GLYCOL 3350 17 G: 17 POWDER, FOR SOLUTION ORAL at 10:14

## 2024-05-10 RX ADMIN — METHOCARBAMOL 500 MG: 500 TABLET ORAL at 23:56

## 2024-05-10 RX ADMIN — HEPARIN, PORCINE (PF) 10 UNIT/ML INTRAVENOUS SYRINGE 5 ML: at 09:21

## 2024-05-10 RX ADMIN — AMPICILLIN SODIUM 2 G: 2 INJECTION, POWDER, FOR SOLUTION INTRAMUSCULAR; INTRAVENOUS at 12:37

## 2024-05-10 RX ADMIN — CALCIUM 500 MG: 500 TABLET ORAL at 08:13

## 2024-05-10 RX ADMIN — PRAMIPEXOLE DIHYDROCHLORIDE 0.25 MG: 0.12 TABLET ORAL at 00:41

## 2024-05-10 RX ADMIN — POLYETHYLENE GLYCOL 3350 17 G: 17 POWDER, FOR SOLUTION ORAL at 20:12

## 2024-05-10 RX ADMIN — FENTANYL CITRATE 25 MCG: 50 INJECTION INTRAMUSCULAR; INTRAVENOUS at 09:15

## 2024-05-10 RX ADMIN — MAGNESIUM OXIDE TAB 400 MG (241.3 MG ELEMENTAL MG) 400 MG: 400 (241.3 MG) TAB at 14:47

## 2024-05-10 RX ADMIN — MAGNESIUM OXIDE TAB 400 MG (241.3 MG ELEMENTAL MG) 400 MG: 400 (241.3 MG) TAB at 10:14

## 2024-05-10 RX ADMIN — POLYETHYLENE GLYCOL 3350 17 G: 17 POWDER, FOR SOLUTION ORAL at 14:48

## 2024-05-10 RX ADMIN — MAGNESIUM OXIDE TAB 400 MG (241.3 MG ELEMENTAL MG) 400 MG: 400 (241.3 MG) TAB at 08:13

## 2024-05-10 RX ADMIN — LORAZEPAM 0.5 MG: 0.5 TABLET ORAL at 01:30

## 2024-05-10 RX ADMIN — AMPICILLIN SODIUM 2 G: 2 INJECTION, POWDER, FOR SOLUTION INTRAMUSCULAR; INTRAVENOUS at 08:10

## 2024-05-10 RX ADMIN — FENTANYL CITRATE 50 MCG: 50 INJECTION INTRAMUSCULAR; INTRAVENOUS at 09:11

## 2024-05-10 RX ADMIN — PRAMIPEXOLE DIHYDROCHLORIDE 0.25 MG: 0.12 TABLET ORAL at 22:18

## 2024-05-10 RX ADMIN — Medication 1000 UNITS: at 12:37

## 2024-05-10 RX ADMIN — ACETAMINOPHEN 975 MG: 325 TABLET, FILM COATED ORAL at 10:13

## 2024-05-10 RX ADMIN — OXYCODONE HYDROCHLORIDE AND ACETAMINOPHEN 500 MG: 500 TABLET ORAL at 08:13

## 2024-05-10 RX ADMIN — SENNOSIDES AND DOCUSATE SODIUM 2 TABLET: 8.6; 5 TABLET ORAL at 08:13

## 2024-05-10 RX ADMIN — GABAPENTIN 900 MG: 300 CAPSULE ORAL at 22:18

## 2024-05-10 RX ADMIN — PREDNISONE 5 MG: 5 TABLET ORAL at 12:37

## 2024-05-10 RX ADMIN — ACETAMINOPHEN 975 MG: 325 TABLET, FILM COATED ORAL at 00:40

## 2024-05-10 RX ADMIN — AMPICILLIN SODIUM 2 G: 2 INJECTION, POWDER, FOR SOLUTION INTRAMUSCULAR; INTRAVENOUS at 16:34

## 2024-05-10 RX ADMIN — AMPICILLIN SODIUM 2 G: 2 INJECTION, POWDER, FOR SOLUTION INTRAMUSCULAR; INTRAVENOUS at 00:42

## 2024-05-10 RX ADMIN — ZOLPIDEM TARTRATE 5 MG: 5 TABLET, COATED ORAL at 23:57

## 2024-05-10 RX ADMIN — OXYCODONE HYDROCHLORIDE 5 MG: 5 TABLET ORAL at 22:18

## 2024-05-10 RX ADMIN — AMPICILLIN SODIUM 2 G: 2 INJECTION, POWDER, FOR SOLUTION INTRAMUSCULAR; INTRAVENOUS at 20:18

## 2024-05-10 RX ADMIN — MIDAZOLAM 1 MG: 1 INJECTION INTRAMUSCULAR; INTRAVENOUS at 09:11

## 2024-05-10 RX ADMIN — PANTOPRAZOLE SODIUM 40 MG: 40 TABLET, DELAYED RELEASE ORAL at 08:13

## 2024-05-10 RX ADMIN — MIDAZOLAM 0.5 MG: 1 INJECTION INTRAMUSCULAR; INTRAVENOUS at 09:16

## 2024-05-10 RX ADMIN — AMPICILLIN SODIUM 2 G: 2 INJECTION, POWDER, FOR SOLUTION INTRAMUSCULAR; INTRAVENOUS at 04:16

## 2024-05-10 RX ADMIN — OXYCODONE HYDROCHLORIDE 10 MG: 5 TABLET ORAL at 01:33

## 2024-05-10 ASSESSMENT — ACTIVITIES OF DAILY LIVING (ADL)
ADLS_ACUITY_SCORE: 30
ADLS_ACUITY_SCORE: 30
ADLS_ACUITY_SCORE: 31
ADLS_ACUITY_SCORE: 31
ADLS_ACUITY_SCORE: 30
ADLS_ACUITY_SCORE: 31
ADLS_ACUITY_SCORE: 30
ADLS_ACUITY_SCORE: 31
ADLS_ACUITY_SCORE: 30
ADLS_ACUITY_SCORE: 31
ADLS_ACUITY_SCORE: 30
ADLS_ACUITY_SCORE: 31
ADLS_ACUITY_SCORE: 30

## 2024-05-10 NOTE — PLAN OF CARE
Goal Outcome Evaluation:      Plan of Care Reviewed With: patient    Overall Patient Progress: improvingOverall Patient Progress: improving    Outcome Evaluation: pt is A&O x4, able to make needs known. R hip surgical dressing to bulb suction putting out moderate amount of serosanguinous drainage, 90mL emptied this shift. pt will be NPO at midnight for IR procedure tomorrow. TTWB RLE, up with assist of 1, walker GB. pt has PRN ativan at bedtime if ambien (given) is inneffective. pt has transthoracic pacemeker at baseline. plan to dc to TCU on Saturday. continue plan of care      VS: Temp: 99  F (37.2  C) Temp src: Oral BP: (!) 145/64 Pulse: 71   Resp: 18 SpO2: 95 % O2 Device: None (Room air)       O2: SpO2>90% on room air, denies SOB and CP   Output: Voiding without difficulty in bedside urinal   Last BM: 05/07/2024   Activity: Up with assist of 1, walker, GB   Skin: R hip surgical incision   Pain: Managed with prn oxy Q4hrs   CMS: A&O x4, baseline N/T to BLE   Dressing: R hip surgical dressing  to bulb suction, dressing has some scant leakage, tegaderm applied to reinforce   Diet: Regular, NPO at midnight   LDA: L PIV SL   Equipment: IV pole, walker, GB, personal bolongings   Plan: IR procedure tomorrow, discharge to TCU on Saturday 5/11   Additional Info:

## 2024-05-10 NOTE — ANESTHESIA POSTPROCEDURE EVALUATION
Patient: Kwabena Solorio    Procedure: Procedure(s):  Explant Right surface replacement Total Hip Arthroplasty ,  vancomycin and tobramycin impregnated cement spacer implantation       Anesthesia Type:  General    Note:  Disposition: Admission   Postop Pain Control: Uneventful            Sign Out: Well controlled pain   PONV: No   Neuro/Psych: Uneventful            Sign Out: Acceptable/Baseline neuro status   Airway/Respiratory: Uneventful            Sign Out: Acceptable/Baseline resp. status   CV/Hemodynamics:             Events: Refractory hypOtension            Sign Out: Acceptable CV status   Other NRE:    DID A NON-ROUTINE EVENT OCCUR? YES           Last vitals:  Vitals Value Taken Time   /65 05/08/24 0045   Temp 36.8  C (98.2  F) 05/08/24 0000   Pulse 74 05/08/24 0045   Resp 10 05/08/24 0040   SpO2 98 % 05/08/24 0046   Vitals shown include unfiled device data.    Electronically Signed By: Janee Ho MD  May 10, 2024  2:52 PM

## 2024-05-10 NOTE — PLAN OF CARE
Pt called for cramping in leg, paged ortho for a muscle relaxer. Ortho placed PRN order for robaxin; bedside RN informed.

## 2024-05-10 NOTE — PRE-PROCEDURE
GENERAL PRE-PROCEDURE:   Procedure:  Tunnelled CVC placement  Date/Time:  5/10/2024 8:48 AM    Verbal consent obtained?: Yes    Written consent obtained?: Yes    Risks and benefits: Risks, benefits and alternatives were discussed    Consent given by:  Patient  Patient states understanding of procedure being performed: Yes    Patient's understanding of procedure matches consent: Yes    Procedure consent matches procedure scheduled: Yes    Expected level of sedation:  Moderate  Appropriately NPO:  Yes  Mallampati  :  Grade 3- soft palate visible, posterior pharyngeal wall not visible  Lungs:  Lungs clear with good breath sounds bilaterally  Heart:  Normal heart sounds and rate  History & Physical reviewed:  History and physical reviewed and no updates needed  Statement of review:  I have reviewed the lab findings, diagnostic data, medications, and the plan for sedation

## 2024-05-10 NOTE — PROGRESS NOTES
Page regarding this patient's home medication Ambien and as needed Ativan for sleep.  Reviewed patient's home medications this was not started.  Patient anxious and interested in resuming his home meds.  I will place order to resume sleep aid, Ativan for anxiety.  Patient also inquiring about IV fluids while n.p.o. gentle IV fluids overnight during period of n.p.o.

## 2024-05-10 NOTE — PLAN OF CARE
VS: VSS, pt denied CP or SOB.   O2: Room air sat. > 90%.   Output: Voiding using urinal..    Last BM: 05/10/24 passing gas.    Activity: Up on chair most of the evening and walked with PT.    Skin: Intact except surgical incision.    Pain: Comfortably manageable with PO pain medication.    Neuro: CMS and neuro intact.   Dressing: CDI.    Diet: Regular tolerating okay.    LDA: PIV TKO between antibiotic's. CVC hep locked.    Equipment: IV pole, walker and personal belongings.    Plan: TBD.    Additional Info:

## 2024-05-10 NOTE — PROGRESS NOTES
[facility] - Internal Medicine Daily Note   Date of Service: 5/10/2024  Patient: Kwabena Solorio  MRN: [id number]  Admission Date: 5/7/2024  Hospital Day # 3    Assessment & Plan    74 yo male s/p explant of right total hip arthroplasty with antibiotic spacer implantation.   PMH of PPM  Required initiation of vasopressor medications for postoperative hypotension.  Infectious disease consulted and patient currently getting vancomycin.  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  He complains about severe pain and spasms in the right leg as well as the back which was worse at night.he was given Ativan 0.5 mg orally yesterday evening to help him sleep.  He was also started on normal saline at 50 cc/h for hydration.  It appears that he was not asking pain medications regularly. He denies any dizziness, lightheadedness when he got up to use the bathroom.  Walked with physical therapy yesterday in the afternoon and just states he had a lot of pain.  Vital signs stable.  He has not had a bowel movement since Monday.  Discussed about titrating the doses of the bowel regimen.    Problems:     Right total hip prosthetic joint infection s/p explant of right total hip arthroplasty with antibiotic spacer implantation.  Postoperative hypotension requiring vasopressor initiation.  Weaned off vasopressor and currently hemodynamically stable on IV fluids.  History of complete heart block status post permanent pacemaker placement.  Being monitored on telemetry.  Acute postoperative pain.  History of degenerative joint disease/scoliosis, chronic back pain.  Currently on Dilaudid IV as needed, oxycodone 5 to  10 mg every 4 hours as needed and Tylenol 975 mg every 8 hours for 3 days.  Polymyalgia rheumatica on prednisone 5 mg which is resumed.     Restless leg syndrome  Insomnia.  Continue gabapentin 100 mg daily at bedtime, PTA Mirapex at bedtime continued     Undifferentiated shock suspected to be due to hypovolemia versus vasoplegia.  Weaned off phenylephrine infusion  Troponin level elevated but suspected to be due to demand ischemia secondary to hypovolemia  Nausea without vomiting, improved  Gastroesophageal reflux disease on PPI daily.Zofran as needed.     Right hydronephrosis secondary to an obstructed collecting system   benign prostatic hyperplasia  Hyponatremia secondary to iatrogenic, lactated Ringer's IV fluid resuscitation  Stress-induced hyperglycemia  Right hip prosthetic joint infection status post explantation  History of Enterococcus prosthetic joint infection found on 4/25/2024.  Leukocytosis.  ID consulted.  On vancomycin and cefazolin.  Continue to monitor fever curve and WBC count  Acute blood loss anemia.  Transfuse if hemoglobin below 7.0.  Hemovac drain removed.  Hemoglobin today 7.6.  Weakness and deconditioning of critical illness  Constipation.  Titrated dose of laxatives for daily bowel movement goal of 1-2 bowel movements.  S/p right internal jugular CVC placement on 5/10 under moderate sedation   Consulting Services: Ortho    CODE: full code  DVT: SCDs   Diet/fluids: NS   Disposition: TBD      Enzo Bianchi MD  Internal Medicine Staff Hospitalist   [facility]   Pager: [phone]    Team: Sissy Velazquez 20  Page Cross Cover after 5 pm: pager [phone]   ___________________________________________________________________    Subjective & Interval Hx:    Awake alert oriented x 3.  Pain control issues noted.  Denies any dizziness or lightheadedness.  No shortness of breath.  No nausea.  No bowel movement since Monday.    Last 24 hr care team notes reviewed.   ROS:  4 point  ROS including Respiratory, CV, GI and , other than that noted in the HPI, is negative.    Medications: Reviewed in EPIC. List below for reference    Current Facility-Administered Medications:     acetaminophen (TYLENOL) tablet 650 mg, 650 mg, Oral, Q4H PRN, Osvaldo Chan MD    acetaminophen (TYLENOL) tablet 975 mg, 975 mg, Oral, Q8H, Osvaldo Chan MD, 975 mg at 05/10/24 0040    ampicillin (OMNIPEN) 2 g vial to attach to  mL bag, 2 g, Intravenous, Q4H, Suma Gan APRN CNP, Last Rate: 200 mL/hr at 05/10/24 0416, 2 g at 05/10/24 0810    aspirin EC tablet 162 mg, 162 mg, Oral, Daily, Boston Anguiano APRN CNP, 162 mg at 05/10/24 0813    benzocaine-menthol (CHLORASEPTIC) 6-10 MG lozenge 1 lozenge, 1 lozenge, Buccal, Q1H PRN, Osvaldo Chan MD    bisacodyl (DULCOLAX) suppository 10 mg, 10 mg, Rectal, Daily PRN, Osvaldo Chan MD    calcium carbonate 500 mg (elemental) (OSCAL) tablet 500 mg, 500 mg, Oral, QAM, 500 mg at 05/10/24 0813 **AND** magnesium oxide (MAG-OX) tablet 400 mg, 400 mg, Oral, Daily, Boston Anguiano APRN CNP, 400 mg at 05/10/24 0813    glucose gel 15-30 g, 15-30 g, Oral, Q15 Min PRN **OR** dextrose 50 % injection 25-50 mL, 25-50 mL, Intravenous, Q15 Min PRN **OR** glucagon injection 1 mg, 1 mg, Subcutaneous, Q15 Min PRN, Suma Gan APRN CNP    [Held by provider] ferrous sulfate (FEROSUL) tablet 325 mg, 325 mg, Oral, Daily with breakfast, Osvaldo Chan MD, 325 mg at 05/08/24 0829    gabapentin (NEURONTIN) capsule 900 mg, 900 mg, Oral, At Bedtime, Osvaldo Chan MD, 900 mg at 05/09/24 2243    HYDROmorphone (PF) (DILAUDID) injection 0.2 mg, 0.2 mg, Intravenous, Q2H PRN, 0.2 mg at 05/09/24 0155 **OR** HYDROmorphone (PF) (DILAUDID) injection 0.4 mg, 0.4 mg, Intravenous, Q2H PRN, Osvaldo Chan MD, 0.4 mg at 05/08/24 0339    lidocaine (LMX4) cream, , Topical, Q1H PRN, Osvaldo Chan MD    lidocaine (XYLOCAINE) 2 % external gel, , Urethral, Q4H PRN,  Efren Buchanan, NEVIN    lidocaine 1 % 0.1-1 mL, 0.1-1 mL, Other, Q1H PRN, Osvaldo Chan MD    LORazepam (ATIVAN) tablet 0.5 mg, 0.5 mg, Oral, At Bedtime PRN, Jacinta Barth MD, 0.5 mg at 05/10/24 0130    magnesium hydroxide (MILK OF MAGNESIA) suspension 30 mL, 30 mL, Oral, Daily PRN, Osvaldo Chan MD    magnesium oxide (MAG-OX) tablet 400 mg, 400 mg, Oral, Q4H, Enzo Bianchi MD    methocarbamol (ROBAXIN) tablet 500 mg, 500 mg, Oral, 4x Daily PRN, Only, MD Chad, 500 mg at 05/10/24 0142    naloxone (NARCAN) injection 0.2 mg, 0.2 mg, Intravenous, Q2 Min PRN **OR** naloxone (NARCAN) injection 0.4 mg, 0.4 mg, Intravenous, Q2 Min PRN **OR** naloxone (NARCAN) injection 0.2 mg, 0.2 mg, Intramuscular, Q2 Min PRN **OR** naloxone (NARCAN) injection 0.4 mg, 0.4 mg, Intramuscular, Q2 Min PRN, Osvaldo Chan MD    ondansetron (ZOFRAN ODT) ODT tab 4 mg, 4 mg, Oral, Q6H PRN **OR** ondansetron (ZOFRAN) injection 4 mg, 4 mg, Intravenous, Q6H PRN, Osvaldo Chan MD, 4 mg at 05/08/24 0319    oxyCODONE (ROXICODONE) tablet 5 mg, 5 mg, Oral, Q4H PRN, 5 mg at 05/09/24 1616 **OR** oxyCODONE (ROXICODONE) tablet 10 mg, 10 mg, Oral, Q4H PRN, Osvaldo Chan MD, 10 mg at 05/10/24 0133    pantoprazole (PROTONIX) EC tablet 40 mg, 40 mg, Oral, QAM AC, Osvaldo Chan MD, 40 mg at 05/10/24 0813    polyethylene glycol (MIRALAX) Packet 17 g, 17 g, Oral, Daily, Osvaldo Chan MD, 17 g at 05/09/24 0836    pramipexole (MIRAPEX) tablet 0.125-0.25 mg, 0.125-0.25 mg, Oral, At Bedtime, Osvaldo Chan MD, 0.25 mg at 05/10/24 0041    predniSONE (DELTASONE) tablet 5 mg, 5 mg, Oral, Daily with lunch, Osvaldo Chan MD, 5 mg at 05/09/24 1148    prochlorperazine (COMPAZINE) injection 5 mg, 5 mg, Intravenous, Q6H PRN, 5 mg at 05/08/24 0411 **OR** prochlorperazine (COMPAZINE) tablet 5 mg, 5 mg, Oral, Q6H PRN, Osvaldo Chan MD    senna-docusate (SENOKOT-S/PERICOLACE) 8.6-50 MG per tablet 2 tablet, 2  "tablet, Oral, BID, Boston Anguiano APRN CNP, 2 tablet at 05/10/24 0813    sodium chloride (PF) 0.9% PF flush 3 mL, 3 mL, Intracatheter, Q8H, Osvaldo Chan MD, 3 mL at 05/10/24 0042    sodium chloride (PF) 0.9% PF flush 3 mL, 3 mL, Intracatheter, q1 min prn, Osvaldo Chan MD    vitamin C (ASCORBIC ACID) tablet 500 mg, 500 mg, Oral, QAM, Osvaldo Chan MD, 500 mg at 05/10/24 0813    Vitamin D3 (CHOLECALCIFEROL) tablet 1,000 Units, 1,000 Units, Oral, Daily with lunch, Osvaldo Chan MD, 1,000 Units at 05/09/24 1148    zolpidem (AMBIEN) tablet 5 mg, 5 mg, Oral, At Bedtime PRN, Jacinta Barth MD, 5 mg at 05/09/24 2300    Physical Exam:    /67   Pulse 60   Temp 97.8  F (36.6  C)   Resp 10   Ht 1.676 m (5' 6\")   Wt 79.8 kg (175 lb 14.8 oz)   SpO2 98%   BMI 28.40 kg/m       GENERAL: Alert and oriented x 3. NAD.   HEENT: Anicteric sclera. Mucous membranes moist.   CV: RRR. S1, S2. No murmurs appreciated.   RESPIRATORY: Effort normal on RA. Lungs CTAB with no wheezing, rales, rhonchi.   GI: Abdomen soft and non distended with normoactive bowel sounds present in all quadrants. No tenderness, rebound, guarding.   NEUROLOGICAL: No focal deficits. Moves all extremities.    EXTREMITIES: Right lower extremity wrapped in Ace wrap's.  No peripheral edema. Intact bilateral pedal pulses.   SKIN: No jaundice. No rashes.     Labs & Studies of Note: I personally reviewed the following studies:  CBC RESULTS:   Recent Labs   Lab Test 05/10/24  0716   WBC 5.0   RBC 2.43*   HGB 7.6*   HCT 22.6*   MCV 93   MCH 31.3   MCHC 33.6   RDW 12.5        Last Comprehensive Metabolic Panel:  Lab Results   Component Value Date     05/10/2024    POTASSIUM 3.9 05/10/2024    CHLORIDE 104 05/10/2024    CO2 28 05/10/2024    ANIONGAP 5 (L) 05/10/2024    GLC 96 05/10/2024    BUN 11.9 05/10/2024    CR 0.82 05/10/2024    GFRESTIMATED >90 05/10/2024    LIDYA 7.9 (L) 05/10/2024         "

## 2024-05-10 NOTE — PROCEDURES
[facility]    Procedure: IR Procedure Note    Date/Time: 5/10/2024 9:32 AM    Performed by: Tanvir Marshall MD  Authorized by: Pratik Bettencourt MD      UNIVERSAL PROTOCOL   Site Marked: NA  Prior Images Obtained and Reviewed:  Yes  Required items: Required blood products, implants, devices and special equipment available    Patient identity confirmed:  Verbally with patient, arm band, provided demographic data and hospital-assigned identification number  Patient was reevaluated immediately before administering moderate or deep sedation or anesthesia  Confirmation Checklist:  Patient's identity using two indicators, relevant allergies, procedure was appropriate and matched the consent or emergent situation and correct equipment/implants were available  Time out: Immediately prior to the procedure a time out was called    Universal Protocol: the Joint Commission Universal Protocol was followed    Preparation: Patient was prepped and draped in usual sterile fashion       ANESTHESIA    Anesthesia:  Local infiltration  Local Anesthetic:  Lidocaine 1% without epinephrine      SEDATION  Patient Sedated: Yes    Sedation:  Fentanyl and midazolam  Vital signs: Vital signs monitored during sedation    See dictated procedure note for full details.  Findings: Tunnelled CVC placement, ready for use    Specimens: none    Complications: None    Condition: Stable    Plan: Tunnelled CVC placement, ready for use      PROCEDURE  Describe Procedure: Tunnelled CVC placement, ready for use  Patient Tolerance:  Patient tolerated the procedure well with no immediate complications  Length of time physician/provider present for 1:1 monitoring during sedation: 30

## 2024-05-10 NOTE — PROGRESS NOTES
General ID [facility] Service: Follow up Note     Patient:  Kwabena Solorio, Date of birth 1950, Medical record number [id number]  Date of Visit:  05/10/2024  Reason for consult: PJI         Assessment and Recommendations:     ID Problem list:  1. Right hip PJI  i. 4/25/24 fluid aspiration +E.faecalis (amp-susceptible)  ii. S/p 5/7/24 right CHRISS explant with antibiotic spacer placement (Dr. Dudley)  iii. OR cultures from 5/7/24 preliminarily with E.faecalis (susceptibility pending) and additional GPCs in pairs/chains  2. History of right CHRISS (2007)  3. History of PMR (on chronic prednisone 5 mg/d)      Discussion:  Given the 4/25 right hip fluid cultures were +E.faecalis (amp-susceptible), would be reasonable at this time to continue on IV ampicillin 2g q4hr while awaiting final tissue cultures from 5/7. Anticipate on discharge will need to continue on IV ampicillin for likely 6-wk course (or until follows up in ID clinic to further adjust regimen) either at 2g q4hr intermittent dosing or 12g q24hr continuous infusion ampicillin dosing.     Recs:  1. Continue IV ampicillin 2g q4hr (and dose adjust as needed for changes in renal function)  a. Anticipate on discharge will need to continue on IV ampicillin for likely 6 wk course either at 2g q4hr intermittent dosing or 12g q24hr continuous infusion    2. Follow up pending 5/7 OR cultures until finalized - prelim with E.faecalis  3. Surgical management as per ortho    OPAT:  Primary team:  Place order panel  OPAT Pharmacy ([facility]) Review and ID Care Transition   Place imaging order(s) requested above prior to discharge.  Contact ID teams about missed ID clinic appointments and/or ongoing therapy needs.    Prolonged Parenteral/Oral Antibiotic Recommendations and ID Follow up  This template provides final ID recommendations as of this date.     Infectious Diseases Indication: PJI    Antibiotic Information  Name of Antibiotic Dose of Antibiotic1 Anticipated duration  Effective start date2 End date   Ampicillin 2g q4hr (or 12g continuous infusion q24hr)  6 wks 5/7/24 tbd in ID clinic follow up (approx 6 wks total antibiotics IV/PO)                 1.Dose of antibiotic will need to be renally adjusted if creatinine clearance changes  2.Effective start date is the date of adequate therapy with appropriate spectrum    Method of antibiotic delivery:Tunneled line.Is the line being used for another indication besides antimicrobials? No At the end of therapy should the line used for antimicrobials be removed or de-accessed? Yes requires IR removal (tunneled line).      Weekly labs required: CBC with diff, CMP and CRP. Dr. House will follow labs at discharge until ID follow up. Fax labs to ID clinic.    Are there pending microbial tests: Yes Cultures     We will attempt to make OPAT appointments within 2 weeks of discharge based on clinic availability.  Provider: Harsh, timing of visit 4 weeks, and the type of clinic appointment visit In-Person visit.   **Until further notice patients discharged to [facility] will need to be seen by [facility] Infectious Diseases group if ID follow up is need. [employer] ID academic group is not credentialed there.**    ID provider route note: OPAT RN Care Coordinator [facility] ID Clinic Information:  Phone: [phone]  Fax: [phone] (Attention Noe Hollis)        Thank you for allowing us to participate in the care of this patient. [facility] ID will sign off at this time. Can see [facility] schedule for paging details if questions.     50 minutes spent by me on the date of the encounter doing chart review, history and exam, documentation and further activities per the note       Zeyad House MD    Infectious Diseases   05/10/2024          Interval events:     Patient was transferred from ICU to floor last night. Today he reports feeling overall unchanged. No fevers/chills. No nausea/vomiting. One  "loose BM. Did get right chest CVC placed by IR (has PPM in left side, and PICC team was unable to thread a PICC past his clavicle on prior attempt). He's still awaiting dispo plans, but says he would like to go to a nursing home for help with antibiotics and wound management and activities of daily living (he lives alone and couldn't manage all this).       Initial HPI:     As per initial ID consult note from 5/8/24:    \"73M with PMH including right CHRISS (2007), PPM, PMR (on prednisone 5 mg/d), BPH, chronic back pain, who was admitted 5/7 for right CHRISS explantation.    Per patient report, he was first noted to have signs of bone deterioration around his right CHRISS on imaging that he was getting to work-up his chronic lower back pain. He saw ortho for this who decided to aspirate his right hip on 4/25 which had 20k WBCs (89% neutrophils) and was +E.faecalis (amp-susceptible) on fluid culture. Thus he was scheduled for right CHRISS explant and antibiotic spacer which was performed by Dr. Dudley on 5/7; post-op course complicated by hypotension requiring phenylephirine for which he was transferred to [facility] ICU post-operatively.    Patient otherwise notes that he didn't actually have any chronic right hip pain prior to finding out about his PJI this past month, just the chronic back pain radiating down both of his legs. He was actually quite active prior - biking, walking his dogs, etc. No fevers/chills, no night sweats, no GI symptoms prior. No recent antibiotics prior to this hospitalization. No known antibiotic allergies.\"           Review of Systems:   ROS as above.       Past Medical History:     Past Medical History:   Diagnosis Date     BPH (benign prostatic hyperplasia)      Cardiac pacemaker in situ      Complete heart block (H)      Hydronephrosis, right      Lumbar radiculopathy      Lumbar scoliosis      Restless legs syndrome (RLS)      Past Surgical History:   Procedure Laterality Date     APPENDECTOMY       " AS TOTAL KNEE ARTHROPLASTY Left 2010     CARPAL TUNNEL RELEASE RT/LT       CATARACT EXTRACTION       COLONOSCOPY       EP ICD       FOOT SURGERY Bilateral      HAND SURGERY       HC TOTAL HIP ARTHROPLASTY Right 2007     HEMORRHOIDECTOMY       HERNIA REPAIR       IR CVC TUNNEL PLACEMENT > 5 YRS OF AGE  5/10/2024     IRRIGATION AND DEBRIDEMENT HIP, PLACE ANTIBIOTIC CEMENT BEADS / SPACER Right 5/7/2024    Procedure: vancomycin and tobramycin impregnated cement spacer implantation;  Surgeon: Rd Dudley MD;  Location: [facility] OR     NASAL FRACTURE SURGERY       REMOVE HARDWARE ARTHROPLASTY HIP Right 5/7/2024    Procedure: Explant Right surface replacement Total Hip Arthroplasty ,;  Surgeon: Rd Dudley MD;  Location: [facility] OR     TONSILLECTOMY & ADENOIDECTOMY       urinary tract surgery       Otherwise as per HPI      Allergies:    No Known Allergies         Current Antimicrobials:   IV ampicillin 2g q4h       Family History:   Reviewed and noncontributory       Social History:     Social History     Tobacco Use     Smoking status: Never     Passive exposure: Past     Smokeless tobacco: Never   Substance Use Topics     Alcohol use: Not Currently     Comment: quit 1990's     Drug use: Never     Otherwise as per HPI         Physical Exam:   Ranges forvital signs:  Temp:  [97.7  F (36.5  C)-99  F (37.2  C)] 97.7  F (36.5  C)  Pulse:  [56-71] 64  Resp:  [10-20] 16  BP: (106-145)/(58-81) 117/79  SpO2:  [95 %-100 %] 97 %  GENERAL:  lying in bed in no acute distress.   ENT:  Head is normocephalic, atraumatic.   EYES:  Eyes have anicteric sclerae.   LUNGS:  Unlabored breathing on room air.  ABDOMEN:  Non-distended appearing  MSKEXT/SKIN: +right hip swollen, dressing without any surrounding erythema, +drain with serosanguinous ouptut  NEUROLOGIC:  Awake, alert, interactive.         Laboratory Data:     Inflammatory Markers    Recent Labs   Lab Test 05/08/24  0328   CRPI 7.97*       Hematology Studies    Recent Labs   Lab Test  05/10/24  0716 05/09/24  1333 05/09/24  0535 05/08/24  1411 05/08/24  0817 05/08/24  0328 05/07/24  2356 05/07/24  2209   WBC 5.0  --  5.6 11.1*  --  11.5* 17.9* 17.6*   HGB 7.6* 7.9* 7.9* 9.0* 9.3* 8.6* 10.1* 10.7*   MCV 93  --  92 90  --  91 88 89     --  173 267  --  217 275 305       Metabolic Studies     Recent Labs   Lab Test 05/10/24  0716 05/09/24  0535 05/08/24  0817 05/08/24  0328 05/07/24  2356    135 131* 132* 133*   POTASSIUM 3.9 3.8  --  4.1 3.7   CHLORIDE 104 103  --  99 99   CO2 28 27  --  24 24   BUN 11.9 14.6  --  15.2 12.9   CR 0.82 0.90  --  0.82 0.75   GFRESTIMATED >90 89  --  >90 >90       Hepatic Studies    Recent Labs   Lab Test 05/08/24 0328   BILITOTAL 0.8   ALKPHOS 43   ALBUMIN 3.6   AST 26   ALT 17       Microbiology:  Culture   Date Value Ref Range Status   05/08/2024 No growth after 2 days  Preliminary   05/08/2024 No growth after 2 days  Preliminary   05/07/2024 No anaerobic organisms isolated after 2 days  Preliminary   05/07/2024 Culture in progress  Preliminary   05/07/2024 (A)  Preliminary    Isolated in broth only Gram positive cocci in pairs and chains     Comment:     On day 2 of incubation   05/07/2024 No anaerobic organisms isolated after 2 days  Preliminary   05/07/2024 1+ Enterococcus faecalis (A)  Final     Comment:     Susceptibilities done on previous cultures   05/07/2024 No anaerobic organisms isolated after 2 days  Preliminary   05/07/2024 No growth after 1 day  Preliminary   05/07/2024 No anaerobic organisms isolated after 2 days  Preliminary   05/07/2024 1+ Enterococcus faecalis (A)  Final     Comment:     Susceptibilities done on previous cultures   05/07/2024 No anaerobic organisms isolated after 2 days  Preliminary   05/07/2024 1+ Enterococcus faecalis (A)  Preliminary   05/07/2024 No anaerobic organisms isolated after 2 days  Preliminary   05/07/2024 1+ Enterococcus faecalis (A)  Preliminary     Comment:     Not isolated or reported on routine  aerobic culture  Susceptibilities done on previous cultures   2024 No growth after 2 days  Preliminary   2024 No anaerobic organisms isolated  Final   2024 1+ Enterococcus faecalis (A)  Final   2024 No growth after 14 days  Preliminary            Imagin/7/24 xray pelvis report:  IMPRESSION: Post right femoral acetabular arthroplasty. Surgical drain in position at the right hip. Bones are demineralized. No acute fracture detected. No dislocation.

## 2024-05-10 NOTE — IR NOTE
Patient Name: Kwabena Solorio  Medical Record Number: [id number]  Today's Date: 5/10/2024    Procedure: Tunneled central venous catheter placement  Proceduralist: Dr. Bettencourt and Dr. Masrhall  Pathology present: NA    Procedure Start: 0911  Procedure end: 0930  Sedation medications administered:   Fentanyl 75 mcg  Midazolam 1.5 mg    Sedation start time: 0911  Sedation end time: 0930  Total sedation time: 19 minutes     Report given to: Emily SANCHES   : MARYURI    Other Notes: Pt arrived to IR room 2 from [facility]. Consent reviewed. Pt denies any questions or concerns regarding procedure. Pt positioned supine and monitored per protocol. Pt tolerated procedure without any noted complications. Pt transferred back to [facility].     Right internal jugular single lumen locked with 5ml heparin 10 units/ml concentration.

## 2024-05-10 NOTE — PROGRESS NOTES
Urology Brief Note     I discussed the care of Kwabena Solorio with his ICU team yesterday. His right hydronephrosis has been present for at least 7 years and has been worked up outpatient with a renogram 4/2024. His creatinine is normal. This finding is chronic and non-contributory to his current hospital stay.     With regards to his retention, he had asymptomatic retention with PVRs as high as 500 mL which are since coming down. Per his chart he has a history of hypospadias and has had several surgeries for it as a result, including a reconstructive procedure at [facility] in the 90's. He's previously needed carl placement over a wire by a urologist.     As such would just observe all of the above and not straight cath him unless he is symptomatically retaining. Suspect that he is also a chronic retainer but should improve as he gets closer to his baseline in terms of mobility, off of narcotics, etc.     Urology will sign off and he can follow up with his already established urologist.

## 2024-05-10 NOTE — PROGRESS NOTES
"Orthopaedic Surgery Progress Note     Subjective: Patient now on floor. He had spasms last night improved with medications. He had several questions about the surgery and treatment plan that were answered today.    Objective: BP (!) 145/64 (BP Location: Left arm)   Pulse 71   Temp 99  F (37.2  C) (Oral)   Resp 18   Ht 1.676 m (5' 6\")   Wt 79.8 kg (175 lb 14.8 oz)   SpO2 95%   BMI 28.40 kg/m      Drain: 50 overnight    General: NAD, alert and oriented, cooperative with exam.   Cardio: RRR, extremities wwp.   Respiratory: Non-labored breathing.  MSK:   RLE: Toes wwp, DP 2+, bcr in all toes. Compartments soft and tolerates passive stretch of toes. +EHL/FHL/GSC/TA. SILT SP/DP/Sa/Oliveira/T. Dressing c/d/i. Drain with serosanguinous output.    Labs: Hgb 7.6 this morning    Cultures: Enterococcus faecalis    Assessment and Plan:   Kwabena Solorio is a 73 year old male s/p Procedure(s):  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 Dudley.     Activity: Up with assist and assistive devices as needed until independent. Posterior hip precautions to operative hip x 3 months:  1) No hip flexion beyond 90 degrees  2) No adduction beyond midline  3) No internal rotation beyond neutral   Weight bearing status: Toe touch weight bearing  Antibiotics: Vancomycin x 24 hours and Ancef. ID recommendations.  Diet: Begin with clear fluids and progress diet as tolerated. Bowel regimen. Anti-emetics PRN.    DVT prophylaxis: Mechanical while in hospital with Aspirin 162mg daily x 4 weeks, starting morning of POD 1  Elevation: Elevate heels off of bed on pillows   Wound Care: Aquacel x 5-7 days.    Drains: Document output per shift, Ortho will discontinue on in 2 weeks  Pain management: Orals PRN, IV for breakthrough only  X-rays: AP Pelvis and lateral in PACU  Physical Therapy: Mobilization, ROM, ADL's, Posterior hip precautions  Occupational Therapy: ADL's  Labs: " Trend Hgb on POD #1, 2  Consults: PT, OT. Hospitalist, appreciate assistance in caring for this patient throughout the perioperative period            Future Appointments   Date Time Provider Department Center   5/8/2024  8:45 AM Sofi Bernstein, PT [facility] [place]   5/8/2024 10:00 AM Cira Raman OTR [facility] [place]   5/8/2024  1:30 PM Sofi Bernstein, PT [facility] [place]   6/10/2024  1:30 PM Kaz Bowser, NEVIN [facility]         Disposition: Pending progress with therapies, pain control on orals, and medical stability, anticipate discharge to Home vs TCU on POD #2-3    Osvaldo Chan MD

## 2024-05-10 NOTE — PROGRESS NOTES
Care Management Follow Up    Length of Stay (days): 3    Expected Discharge Date: 05/11/2024     Concerns to be Addressed: discharge planning     Patient plan of care discussed at interdisciplinary rounds: Yes    Anticipated Discharge Disposition: Transitional Care     Anticipated Discharge Services: Transportation Services  Anticipated Discharge DME: Other (see comment) (TBD)    Patient/family educated on Medicare website which has current facility and service quality ratings: no (Pt identified bed hold at [facility])  Education Provided on the Discharge Plan: Yes  Patient/Family in Agreement with the Plan: yes    Referrals Placed by CM/SW:    Charly C[facility]denas [facility]  Ph: [phone]  Fax: [phone]  Private pay costs discussed: Not applicable    Additional Information:  CORDELL completed [employer] Prior Auth [id number].  CORDELL prompted TX team to either have IV-ABX orders signed and then faxed 48 hours prior to discharge or send Pt with several daysdays worth of IV-ABX to help pt until the TCU's pharmacy can fill the IV-ABX.  CORDELL informed TX team during rounds that Pt's family is willing to transport either Saturday or Monday.  SW will continue to follow for for safe discharge placement and planning.         Addendum: Pt is able to discharge Monday if discharge orders can be faxed Monday morning to [facility] [phone] and to [facility] call to obtain fax: [phone].    Rich Licoan, LICSW  [address] [place] [facility]   Ph: [phone]  Pager: [phone]

## 2024-05-11 ENCOUNTER — APPOINTMENT (OUTPATIENT)
Dept: PHYSICAL THERAPY | Facility: CLINIC | Age: 74
DRG: 466 | End: 2024-05-11
Attending: ORTHOPAEDIC SURGERY
Payer: COMMERCIAL

## 2024-05-11 LAB
ANION GAP SERPL CALCULATED.3IONS-SCNC: 6 MMOL/L (ref 7–15)
BACTERIA TISS BX CULT: ABNORMAL
BACTERIA TISS BX CULT: ABNORMAL
BASOPHILS # BLD AUTO: 0 10E3/UL (ref 0–0.2)
BASOPHILS NFR BLD AUTO: 0 %
BUN SERPL-MCNC: 11.5 MG/DL (ref 8–23)
CALCIUM SERPL-MCNC: 8.2 MG/DL (ref 8.8–10.2)
CHLORIDE SERPL-SCNC: 102 MMOL/L (ref 98–107)
CREAT SERPL-MCNC: 0.79 MG/DL (ref 0.67–1.17)
DEPRECATED HCO3 PLAS-SCNC: 28 MMOL/L (ref 22–29)
EGFRCR SERPLBLD CKD-EPI 2021: >90 ML/MIN/1.73M2
EOSINOPHIL # BLD AUTO: 0.2 10E3/UL (ref 0–0.7)
EOSINOPHIL NFR BLD AUTO: 3 %
ERYTHROCYTE [DISTWIDTH] IN BLOOD BY AUTOMATED COUNT: 12.5 % (ref 10–15)
GLUCOSE SERPL-MCNC: 95 MG/DL (ref 70–99)
HCT VFR BLD AUTO: 21.4 % (ref 40–53)
HGB BLD-MCNC: 7.2 G/DL (ref 13.3–17.7)
IMM GRANULOCYTES # BLD: 0 10E3/UL
IMM GRANULOCYTES NFR BLD: 0 %
LYMPHOCYTES # BLD AUTO: 1.1 10E3/UL (ref 0.8–5.3)
LYMPHOCYTES NFR BLD AUTO: 22 %
MAGNESIUM SERPL-MCNC: 1.6 MG/DL (ref 1.7–2.3)
MCH RBC QN AUTO: 30.9 PG (ref 26.5–33)
MCHC RBC AUTO-ENTMCNC: 33.6 G/DL (ref 31.5–36.5)
MCV RBC AUTO: 92 FL (ref 78–100)
MONOCYTES # BLD AUTO: 0.4 10E3/UL (ref 0–1.3)
MONOCYTES NFR BLD AUTO: 8 %
NEUTROPHILS # BLD AUTO: 3.2 10E3/UL (ref 1.6–8.3)
NEUTROPHILS NFR BLD AUTO: 67 %
NRBC # BLD AUTO: 0 10E3/UL
NRBC BLD AUTO-RTO: 0 /100
PHOSPHATE SERPL-MCNC: 3.8 MG/DL (ref 2.5–4.5)
PLATELET # BLD AUTO: 191 10E3/UL (ref 150–450)
POTASSIUM SERPL-SCNC: 3.5 MMOL/L (ref 3.4–5.3)
RBC # BLD AUTO: 2.33 10E6/UL (ref 4.4–5.9)
SODIUM SERPL-SCNC: 136 MMOL/L (ref 135–145)
WBC # BLD AUTO: 4.8 10E3/UL (ref 4–11)

## 2024-05-11 PROCEDURE — 97116 GAIT TRAINING THERAPY: CPT | Mod: GP

## 2024-05-11 PROCEDURE — 120N000002 HC R&B MED SURG/OB UMMC

## 2024-05-11 PROCEDURE — 84100 ASSAY OF PHOSPHORUS: CPT

## 2024-05-11 PROCEDURE — 250N000012 HC RX MED GY IP 250 OP 636 PS 637: Performed by: STUDENT IN AN ORGANIZED HEALTH CARE EDUCATION/TRAINING PROGRAM

## 2024-05-11 PROCEDURE — 85025 COMPLETE CBC W/AUTO DIFF WBC: CPT

## 2024-05-11 PROCEDURE — 250N000013 HC RX MED GY IP 250 OP 250 PS 637

## 2024-05-11 PROCEDURE — 250N000011 HC RX IP 250 OP 636

## 2024-05-11 PROCEDURE — 250N000013 HC RX MED GY IP 250 OP 250 PS 637: Performed by: INTERNAL MEDICINE

## 2024-05-11 PROCEDURE — 36415 COLL VENOUS BLD VENIPUNCTURE: CPT

## 2024-05-11 PROCEDURE — 250N000013 HC RX MED GY IP 250 OP 250 PS 637: Performed by: STUDENT IN AN ORGANIZED HEALTH CARE EDUCATION/TRAINING PROGRAM

## 2024-05-11 PROCEDURE — 80048 BASIC METABOLIC PNL TOTAL CA: CPT

## 2024-05-11 PROCEDURE — 250N000011 HC RX IP 250 OP 636: Performed by: RADIOLOGY

## 2024-05-11 PROCEDURE — 250N000011 HC RX IP 250 OP 636: Performed by: INTERNAL MEDICINE

## 2024-05-11 PROCEDURE — 97530 THERAPEUTIC ACTIVITIES: CPT | Mod: GP

## 2024-05-11 PROCEDURE — 99233 SBSQ HOSP IP/OBS HIGH 50: CPT | Performed by: INTERNAL MEDICINE

## 2024-05-11 PROCEDURE — 83735 ASSAY OF MAGNESIUM: CPT

## 2024-05-11 RX ORDER — MAGNESIUM SULFATE HEPTAHYDRATE 40 MG/ML
2 INJECTION, SOLUTION INTRAVENOUS ONCE
Status: COMPLETED | OUTPATIENT
Start: 2024-05-11 | End: 2024-05-11

## 2024-05-11 RX ORDER — POTASSIUM CHLORIDE 1500 MG/1
20 TABLET, EXTENDED RELEASE ORAL ONCE
Status: COMPLETED | OUTPATIENT
Start: 2024-05-11 | End: 2024-05-11

## 2024-05-11 RX ORDER — FUROSEMIDE 20 MG
20 TABLET ORAL ONCE
Status: COMPLETED | OUTPATIENT
Start: 2024-05-11 | End: 2024-05-11

## 2024-05-11 RX ADMIN — OXYCODONE HYDROCHLORIDE 5 MG: 5 TABLET ORAL at 22:50

## 2024-05-11 RX ADMIN — AMPICILLIN SODIUM 2 G: 2 INJECTION, POWDER, FOR SOLUTION INTRAMUSCULAR; INTRAVENOUS at 08:21

## 2024-05-11 RX ADMIN — HEPARIN, PORCINE (PF) 10 UNIT/ML INTRAVENOUS SYRINGE 5 ML: at 11:08

## 2024-05-11 RX ADMIN — ACETAMINOPHEN 650 MG: 325 TABLET, FILM COATED ORAL at 04:24

## 2024-05-11 RX ADMIN — AMPICILLIN SODIUM 2 G: 2 INJECTION, POWDER, FOR SOLUTION INTRAMUSCULAR; INTRAVENOUS at 17:27

## 2024-05-11 RX ADMIN — ASPIRIN 162 MG: 81 TABLET, COATED ORAL at 08:24

## 2024-05-11 RX ADMIN — MAGNESIUM SULFATE HEPTAHYDRATE 2 G: 40 INJECTION, SOLUTION INTRAVENOUS at 13:56

## 2024-05-11 RX ADMIN — POLYETHYLENE GLYCOL 3350 17 G: 17 POWDER, FOR SOLUTION ORAL at 14:07

## 2024-05-11 RX ADMIN — SENNOSIDES AND DOCUSATE SODIUM 3 TABLET: 50; 8.6 TABLET ORAL at 21:12

## 2024-05-11 RX ADMIN — PANTOPRAZOLE SODIUM 40 MG: 40 TABLET, DELAYED RELEASE ORAL at 08:24

## 2024-05-11 RX ADMIN — POLYETHYLENE GLYCOL 3350 17 G: 17 POWDER, FOR SOLUTION ORAL at 21:12

## 2024-05-11 RX ADMIN — SENNOSIDES AND DOCUSATE SODIUM 3 TABLET: 50; 8.6 TABLET ORAL at 08:24

## 2024-05-11 RX ADMIN — POLYETHYLENE GLYCOL 3350 17 G: 17 POWDER, FOR SOLUTION ORAL at 08:23

## 2024-05-11 RX ADMIN — OXYCODONE HYDROCHLORIDE AND ACETAMINOPHEN 500 MG: 500 TABLET ORAL at 08:24

## 2024-05-11 RX ADMIN — GABAPENTIN 900 MG: 300 CAPSULE ORAL at 22:50

## 2024-05-11 RX ADMIN — OXYCODONE HYDROCHLORIDE 5 MG: 5 TABLET ORAL at 04:24

## 2024-05-11 RX ADMIN — AMPICILLIN SODIUM 2 G: 2 INJECTION, POWDER, FOR SOLUTION INTRAMUSCULAR; INTRAVENOUS at 12:33

## 2024-05-11 RX ADMIN — AMPICILLIN SODIUM 2 G: 2 INJECTION, POWDER, FOR SOLUTION INTRAMUSCULAR; INTRAVENOUS at 04:21

## 2024-05-11 RX ADMIN — AMPICILLIN SODIUM 2 G: 2 INJECTION, POWDER, FOR SOLUTION INTRAMUSCULAR; INTRAVENOUS at 21:02

## 2024-05-11 RX ADMIN — POTASSIUM CHLORIDE 20 MEQ: 1500 TABLET, EXTENDED RELEASE ORAL at 12:33

## 2024-05-11 RX ADMIN — PRAMIPEXOLE DIHYDROCHLORIDE 0.25 MG: 0.12 TABLET ORAL at 22:50

## 2024-05-11 RX ADMIN — ACETAMINOPHEN 650 MG: 325 TABLET, FILM COATED ORAL at 11:08

## 2024-05-11 RX ADMIN — ACETAMINOPHEN 650 MG: 325 TABLET, FILM COATED ORAL at 21:01

## 2024-05-11 RX ADMIN — Medication 1000 UNITS: at 11:08

## 2024-05-11 RX ADMIN — FUROSEMIDE 20 MG: 20 TABLET ORAL at 12:33

## 2024-05-11 RX ADMIN — PREDNISONE 5 MG: 5 TABLET ORAL at 11:08

## 2024-05-11 RX ADMIN — CALCIUM 500 MG: 500 TABLET ORAL at 08:37

## 2024-05-11 RX ADMIN — MAGNESIUM OXIDE TAB 400 MG (241.3 MG ELEMENTAL MG) 400 MG: 400 (241.3 MG) TAB at 08:24

## 2024-05-11 ASSESSMENT — ACTIVITIES OF DAILY LIVING (ADL)
ADLS_ACUITY_SCORE: 30

## 2024-05-11 NOTE — PLAN OF CARE
Pt A&O x's 4. VSS. Afebrile. 02 sats in the 90s on RA. Lungs clear. Denies SOB, CP or nausea. Tolerating regular diet. Bowel sound active in all quadrants. No BM but passing gas. Voiding adequate amount into the bedside commode. Pain fairly  managed with  current pain regimen. CMS intact, denies N/T. PIV... Pt slept...able to make needs known, call light with in reach. Will continue to monitor.

## 2024-05-11 NOTE — PROGRESS NOTES
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 Hypotensive post-operatively requiring admission to ICU, subsequently resolved and transferred to orthopedic unit with hospitalist consultation.       Infected right hip arthroplasty, S/P explant of right hip resurfacing arthroplasty and implantation of Vanco/Tobra cement spacer 5/7 5/7/24 by Dr. Dudley:  Prior fluid aspirate positive E faecalis, amp susceptible 4/25/2024.  OR cultures 5/7/2024 also growing E faecalis, again amp susceptible.  Pain well-controlled.  NWB RLE, hopping with rolling walker and therapy.  Eating well and having good bowel movements.  WBC normal 5/11.  -Pain, bowel regimens per surgical team  -PT, OT  -DVT px with  mg bid  -tunneled CVC placed 5/10/24 for prolonged IV abx  -IV ampicillin per ID, anticipating 6-week course with transition to TCU at discharge  -Await final surgical cultures      Post-op HoTN  Troponemia due to demand ischemia  CHB, S/P PPM:  ECHO 3/19/21: EF 50%, LV systolic function low normal.  Normal RV systolic function.  Mild MR.  Normal PASP.  IVC normal size.  Severe LAE, mild REINA.  Required ICU postoperatively for hypotension, requiring IV pressors and IV fluid resuscitation.  Subsequently resolved and transferred to medical floor 5/9.  Borderline troponin elevation secondary to demand ischemia, asymptomatic.  Has mild pedal edema on exam following resuscitation, otherwise appears euvolemic.  Uses teds  hose at home as needed.  BMP normal 5/11.  Hemodynamics normal.  -Give oral Lasix 20 mg, KCl 20 x 1 on 5/11  -BMP 5/12    ABLA of Surgery, H/O ALOK:  Preop hemoglobin 12.6, MCV 90 on 4/10/2024.  Doubt active iron deficiency.  Hemoglobin continues to trend down to 7.2 on 5/11/2024.  Hemodynamically stable.  -Daily hemoglobin, transfuse as needed    PMR, Chronic steroids:  On chronic prednisone 5 mg daily.  Compensated, asymptomatic.  -Continue prednisone 5 mg daily, monitor for evidence of adrenal insufficiency    Chronic Right Hydronephrosis  H/O Hypospadias  Urine Retention:  Urology consulted 5/10/2024.  History of right hydronephrosis dating back years, previously worked up with renogram 4/2024.  Multiple prior surgeries for hypospadias, difficult Lomeli placement for which he has required placement over a wire in the past by urologist.  Emptying well at this point.  Renal function remains normal.  -Per urology, avoid straight cathing unless symptomatically retaining given difficult access, hypospadias and need for placement over a wire.      Hyponatremia, hypomagnesemia, hypokalemia:  Hyponatremia resolved.  -Continue magnesium, potassium replacement protocols  -Daily BMP, magnesium    GERD:  Eating and drinking well.  Having regular bowel movements.  -Continue PTA PPI    RLS, Insomnia:  -Continue PTA pramipexole      Diet: Advance Diet as Tolerated: Regular Diet Adult    DVT Prophylaxis:  mg daily per ortho  Lomeli Catheter: Not present  Lines: PRESENT      CVC Single Lumen Right Internal jugular Tunneled-Site Assessment: WDL      Cardiac Monitoring: None  Code Status: Full Code            Jeanmarie Rocha MD  Hospitalist Service, GOLD TEAM [address] [place] [address]  Securely message with Thismoment (more info)  Text page via [email or web address] Paging/Directory   See signed in provider for up to date coverage  information  ______________________________________________________________________    Interval History   Companied by his sisters.  Pain well-controlled, remains nonweightbearing long-term on right lower extremity but hopping with a rolling walker and therapy.  Eating well.  Having good bowel movements.  No dysuria or urgency.  No cough or dyspnea.  No fevers or chills.  States he has intermittent lower extremity edema which she treats with teds hose at home as needed.  Noting increased pedal edema postoperatively following IV fluid resuscitation's.    Physical Exam   Vital Signs: Temp: 98.1  F (36.7  C) Temp src: Oral BP: 127/70 Pulse: 66   Resp: 16 SpO2: 98 % O2 Device: None (Room air)    Weight: 175 lbs 14.83 oz  General: Alert and oriented x 4, very pleasant.  Speech, affect normal.  Chest: CTA bilaterally  CV: RRR.  No murmurs.  Right CVC catheter in place.  Abdomen: NABS.  Soft, nontender, nondistended.  Extremities: Trace pedal edema, feet otherwise clean.  No proximal edema.  Right thigh dressing C/D/I.  Drain in place.  Neuro: Nonfocal      55 MINUTES SPENT BY ME on the date of service doing chart review, history, exam, documentation & further activities per the note.      Data      CMP  Recent Labs   Lab 05/11/24  0729 05/10/24  0716 05/09/24  0535 05/08/24  1206 05/08/24  0817 05/08/24  0743 05/08/24  0328    137 135  --  131*  --  132*   POTASSIUM 3.5 3.9 3.8  --   --   --  4.1   CHLORIDE 102 104 103  --   --   --  99   CO2 28 28 27  --   --   --  24   ANIONGAP 6* 5* 5*  --   --   --  9   GLC 95 96 95  100*   < >  --    < > 128*   BUN 11.5 11.9 14.6  --   --   --  15.2   CR 0.79 0.82 0.90  --   --   --  0.82   GFRESTIMATED >90 >90 89  --   --   --  >90   LIDYA 8.2* 7.9* 8.0*  --   --   --  7.9*   MAG 1.6* 1.8 1.9  --  3.2*  --  1.3*   PHOS 3.8 3.2 2.9  --   --   --  3.6   PROTTOTAL  --   --   --   --   --   --  4.7*   ALBUMIN  --   --   --   --   --   --  3.6   BILITOTAL  --   --   --   --   --    "--  0.8   ALKPHOS  --   --   --   --   --   --  43   AST  --   --   --   --   --   --  26   ALT  --   --   --   --   --   --  17    < > = values in this interval not displayed.     CBC  Recent Labs   Lab 05/11/24  0729 05/10/24  0716 05/09/24  1333 05/09/24  0535 05/08/24  1411   WBC 4.8 5.0  --  5.6 11.1*   RBC 2.33* 2.43*  --  2.58* 2.89*   HGB 7.2* 7.6* 7.9* 7.9* 9.0*   HCT 21.4* 22.6* 23.8* 23.6* 25.9*   MCV 92 93  --  92 90   MCH 30.9 31.3  --  30.6 31.1   MCHC 33.6 33.6  --  33.5 34.7   RDW 12.5 12.5  --  12.8 12.6    174  --  173 267     INRNo lab results found in last 7 days.  Arterial Blood GasNo lab results found in last 7 days.Venous Blood Gas  No lab results found in last 7 days.No results found for: \"A1C\", \"HEMOGLOBINA1\"  Results for orders placed or performed during the hospital encounter of 05/07/24   XR Pelvis Port 1/2 Views    Narrative    EXAM: XR PELVIS PORT 1/2 VIEWS  LOCATION: [facility]  DATE: 5/7/2024    INDICATION: s p explant and spacer placement on 5 7  COMPARISON: None.      Impression    IMPRESSION: Post right femoral acetabular arthroplasty. Surgical drain in position at the right hip. Bones are demineralized. No acute fracture detected. No dislocation.   XR Chest Port 1 View    Narrative    Exam:  Chest X-ray 5/8/2024 10:47 AM    History: Pulmonary edema?    Comparison: CT chest 2/2/24    Findings:   Single portable AP view of the chest. Left chest wall implantable  cardiac defibrillator with leads overlying the right atrium and right  ventricle. Mild cardiomegaly. No focal airspace opacity. Prominent  pulmonary vascularity. No pleural effusion or pneumothorax. Visualized  upper abdomen is unremarkable. No acute osseous abnormality. Old right  clavicle fracture.      Impression    Impression:   1.  No evidence of pulmonary edema or focal pulmonary opacity.   2.  Cardiomegaly.    ERIKA MAKI MD         SYSTEM ID:  [id number]    CVC " Tunnel Placement > 5 Yrs of Age    Narrative    PROCEDURE: Tunneled central venous catheter placement     Procedural Personnel  Attending physician(s): MEGHAN RAMOS I, Dr. Pratik Bettencourt, was present for the critical part of the  procedure and immediately available for the entire procedure.    Fellow physician(s): NHAN Marshall MD  Resident physician(s): None  Advanced practice provider(s): None    Procedure Date (mm/dd/yyyy): 5/10/2024    Pre-procedure diagnosis: CVC for antibiotics  Post-procedure diagnosis: Same  Indication: Other- antibiotics  Additional clinical history: None    Complications: No immediate complications.      Impression    IMPRESSION:    Insertion of right-sided tunneled single lumen catheter, with tip in  the expected location of the right atrium.    Plan:     The catheter may be used immediately.  _______________________________________________________________    PROCEDURE SUMMARY:  - Venous access with ultrasound guidance  - Tunneled central venous catheter insertion with fluoroscopic  guidance  - Additional procedure(s): None    PROCEDURE DETAILS:    Pre-procedure  Consent: Informed consent for the procedure including risks, benefits  and alternatives was obtained and time-out was performed prior to the  procedure.  Preparation (MIPS): The site was prepared and draped using all  elements of maximal sterile barrier technique including sterile  gloves, sterile gown, cap, mask, large sterile sheet, sterile  ultrasound probe cover, hand hygiene and cutaneous antisepsis with 2%  chlorhexidine.   Medical reason for site preparation exception (MIPS): Not applicable    Anesthesia/sedation  Level of anesthesia/sedation: Moderate sedation (conscious sedation)  Anesthesia/sedation administered by: Independent trained observer  under attending supervision with continuous monitoring of the  patient?s level of consciousness and physiologic status  Total intra-service sedation time (minutes):  19    Access  Local anesthesia was administered. The vessel was sonographically  evaluated and determined to be patent. Real time ultrasound was used  to visualize needle entry into the vessel and a permanent image was  stored.  Laterality: Right  Vein accessed: Internal jugular vein  Access technique: Micropuncture set with 21 gauge needle    Catheter placement  An incision was made near the venous access site and the catheter was  tunneled subcutaneously to the venous access site and trimmed to  appropriate length. The catheter was advanced via a peel-away sheath  into the vein under fluoroscopic guidance. Catheter tip location was  fluoroscopically verified and a permanent image was stored.  Catheter placed: Single Lumen  Catheter size (Guamanian): 5  Lumens: Single-lumen   Power injectable: Yes  Catheter tip: right atrium  Catheter flush: Other- heparin 10 units/cc    Closure  A sterile dressing was applied.  Access site closure technique: Tissue adhesive  Catheter securement technique: Non-absorbable suture    Radiation Dose  Fluoroscopy time (seconds): 33.4    Reference air kerma (mGy): 7.67     Additional Details  Additional description of procedure: None  Registry event: V/3/g  Device used: None  Equipment details: None  Unique Device Identifiers: Not available  Specimens removed: None  Estimated blood loss (mL): Less than 10  Standardized report: [email or web address]_v3.1    Attestation  Signer name: BLUE ALCANTARA  I attest that I was present for the entire procedure. I reviewed the  stored images and agree with the report as written.      I have personally reviewed the examination and initial interpretation  and I agree with the findings.    BLUE ALCANTARA         SYSTEM ID:  [id number]

## 2024-05-11 NOTE — PLAN OF CARE
VS: VSS, pt denied CP or SOB.   O2: Room air sat. > 90%.   Output: Voiding using urinal and up to bathroom also.    Last BM: 05/11/24 passing gas.    Activity: Up on chair most of the evening and walked with PT.    Skin: Intact except surgical incision.    Pain: Comfortably manageable with PO pain medication.    Neuro: CMS and neuro intact.   Dressing: CDI except slight dry drainage on the bottom of the dressing.    Diet: Regular tolerating okay.    LDA: PIV TKO between antibiotic's. CVC hep locked.    Equipment: IV pole, walker and personal belongings.    Plan: TBD.    Additional Info:

## 2024-05-11 NOTE — PROGRESS NOTES
"Orthopaedic Surgery Progress Note     Subjective: Patient seen and examined. He slept better last night and spasm pains have improved. Questions were answered. Anticipates discharge Monday.    Objective: /70 (BP Location: Right arm)   Pulse 66   Temp 98.1  F (36.7  C) (Oral)   Resp 16   Ht 1.676 m (5' 6\")   Wt 79.8 kg (175 lb 14.8 oz)   SpO2 98%   BMI 28.40 kg/m      Drain: 200 over last recorded shift    General: NAD, alert and oriented, cooperative with exam.   Cardio: RRR, extremities wwp.   Respiratory: Non-labored breathing.  MSK:   RLE: Toes wwp, DP 2+, bcr in all toes. Compartments soft and tolerates passive stretch of toes. +EHL/FHL/GSC/TA. SILT SP/DP/Sa/Oliveira/T. Dressing c/d/i. Drain with serosanguinous output.    Labs: Hgb 7.2 this morning    Cultures: Enterococcus faecalis    Assessment and Plan:   Kwabena Solorio is a 73 year old male s/p Procedure(s):  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 Dudley.     Activity: Up with assist and assistive devices as needed until independent. Posterior hip precautions to operative hip x 3 months:  1) No hip flexion beyond 90 degrees  2) No adduction beyond midline  3) No internal rotation beyond neutral   Weight bearing status: Toe touch weight bearing  Antibiotics: Vancomycin x 24 hours and Ancef. ID recommendations.  Diet: Begin with clear fluids and progress diet as tolerated. Bowel regimen. Anti-emetics PRN.    DVT prophylaxis: Mechanical while in hospital with Aspirin 162mg daily x 4 weeks, starting morning of POD 1  Elevation: Elevate heels off of bed on pillows   Wound Care: Aquacel x 5-7 days.    Drains: Document output per shift, Ortho will discontinue on in 2 weeks  Pain management: Orals PRN, IV for breakthrough only  X-rays: AP Pelvis and lateral in PACU  Physical Therapy: Mobilization, ROM, ADL's, Posterior hip precautions  Occupational Therapy: ADL's  Labs: Trend " Hgb on POD #1, 2  Consults: PT, OT. Hospitalist, appreciate assistance in caring for this patient throughout the perioperative period            Future Appointments   Date Time Provider Department Center   5/8/2024  8:45 AM Sofi Bernstein, PT [facility] [place]   5/8/2024 10:00 AM Cira Raman OTR [facility] [place]   5/8/2024  1:30 PM Sofi Bernstein, PT [facility] [place]   6/10/2024  1:30 PM Kaz Bowser, NEVIN [facility]         Disposition: Pending progress with therapies, pain control on orals, and medical stability, anticipate discharge to Home vs TCU on POD #2-3    Osvaldo Chan MD

## 2024-05-11 NOTE — PROGRESS NOTES
Care Management Follow Up    Length of Stay (days): 4    Expected Discharge Date: 05/11/2024     Concerns to be Addressed: discharge planning     Patient plan of care discussed at interdisciplinary rounds: Yes    Anticipated Discharge Disposition: Transitional Care     Anticipated Discharge Services: Transportation Services  Anticipated Discharge DME: Other (see comment) (TBD)    Patient/family educated on Medicare website which has current facility and service quality ratings: no (Pt identified bed hold at [facility])  Education Provided on the Discharge Plan: Yes  Patient/Family in Agreement with the Plan: yes    Referrals Placed by CM/SW:    Private pay costs discussed: Not applicable    Additional Information:  SW informed ID has finalized IV Antibiotic Plan. Writer faxed ID note to facility; SW called and spoke w/facility.  They will need an actual prescription faxed prior to pt's discharge. Writer updated Dr. Burgess with request for prescription to be sent ahead of pt's discharge orders.    Prescription to be faxed to: [facility] [place], [place]  Phone : [phone] FAX: [phone].    MARY Melchor  Casual        Social Work and Care Management Department       SEARCHABLE in [employer] - search SOCIAL WORK       [place] (0800 - 1630) Saturday and Sunday     Units: 4A Vocera, 4C Vocera, & 4E Vocera    Pager: [phone]     Units: 5A [id number] Vocera, 5A [id number] Vocera & 5C Vocera Pager: [phone]     Units: 6A Vocera & 6B Vocera  Pager: [phone]     Units: 6C Vocera Pager: [phone]     Units: 7A Vocera & 7B Vocera  Pager: [phone]     Units: 7C Vocera Pager: [phone]     Unit: [place] ED Vocera & [place] Obs Vocera Pager: [phone]      [facility] ([phone]) Saturday and Sunday      Units: 5 Ortho Vocera, 5 Med Surg Vocera & WB ED Vocera Pager:[phone]     Units: 6 Med Surg Vocera, 8 Med Surg Vocera, & 10 ICU Vocera Pager:  [phone]        After hours [facility] [place] and After Hours [facility] [place]     Saturday & Sunday (1630 - 0000)    Mon-Fri ([id number])     [facility] Recognized Holidays  ([id number])    Units: ALL  Pager: [phone]

## 2024-05-12 ENCOUNTER — APPOINTMENT (OUTPATIENT)
Dept: PHYSICAL THERAPY | Facility: CLINIC | Age: 74
DRG: 466 | End: 2024-05-12
Attending: ORTHOPAEDIC SURGERY
Payer: COMMERCIAL

## 2024-05-12 LAB
ANION GAP SERPL CALCULATED.3IONS-SCNC: 6 MMOL/L (ref 7–15)
BACTERIA TISS BX CULT: ABNORMAL
BACTERIA TISS BX CULT: NO GROWTH
BUN SERPL-MCNC: 11.3 MG/DL (ref 8–23)
CALCIUM SERPL-MCNC: 8.2 MG/DL (ref 8.8–10.2)
CHLORIDE SERPL-SCNC: 103 MMOL/L (ref 98–107)
CREAT SERPL-MCNC: 0.82 MG/DL (ref 0.67–1.17)
DEPRECATED HCO3 PLAS-SCNC: 30 MMOL/L (ref 22–29)
EGFRCR SERPLBLD CKD-EPI 2021: >90 ML/MIN/1.73M2
GLUCOSE SERPL-MCNC: 101 MG/DL (ref 70–99)
HGB BLD-MCNC: 8 G/DL (ref 13.3–17.7)
MAGNESIUM SERPL-MCNC: 1.8 MG/DL (ref 1.7–2.3)
MAGNESIUM SERPL-MCNC: 2 MG/DL (ref 1.7–2.3)
POTASSIUM SERPL-SCNC: 3.7 MMOL/L (ref 3.4–5.3)
POTASSIUM SERPL-SCNC: 4.3 MMOL/L (ref 3.4–5.3)
SODIUM SERPL-SCNC: 139 MMOL/L (ref 135–145)

## 2024-05-12 PROCEDURE — 80048 BASIC METABOLIC PNL TOTAL CA: CPT

## 2024-05-12 PROCEDURE — 97530 THERAPEUTIC ACTIVITIES: CPT | Mod: GP

## 2024-05-12 PROCEDURE — 97116 GAIT TRAINING THERAPY: CPT | Mod: GP

## 2024-05-12 PROCEDURE — 83735 ASSAY OF MAGNESIUM: CPT | Performed by: ORTHOPAEDIC SURGERY

## 2024-05-12 PROCEDURE — 250N000011 HC RX IP 250 OP 636: Performed by: RADIOLOGY

## 2024-05-12 PROCEDURE — 250N000012 HC RX MED GY IP 250 OP 636 PS 637: Performed by: STUDENT IN AN ORGANIZED HEALTH CARE EDUCATION/TRAINING PROGRAM

## 2024-05-12 PROCEDURE — 250N000011 HC RX IP 250 OP 636

## 2024-05-12 PROCEDURE — 250N000013 HC RX MED GY IP 250 OP 250 PS 637: Performed by: INTERNAL MEDICINE

## 2024-05-12 PROCEDURE — 36415 COLL VENOUS BLD VENIPUNCTURE: CPT

## 2024-05-12 PROCEDURE — 250N000013 HC RX MED GY IP 250 OP 250 PS 637: Performed by: STUDENT IN AN ORGANIZED HEALTH CARE EDUCATION/TRAINING PROGRAM

## 2024-05-12 PROCEDURE — 250N000011 HC RX IP 250 OP 636: Performed by: STUDENT IN AN ORGANIZED HEALTH CARE EDUCATION/TRAINING PROGRAM

## 2024-05-12 PROCEDURE — 120N000002 HC R&B MED SURG/OB UMMC

## 2024-05-12 PROCEDURE — 250N000013 HC RX MED GY IP 250 OP 250 PS 637

## 2024-05-12 PROCEDURE — 84132 ASSAY OF SERUM POTASSIUM: CPT | Performed by: INTERNAL MEDICINE

## 2024-05-12 PROCEDURE — 83735 ASSAY OF MAGNESIUM: CPT | Performed by: INTERNAL MEDICINE

## 2024-05-12 PROCEDURE — 85018 HEMOGLOBIN: CPT | Performed by: INTERNAL MEDICINE

## 2024-05-12 PROCEDURE — 36415 COLL VENOUS BLD VENIPUNCTURE: CPT | Performed by: INTERNAL MEDICINE

## 2024-05-12 PROCEDURE — 99232 SBSQ HOSP IP/OBS MODERATE 35: CPT | Performed by: INTERNAL MEDICINE

## 2024-05-12 RX ORDER — POTASSIUM CHLORIDE 1500 MG/1
20 TABLET, EXTENDED RELEASE ORAL ONCE
Status: COMPLETED | OUTPATIENT
Start: 2024-05-12 | End: 2024-05-12

## 2024-05-12 RX ORDER — MAGNESIUM OXIDE 400 MG/1
400 TABLET ORAL EVERY 4 HOURS
Status: COMPLETED | OUTPATIENT
Start: 2024-05-12 | End: 2024-05-13

## 2024-05-12 RX ORDER — FUROSEMIDE 20 MG
20 TABLET ORAL ONCE
Status: COMPLETED | OUTPATIENT
Start: 2024-05-12 | End: 2024-05-12

## 2024-05-12 RX ORDER — MAGNESIUM OXIDE 400 MG/1
400 TABLET ORAL EVERY 4 HOURS
Status: COMPLETED | OUTPATIENT
Start: 2024-05-12 | End: 2024-05-12

## 2024-05-12 RX ADMIN — HEPARIN, PORCINE (PF) 10 UNIT/ML INTRAVENOUS SYRINGE 5 ML: at 10:49

## 2024-05-12 RX ADMIN — PANTOPRAZOLE SODIUM 40 MG: 40 TABLET, DELAYED RELEASE ORAL at 06:35

## 2024-05-12 RX ADMIN — OXYCODONE HYDROCHLORIDE AND ACETAMINOPHEN 500 MG: 500 TABLET ORAL at 08:27

## 2024-05-12 RX ADMIN — AMPICILLIN SODIUM 2 G: 2 INJECTION, POWDER, FOR SOLUTION INTRAMUSCULAR; INTRAVENOUS at 05:00

## 2024-05-12 RX ADMIN — FUROSEMIDE 20 MG: 20 TABLET ORAL at 11:37

## 2024-05-12 RX ADMIN — CALCIUM 500 MG: 500 TABLET ORAL at 08:33

## 2024-05-12 RX ADMIN — PRAMIPEXOLE DIHYDROCHLORIDE 0.25 MG: 0.12 TABLET ORAL at 21:27

## 2024-05-12 RX ADMIN — SENNOSIDES AND DOCUSATE SODIUM 3 TABLET: 50; 8.6 TABLET ORAL at 08:27

## 2024-05-12 RX ADMIN — POTASSIUM CHLORIDE 20 MEQ: 1500 TABLET, EXTENDED RELEASE ORAL at 11:37

## 2024-05-12 RX ADMIN — AMPICILLIN SODIUM 2 G: 2 INJECTION, POWDER, FOR SOLUTION INTRAMUSCULAR; INTRAVENOUS at 17:40

## 2024-05-12 RX ADMIN — Medication 1000 UNITS: at 11:37

## 2024-05-12 RX ADMIN — HYDROMORPHONE HYDROCHLORIDE 0.4 MG: 1 INJECTION, SOLUTION INTRAMUSCULAR; INTRAVENOUS; SUBCUTANEOUS at 20:13

## 2024-05-12 RX ADMIN — MAGNESIUM OXIDE TAB 400 MG (241.3 MG ELEMENTAL MG) 400 MG: 400 (241.3 MG) TAB at 15:41

## 2024-05-12 RX ADMIN — SENNOSIDES AND DOCUSATE SODIUM 3 TABLET: 50; 8.6 TABLET ORAL at 20:12

## 2024-05-12 RX ADMIN — AMPICILLIN SODIUM 2 G: 2 INJECTION, POWDER, FOR SOLUTION INTRAMUSCULAR; INTRAVENOUS at 21:27

## 2024-05-12 RX ADMIN — ACETAMINOPHEN 650 MG: 325 TABLET, FILM COATED ORAL at 17:46

## 2024-05-12 RX ADMIN — AMPICILLIN SODIUM 2 G: 2 INJECTION, POWDER, FOR SOLUTION INTRAMUSCULAR; INTRAVENOUS at 10:03

## 2024-05-12 RX ADMIN — AMPICILLIN SODIUM 2 G: 2 INJECTION, POWDER, FOR SOLUTION INTRAMUSCULAR; INTRAVENOUS at 13:33

## 2024-05-12 RX ADMIN — ACETAMINOPHEN 650 MG: 325 TABLET, FILM COATED ORAL at 12:15

## 2024-05-12 RX ADMIN — POLYETHYLENE GLYCOL 3350 17 G: 17 POWDER, FOR SOLUTION ORAL at 08:26

## 2024-05-12 RX ADMIN — GABAPENTIN 900 MG: 300 CAPSULE ORAL at 21:27

## 2024-05-12 RX ADMIN — ZOLPIDEM TARTRATE 5 MG: 5 TABLET, COATED ORAL at 01:01

## 2024-05-12 RX ADMIN — OXYCODONE HYDROCHLORIDE 5 MG: 5 TABLET ORAL at 06:35

## 2024-05-12 RX ADMIN — ASPIRIN 162 MG: 81 TABLET, COATED ORAL at 08:27

## 2024-05-12 RX ADMIN — PREDNISONE 5 MG: 5 TABLET ORAL at 11:37

## 2024-05-12 RX ADMIN — POLYETHYLENE GLYCOL 3350 17 G: 17 POWDER, FOR SOLUTION ORAL at 13:34

## 2024-05-12 RX ADMIN — AMPICILLIN SODIUM 2 G: 2 INJECTION, POWDER, FOR SOLUTION INTRAMUSCULAR; INTRAVENOUS at 01:02

## 2024-05-12 RX ADMIN — MAGNESIUM OXIDE TAB 400 MG (241.3 MG ELEMENTAL MG) 400 MG: 400 (241.3 MG) TAB at 08:27

## 2024-05-12 RX ADMIN — MAGNESIUM OXIDE TAB 400 MG (241.3 MG ELEMENTAL MG) 400 MG: 400 (241.3 MG) TAB at 11:37

## 2024-05-12 RX ADMIN — ACETAMINOPHEN 650 MG: 325 TABLET, FILM COATED ORAL at 08:27

## 2024-05-12 ASSESSMENT — ACTIVITIES OF DAILY LIVING (ADL)
ADLS_ACUITY_SCORE: 30
ADLS_ACUITY_SCORE: 31
ADLS_ACUITY_SCORE: 29
ADLS_ACUITY_SCORE: 31
ADLS_ACUITY_SCORE: 30
ADLS_ACUITY_SCORE: 31
ADLS_ACUITY_SCORE: 30
ADLS_ACUITY_SCORE: 31
ADLS_ACUITY_SCORE: 31
ADLS_ACUITY_SCORE: 30
ADLS_ACUITY_SCORE: 31
ADLS_ACUITY_SCORE: 30
ADLS_ACUITY_SCORE: 31
ADLS_ACUITY_SCORE: 30
ADLS_ACUITY_SCORE: 29
ADLS_ACUITY_SCORE: 29
ADLS_ACUITY_SCORE: 31
ADLS_ACUITY_SCORE: 30

## 2024-05-12 NOTE — PROGRESS NOTES
"Orthopaedic Surgery Progress Note     Subjective: Patient seen and examined. He was resting comfortably in bed. Questions answered. He is hoping for discharge on Monday.    Objective: /71 (BP Location: Right arm)   Pulse 61   Temp 97.8  F (36.6  C) (Oral)   Resp 16   Ht 1.676 m (5' 6\")   Wt 79.8 kg (175 lb 14.8 oz)   SpO2 93%   BMI 28.40 kg/m      Drain: 30 over last recorded shift    General: NAD, alert and oriented, cooperative with exam.   Cardio: RRR, extremities wwp.   Respiratory: Non-labored breathing.  MSK:   RLE: Toes wwp, DP 2+, bcr in all toes. Compartments soft and tolerates passive stretch of toes. +EHL/FHL/GSC/TA. SILT SP/DP/Sa/Oliveira/T. Dressing c/d/I with some dried sanguinous output at inferior aspect. Drain with serosanguinous output.    Labs: Hgb 7.2 on 5/11    Cultures: Enterococcus faecalis    Assessment and Plan:   Kwabena Solorio is a 73 year old male s/p Procedure(s):  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 Dudley.     Activity: Up with assist and assistive devices as needed until independent. Posterior hip precautions to operative hip x 3 months:  1) No hip flexion beyond 90 degrees  2) No adduction beyond midline  3) No internal rotation beyond neutral   Weight bearing status: Toe touch weight bearing  Antibiotics: Vancomycin x 24 hours and Ancef. ID recommendations.  Diet: Begin with clear fluids and progress diet as tolerated. Bowel regimen. Anti-emetics PRN.    DVT prophylaxis: Mechanical while in hospital with Aspirin 162mg daily x 4 weeks, starting morning of POD 1  Elevation: Elevate heels off of bed on pillows   Wound Care: Aquacel x 5-7 days.    Drains: Document output per shift, Ortho will discontinue on in 2 weeks  Pain management: Orals PRN, IV for breakthrough only  X-rays: AP Pelvis and lateral in PACU  Physical Therapy: Mobilization, ROM, ADL's, Posterior hip precautions  Occupational " Therapy: ADL's  Labs: Trend Hgb on POD #1, 2  Consults: PT, OT. Hospitalist, appreciate assistance in caring for this patient throughout the perioperative period            Future Appointments   Date Time Provider Department Center   5/8/2024  8:45 AM Sofi Bernstein, PT [facility] [place]   5/8/2024 10:00 AM Cira Raman OTR [facility] [place]   5/8/2024  1:30 PM Sofi Bernstein, PT [facility] [place]   6/10/2024  1:30 PM Kaz Bowser, NEVIN [facility]         Disposition: Discharge to TCU likely Monday    Osvaldo Chan MD

## 2024-05-12 NOTE — PLAN OF CARE
Goal Outcome Evaluation:                        VS: VSS and afebrile    O2: Stable on RA    Output: Voids without diffculty    Last BM: 5/11 and passing gas   Activity: Non WB to R leg   Skin: Intact except incision    Pain: Given Oxy 5mg at bedtime    Neuro: Alert and oriented   Dressing: Dry drainage to R hip dressing   Diet: Regular    LDA: Internal jugular to R chest   PIV: L-lower FA  KEYUR   Equipment: Cellphone and personal belongings    Plan: Discharge to TCU    Additional Info:

## 2024-05-12 NOTE — PROGRESS NOTES
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 Hypotensive post-operatively requiring admission to ICU, subsequently resolved and transferred to orthopedic unit with hospitalist consultation.       Infected right hip arthroplasty, S/P explant of right hip resurfacing arthroplasty and implantation of Vanco/Tobra cement spacer 5/7 5/7/24 by Dr. Dudley:  Prior fluid aspirate positive E faecalis, amp susceptible 4/25/2024.  OR cultures 5/7/2024 also growing E faecalis, again amp susceptible.  Pain well-controlled.  NWB RLE, hopping with rolling walker and therapy.  Eating well and having good bowel movements.  WBC normal 5/11.  -Pain, bowel regimens per surgical team  -PT, OT  -DVT px with  mg bid  -tunneled CVC placed 5/10/24 for prolonged IV abx  -IV ampicillin per ID, anticipating 6-week course with transition to TCU at discharge  -Await final surgical cultures      Post-op HoTN  Troponemia due to demand ischemia  CHB, S/P PPM  Mild postoperative edema  ECHO 3/19/21: EF 50%, LV systolic function low normal.  Normal RV systolic function.  Mild MR.  Normal PASP.  IVC normal size.  Severe LAE, mild REINA.  Required ICU postoperatively for hypotension, requiring IV pressors and IV fluid resuscitation.  Subsequently resolved and transferred to medical floor 5/9.  Borderline troponin elevation secondary to demand ischemia, asymptomatic.  Has mild pedal edema on exam following resuscitation, otherwise  appears euvolemic.  Uses teds hose at home as needed.  Responded fairly well to Lasix, KCl 5/11, BMP again normal 5/12.  -Repeat oral Lasix 20 mg, KCl 20 x 1 on 5/12  -BMP 5/13    ABLA of Surgery, H/O ALOK:  Preop hemoglobin 12.6, MCV 90 on 4/10/2024.  Doubt active iron deficiency.  Hemoglobin adrianne 7.2 on 5/11/2024, improved to 8.0 on 5/12.  Hemodynamically stable.  -Daily hemoglobin, transfuse as needed    PMR, Chronic steroids:  On chronic prednisone 5 mg daily.  Compensated, asymptomatic.  -Continue prednisone 5 mg daily, monitor for evidence of adrenal insufficiency    Chronic Right Hydronephrosis  H/O Hypospadias  Urine Retention:  Urology consulted 5/10/2024.  History of right hydronephrosis dating back years, previously worked up with renogram 4/2024.  Multiple prior surgeries for hypospadias, difficult Lomeli placement for which he has required placement over a wire in the past by urologist.  Emptying well at this point.  Renal function remains normal.  -Per urology, avoid straight cathing unless symptomatically retaining given difficult access, hypospadias and need for placement over a wire.      Hyponatremia, hypomagnesemia, hypokalemia:  Hyponatremia resolved.  Magnesium now normal.  -Continue magnesium, potassium replacement protocols  -Daily BMP, magnesium    GERD:  Eating and drinking well.  Having regular bowel movements.  -Continue PTA PPI    RLS, Insomnia:  -Continue PTA pramipexole      Diet: Advance Diet as Tolerated: Regular Diet Adult    DVT Prophylaxis:  mg daily per ortho  Lomeli Catheter: Not present  Lines: PRESENT      CVC Single Lumen Right Internal jugular Tunneled-Site Assessment: WDL      Cardiac Monitoring: None  Code Status: Full Code            Jeanmarie Rocha MD  Hospitalist Service, GOLD TEAM 19  [facility]  Securely message with [employer] (more info)  Text page via [employer] Paging/Directory   See signed in provider for up to  "date coverage information  ______________________________________________________________________    Interval History   Accompanied by his sisters.  Eating \"too much\".  Having good bowel movements.  Pain control adequate.  Left foot edema better, notes increased right lower extremity edema.  Would like to try Lasix again.  No fevers or chills.  No dysuria or urgency.  No cough or dyspnea.    Physical Exam   Vital Signs: Temp: 97.8  F (36.6  C) Temp src: Oral BP: 122/71 Pulse: 61   Resp: 16 SpO2: 93 % O2 Device: None (Room air)    Weight: 175 lbs 14.83 oz  General: Alert and oriented x 4, very pleasant.  Speech, affect normal.  Chest: CTA bilaterally  CV: RRR.  No murmurs.  Right CVC catheter in place.  Abdomen: NABS.  Soft, nontender, nondistended.  Extremities: No left lower extremity edema.  Trace to +1 right lower extremity edema.  Right thigh dressing C/D/I.  Drain in place.  Neuro: Nonfocal            Data      CMP  Recent Labs   Lab 05/12/24  0905 05/11/24  0729 05/10/24  0716 05/09/24  0535 05/08/24  0743 05/08/24  0328    136 137 135   < > 132*   POTASSIUM 3.7 3.5 3.9 3.8  --  4.1   CHLORIDE 103 102 104 103  --  99   CO2 30* 28 28 27  --  24   ANIONGAP 6* 6* 5* 5*  --  9   * 95 96 95  100*   < > 128*   BUN 11.3 11.5 11.9 14.6  --  15.2   CR 0.82 0.79 0.82 0.90  --  0.82   GFRESTIMATED >90 >90 >90 89  --  >90   LIDYA 8.2* 8.2* 7.9* 8.0*  --  7.9*   MAG 1.8 1.6* 1.8 1.9   < > 1.3*   PHOS  --  3.8 3.2 2.9  --  3.6   PROTTOTAL  --   --   --   --   --  4.7*   ALBUMIN  --   --   --   --   --  3.6   BILITOTAL  --   --   --   --   --  0.8   ALKPHOS  --   --   --   --   --  43   AST  --   --   --   --   --  26   ALT  --   --   --   --   --  17    < > = values in this interval not displayed.     CBC  Recent Labs   Lab 05/12/24  0905 05/11/24  0729 05/10/24  0716 05/09/24  1333 05/09/24  0535 05/08/24  1411   WBC  --  4.8 5.0  --  5.6 11.1*   RBC  --  2.33* 2.43*  --  2.58* 2.89*   HGB 8.0* 7.2* 7.6* 7.9* " "7.9* 9.0*   HCT  --  21.4* 22.6* 23.8* 23.6* 25.9*   MCV  --  92 93  --  92 90   MCH  --  30.9 31.3  --  30.6 31.1   MCHC  --  33.6 33.6  --  33.5 34.7   RDW  --  12.5 12.5  --  12.8 12.6   PLT  --  191 174  --  173 267     INRNo lab results found in last 7 days.  Arterial Blood GasNo lab results found in last 7 days.Venous Blood Gas  No lab results found in last 7 days.No results found for: \"A1C\", \"HEMOGLOBINA1\"  Results for orders placed or performed during the hospital encounter of 05/07/24   XR Pelvis Port 1/2 Views    Narrative    EXAM: XR PELVIS PORT 1/2 VIEWS  LOCATION: [facility]  DATE: 5/7/2024    INDICATION: s p explant and spacer placement on 5 7  COMPARISON: None.      Impression    IMPRESSION: Post right femoral acetabular arthroplasty. Surgical drain in position at the right hip. Bones are demineralized. No acute fracture detected. No dislocation.   XR Chest Port 1 View    Narrative    Exam:  Chest X-ray 5/8/2024 10:47 AM    History: Pulmonary edema?    Comparison: CT chest 2/2/24    Findings:   Single portable AP view of the chest. Left chest wall implantable  cardiac defibrillator with leads overlying the right atrium and right  ventricle. Mild cardiomegaly. No focal airspace opacity. Prominent  pulmonary vascularity. No pleural effusion or pneumothorax. Visualized  upper abdomen is unremarkable. No acute osseous abnormality. Old right  clavicle fracture.      Impression    Impression:   1.  No evidence of pulmonary edema or focal pulmonary opacity.   2.  Cardiomegaly.    ERIKA MAKI MD         SYSTEM ID:  [id number]   IR CVC Tunnel Placement > 5 Yrs of Age    Narrative    PROCEDURE: Tunneled central venous catheter placement     Procedural Personnel  Attending physician(s): MEGHAN RAMOS I, Dr. Pratik Bettencourt, was present for the critical part of the  procedure and immediately available for the entire procedure.    Fellow physician(s): R " Joanna RAMOS  Resident physician(s): None  Advanced practice provider(s): None    Procedure Date (mm/dd/yyyy): 5/10/2024    Pre-procedure diagnosis: CVC for antibiotics  Post-procedure diagnosis: Same  Indication: Other- antibiotics  Additional clinical history: None    Complications: No immediate complications.      Impression    IMPRESSION:    Insertion of right-sided tunneled single lumen catheter, with tip in  the expected location of the right atrium.    Plan:     The catheter may be used immediately.  _______________________________________________________________    PROCEDURE SUMMARY:  - Venous access with ultrasound guidance  - Tunneled central venous catheter insertion with fluoroscopic  guidance  - Additional procedure(s): None    PROCEDURE DETAILS:    Pre-procedure  Consent: Informed consent for the procedure including risks, benefits  and alternatives was obtained and time-out was performed prior to the  procedure.  Preparation (MIPS): The site was prepared and draped using all  elements of maximal sterile barrier technique including sterile  gloves, sterile gown, cap, mask, large sterile sheet, sterile  ultrasound probe cover, hand hygiene and cutaneous antisepsis with 2%  chlorhexidine.   Medical reason for site preparation exception (MIPS): Not applicable    Anesthesia/sedation  Level of anesthesia/sedation: Moderate sedation (conscious sedation)  Anesthesia/sedation administered by: Independent trained observer  under attending supervision with continuous monitoring of the  patient?s level of consciousness and physiologic status  Total intra-service sedation time (minutes): 19    Access  Local anesthesia was administered. The vessel was sonographically  evaluated and determined to be patent. Real time ultrasound was used  to visualize needle entry into the vessel and a permanent image was  stored.  Laterality: Right  Vein accessed: Internal jugular vein  Access technique: Micropuncture set with 21  gauge needle    Catheter placement  An incision was made near the venous access site and the catheter was  tunneled subcutaneously to the venous access site and trimmed to  appropriate length. The catheter was advanced via a peel-away sheath  into the vein under fluoroscopic guidance. Catheter tip location was  fluoroscopically verified and a permanent image was stored.  Catheter placed: Single Lumen  Catheter size (Tunisian): 5  Lumens: Single-lumen   Power injectable: Yes  Catheter tip: right atrium  Catheter flush: Other- heparin 10 units/cc    Closure  A sterile dressing was applied.  Access site closure technique: Tissue adhesive  Catheter securement technique: Non-absorbable suture    Radiation Dose  Fluoroscopy time (seconds): 33.4    Reference air kerma (mGy): 7.67     Additional Details  Additional description of procedure: None  Registry event: V/3/g  Device used: None  Equipment details: None  Unique Device Identifiers: Not available  Specimens removed: None  Estimated blood loss (mL): Less than 10  Standardized report: [email or web address]_v3.1    Attestation  Signer name: BLUE ALCANTARA  I attest that I was present for the entire procedure. I reviewed the  stored images and agree with the report as written.      I have personally reviewed the examination and initial interpretation  and I agree with the findings.    BLUE ALCANTARA         SYSTEM ID:  [id number]

## 2024-05-12 NOTE — PLAN OF CARE
VS: VSS, pt denied CP or SOB.   O2: Room air sat. > 90%.   Output: Voiding using urinal and up to bathroom also.    Last BM: 05/12/24 passing gas.    Activity: Up on chair most of the evening and walked with PT.    Skin: Intact except surgical incision.    Pain: Comfortably manageable with PO pain medication.    Neuro: CMS and neuro intact.   Dressing: CDI except slight dry drainage on the bottom of the dressing.    Diet: Regular tolerating okay.    LDA: PIV TKO between antibiotic's. CVC hep locked.    Equipment: IV pole, walker and personal belongings.    Plan: TBD.    Additional Info:

## 2024-05-13 ENCOUNTER — TELEPHONE (OUTPATIENT)
Dept: INFECTIOUS DISEASES | Facility: CLINIC | Age: 74
End: 2024-05-13
Payer: COMMERCIAL

## 2024-05-13 VITALS
OXYGEN SATURATION: 97 % | RESPIRATION RATE: 16 BRPM | HEART RATE: 59 BPM | WEIGHT: 175.93 LBS | SYSTOLIC BLOOD PRESSURE: 122 MMHG | TEMPERATURE: 98.4 F | BODY MASS INDEX: 28.27 KG/M2 | DIASTOLIC BLOOD PRESSURE: 70 MMHG | HEIGHT: 66 IN

## 2024-05-13 LAB
ANION GAP SERPL CALCULATED.3IONS-SCNC: 5 MMOL/L (ref 7–15)
BACTERIA BLD CULT: NO GROWTH
BACTERIA BLD CULT: NO GROWTH
BUN SERPL-MCNC: 11.3 MG/DL (ref 8–23)
CALCIUM SERPL-MCNC: 8.2 MG/DL (ref 8.8–10.2)
CHLORIDE SERPL-SCNC: 102 MMOL/L (ref 98–107)
CREAT SERPL-MCNC: 0.87 MG/DL (ref 0.67–1.17)
DEPRECATED HCO3 PLAS-SCNC: 29 MMOL/L (ref 22–29)
EGFRCR SERPLBLD CKD-EPI 2021: 90 ML/MIN/1.73M2
GLUCOSE SERPL-MCNC: 91 MG/DL (ref 70–99)
HGB BLD-MCNC: 7.6 G/DL (ref 13.3–17.7)
MAGNESIUM SERPL-MCNC: 2.3 MG/DL (ref 1.7–2.3)
POTASSIUM SERPL-SCNC: 3.8 MMOL/L (ref 3.4–5.3)
SODIUM SERPL-SCNC: 136 MMOL/L (ref 135–145)

## 2024-05-13 PROCEDURE — 80048 BASIC METABOLIC PNL TOTAL CA: CPT

## 2024-05-13 PROCEDURE — 85018 HEMOGLOBIN: CPT | Performed by: INTERNAL MEDICINE

## 2024-05-13 PROCEDURE — 250N000013 HC RX MED GY IP 250 OP 250 PS 637

## 2024-05-13 PROCEDURE — 250N000013 HC RX MED GY IP 250 OP 250 PS 637: Performed by: STUDENT IN AN ORGANIZED HEALTH CARE EDUCATION/TRAINING PROGRAM

## 2024-05-13 PROCEDURE — 250N000013 HC RX MED GY IP 250 OP 250 PS 637: Performed by: INTERNAL MEDICINE

## 2024-05-13 PROCEDURE — 250N000011 HC RX IP 250 OP 636

## 2024-05-13 PROCEDURE — 36415 COLL VENOUS BLD VENIPUNCTURE: CPT

## 2024-05-13 PROCEDURE — 250N000011 HC RX IP 250 OP 636: Performed by: RADIOLOGY

## 2024-05-13 PROCEDURE — 99232 SBSQ HOSP IP/OBS MODERATE 35: CPT | Performed by: INTERNAL MEDICINE

## 2024-05-13 PROCEDURE — 83735 ASSAY OF MAGNESIUM: CPT

## 2024-05-13 RX ORDER — OXYCODONE HYDROCHLORIDE 5 MG/1
5 TABLET ORAL EVERY 4 HOURS PRN
Qty: 26 TABLET | Refills: 0 | Status: SHIPPED | OUTPATIENT
Start: 2024-05-13 | End: 2024-05-13

## 2024-05-13 RX ORDER — AMPICILLIN 2 G/1
2 INJECTION, POWDER, FOR SOLUTION INTRAVENOUS EVERY 4 HOURS
Qty: 2016 ML | Refills: 0 | Status: ACTIVE | OUTPATIENT
Start: 2024-05-13 | End: 2024-06-24

## 2024-05-13 RX ORDER — DIPHENHYDRAMINE HYDROCHLORIDE, ZINC ACETATE 2; .1 G/100G; G/100G
CREAM TOPICAL 3 TIMES DAILY
Status: DISCONTINUED | OUTPATIENT
Start: 2024-05-13 | End: 2024-05-13 | Stop reason: HOSPADM

## 2024-05-13 RX ORDER — OXYCODONE HYDROCHLORIDE 5 MG/1
5 TABLET ORAL EVERY 4 HOURS PRN
Qty: 26 TABLET | Refills: 0 | Status: SHIPPED | OUTPATIENT
Start: 2024-05-13 | End: 2024-08-19

## 2024-05-13 RX ORDER — DIPHENHYDRAMINE HYDROCHLORIDE, ZINC ACETATE 2; .1 G/100G; G/100G
CREAM TOPICAL 3 TIMES DAILY
DISCHARGE
Start: 2024-05-13 | End: 2024-05-20

## 2024-05-13 RX ORDER — POLYETHYLENE GLYCOL 3350 17 G/17G
17 POWDER, FOR SOLUTION ORAL DAILY
Status: ON HOLD | DISCHARGE
Start: 2024-05-13 | End: 2024-09-03

## 2024-05-13 RX ORDER — AMOXICILLIN 250 MG
3 CAPSULE ORAL 2 TIMES DAILY
Qty: 60 TABLET | Refills: 0 | Status: ON HOLD | DISCHARGE
Start: 2024-05-13 | End: 2024-09-03

## 2024-05-13 RX ORDER — DIPHENHYDRAMINE HYDROCHLORIDE, ZINC ACETATE 2; .1 G/100G; G/100G
CREAM TOPICAL 3 TIMES DAILY PRN
DISCHARGE
Start: 2024-05-13

## 2024-05-13 RX ORDER — ZOLPIDEM TARTRATE 5 MG/1
5 TABLET ORAL
Qty: 10 TABLET | Refills: 0 | Status: ON HOLD | OUTPATIENT
Start: 2024-05-13 | End: 2024-08-31

## 2024-05-13 RX ORDER — GABAPENTIN 300 MG/1
900 CAPSULE ORAL AT BEDTIME
DISCHARGE
Start: 2024-05-13

## 2024-05-13 RX ORDER — LORAZEPAM 0.5 MG/1
0.5 TABLET ORAL
Qty: 10 TABLET | Refills: 0 | Status: ON HOLD | OUTPATIENT
Start: 2024-05-13 | End: 2024-08-31

## 2024-05-13 RX ORDER — ASPIRIN 81 MG/1
162 TABLET ORAL DAILY
Qty: 56 TABLET | Refills: 0 | DISCHARGE
Start: 2024-05-14 | End: 2024-06-13

## 2024-05-13 RX ORDER — ASPIRIN 81 MG/1
162 TABLET ORAL DAILY
DISCHARGE
Start: 2024-05-14 | End: 2024-05-13

## 2024-05-13 RX ORDER — ACETAMINOPHEN 325 MG/1
650 TABLET ORAL EVERY 4 HOURS PRN
Status: ON HOLD | DISCHARGE
Start: 2024-05-13 | End: 2024-09-03

## 2024-05-13 RX ADMIN — MAGNESIUM OXIDE TAB 400 MG (241.3 MG ELEMENTAL MG) 400 MG: 400 (241.3 MG) TAB at 00:44

## 2024-05-13 RX ADMIN — DIPHENHYDRAMINE HYDROCHLORIDE, ZINC ACETATE: 2; .1 CREAM TOPICAL at 10:10

## 2024-05-13 RX ADMIN — AMPICILLIN SODIUM 2 G: 2 INJECTION, POWDER, FOR SOLUTION INTRAMUSCULAR; INTRAVENOUS at 05:11

## 2024-05-13 RX ADMIN — AMPICILLIN SODIUM 2 G: 2 INJECTION, POWDER, FOR SOLUTION INTRAMUSCULAR; INTRAVENOUS at 09:41

## 2024-05-13 RX ADMIN — ZOLPIDEM TARTRATE 5 MG: 5 TABLET, COATED ORAL at 00:44

## 2024-05-13 RX ADMIN — SENNOSIDES AND DOCUSATE SODIUM 3 TABLET: 50; 8.6 TABLET ORAL at 08:16

## 2024-05-13 RX ADMIN — CALCIUM 500 MG: 500 TABLET ORAL at 08:16

## 2024-05-13 RX ADMIN — PANTOPRAZOLE SODIUM 40 MG: 40 TABLET, DELAYED RELEASE ORAL at 08:16

## 2024-05-13 RX ADMIN — ACETAMINOPHEN 650 MG: 325 TABLET, FILM COATED ORAL at 05:10

## 2024-05-13 RX ADMIN — OXYCODONE HYDROCHLORIDE AND ACETAMINOPHEN 500 MG: 500 TABLET ORAL at 08:16

## 2024-05-13 RX ADMIN — MAGNESIUM OXIDE TAB 400 MG (241.3 MG ELEMENTAL MG) 400 MG: 400 (241.3 MG) TAB at 08:16

## 2024-05-13 RX ADMIN — OXYCODONE HYDROCHLORIDE 5 MG: 5 TABLET ORAL at 11:20

## 2024-05-13 RX ADMIN — AMPICILLIN SODIUM 2 G: 2 INJECTION, POWDER, FOR SOLUTION INTRAMUSCULAR; INTRAVENOUS at 01:32

## 2024-05-13 RX ADMIN — POLYETHYLENE GLYCOL 3350 17 G: 17 POWDER, FOR SOLUTION ORAL at 08:17

## 2024-05-13 RX ADMIN — OXYCODONE HYDROCHLORIDE 5 MG: 5 TABLET ORAL at 05:10

## 2024-05-13 RX ADMIN — ACETAMINOPHEN 650 MG: 325 TABLET, FILM COATED ORAL at 00:45

## 2024-05-13 RX ADMIN — BISACODYL 5 MG: 5 TABLET, COATED ORAL at 08:16

## 2024-05-13 RX ADMIN — HEPARIN, PORCINE (PF) 10 UNIT/ML INTRAVENOUS SYRINGE 5 ML: at 09:41

## 2024-05-13 RX ADMIN — MAGNESIUM OXIDE TAB 400 MG (241.3 MG ELEMENTAL MG) 400 MG: 400 (241.3 MG) TAB at 05:10

## 2024-05-13 RX ADMIN — ASPIRIN 162 MG: 81 TABLET, COATED ORAL at 08:16

## 2024-05-13 ASSESSMENT — ACTIVITIES OF DAILY LIVING (ADL)
ADLS_ACUITY_SCORE: 31

## 2024-05-13 NOTE — PLAN OF CARE
Goal Outcome Evaluation:                        VS: VSS and afebrile    O2: Stable on RA   Output: Voids adequate amount in urinal   Last BM: 5/12   Activity: Non WB to R lower ext.   Skin: Intact except surgical incision    Pain: Managed with PRN Oxy   Neuro: CMS intact    Dressing: R hip: moist/dry drainage  Otherrise, CDI    Diet: Regular    LDA: L lower FA PIV: SL    Equipment: Cellphone and personal belongings at bedside    Plan: Discharge to TCU, possible today.    Additional Info:

## 2024-05-13 NOTE — PROGRESS NOTES
5/13/24  Care Management Follow Up    CHW filed PAS, [id number].    Marilou Villanueva  Inpatient CHW  [facility] 5 Ortho, 8 Med Surge, & ED  PH: [phone]

## 2024-05-13 NOTE — PROGRESS NOTES
Kwabena Solorio is a 73 year old male s/p   Explant Right surface replacement Total Hip Arthroplasty vanco/tobra cement spacer implantation on 5/7/2024 with Dr. Dudley.  ID recommended  2 g every 4 hours/IV for 6 weeks and this morning made a recommendation for an option for 12 g every 24 hours as continuous infusion. [phone] updated and gave a verbal order for the continuous option if wishes, heparin and saline flushes as well.     Drain is intact and will stay in place for 14 days.   Dressing with old scant serosanguinous drainage. Incision is intact, no dehiscence, erythema or new drainage. Applied a non adherence Aquacel to the incision site. Keep dressing clean dry and intact for protection.    Patient lives about three hours away and would like a video appointment for the post op June appointment if okay with Dr. Dudley. The facility has a provider that rounds and could take a look at his wound if needed or see a provider locally. Will inform the  and Kaz Bowser and  Dr Luis Enrique Hubbard, AURY- CNP   Orthopaedic Surgery

## 2024-05-13 NOTE — PROGRESS NOTES
"[id number]   PT is alert orient x  4 on RA. SBA with walker and able to make needs known. GP in place with pink output. Dressing to  the right hip is dry intact and intact. Voids spontaneous to the bathroom and have a urinal at the bedside. LP IV TKO , CVC R chest heparin locked. RN managed   protocol done  /67 (BP Location: Right arm)   Pulse 58   Temp 97.9  F (36.6  C) (Oral)   Resp 16   Ht 1.676 m (5' 6\")   Wt 79.8 kg (175 lb 14.8 oz)   SpO2 97%   BMI 28.40 kg/m     "

## 2024-05-13 NOTE — PROGRESS NOTES
"  VS: /70 (BP Location: Right arm)   Pulse 59   Temp 98.4  F (36.9  C) (Oral)   Resp 16   Ht 1.676 m (5' 6\")   Wt 79.8 kg (175 lb 14.8 oz)   SpO2 97%   BMI 28.40 kg/m      O2: RA   Output: Voids in urinal   Last BM: 5/13   Activity: SBA w/ GB & walker   Skin: Intact except for rash on L shoulder/upper back, R hip incision   Pain: Complains of intermittent pain in R hip area   CMS: Intact, denies numbness or tingling   Dressing: R hip and KEYUR drain dressings   Diet: reg   LDA: PIV removed, has internal jugular IV in place   Equipment: GB & walker   Plan: Discharge to TCU with his sisters   Additional Info:         Kwabena left the unit by WC and his sisters drove him to the TCU  "

## 2024-05-13 NOTE — PROGRESS NOTES
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 Hypotensive post-operatively requiring admission to ICU, subsequently resolved and transferred to orthopedic unit with hospitalist consultation.       Infected right hip arthroplasty, S/P explant of right hip resurfacing arthroplasty and implantation of Vanco/Tobra cement spacer 5/7 5/7/24 by Dr. Dudley:  Prior fluid aspirate positive E faecalis, amp susceptible 4/25/2024.  OR cultures 5/7/2024 also growing E faecalis, again amp susceptible.  Pain well-controlled.  NWB RLE, hopping with rolling walker and therapy.  Eating well and having good bowel movements.  WBC normal 5/11.  -Pain, bowel regimens per surgical team  -PT, OT  -DVT px with  mg bid  -tunneled CVC placed 5/10/24 for prolonged IV abx  -IV ampicillin per ID, anticipating 6-week course with transition to TCU at discharge  -Await final surgical cultures  -Weekly labs at TCU while on IV ampicillin, faxed to ID clinic with follow-up with ID, orthopedic surgery as directed      Post-op HoTN  Troponemia due to demand ischemia  CHB, S/P PPM  Mild postoperative edema  ECHO 3/19/21: EF 50%, LV systolic function low normal.  Normal RV systolic function.  Mild MR.  Normal PASP.  IVC normal size.  Severe LAE, mild REINA.  Required ICU postoperatively for hypotension, requiring IV pressors and IV fluid resuscitation.  Subsequently resolved and transferred to medical floor 5/9.  Borderline troponin  elevation secondary to demand ischemia, asymptomatic.  Has mild pedal edema on exam following resuscitation, otherwise appears euvolemic.  Uses teds hose at home as needed.  Responded fairly well to Lasix, KCl 5/11 and 5/12, BMP again normal 5/13.  Now wearing Tubigrip's.  -Continue Tubigrip's    ABLA of Surgery, H/O ALOK:  Preop hemoglobin 12.6, MCV 90 on 4/10/2024.  Doubt active iron deficiency.  Hemoglobin adrianne 7.2 on 5/11/2024, hemoglobin stable 8.0-7.6.  No active bleeding clinically.  Hemodynamically stable.  Still has drain in right thigh.  -Monitor CBC weekly at TCU while on antibiotics    PMR, Chronic steroids:  On chronic prednisone 5 mg daily.  Compensated, asymptomatic.  -Continue prednisone 5 mg daily, monitor for evidence of adrenal insufficiency    Chronic Right Hydronephrosis  H/O Hypospadias  Urine Retention:  Urology consulted 5/10/2024.  History of right hydronephrosis dating back years, previously worked up with renogram 4/2024.  Multiple prior surgeries for hypospadias, difficult Lomeli placement for which he has required placement over a wire in the past by urologist.  Emptying well at this point.  Renal function remains normal.  -Per urology, avoid straight cathing unless symptomatically retaining given difficult access, hypospadias and need for placement over a wire.      Hyponatremia, hypomagnesemia, hypokalemia:  Hyponatremia resolved.  Magnesium now normal.  -Weekly BMP at TCU    GERD:  Eating and drinking well.  Having regular bowel movements.  -Continue PTA PPI    RLS, Insomnia:  -Continue PTA pramipexole    Left upper back rash:  Maculopapular, pruritic rash on left upper back, scapular area.  Suspect due to moisture, heat.  No peripheral rash.  No penicillin allergy.  -Start Benadryl cream 3 times daily for 7 days, then as needed  -Continue to monitor  -Keep area dry and clean      Diet: Advance Diet as Tolerated: Regular Diet Adult  Diet    DVT Prophylaxis:  mg daily per  ortho  Lomeli Catheter: Not present  Lines: PRESENT      CVC Single Lumen Right Internal jugular Tunneled-Site Assessment: WDL      Cardiac Monitoring: None  Code Status: Full Code            Jeanmarie Rocha MD  Hospitalist Service, GOLD TEAM 19  [facility]  Securely message with [employer] (more info)  Text page via [facility] Paging/Directory   See signed in provider for up to date coverage information  ______________________________________________________________________    Interval History   Notes itchy rash over left upper back, scapular area.  Pain well-controlled.  No fevers, chills or night sweats.  Eating well.  Constipation controlled.  No cough or dyspnea.  No dysuria or urgency.  Wearing Tubigrip's now.    Physical Exam   Vital Signs: Temp: 98.4  F (36.9  C) Temp src: Oral BP: 122/70 Pulse: 59   Resp: 16 SpO2: 97 % O2 Device: None (Room air)    Weight: 175 lbs 14.83 oz  General: Alert and oriented x 4, very pleasant.  Speech, affect normal.  Chest: CTA bilaterally  CV: RRR.  No murmurs.  Right CVC catheter in place.  Abdomen: NABS.  Soft, nontender, nondistended.  Extremities: No left lower extremity edema.  Mild right lower extremity edema, bilateral Tubigrip's on.  Right thigh dressing C/D/I.  Drain in place.  Neuro: Nonfocal            Data      CMP  Recent Labs   Lab 05/13/24  0641 05/12/24  2155 05/12/24  0905 05/11/24  0729 05/10/24  0716 05/09/24  0535 05/08/24  0743 05/08/24  0328     --  139 136 137 135   < > 132*   POTASSIUM 3.8 4.3 3.7 3.5 3.9 3.8  --  4.1   CHLORIDE 102  --  103 102 104 103  --  99   CO2 29  --  30* 28 28 27  --  24   ANIONGAP 5*  --  6* 6* 5* 5*  --  9   GLC 91  --  101* 95 96 95  100*   < > 128*   BUN 11.3  --  11.3 11.5 11.9 14.6  --  15.2   CR 0.87  --  0.82 0.79 0.82 0.90  --  0.82   GFRESTIMATED 90  --  >90 >90 >90 89  --  >90   LIDYA 8.2*  --  8.2* 8.2* 7.9* 8.0*  --  7.9*   MAG 2.3 2.0 1.8 1.6* 1.8 1.9   < > 1.3*  "  PHOS  --   --   --  3.8 3.2 2.9  --  3.6   PROTTOTAL  --   --   --   --   --   --   --  4.7*   ALBUMIN  --   --   --   --   --   --   --  3.6   BILITOTAL  --   --   --   --   --   --   --  0.8   ALKPHOS  --   --   --   --   --   --   --  43   AST  --   --   --   --   --   --   --  26   ALT  --   --   --   --   --   --   --  17    < > = values in this interval not displayed.     CBC  Recent Labs   Lab 05/13/24  0641 05/12/24  0905 05/11/24  0729 05/10/24  0716 05/09/24  1333 05/09/24  0535 05/08/24  1411   WBC  --   --  4.8 5.0  --  5.6 11.1*   RBC  --   --  2.33* 2.43*  --  2.58* 2.89*   HGB 7.6* 8.0* 7.2* 7.6* 7.9* 7.9* 9.0*   HCT  --   --  21.4* 22.6* 23.8* 23.6* 25.9*   MCV  --   --  92 93  --  92 90   MCH  --   --  30.9 31.3  --  30.6 31.1   MCHC  --   --  33.6 33.6  --  33.5 34.7   RDW  --   --  12.5 12.5  --  12.8 12.6   PLT  --   --  191 174  --  173 267     INRNo lab results found in last 7 days.  Arterial Blood GasNo lab results found in last 7 days.Venous Blood Gas  No lab results found in last 7 days.No results found for: \"A1C\", \"HEMOGLOBINA1\"  Results for orders placed or performed during the hospital encounter of 05/07/24   XR Pelvis Port 1/2 Views    Narrative    EXAM: XR PELVIS PORT 1/2 VIEWS  LOCATION: [facility]  DATE: 5/7/2024    INDICATION: s p explant and spacer placement on 5 7  COMPARISON: None.      Impression    IMPRESSION: Post right femoral acetabular arthroplasty. Surgical drain in position at the right hip. Bones are demineralized. No acute fracture detected. No dislocation.   XR Chest Port 1 View    Narrative    Exam:  Chest X-ray 5/8/2024 10:47 AM    History: Pulmonary edema?    Comparison: CT chest 2/2/24    Findings:   Single portable AP view of the chest. Left chest wall implantable  cardiac defibrillator with leads overlying the right atrium and right  ventricle. Mild cardiomegaly. No focal airspace opacity. Prominent  pulmonary " vascularity. No pleural effusion or pneumothorax. Visualized  upper abdomen is unremarkable. No acute osseous abnormality. Old right  clavicle fracture.      Impression    Impression:   1.  No evidence of pulmonary edema or focal pulmonary opacity.   2.  Cardiomegaly.    ERIKA MAKI MD         SYSTEM ID:  [id number]   IR CVC Tunnel Placement > 5 Yrs of Age    Narrative    PROCEDURE: Tunneled central venous catheter placement     Procedural Personnel  Attending physician(s): MEGHAN RAMOS I, Dr. Pratik Bettencourt, was present for the critical part of the  procedure and immediately available for the entire procedure.    Fellow physician(s): NHAN Marshall MD  Resident physician(s): None  Advanced practice provider(s): None    Procedure Date (mm/dd/yyyy): 5/10/2024    Pre-procedure diagnosis: CVC for antibiotics  Post-procedure diagnosis: Same  Indication: Other- antibiotics  Additional clinical history: None    Complications: No immediate complications.      Impression    IMPRESSION:    Insertion of right-sided tunneled single lumen catheter, with tip in  the expected location of the right atrium.    Plan:     The catheter may be used immediately.  _______________________________________________________________    PROCEDURE SUMMARY:  - Venous access with ultrasound guidance  - Tunneled central venous catheter insertion with fluoroscopic  guidance  - Additional procedure(s): None    PROCEDURE DETAILS:    Pre-procedure  Consent: Informed consent for the procedure including risks, benefits  and alternatives was obtained and time-out was performed prior to the  procedure.  Preparation (MIPS): The site was prepared and draped using all  elements of maximal sterile barrier technique including sterile  gloves, sterile gown, cap, mask, large sterile sheet, sterile  ultrasound probe cover, hand hygiene and cutaneous antisepsis with 2%  chlorhexidine.   Medical reason for site preparation exception (MIPS): Not  applicable    Anesthesia/sedation  Level of anesthesia/sedation: Moderate sedation (conscious sedation)  Anesthesia/sedation administered by: Independent trained observer  under attending supervision with continuous monitoring of the  patient?s level of consciousness and physiologic status  Total intra-service sedation time (minutes): 19    Access  Local anesthesia was administered. The vessel was sonographically  evaluated and determined to be patent. Real time ultrasound was used  to visualize needle entry into the vessel and a permanent image was  stored.  Laterality: Right  Vein accessed: Internal jugular vein  Access technique: Micropuncture set with 21 gauge needle    Catheter placement  An incision was made near the venous access site and the catheter was  tunneled subcutaneously to the venous access site and trimmed to  appropriate length. The catheter was advanced via a peel-away sheath  into the vein under fluoroscopic guidance. Catheter tip location was  fluoroscopically verified and a permanent image was stored.  Catheter placed: Single Lumen  Catheter size (Greenlandic): 5  Lumens: Single-lumen   Power injectable: Yes  Catheter tip: right atrium  Catheter flush: Other- heparin 10 units/cc    Closure  A sterile dressing was applied.  Access site closure technique: Tissue adhesive  Catheter securement technique: Non-absorbable suture    Radiation Dose  Fluoroscopy time (seconds): 33.4    Reference air kerma (mGy): 7.67     Additional Details  Additional description of procedure: None  Registry event: V/3/g  Device used: None  Equipment details: None  Unique Device Identifiers: Not available  Specimens removed: None  Estimated blood loss (mL): Less than 10  Standardized report: [email or web address]    Attestation  Signer name: BLUE ALCANTARA  I attest that I was present for the entire procedure. I reviewed the  stored images and agree with the report as written.      I have personally reviewed the  examination and initial interpretation  and I agree with the findings.    [facility]         SYSTEM ID:  [id number]

## 2024-05-13 NOTE — DISCHARGE SUMMARY
.ORTHOPAEDIC SURGERY DISCHARGE  SUMMARY     Date of Admission: 5/7/2024  Date of Discharge: 5/13/2024 11:39 AM  Disposition: Home  Staff Physician: No att. providers found  Primary Care Provider: Keyur Augustin    DISCHARGE DIAGNOSIS:  Infection of prosthetic total hip joint, subsequent encounter [T84.59XD, Z96.649]    PROCEDURES: Procedure(s):  Explant Right surface replacement Total Hip Arthroplasty ,  vancomycin and tobramycin impregnated cement spacer implantation on 5/7/2024    BRIEF HISTORY:  This is a 73 year old patient who has been followed in clinic. Please refer to that documentation for full details. Briefly, the patient has a history of PJI of R CHRISS. After reviewing non-operative and operative options including the risks and benefits associated with each, the patient elected to proceed with the above stated procedure.     HOSPITAL COURSE:    The patient was admitted following the above listed procedures for pain control and rehabilitation. Kwabena Solorio did well post-operatively. Medicine was consulted post operatively to aid in management of medical co-morbidities. The patient received routine nursing cares and at the time of discharge was medically stable. Vital signs were stable throughout admission. The patient is tolerating a regular diet and is voiding spontaneously. All PT/OT goals have been met for safe mobility. Pain is now controlled on oral medications which will be available on discharge. Stool softeners have been used while taking pain medications to help prevent constipation. Kwabena Solorio is deemed medically safe to discharge.     Antibiotics:  ID recommended Ampicillin IV for 6 weeks  DVT prophylaxis:  Mechanical  PT Progress:  Has met PT/OT goals for safe mobility.   Pain Meds:  Weaned off all IV pain meds by discharge.  Inpatient Events: No significant events or complications.    PHYSICAL EXAM:    General: NAD, alert and oriented, cooperative with exam.   Cardio: RRR,  extremities wwp.   Respiratory: Non-labored breathing.  MSK:   RLE: Toes wwp. Compartments soft and tolerates passive stretch of toes. +EHL/FHL/GSC/TA. SILT SP/DP/Sa/[place]/T. Dressing c/d/I with some dried sanguinous output at inferior aspect. Drain with serosanguinous output.    FOLLOWUP:    Follow up with Dr. Dudley (Kaz Bowser) at 2 weeks postoperatively. Appointment 6/10.    Future Appointments   Date Time Provider Department Center   5/14/2024 10:00 AM Darcy Gutierrez, ISA UROT [place]   6/10/2024  1:30 PM Kaz Bowser PA-C [facility]   6/12/2024  3:30 PM Zeyad House MD [facility]     Orthopaedic Surgery appointments are at the [facility] ([address] [place] [address]). Call [phone] to schedule a follow-up appointment at this location with your provider.     PLANNED DISCHARGE ORDERS:     DVT Prophylaxis: Mechanical     Activity: Toe touch weight bearing RLE     Wound Care: see Below      Discharge Medication List as of 5/13/2024 10:59 AM        START taking these medications    Details   ampicillin (OMNIPEN) 2 g vial Inject 2 g into the vein every 4 hours for 42 days, Disp-2016 mL, R-0, Local Print      !! diphenhydrAMINE-zinc acetate (BENADRYL) 2-0.1 % external cream Apply topically 3 times daily for 7 daysTransitional      !! diphenhydrAMINE-zinc acetate (BENADRYL) 2-0.1 % external cream Apply topically 3 times daily as needed for itchingTransitional      polyethylene glycol (MIRALAX) 17 GM/Dose powder Take 17 g by mouth daily Hold for loose stools., Transitional      senna-docusate (SENOKOT-S/PERICOLACE) 8.6-50 MG tablet Take 3 tablets by mouth 2 times daily, Disp-60 tablet, R-0, Transitional       !! - Potential duplicate medications found. Please discuss with provider.        CONTINUE these medications which have CHANGED    Details   acetaminophen (TYLENOL) 325 MG tablet Take 2 tablets (650 mg) by mouth every 4 hours as needed for other (For optimal  non-opioid multimodal pain management to improve pain control.), Transitional      aspirin 81 MG EC tablet Take 2 tablets (162 mg) by mouth daily for 30 days, Disp-56 tablet, R-0, Transitional      gabapentin (NEURONTIN) 300 MG capsule Take 3 capsules (900 mg) by mouth at bedtime, Transitional      LORazepam (ATIVAN) 0.5 MG tablet Take 1 tablet (0.5 mg) by mouth nightly as needed for anxiety or sleep, Disp-10 tablet, R-0, Local Print      oxyCODONE (ROXICODONE) 5 MG tablet Take 1 tablet (5 mg) by mouth every 4 hours as needed for moderate pain, Disp-26 tablet, R-0, Local Print      zolpidem (AMBIEN) 5 MG tablet Take 1 tablet (5 mg) by mouth nightly as needed for sleep, Disp-10 tablet, R-0, Local Print           CONTINUE these medications which have NOT CHANGED    Details   amoxicillin (AMOXIL) 500 MG capsule Take 2,000 mg by mouth once Prior to dental appointments, Historical      calcium-magnesium (CALMAG) 500-250 MG TABS per tablet Take 1 tablet by mouth every morning With ZINC, Historical      Cholecalciferol (D 1000) 25 MCG (1000 UT) CAPS Take 1,000 Units by mouth daily (with lunch), Historical      diclofenac (VOLTAREN) 1 % topical gel Apply 2 g topically 2 times daily as needed for moderate pain Applies before activity to right foot or in the evening to tingling hands, Historical      ferrous sulfate (FEROSUL) 325 (65 Fe) MG tablet Take 325 mg by mouth daily (with breakfast), Historical      glucosamine-chondroitin 500-400 MG CAPS per capsule Take 1 capsule by mouth every morning, Historical      ketoconazole (NIZORAL) 2 % external cream Apply thin layer twice daily as needed for rash on the face.Historical      LUTEIN PO Take 1 tablet by mouth every morning, Historical      Omega-3 Fatty Acids (SUPER OMEGA-3) 1000 MG CAPS Take 1,000 mg by mouth every morning, Historical      omeprazole (PRILOSEC) 20 MG DR capsule Take 1 capsule by mouth daily before breakfast, Historical      pramipexole (MIRAPEX) 0.125 MG  tablet Take 0.25 mg by mouth at bedtime, Historical      predniSONE (DELTASONE) 5 MG tablet Take 5 mg by mouth daily (with lunch), Historical      Psyllium (METAMUCIL 4 IN 1 FIBER) 55.6 % POWD Take 1 Scoop by mouth every morning, Historical      vitamin B-12 (CYANOCOBALAMIN) 500 MCG tablet Take 1,000 mcg by mouth daily (with lunch), Historical      vitamin C (ASCORBIC ACID) 500 MG tablet Take 500 mg by mouth every morning, Historical           STOP taking these medications       acetaminophen (TYLENOL 8 HOUR ARTHRITIS PAIN) 650 MG CR tablet Comments:   Reason for Stopping:         docusate sodium (COLACE) 100 MG capsule Comments:   Reason for Stopping:         ibuprofen (ADVIL/MOTRIN) 200 MG capsule Comments:   Reason for Stopping:         naproxen (NAPROSYN) 250 MG tablet Comments:   Reason for Stopping:         SSD 1 % external cream Comments:   Reason for Stopping:                 Discharge Procedure Orders   Comprehensive metabolic panel   Standing Status: Standing Number of Occurrences: 6 Standing Exp. Date: 05/13/25   Order Comments: Fax lab result to [facility] Information:  Phone: [phone]  Fax: [phone] (Liliana Hollis)     CRP, inflammation   Standing Status: Standing Number of Occurrences: 6 Standing Exp. Date: 05/13/25   Order Comments: Fax lab result to [facility] Information:  Phone: [phone]  Fax: [phone] (Liliana Hollis)     OPAT enrollment and ID Clinic Referral   Referral Priority: Routine: Next available opening Referral Type: Consultation   Number of Visits Requested: 1     General info for SNF   Order Comments: Length of Stay Estimate: Short Term Care: Estimated # of Days 31-90 Condition at Discharge: Stable Level of care:skilled  Rehabilitation Potential: Good Admission H&P remains valid and up-to-date: Yes Recent Chemotherapy: N/A Use Nursing Home Standing Orders: Yes  "    Mantoux Instructions   Order Comments: Give two-step Mantoux (PPD) Per Facility Policy {.:[id number]     Incentive Spirometry   Order Comments: Incentive Spirometry 10 times per hour, 4 times per day.     Reason for your hospital stay   Order Comments: Right hip explant antibiotic spacer     When to call - Contact Surgeon Team   Order Comments: You may experience symptoms that require follow-up before your scheduled appointment. Refer to the \"Stoplight Tool\" for instructions on when to contact your Surgeon Team if you are concerned about pain control, blood clots, constipation, or if you are unable to urinate.     When to call - Reach out to Urgent Care   Order Comments: If you are not able to reach your Surgeon Team and you need immediate care, go to the Orthopedic Walk-in Clinic or Urgent Care at your Surgeon's office.  Do NOT go to the Emergency Room unless you have shortness of breath, chest pain, or other signs of a medical emergency.     When to call - Reasons to Call 911   Order Comments: Call 911 immediately if you experience sudden-onset chest pain, arm weakness/numbness, slurred speech, or shortness of breath     Symptoms - Fever Management   Order Comments: A low grade fever can be expected after surgery.  Use acetaminophen (TYLENOL) as needed for fever management.  Contact your Surgeon Team if you have a fever greater than 101.5 F, chills, and/or night sweats.     Symptoms - Constipation management   Order Comments: Constipation (hard, dry bowel movements) is expected after surgery due to the combination of being less active, the anesthetic, and the opioid pain medication.  You can do the following to help reduce constipation:  ~  FLUIDS:  Drink clear liquids (water or Gatorade), or juice (apple/prune).  ~  DIET:  Eat a fiber rich diet.    ~  ACTIVITY:  Get up and move around several times a day.  Increase your activity as you are able.  MEDICATIONS:  Reduce the risk of constipation by starting " medications before you are constipated.  You can take Miralax   (1 packet as directed) and/or a stool softener (Senokot 1-2 tablets 1-2 times a day).  If you already have constipation and these medications are not working, you can get magnesium citrate and use as directed.  If you continue to have constipation you can try an over the counter suppository or enema.  Call your Surgeon Team if it has been greater than 3 days since your last bowel movement.     Symptoms - Reduced Urine Output   Order Comments: Changes in the amount of fluids you drank before and after surgery may result in problems urinating.  It is important to stay well-hydrated after surgery and drink plenty of water. If it has been greater than 8 hours since you have urinated despite drinking plenty of water, call your Surgeon Team.     Activity - Exercises to prevent blood clots   Order Comments: Unless otherwise directed by your Surgeon team, perform the following exercises at least three times per day for the first four weeks after surgery to prevent blood clots in your legs: 1) Point and flex your feet (Ankle Pumps), 2) Move your ankle around in big circles, 3) Wiggle your toes, 4) Walk, even for short distances, several times a day, will help decrease the risk of blood clots.     Order Specific Question Answer Comments   Is discharge order? Yes      Comfort and Pain Management - Pain after Surgery   Order Comments: Pain after surgery is normal and expected.  You will have some amount of pain for several weeks after surgery.  Your pain will improve with time.  There are several things you can do to help reduce your pain including: rest, ice, elevation, and using pain medications as needed. Contact your Surgeon Team if you have pain that persists or worsens after surgery despite rest, ice, elevation, and taking your medication(s) as prescribed. Contact your Surgeon Team if you have new numbness, tingling, or weakness in your operative extremity.      Comfort and Pain Management - Swelling after Surgery   Order Comments: Swelling and/or bruising of the surgical extremity is common and may persist for several months after surgery. In addition to frequent icing and elevation, gentle compressive support with an ACE wrap or tubigrip may help with swelling. Apply compression regularly, removing at least twice daily to perform skin checks. Contact your Surgeon Team if your swelling increases and is NOT associated with an increase in your activity level, or if your swelling increases and is associated with redness and pain.     Comfort and Pain Management - Cold therapy   Order Comments: Ice can be used to control swelling and discomfort after surgery. Place a thin towel over your operative site and apply the ice pack overtop. Leave ice pack in place for 20 minutes, then remove for 20 minutes. Repeat this 20 minutes on/20 minutes off routine as often as tolerated.     Medication Instructions - Acetaminophen (TYLENOL) Instructions   Order Comments: You were discharged with acetaminophen (TYLENOL) for pain management after surgery. Acetaminophen most effectively manages pain symptoms when it is taken on a schedule without missing doses (every four, six, or eight hours). Your Provider will prescribe a safe daily dose between 3000 - 4000 mg.  Do NOT exceed this daily dose. Most patients use acetaminophen for pain control for the first four weeks after surgery.  You can wean from this medication as your pain decreases.     Medication Instructions - NSAID Instructions   Order Comments: You were discharged with an anti-inflammatory medication for pain management to use in combination with acetaminophen (TYLENOL) and the narcotic pain medication.  Take this medication exactly as directed.  You should only take one anti-inflammatory at a time.  Some common anti-inflammatories include: ibuprofen (ADVIL, MOTRIN), naproxen (ALEVE, NAPROSYN), celecoxib (CELEBREX), meloxicam  (MOBIC), ketorolac (TORADOL).  Take this medication with food and water.     Medication Instructions - Opioids - Tapering Instructions   Order Comments: In the first three days following surgery, your symptoms may warrant use of the narcotic pain medication every four to six hours as prescribed. This is normal. As your pain symptoms improve, focus your efforts on decreasing (tapering) use of narcotic medications. The most successful tapering strategy is to first, decrease the number of tablets you take every 4-6 hours to the minimum prescribed. Then, increase the amount of time between doses.  For example:  First, taper to   or 1 tablet every 4-6 hours.  Then, taper to   or 1 tablet every 6-8 hours.  Then, taper to   or 1 tablet every 8-10 hours.  Then, taper to   or 1 tablet every 10-12 hours.  Then, taper to   or 1 tablet at bedtime.  The bedtime dose can help with comfort during sleep and is typically the last dose to be discontinued after surgery.     Follow Up Care   Order Comments: Follow-up with your Surgeon Team in [place] 10, 2024 for wound check.     Assess heels for pressure points     Shower with wound/dressing covered   Order Comments: You must COVER your dressing or incision with saran wrap (or any other non-permeable covering) to allow the incision to remain dry while showering. You may shower 7 days after surgery as long as the surgical wound stays dry. Continue to cover your dressing or incision for showering until your first office visit.  You are strictly prohibited from soaking or submerging the surgical wound underwater.     Medication instructions -  Anticoagulation - aspirin   Order Comments: Take the aspirin as prescribed for a total of four weeks after surgery.  This is given to help minimize your risk of blood clot.     Comfort and Pain Management - LOWER Extremity Elevation   Order Comments: Swelling is expected for several months after surgery. This type of swelling is usually associated  "with gravity and activity, and can be improved with elevation.   The best way to do this is to get your \"toes above your nose\" by laying down and placing several pillows lengthwise under your calf and heel. When elevating your leg keep your knee completely straight. Perform this elevation as often as possible especially for the first two weeks after surgery.     Medication Instructions - Opioid Instructions (Greater than or equal to 65 years)   Order Comments: You were discharged with an opioid medication (hydromorphone, oxycodone, hydrocodone, or tramadol). This medication should only be taken for breakthrough pain that is not controlled with acetaminophen (TYLENOL). If you rate your pain less than 3 you do not need this medication.  Pain rating 0-3:  You do not need this medication  Pain rating 4-6:  Take 1/2 tablet every 4-6 hours as needed  Pain rating 7-10:  Take 1 tablet every 4-6 hours as needed  Do not exceed 4 tablets per day     Drain care   Order Comments: Leave drain in for 14 days     Physical Therapy Adult Consult   Order Comments: Evaluate and treat as clinically indicated.    Reason: Status Post Hip Surgery     Occupational Therapy Adult Consult   Order Comments: Evaluate and treat as clinically indicated.    Reason: Status Post Hip Surgery     Fall precautions     Crutches DME   Order Comments: DME Documentation: Describe the reason for need to support medical necessity: Impaired gait status post hip surgery. I, the undersigned, certify that the above prescribed supplies are medically necessary for this patient and is both reasonable and necessary in reference to accepted standards of medical practice in the treatment of this patient's condition and is not prescribed as a convenience.     Order Specific Question Answer Comments   Medical Equipment (DME) Supplier: [employer]    PATIENT INSTRUCTIONS: If you did not receive this ordered item today, please contact [employer] for availability ([place] Locations: [phone], [place]: [phone]).    Crutch Type: Standard    Crutches Add On: NA    Length of Need: Lifetime      Cane DME   Order Comments: DME Documentation: Describe the reason for need to support medical necessity: Impaired gait status post hip surgery. I, the undersigned, certify that the above prescribed supplies are medically necessary for this patient and is both reasonable and necessary in reference to accepted standards of medical practice in the treatment of this patient's condition and is not prescribed as a convenience.     Order Specific Question Answer Comments   Medical Equipment (DME) Supplier: [employer]    PATIENT INSTRUCTIONS: If you did not receive this ordered item today, please contact [employer] for availability ([place] Locations: [phone], [place]: [phone]).    Cane Type: Single Tip    Reminder: Patient can typically get 1 every 5 years      Walker DME   Order Comments: DME Documentation: Describe the reason for need to support medical necessity: Impaired gait status post hip surgery. I, the undersigned, certify that the above prescribed supplies are medically necessary for this patient and is both reasonable and necessary in reference to accepted standards of medical practice in the treatment of this patient's condition and is not prescribed as a convenience.     Order Specific Question Answer Comments   Medical Equipment (DME) Supplier: [employer]    PATIENT INSTRUCTIONS: If you did not receive this ordered item today, please contact [employer] for availability ([place] Locations: [phone], [place]: [phone]).    Walker Type: Standard (2 Wheel)    Accessories: N/A      Diet   Order Comments: Follow this diet upon discharge: Regular     Order Specific Question Answer Comments   Is discharge order? Yes      CBC with platelets and  differential   Standing Status: Standing Number of Occurrences: 6 Standing Exp. Date: 05/13/25   Order Comments: Fax lab result to [facility] Information:  Phone: [phone]  Fax: [phone] (Attention Noe Hollis)       Mayte Gomez MD  Resident Physician, PGY-1  Orthopedic Surgery

## 2024-05-13 NOTE — PLAN OF CARE
Physical Therapy Discharge Summary    Reason for therapy discharge:    Discharged to transitional care facility.    Progress towards therapy goal(s). See goals on Care Plan in [facility] electronic health record for goal details.  Goals partially met.  Barriers to achieving goals:   discharge from facility.    Therapy recommendation(s):    Continued therapy is recommended.  Rationale/Recommendations:  Continue PT to improve strength and independence with mobility.

## 2024-05-13 NOTE — PROGRESS NOTES
DISCHARGE SUMMARY    Pt discharging to: TCU  Transportation: private vehicle  AVS given and discussed: Pt was given AVS and pt states understanding of content. Pt has no further questions.     Belongings returned: Yes, ensured all belongings packed and sent with pt. No items in security.   Comments: Escorted safely to elevators. Pt left at

## 2024-05-13 NOTE — PROGRESS NOTES
"Orthopaedic Surgery Progress Note     Subjective: Patient seen and examined. He was resting comfortably in bed. Questions answered. He is hoping for discharge today.    ID plan from 5/10 is IV ampicillin for 6 weeks.    Objective: /70 (BP Location: Right arm)   Pulse 59   Temp 98.4  F (36.9  C) (Oral)   Resp 16   Ht 1.676 m (5' 6\")   Wt 79.8 kg (175 lb 14.8 oz)   SpO2 97%   BMI 28.40 kg/m      Drain:   Output by Drain (mL) 05/11/24 0700 - 05/11/24 1459 05/11/24 1500 - 05/11/24 2259 05/11/24 2300 - 05/12/24 0659 05/12/24 0700 - 05/12/24 1459 05/12/24 1500 - 05/12/24 2259 05/12/24 2300 - 05/13/24 0659 05/13/24 0700 - 05/13/24 0756   Closed/Suction Drain 1 Right Thigh Bulb 15 Lithuanian 90  30 30 75 30      General: NAD, alert and oriented, cooperative with exam.   Cardio: RRR, extremities wwp.   Respiratory: Non-labored breathing.  MSK:   RLE: Toes wwp. Compartments soft and tolerates passive stretch of toes. +EHL/FHL/GSC/TA. SILT SP/DP/Sa/Oliveira/T. Dressing c/d/I with some dried sanguinous output at inferior aspect. Drain with serosanguinous output.    Labs:  Recent Labs   Lab 05/13/24  0641 05/12/24  0905 05/11/24  0729 05/10/24  0716 05/09/24  1333   HGB 7.6* 8.0* 7.2* 7.6* 7.9*     Cultures: Enterococcus faecalis  .No results found for: \"SED\"    CRP Inflammation   Date Value Ref Range Status   05/08/2024 7.97 (H) <5.00 mg/L Final     Recent Labs   Lab 05/08/24  0817 05/07/24  1920 05/07/24  1909 05/07/24  1908 05/07/24  1907 05/07/24  1847 05/07/24  1835   CULTURE No growth after 4 days  No growth after 4 days No anaerobic organisms isolated after 5 days  Isolated in broth only Enterococcus faecalis* No anaerobic organisms isolated after 5 days  1+ Enterococcus faecalis* No anaerobic organisms isolated after 5 days  Isolated in broth only Enterococcus faecalis* No anaerobic organisms isolated after 5 days  1+ Enterococcus faecalis* No anaerobic organisms isolated after 5 days  1+ Enterococcus faecalis* " No Growth  No anaerobic organisms isolated after 2 days  1+ Enterococcus faecalis*      Assessment and Plan:   Kwabena Solorio is a 73 year old male s/p Procedure(s):  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 Dudley.     ID plan from 5/10 is IV ampicillin for 6 weeks.    Activity: Up with assist and assistive devices as needed until independent. Posterior hip precautions to operative hip x 3 months:  1) No hip flexion beyond 90 degrees  2) No adduction beyond midline  3) No internal rotation beyond neutral   Weight bearing status: Toe touch weight bearing  Antibiotics: ID recommendations above.  Diet: Begin with clear fluids and progress diet as tolerated. Bowel regimen. Anti-emetics PRN.    DVT prophylaxis: Mechanical while in hospital with Aspirin 162mg daily x 4 weeks, starting morning of POD 1  Elevation: Elevate heels off of bed on pillows   Wound Care: Aquacel x 5-7 days.    Drains: Document output per shift, Ortho will discontinue on in 2 weeks  Pain management: Orals PRN, IV for breakthrough only  X-rays: AP Pelvis and lateral in PACU  Physical Therapy: Mobilization, ROM, ADL's, Posterior hip precautions  Occupational Therapy: ADL's  Labs: Trend Hgb on POD #1, 2  Consults: PT, OT. Hospitalist, appreciate assistance in caring for this patient throughout the perioperative period            Future Appointments   Date Time Provider Department Center   5/8/2024  8:45 AM Sofi Bernstein PT [facility] [place]   5/8/2024 10:00 AM Cira Raman OTR [facility] [place]   5/8/2024  1:30 PM Sofi Bernstein PT [facility] [place]   6/10/2024  1:30 PM Kaz Bowser, NEVIN MERCEDESUOR [facility]      Disposition: Discharge to TCU likely Monday    Mayte Gomez MD  Resident Physician  Orthopedic Surgery  Pager: [phone]     Please page me directly with any questions/concerns during regular weekday hours before 5 pm. If there is no response,  if it is a weekend, or if it is during evening hours then please page the orthopedic surgery resident on call.

## 2024-05-13 NOTE — PROGRESS NOTES
Care Management Discharge Note    Discharge Date: 05/13/24       Discharge Disposition: [facility] Nurse to Nurse [phone]-Dmsysz/Boby    Discharge Services: Transportation Services-Family providing transport    Discharge DME: IV-ABX    Discharge Transportation: family o  Private pay costs discussed: insurance costs co-pays    Does the patient's insurance plan have a 3 day qualifying hospital stay waiver?  No    PAS Confirmation Code: [id number]    Patient/family educated on Medicare website which has current facility and service quality ratings: no (Pt identified bed hold at [facility])    Education Provided on the Discharge Plan: Yes  Persons Notified of Discharge Plans: Pt, family, provider, facility, insurance company  Patient/Family in Agreement with the Plan: yes    Handoff Referral Completed: Yes    Additional Information:  SW completed Prior auth, CHW completed PAS. CORDELL sent orders to pharmacy and TCU.     Rich Licona, LICSW  [facility]   Ph: [phone]  Pager: [phone]

## 2024-05-13 NOTE — TELEPHONE ENCOUNTER
Prolonged Parenteral/Oral Antibiotic Recommendations and ID Follow up  This template provides final ID recommendations as of this date.      Infectious Diseases Indication: PJI     Antibiotic Information  Name of Antibiotic Dose of Antibiotic1 Anticipated duration Effective start date2 End date   Ampicillin 2g q4hr (or 12g continuous infusion q24hr)  6 wks 5/7/24 tbd in ID clinic follow up (approx 6 wks total antibiotics IV/PO)                           1.Dose of antibiotic will need to be renally adjusted if creatinine clearance changes  2.Effective start date is the date of adequate therapy with appropriate spectrum     Method of antibiotic delivery:Tunneled line.Is the line being used for another indication besides antimicrobials? No At the end of therapy should the line used for antimicrobials be removed or de-accessed? Yes requires IR removal (tunneled line).       Weekly labs required: CBC with diff, CMP and CRP. Dr. House will follow labs at discharge until ID follow up. Fax labs to ID clinic.     Are there pending microbial tests: Yes Cultures      We will attempt to make OPAT appointments within 2 weeks of discharge based on clinic availability.  Provider: Harsh, timing of visit 4 weeks, and the type of clinic appointment visit In-Person visit.      ID provider route note: OPAT RN Care Coordinator [facility] ID Clinic Information:  Phone: [phone]  Fax: [phone] (Attention Noe Hollis)           Thank you for allowing us to participate in the care of this patient. [facility] ID will sign off at this time. Can see [facility] schedule for paging details if questions.      50 minutes spent by me on the date of the encounter doing chart review, history and exam, documentation and further activities per the note        Zeyad House MD    Infectious Diseases   05/10/2024

## 2024-05-13 NOTE — PHARMACY
[facility]  Parenteral ANtibiotic Review at Departure from Acute Care Collaborative Note     Patient: Kwabena Solorio  MRN: [id number]  Allergies: Patient has no known allergies.    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 No  Pending Cultures/Microbiological Tests: yes 5/7 intraoperative R hip tissue culture finalization    Inpatient ID involved in developing OPAT plan: Yes - discharge OPAT plan has no changes from ID provider, Dr. Bhargav House, OPAT plan charted on 5/10/2024    Outpatient ID Follow-up: ID OPAT Clinic Referral Placed ([facility] Ph: [phone] and Fax: [phone]) - appointment scheduled  Designated Provider: Dr. Bhargav House    Antimicrobial Regimen / Route Anticipated  Duration Start Date Stop /  Reassess Date   Ampicillin 2 g every 4 hours/IV    Or    12 g every 24 hours as continuous infusion TBD per ID provider (anticipate 6 weeks of IV/PO therapy) 5/7/2024 (date of surgery) Definitive end date to be determined by ID provider      Laboratory Tests and Monitoring Frequency: CBC with Diff, CMP, CRP Once Weekly    Imaging/Miscellaneous Monitoring: None    ID Pharmacist Interventions: None                          Apple Aguilar, PharmD, BCIDP  Pager: [phone]

## 2024-05-14 ENCOUNTER — DOCUMENTATION ONLY (OUTPATIENT)
Dept: INFECTIOUS DISEASES | Facility: CLINIC | Age: 74
End: 2024-05-14
Payer: COMMERCIAL

## 2024-05-14 LAB
BACTERIA TISS BX CULT: ABNORMAL
BACTERIA TISS BX CULT: NORMAL

## 2024-05-14 NOTE — PLAN OF CARE
Occupational Therapy Discharge Summary    Reason for therapy discharge:    Discharged to transitional care facility.    Progress towards therapy goal(s). See goals on Care Plan in [facility] electronic health record for goal details.  Goals partially met.  Barriers to achieving goals:   discharge from facility.    Therapy recommendation(s):    Continued therapy is recommended.  Rationale/Recommendations:  maximize safety and independence with ADLs.

## 2024-05-14 NOTE — TELEPHONE ENCOUNTER
Called [place] and patient was not admitted. Called patient and spoke to sister Tete and informed he was sent to   [facility] [place] [address]   P: [phone] ask for Kiley, Director RN - direct line [phone].      They do have hospitalist that can see the patient weekly and will be seeing patient on Thursday. Weekly labs will be sent to provider last seen in hospital. Labs will be sent on Fridays.     Sisters will bring to follow-ups. Call facility to set up appts.       Max Cortez RN  Infectious Disease on 5/14/2024 at 10:42 AM

## 2024-05-14 NOTE — PROGRESS NOTES
Prolonged Parenteral/Oral Antibiotic Recommendations and ID Follow up  This template provides final ID recommendations as of this date.      Infectious Diseases Indication: PJI     Antibiotic Information  Name of Antibiotic Dose of Antibiotic1 Anticipated duration Effective start date2 End date   Ampicillin 2g q4hr (or 12g continuous infusion q24hr)  6 wks 5/7/24 tbd in ID clinic follow up (approx 6 wks total antibiotics IV/PO)                           1.Dose of antibiotic will need to be renally adjusted if creatinine clearance changes  2.Effective start date is the date of adequate therapy with appropriate spectrum     Method of antibiotic delivery:Tunneled line.Is the line being used for another indication besides antimicrobials? No At the end of therapy should the line used for antimicrobials be removed or de-accessed? Yes requires IR removal (tunneled line).       Weekly labs required: CBC with diff, CMP and CRP. Dr. House will follow labs at discharge until ID follow up. Fax labs to ID clinic.      Are there pending microbial tests: Yes Cultures      We will attempt to make OPAT appointments within 2 weeks of discharge based on clinic availability.  Provider: Harsh, timing of visit 4 weeks, and the type of clinic appointment visit In-Person visit. 6/12/24     ID provider route note: OPAT RN Care Coordinator [facility] ID Clinic Information:  Phone: [phone]  Fax: [phone] (Attention Noe Hollis)           Thank you for allowing us to participate in the care of this patient. [facility] ID will sign off at this time. Can see [facility] schedule for paging details if questions.      50 minutes spent by me on the date of the encounter doing chart review, history and exam, documentation and further activities per the note        Zeyad House MD    Infectious Diseases   05/10/2024

## 2024-05-20 DIAGNOSIS — T84.59XD INFECTION OF PROSTHETIC TOTAL HIP JOINT, SUBSEQUENT ENCOUNTER: Primary | ICD-10-CM

## 2024-05-20 DIAGNOSIS — Z96.649 INFECTION OF PROSTHETIC TOTAL HIP JOINT, SUBSEQUENT ENCOUNTER: Primary | ICD-10-CM

## 2024-05-23 LAB — BACTERIA SNV CULT: NO GROWTH

## 2024-06-07 ENCOUNTER — CARE COORDINATION (OUTPATIENT)
Dept: ORTHOPEDICS | Facility: CLINIC | Age: 74
End: 2024-06-07
Payer: COMMERCIAL

## 2024-06-07 NOTE — PROGRESS NOTES
- A call was placed to the patient.     - I let him know the images are not in and they need to be mailed. We rescheduled to Thursday with .     - He was wondering when drain will be removed is currently out putting 100-70mLs a day. I told him I would reach out to team and let him know the plan.     - They are able to remove the drain at the nursing home.     - Patient verbalized understanding of plan and all questions were answered. Call back number to clinic was given and patient was told to call if they had an further questions.

## 2024-06-07 NOTE — PROGRESS NOTES
Virtual Visit Details    Type of service:  Video Visit   Video Start Time: 1645  Video End Time: 1715    Originating Location (pt. Location): Home    Distant Location (provider location):  On-site  Platform used for Video Visit: [facility]  Orthopaedic Surgery, Joint Replacement Consultation  by Rd Dudley M.D.     Kwabena Solorio MRN# [id number]     YOB: 1950      Requesting physician: No ref. provider found  No primary care provider on file.      Background history:  DX:  PMR on prednisone     TREATMENTS:  10/9/1979, left knee arthroscpy medial menisectomy, (Oly), [facility]  3/27/07, Right hip arthroplasty, [place] Hip [facility]ur[facility]. Size 46 femoral head, 52-mm, acetabulum, Tobramycin Simplex cement.  ( Keyur Balderrama MD ) [facility]  3/18/2010, L TKA, Jaquan NexGen LPS Flex, 10mm insert, (Keyur Balderrama) [facility]  3/22/2013, Angel/Akin Bunionectomy, bilateral feet (R. Sticha) [facility]  3/10/2014, screw removal right foot (R. Sticha) [facility]  5/2/2014, screw removal left foot, (R. Sticha), [facility]  2/27/2020, Right CMC arthroplasty and Left hand thumb trigger finger release (JAQUI Prasad), [facility]  4/25/2024, R hip aspirate: WBC 21k, PMNs 89%, Culture Enterococcus faecalis, serum Chr 3.7 nl, serum Co 4.2 elevated.  5/7/24, Explant Right surface replacement Total Hip Arthroplasty and vancomycin and tobramycin impregnated cement spacer implantation, (Luis Enrique), [facility]  E Faecalis.  Ampicillin IV x 6 weeks.      Drain removed 2 days ago.  ID svc states to stop antibx.    Patient feels well.  No fever.  Incision dry per pt.    XR:  spacer in place.    Labs: CRP 0.6  (<1.0)     IMP: resolving PJI    PLAN:  After ampicillin discontinued, antibiotic holiday x 8 weeks and then repeat aspiration of R hip for infection workup and in person clinic visit for discussion of 2nd stage reimplantation.  If negative workup, then OK to proceed with 2nd stage  reimplantation.  We can hold dates for repeat aspiration and 2nd stage implantation in advance.  I will ask our clinic RN to arrange.      Rd Dudley MD  [employer] Professor  Oncology and Adult Reconstructive Surgery  Dept Orthopaedic Surgery, [facility]  [phone] office, [phone] pager  [email or web address]

## 2024-06-11 NOTE — PROGRESS NOTES
- A call was placed to the patient.     - I told him we want them to pull the drain.     - Barbara, [phone] to let her know about this order.     - Patient verbalized understanding of plan and all questions were answered. Call back number to clinic was given and patient was told to call if they had an further questions.     ---------    - Call placed to VERÓNICA Cao and she took verbal orders to pull drain.

## 2024-06-12 ENCOUNTER — VIRTUAL VISIT (OUTPATIENT)
Dept: INFECTIOUS DISEASES | Facility: CLINIC | Age: 74
End: 2024-06-12
Attending: INTERNAL MEDICINE
Payer: COMMERCIAL

## 2024-06-12 VITALS
SYSTOLIC BLOOD PRESSURE: 130 MMHG | OXYGEN SATURATION: 95 % | DIASTOLIC BLOOD PRESSURE: 51 MMHG | WEIGHT: 174.2 LBS | HEIGHT: 66 IN | HEART RATE: 68 BPM | BODY MASS INDEX: 28 KG/M2 | RESPIRATION RATE: 16 BRPM

## 2024-06-12 DIAGNOSIS — T84.59XD INFECTION OF PROSTHETIC TOTAL HIP JOINT, SUBSEQUENT ENCOUNTER: Primary | ICD-10-CM

## 2024-06-12 DIAGNOSIS — Z96.649 INFECTION OF PROSTHETIC TOTAL HIP JOINT, SUBSEQUENT ENCOUNTER: Primary | ICD-10-CM

## 2024-06-12 PROCEDURE — 99214 OFFICE O/P EST MOD 30 MIN: CPT | Mod: 24 | Performed by: INTERNAL MEDICINE

## 2024-06-12 ASSESSMENT — PAIN SCALES - GENERAL: PAINLEVEL: MILD PAIN (2)

## 2024-06-12 NOTE — PROGRESS NOTES
Virtual Visit Details    Type of service:  Video Visit   Video Start Time: 3:31 PM  Video End Time:3:43 PM    Originating Location (pt. Location): Other nursing home  Distant Location (provider location):  On-site  Platform used for Video Visit: Lissette Solorio is here today for a complaint of hospital discharge follow up.      [facility] [address] [address]  Phone: [phone]  Fax: [phone]    Patient:  Kwabena Solorio, Date of birth 1950  Date of Visit:  06/12/2024  Referring Provider Zeyad House      Assessment & Plan        ID problem list:  Right hip PJI  4/25/24 fluid aspiration +E.faecalis (amp-susceptible)  S/p 5/7/24 right CHRISS explant with antibiotic spacer placement (Dr. Dudley)  OR cultures from 5/7/24 preliminarily with E.faecalis (susceptibility pending) and C.acnes  History of right CHRISS (2007)  History of PMR (on chronic prednisone 5 mg/d)    Discussion:  Continues on ampicillin course which he is tolerating without major issues, now with normalization of CRP on repeat labs, and s/p drain removal by nursing. I am unable to examine hip today due to patient preference for video visit, but he does plan to see ortho next week to ensure appropriate healing and to ensure no clinical signs infection.     Recs:  1.  Continue 6-week course of ampicillin from the time of surgery (5/7-6/18)  - will need to schedule CVC removal with IR after antibiotic completion 6/18 as has a tunneled right chest CVC instead of a usual PICC  2.  Continue to follow with ortho as planned for hip exam and surgical management  3. Continue to follow with primary care for remaining management    4. Follow up in ID clinic within 2-4 weeks (in-person visit preferred)      Medical Decision Making    35 minutes spent by me on the date of the encounter doing chart review, history and exam, documentation and further activities per the note           I communicated with the patient at this visit.      Patient was seen on 06/12/2024 via video visit.    Zeyad House MD  Infectious Diseases      Subjective       HPI:  Kwabena Solorio is a 73 year old male with PMH including right hip PJI with recent hospitalization at [facility] 5/2024 for PJI, who presents for hospital discharge follow up visit (virtual visit a patient preference due to difficulty with transportation from nursing home).       Interval HPI:  Patient calling from nursing [place] in [facility] -- taking the IV antibiotic without issues there. No nausea/vomiting, +runny stool mix of watery/loose 1x/day, no abdominal pain. Only chills/sweats was when drain was pulled yesterday. In terms of hip spacer - achy at times if moving a lot (not if sedentary); prior was having discomfort around the drain which improved after removal. No open wounds/drainage now. No rashes. Does have left foot first toe nail removed separately - healing also. PICC line pulling some hair, is on right chest (they weren't able to place in arm).  Getting labs checked again tomorrow. Plan to continue through 6/18 and then stop. Sees ortho tomorrow. Also sees local primary care provider at nursing home. Says this is his first revision CHRISS infection.      PAST MEDICAL HISTORY  Past Medical History:   Diagnosis Date    BPH (benign prostatic hyperplasia)     Cardiac pacemaker in situ     Complete heart block (H)     Hydronephrosis, right     Lumbar radiculopathy     Lumbar scoliosis     Restless legs syndrome (RLS)      Otherwise as per HPI    PAST SURGICAL HISTORY  Past Surgical History:   Procedure Laterality Date    APPENDECTOMY      AS TOTAL KNEE ARTHROPLASTY Left 2010    CARPAL TUNNEL RELEASE RT/LT      CATARACT EXTRACTION      COLONOSCOPY      EP ICD      FOOT SURGERY Bilateral     HAND SURGERY      HC TOTAL HIP ARTHROPLASTY Right 2007    HEMORRHOIDECTOMY      HERNIA REPAIR      IR CVC TUNNEL PLACEMENT > 5 YRS OF AGE   "5/10/2024    IRRIGATION AND DEBRIDEMENT HIP, PLACE ANTIBIOTIC CEMENT BEADS / SPACER Right 5/7/2024    Procedure: vancomycin and tobramycin impregnated cement spacer implantation;  Surgeon: Rd Dudley MD;  Location: [facility] OR    NASAL FRACTURE SURGERY      REMOVE HARDWARE ARTHROPLASTY HIP Right 5/7/2024    Procedure: Explant Right surface replacement Total Hip Arthroplasty ,;  Surgeon: Rd Dudley MD;  Location: [facility] OR    TONSILLECTOMY & ADENOIDECTOMY      urinary tract surgery       Otherwise as per HPI    ALLERGIES AND DRUG REACTIONS  No Known Allergies    FAMILY HISTORY  No known immunocompromising conditions or infections unless listed below  family history is not on file.    SOCIAL AND FUNCTIONAL HISTORY  Social History     Tobacco Use    Smoking status: Never     Passive exposure: Past    Smokeless tobacco: Never   Substance Use Topics    Alcohol use: Not Currently     Comment: quit 1990's    Drug use: Never     Otherwise as per HPI    CURRENT ANTIMICROBIALS  Other medications reviewed in EPIC  IV ampicillin    REVIEW OF SYSTEMS  ROS obtained, pertinent positives and negatives as above.      Objective   PHYSICAL EXAMINATION     height is 1.676 m (5' 6\") and weight is 79 kg (174 lb 3.2 oz). His blood pressure is 130/51 and his pulse is 68. His respiration is 16 and oxygen saturation is 95%.   (Physical exam limited by nature of virutal visit)  Constitutional:  appearance not in distress   Pulmonary: appears to have unlabored breathing   Skin: no rashes visible on video      Other Significant Information (Labs, cultures, radiology, etc)    Recent Labs   Lab Test 05/08/24  0328   CRPI 7.97*       Recent micro reviewed:   5/7/24 right hip tissue cultures: E.faecalis, C.acnes    Recent radiology reviewed:      6/4/24 xray pelvis report:  IMPRESSION:   Normal positioning of an antibiotic impregnated cement spacer within the right hip.     "

## 2024-06-12 NOTE — NURSING NOTE
Is the patient currently in the state of [place]? YES    Visit mode:VIDEO    If the visit is dropped, the patient can be reconnected by: VIDEO VISIT: Text to cell phone:   Telephone Information:   Mobile [phone]       Will anyone else be joining the visit? Yes, pt's Nurse Barbara is with the pt and will be joining the video visit per pt  (If patient encounters technical issues they should call [phone])    How would you like to obtain your AVS? MyChart    Are changes needed to the allergy or medication list? Pt stated no med changes    Are refills needed on medications prescribed by this physician? NO    Reason for visit: RECHECK    No other vitals to report per pt    Lazara MENDOSAF

## 2024-06-12 NOTE — LETTER
6/12/2024       RE: Kwabena Solorio  [address]  [address]  [facility] [address]     Dear Colleague,    Thank you for referring your patient, Kwabena Solorio, to the [facility] INFECTIOUS DISEASE CLINIC [place] at [facility]. Please see a copy of my visit note below.    Virtual Visit Details    Type of service:  Video Visit   Video Start Time: 3:31 PM  Video End Time:3:43 PM    Originating Location (pt. Location): Other [facility] home  Distant Location (provider location):  On-site  Platform used for Video Visit: AmWell    Kwabena Solorio is here today for a complaint of hospital discharge follow up.      [facility] INFECTIOUS DISEASE CLINIC [address] [address]  Phone: [phone]  Fax: [phone]    Patient:  Kwabena Solorio, Date of birth 1950  Date of Visit:  06/12/2024  Referring Provider Zeyad House      Assessment & Plan       ID problem list:  Right hip PJI  4/25/24 fluid aspiration +E.faecalis (amp-susceptible)  S/p 5/7/24 right CHRISS explant with antibiotic spacer placement (Dr. Dudley)  OR cultures from 5/7/24 preliminarily with E.faecalis (susceptibility pending) and C.acnes  History of right CHRISS (2007)  History of PMR (on chronic prednisone 5 mg/d)    Discussion:  Continues on ampicillin course which he is tolerating without major issues, now with normalization of CRP on repeat labs, and s/p drain removal by nursing. I am unable to examine hip today due to patient preference for video visit, but he does plan to see ortho next week to ensure appropriate healing and to ensure no clinical signs infection.     Recs:  1.  Continue 6-week course of ampicillin from the time of surgery (5/7-6/18)  - will need to schedule CVC removal with IR after antibiotic completion 6/18 as has a tunneled right chest CVC instead of a usual PICC  2.  Continue to follow with ortho as planned for hip exam and surgical  management  3. Continue to follow with primary care for remaining management    4. Follow up in ID clinic within 2-4 weeks (in-person visit preferred)      Medical Decision Making   35 minutes spent by me on the date of the encounter doing chart review, history and exam, documentation and further activities per the note          I communicated with the patient at this visit.      Patient was seen on 06/12/2024 via video visit.    Zeyad House MD  Infectious Diseases      Subjective       HPI:  Kwabena Solorio is a 73 year old male with PMH including right hip PJI with recent hospitalization at [facility] 5/2024 for PJI, who presents for hospital discharge follow up visit (virtual visit a patient preference due to difficulty with transportation from [facility] [place]).       Interval HPI:  Patient calling from [facility] -- taking the IV antibiotic without issues there. No nausea/vomiting, +runny stool mix of watery/loose 1x/day, no abdominal pain. Only chills/sweats was when drain was pulled yesterday. In terms of hip spacer - achy at times if moving a lot (not if sedentary); prior was having discomfort around the drain which improved after removal. No open wounds/drainage now. No rashes. Does have left foot first toe nail removed separately - healing also. PICC line pulling some hair, is on right chest (they weren't able to place in arm).  Getting labs checked again tomorrow. Plan to continue through 6/18 and then stop. Sees ortho tomorrow. Also sees local primary care provider at nursing home. Says this is his first revision CHRISS infection.      PAST MEDICAL HISTORY  Past Medical History:   Diagnosis Date    BPH (benign prostatic hyperplasia)     Cardiac pacemaker in situ     Complete heart block (H)     Hydronephrosis, right     Lumbar radiculopathy     Lumbar scoliosis     Restless legs syndrome (RLS)      Otherwise as per HPI    PAST SURGICAL HISTORY  Past Surgical History:   Procedure  "Laterality Date    APPENDECTOMY      AS TOTAL KNEE ARTHROPLASTY Left 2010    CARPAL TUNNEL RELEASE RT/LT      CATARACT EXTRACTION      COLONOSCOPY      EP ICD      FOOT SURGERY Bilateral     HAND SURGERY      HC TOTAL HIP ARTHROPLASTY Right 2007    HEMORRHOIDECTOMY      HERNIA REPAIR      IR CVC TUNNEL PLACEMENT > 5 YRS OF AGE  5/10/2024    IRRIGATION AND DEBRIDEMENT HIP, PLACE ANTIBIOTIC CEMENT BEADS / SPACER Right 5/7/2024    Procedure: vancomycin and tobramycin impregnated cement spacer implantation;  Surgeon: Rd Dudley MD;  [facility]: [facility] OR    NASAL FRACTURE SURGERY      REMOVE HARDWARE ARTHROPLASTY HIP Right 5/7/2024    Procedure: Explant Right surface replacement Total Hip Arthroplasty ,;  Surgeon: Rd Dudley MD;  [facility] OR    TONSILLECTOMY & ADENOIDECTOMY      urinary tract surgery       Otherwise as per HPI    ALLERGIES AND DRUG REACTIONS  No Known Allergies    FAMILY HISTORY  No known immunocompromising conditions or infections unless listed below  family history is not on file.    SOCIAL AND FUNCTIONAL HISTORY  Social History     Tobacco Use    Smoking status: Never     Passive exposure: Past    Smokeless tobacco: Never   Substance Use Topics    Alcohol use: Not Currently     Comment: quit 1990's    Drug use: Never     Otherwise as per HPI    CURRENT ANTIMICROBIALS  Other medications reviewed in EPIC  IV ampicillin    REVIEW OF SYSTEMS  ROS obtained, pertinent positives and negatives as above.      Objective   PHYSICAL EXAMINATION     height is 1.676 m (5' 6\") and weight is 79 kg (174 lb 3.2 oz). His blood pressure is 130/51 and his pulse is 68. His respiration is 16 and oxygen saturation is 95%.   (Physical exam limited by nature of virutal visit)  Constitutional:  appearance not in distress   Pulmonary: appears to have unlabored breathing   Skin: no rashes visible on video      Other Significant Information (Labs, cultures, radiology, etc)    Recent Labs   Lab Test 05/08/24  0328   CRPI " 7.97*       Recent micro reviewed:   5/7/24 right hip tissue cultures: E.faecalis, C.acnes    Recent radiology reviewed:      6/4/24 xray pelvis report:  IMPRESSION:   Normal positioning of an antibiotic impregnated cement spacer within the right hip.

## 2024-06-13 ENCOUNTER — PRE VISIT (OUTPATIENT)
Dept: ORTHOPEDICS | Facility: CLINIC | Age: 74
End: 2024-06-13

## 2024-06-13 ENCOUNTER — VIRTUAL VISIT (OUTPATIENT)
Dept: ORTHOPEDICS | Facility: CLINIC | Age: 74
End: 2024-06-13
Payer: COMMERCIAL

## 2024-06-13 VITALS
HEIGHT: 65 IN | BODY MASS INDEX: 29.32 KG/M2 | DIASTOLIC BLOOD PRESSURE: 85 MMHG | SYSTOLIC BLOOD PRESSURE: 140 MMHG | WEIGHT: 176 LBS

## 2024-06-13 DIAGNOSIS — T84.59XD INFECTION OF PROSTHETIC TOTAL HIP JOINT, SUBSEQUENT ENCOUNTER: Primary | ICD-10-CM

## 2024-06-13 DIAGNOSIS — Z96.649 INFECTION OF PROSTHETIC TOTAL HIP JOINT, SUBSEQUENT ENCOUNTER: Primary | ICD-10-CM

## 2024-06-13 PROCEDURE — 99024 POSTOP FOLLOW-UP VISIT: CPT | Mod: 95 | Performed by: ORTHOPAEDIC SURGERY

## 2024-06-13 ASSESSMENT — HOOS JR: HOOS JR TOTAL INTERVAL SCORE: 100

## 2024-06-13 ASSESSMENT — PAIN SCALES - GENERAL: PAINLEVEL: MILD PAIN (3)

## 2024-06-13 NOTE — LETTER
6/13/2024      Kwabena Soloroi  [address]  [address]  [facility] [address]      Dear Colleague,    Thank you for referring your patient, Kwabena Solorio, to the [facility] ORTHOPED[facility] [place]. Please see a copy of my visit note below.    Virtual Visit Details    Type of service:  Video Visit   Video Start Time: 1645  Video End Time: 1715    Originating Location (pt. Location): Home    Distant Location (provider location):  On-site  Platform used for Video Visit: Lissette             [facility]  Orthopaedic Surgery, Joint Replacement Consultation  by Rd Dudley M.D.     Kwabena Solorio MRN# [id number]     YOB: 1950      Requesting physician: No ref. provider found  No primary care provider on file.      Background history:  DX:  PMR on prednisone     TREATMENTS:  10/9/1979, left knee arthroscpy medial menisectomy, (Tasneem), [facility]  3/27/07, Right hip arthroplasty, [place] Hip Resurfacing. Size 46 femoral head, 52-mm, acetabulum, Tobramycin Simplex cement.  ( Keyur Balderrama MD ) [facility]  3/18/2010, L TKA, Jaquan NexGen LPS Flex, 10mm insert, (Keyur Balderrama) [facility]  3/22/2013, Angel/Akin Bunionectomy, bilateral feet (R. Sticha) [facility]  3/10/2014, screw removal right foot (R. Sticha) [facility]  5/2/2014, screw removal left foot, (R. Sticha), [facility]  2/27/2020, Right CMC arthroplasty and Left hand thumb trigger finger release (JAQUI Prasad), [facility]  4/25/2024, R hip aspirate: WBC 21k, PMNs 89%, Culture Enterococcus faecalis, serum Chr 3.7 nl, serum Co 4.2 elevated.  5/7/24, Explant Right surface replacement Total Hip Arthroplasty and vancomycin and tobramycin impregnated cement spacer implantation, (Luis Enrique), [facility]  E Faecalis.  Ampicillin IV x 6 weeks.      Drain removed 2 days ago.  ID svc states to stop antibx.    Patient feels well.  No fever.  Incision dry per pt.    XR:  spacer in place.    Labs: CRP 0.6  (<1.0)     IMP: resolving  PJI    PLAN:  After ampicillin discontinued, antibiotic holiday x 8 weeks and then repeat aspiration of R hip for infection workup and in person clinic visit for discussion of 2nd stage reimplantation.  If negative workup, then OK to proceed with 2nd stage reimplantation.  We can hold dates for repeat aspiration and 2nd stage implantation in advance.  I will ask our clinic RN to arrange.      MD Troy Jesus Family Professor  Oncology and Adult Reconstructive Surgery  Dept Orthopaedic Surgery, [facility]  [phone] office, [phone] pager  [email or web address]

## 2024-06-13 NOTE — NURSING NOTE
Is the patient currently in the state of [place]? YES    Visit mode:VIDEO    If the visit is dropped, the patient can be reconnected by: VIDEO VISIT: Text to cell phone:   Telephone Information:   Mobile [phone]       Will anyone else be joining the visit? NO  (If patient encounters technical issues they should call [phone]    How would you like to obtain your AVS? MyChart    Are changes needed to the allergy or medication list? Pt stated no changes to allergies and Pt stated no med changes    Are refills needed on medications prescribed by this physician?     Reason for visit: JOYCE MENDOSAF

## 2024-06-13 NOTE — TELEPHONE ENCOUNTER
Imaging DISC Received  June 13, 2024 10:17 AM ABT   Action: Imaging disc from [facility] received from Geovanna SANCHES and uploaded to PACS    06/03/24: XR Hip/Pelvis RT

## 2024-06-17 ENCOUNTER — MYC MEDICAL ADVICE (OUTPATIENT)
Dept: ORTHOPEDICS | Facility: CLINIC | Age: 74
End: 2024-06-17

## 2024-06-18 ENCOUNTER — PREP FOR PROCEDURE (OUTPATIENT)
Dept: OTHER | Facility: CLINIC | Age: 74
End: 2024-06-18
Payer: COMMERCIAL

## 2024-06-18 DIAGNOSIS — T84.51XD INFECTION ASSOCIATED WITH INTERNAL RIGHT HIP PROSTHESIS, SUBSEQUENT ENCOUNTER: Primary | ICD-10-CM

## 2024-06-18 PROBLEM — Q25.40 ABNORMALITY OF ABDOMINAL AORTA: Status: ACTIVE | Noted: 2024-02-07

## 2024-06-18 PROBLEM — R79.82 CRP ELEVATED: Status: ACTIVE | Noted: 2017-04-21

## 2024-06-18 PROBLEM — M47.16 LUMBAR SPONDYLOSIS WITH MYELOPATHY: Status: ACTIVE | Noted: 2024-01-12

## 2024-06-18 PROBLEM — M47.27 LUMBOSACRAL SPONDYLOSIS WITH RADICULOPATHY: Status: ACTIVE | Noted: 2024-01-10

## 2024-06-18 PROBLEM — I44.2 CHB (COMPLETE HEART BLOCK) (H): Status: ACTIVE | Noted: 2020-06-13

## 2024-06-18 PROBLEM — M35.3 PMR (POLYMYALGIA RHEUMATICA) (H): Status: ACTIVE | Noted: 2017-05-04

## 2024-06-18 PROBLEM — G62.9 POLYNEUROPATHY, UNSPECIFIED: Status: ACTIVE | Noted: 2019-02-25

## 2024-06-18 PROBLEM — M79.641 PAIN IN BOTH HANDS: Status: ACTIVE | Noted: 2018-04-19

## 2024-06-18 PROBLEM — C00.9: Status: ACTIVE | Noted: 2023-01-05

## 2024-06-18 PROBLEM — Z96.649 INFECTION OF PROSTHETIC TOTAL HIP JOINT (H): Status: ACTIVE | Noted: 2024-05-08

## 2024-06-18 PROBLEM — G25.81 RESTLESS LEGS: Status: ACTIVE | Noted: 2019-02-25

## 2024-06-18 PROBLEM — T84.59XA INFECTION OF PROSTHETIC TOTAL HIP JOINT (H): Status: ACTIVE | Noted: 2024-05-08

## 2024-06-18 PROBLEM — M41.50 DEGENERATIVE SCOLIOSIS IN ADULT PATIENT: Status: ACTIVE | Noted: 2024-03-05

## 2024-06-18 PROBLEM — M85.852 OSTEOPENIA OF NECK OF LEFT FEMUR: Status: ACTIVE | Noted: 2024-03-21

## 2024-06-18 PROBLEM — Z79.52 CURRENT USE OF STEROID MEDICATION: Status: ACTIVE | Noted: 2017-05-04

## 2024-06-18 PROBLEM — R76.8 ANA POSITIVE: Status: ACTIVE | Noted: 2017-04-21

## 2024-06-18 PROBLEM — N40.1 BPH WITH URINARY OBSTRUCTION: Status: ACTIVE | Noted: 2017-07-06

## 2024-06-18 PROBLEM — Z95.0 CARDIAC PACEMAKER: Status: ACTIVE | Noted: 2020-07-23

## 2024-06-18 PROBLEM — N13.8 BPH WITH URINARY OBSTRUCTION: Status: ACTIVE | Noted: 2017-07-06

## 2024-06-18 PROBLEM — M79.642 PAIN IN BOTH HANDS: Status: ACTIVE | Noted: 2018-04-19

## 2024-06-18 PROBLEM — R20.0 NUMBNESS AND TINGLING OF BOTH LEGS BELOW KNEES: Status: ACTIVE | Noted: 2017-12-06

## 2024-06-18 PROBLEM — R20.2 NUMBNESS AND TINGLING OF BOTH LEGS BELOW KNEES: Status: ACTIVE | Noted: 2017-12-06

## 2024-06-18 RX ORDER — TRANEXAMIC ACID 650 MG/1
1950 TABLET ORAL ONCE
Status: CANCELLED | OUTPATIENT
Start: 2024-06-18 | End: 2024-06-18

## 2024-06-18 RX ORDER — CELECOXIB 200 MG/1
400 CAPSULE ORAL ONCE
Status: CANCELLED | OUTPATIENT
Start: 2024-06-18 | End: 2024-06-18

## 2024-06-18 RX ORDER — ACETAMINOPHEN 325 MG/1
975 TABLET ORAL ONCE
Status: CANCELLED | OUTPATIENT
Start: 2024-06-18 | End: 2024-06-18

## 2024-06-18 NOTE — TELEPHONE ENCOUNTER
- A call was placed to the patient.     - Pre-op on August 8th with PCP.     - 8/30 or 9/3 was offered to patient. Patient would like 8/30.     - Confirmed other appointment.     - Discussed expectations.      - Patient verbalized understanding of plan and all questions were answered. Call back number to clinic was given and patient was told to call if they had an further questions.

## 2024-06-19 ENCOUNTER — TELEPHONE (OUTPATIENT)
Dept: INFECTIOUS DISEASES | Facility: CLINIC | Age: 74
End: 2024-06-19
Payer: COMMERCIAL

## 2024-06-19 DIAGNOSIS — Z96.649 INFECTION OF PROSTHETIC TOTAL HIP JOINT, SUBSEQUENT ENCOUNTER: Primary | ICD-10-CM

## 2024-06-19 DIAGNOSIS — T84.59XD INFECTION OF PROSTHETIC TOTAL HIP JOINT, SUBSEQUENT ENCOUNTER: Primary | ICD-10-CM

## 2024-06-19 NOTE — TELEPHONE ENCOUNTER
Zeyad House MD  P [facility] (Outpatient Parenteral Antimicrobial Therapy) Rn Care Coordinator Csc; P [facility] Infectious Disease Adult Csc  Please help patient to coordinate IR tunneled CVC removal if able anytime after ampicillin completed on 6/18        Submitted IR Referral Emergent they will contact patient to schedule.     Sent patient Spartoo message with information.     Max Cortez RN  Infectious Disease on 6/19/2024 at 9:28 AM     Admission Reconciliation is Completed  Discharge Reconciliation is Completed

## 2024-06-20 ENCOUNTER — TELEPHONE (OUTPATIENT)
Dept: INFECTIOUS DISEASES | Facility: CLINIC | Age: 74
End: 2024-06-20
Payer: COMMERCIAL

## 2024-06-20 NOTE — TELEPHONE ENCOUNTER
EP called 6/20 to resched 7/31 video follow up with Dr. House and resched to 7/18 instead. Patient was okay with it.

## 2024-06-20 NOTE — TELEPHONE ENCOUNTER
Call received from pt-he reports that the nursing home was able to help him with this issue-says he had his port removed (believes it was on 6/18/24) by Dr. Xiao with the [facility].     Routed to care team as an FYI.

## 2024-06-20 NOTE — TELEPHONE ENCOUNTER
Health Call Center    Phone Message    May a detailed message be left on voicemail: yes     Reason for Call: Pt is scheduled for follow-up virtual visit with Dr. House 7/31/24, needs to have lab tests done beforehand. Pt would like to have his labs done at the [facility] near him, was given the fax number for those orders to be sent to, but is unable to clearly read the fax number that was written: [phone].     Pt would like to be notified once orders have been sent so he can call to schedule right away.     Action Taken: Other: Infectious Disease    Travel Screening: Not Applicable

## 2024-06-21 DIAGNOSIS — T84.59XD INFECTION OF PROSTHETIC TOTAL HIP JOINT, SUBSEQUENT ENCOUNTER: ICD-10-CM

## 2024-06-21 DIAGNOSIS — T84.51XA INFECTION OF RIGHT PROSTHETIC HIP JOINT (H): Primary | ICD-10-CM

## 2024-06-21 DIAGNOSIS — Z96.649 INFECTION OF PROSTHETIC TOTAL HIP JOINT, SUBSEQUENT ENCOUNTER: ICD-10-CM

## 2024-06-25 NOTE — TELEPHONE ENCOUNTER
Faxed order to [facility] and then called patient to confirm that this was taken care of and he can go ahead and call them to get his lab appt set up.    Anita Partida, CMA

## 2024-06-25 NOTE — TELEPHONE ENCOUNTER
Pt would like labs faxed over to [facility] fax number:[phone].     Pt would like a call at [phone] once orders are sent over.

## 2024-07-18 ENCOUNTER — VIRTUAL VISIT (OUTPATIENT)
Dept: INFECTIOUS DISEASES | Facility: CLINIC | Age: 74
End: 2024-07-18
Attending: INTERNAL MEDICINE
Payer: COMMERCIAL

## 2024-07-18 ENCOUNTER — TRANSFERRED RECORDS (OUTPATIENT)
Dept: HEALTH INFORMATION MANAGEMENT | Facility: CLINIC | Age: 74
End: 2024-07-18

## 2024-07-18 DIAGNOSIS — T84.59XD INFECTION OF PROSTHETIC TOTAL HIP JOINT, SUBSEQUENT ENCOUNTER: Primary | ICD-10-CM

## 2024-07-18 DIAGNOSIS — Z96.649 INFECTION OF PROSTHETIC TOTAL HIP JOINT, SUBSEQUENT ENCOUNTER: Primary | ICD-10-CM

## 2024-07-18 PROCEDURE — 99213 OFFICE O/P EST LOW 20 MIN: CPT | Mod: 24 | Performed by: INTERNAL MEDICINE

## 2024-07-18 ASSESSMENT — PAIN SCALES - GENERAL: PAINLEVEL: SEVERE PAIN (6)

## 2024-07-18 NOTE — NURSING NOTE
Current patient location: [address] [facility]   [facility] [address]    Is the patient currently in the state of [place]? YES    Visit mode:VIDEO    If the visit is dropped, the patient can be reconnected by: VIDEO VISIT: Text to cell phone:   Telephone Information:   Mobile [phone]       Will anyone else be joining the visit? NO  (If patient encounters technical issues they should call [phone]    How would you like to obtain your AVS? MyChart    Are changes needed to the allergy or medication list? Pt declined med review and Pt stated no med changes    Are refills needed on medications prescribed by this physician? NO    Reason for visit: OJYCE PALACIOS

## 2024-07-18 NOTE — PROGRESS NOTES
Virtual Visit Details    Type of service:  Video Visit   Video Start Time: 3:27 PM  Video End Time:3:37 PM    Originating Location (pt. Location): Home  Distant Location (provider location):  On-site  Platform used for Video Visit: [facility] INFECTIOUS DISEASE CLINIC [address] [address]  Phone: [phone]  Fax: [phone]    Patient:  Kwabena Solorio, Date of birth 1950  Date of Visit:  07/18/2024  Referring Provider Zeyad House      Assessment & Plan        ID problem list:  Right hip PJI  4/25/24 fluid aspiration +E.faecalis (amp-susceptible)  S/p 5/7/24 right CHRISS explant with antibiotic spacer placement (Dr. Dudley)  OR cultures from 5/7/24 with E.faecalis and C.acnes  S/p 6-week course of IV ampicillin (5/7-6/18)  History of right CHRISS (2007)  History of PMR (on chronic prednisone 5 mg/d)    Discussion:  S/p 6-week IV ampicillin course (5/7-6/18) with no subsequent new infectious symptoms after antibiotic discontinuation and CVC removal last month, and CRP remains normal after stopping antibiotics. Says he's planned by ortho for repeat joint aspiration on 8/12.    Recs:  1.  Continue off antibiotics - completed 6-week course on 6/18  2.  Continue to follow with ortho as planned for 8/12 joint aspiration  3.  Continue to follow with primary care provider Dr. Augustin for remaining management      29 minutes spent by me on the date of the encounter doing chart review, history and exam, documentation and further activities per the note          I communicated with the patient at this visit.      Patient was seen on 07/18/2024 via video visit.    Zeyad House MD  Infectious Diseases      Subjective       HPI:  Kwabena Solorio is a 74 year old male with PMH including right hip PJI with recent hospitalization at [facility] 5/2024 for PJI, who presents for hospital discharge follow up visit (virtual visit a patient preference due to  distance from his home).       Interval HPI:  7/18/24 update:   Since last virtual visit, patient reports that he completed IV ampicillin 6-week course on 6/18 without issues and had his CVC removed few days after stopping the IV antibiotic - prior CVC site has since healed without issues. Hip site also all healed up without any open wounds, just surgical scarring (some residual tenderness of the site but overall less than month ago). No redness of hip. No change in hip pain - does have hip soreness with sitting all day in a chair, also shoulder discomfort with using the walker. Some foot swelling for which he's wearing compression shocks. No fevers. Stool firmer now than few weeks ago. Planned for repeat hip aspiration on 8/12 with ortho and possible hip surgery on 8/30 if all goes well. Also follows up with his primary care provider Dr. Augustin on 8/8 and also says he's undergoing lip evaluation for possible pre-cancerous lesion.       PAST MEDICAL HISTORY  Past Medical History:   Diagnosis Date    BPH (benign prostatic hyperplasia)     Cardiac pacemaker in situ     Complete heart block (H)     Hydronephrosis, right     Lumbar radiculopathy     Lumbar scoliosis     Restless legs syndrome (RLS)      Otherwise as per HPI    PAST SURGICAL HISTORY  Past Surgical History:   Procedure Laterality Date    APPENDECTOMY      AS TOTAL KNEE ARTHROPLASTY Left 2010    CARPAL TUNNEL RELEASE RT/LT      CATARACT EXTRACTION      COLONOSCOPY      EP ICD      FOOT SURGERY Bilateral     HAND SURGERY      HC TOTAL HIP ARTHROPLASTY Right 2007    HEMORRHOIDECTOMY      HERNIA REPAIR      IR CVC TUNNEL PLACEMENT > 5 YRS OF AGE  5/10/2024    IRRIGATION AND DEBRIDEMENT HIP, PLACE ANTIBIOTIC CEMENT BEADS / SPACER Right 5/7/2024    Procedure: vancomycin and tobramycin impregnated cement spacer implantation;  Surgeon: Rd Dudley MD;  [facility]    NASAL FRACTURE SURGERY      REMOVE HARDWARE ARTHROPLASTY HIP Right 5/7/2024     Procedure: Explant Right surface replacement Total Hip Arthroplasty ,;  Surgeon: Rd Dudley MD;  [facility] OR    TONSILLECTOMY & ADENOIDECTOMY      urinary tract surgery       Otherwise as per HPI    ALLERGIES AND DRUG REACTIONS  No Known Allergies    FAMILY HISTORY  No known immunocompromising conditions or infections unless listed below  family history is not on file.    SOCIAL AND FUNCTIONAL HISTORY  Social History     Tobacco Use    Smoking status: Never     Passive exposure: Past    Smokeless tobacco: Never   Substance Use Topics    Alcohol use: Not Currently     Comment: quit 1990's    Drug use: Never     Otherwise as per HPI    CURRENT ANTIMICROBIALS  Other medications reviewed in [facility]  N/a    REVIEW OF SYSTEMS  ROS as above.    Objective   PHYSICAL EXAMINATION  (Physical exam limited by nature of virutal visit)  Constitutional:  appearance not in distress   Pulmonary: appears to have unlabored breathing   Skin: no rashes visible on video      Other Significant Information (Labs, cultures, radiology, etc)    Recent Labs   Lab Test 05/08/24  0328   CRPI 7.97*   6/26/24 CRP (OS[facility]): 0.5    Recent micro reviewed:   4/25/24 right hip synovial cultures: +E.faecalis  5/7/24 right hip tissue cultures: E.faecalis, C.acnes    Recent radiology reviewed:      6/4/24 xray pelvis report:  IMPRESSION:   Normal positioning of an antibiotic impregnated cement spacer within the right hip.

## 2024-07-18 NOTE — LETTER
7/18/2024       RE: Kwabena Solorio  [address]  [address]  [facility] [address]     Dear Colleague,    Thank you for referring your patient, Kwabena Solorio, to the [facility] INFECTIOUS DISEASE CLINIC [place] [facility]. Please see a copy of my visit note below.    Virtual Visit Details    Type of service:  Video Visit   Video Start Time: 3:27 PM  Video End Time:3:37 PM    Originating Location (pt. Location): Home  Distant Location (provider location):  On-site  Platform used for Video Visit: [facility] INFECTIOUS DISEASE CLINIC [address] [address]  Phone: [phone]  Fax: [phone]    Patient:  Kwabena Solorio, Date of birth 1950  Date of Visit:  07/18/2024  Referring Provider Zeyad House      Assessment & Plan       ID problem list:  Right hip PJI  4/25/24 fluid aspiration +E.faecalis (amp-susceptible)  S/p 5/7/24 right CHRISS explant with antibiotic spacer placement (Dr. Dudley)  OR cultures from 5/7/24 with E.faecalis and C.acnes  S/p 6-week course of IV ampicillin (5/7-6/18)  History of right CHRISS (2007)  History of PMR (on chronic prednisone 5 mg/d)    Discussion:  S/p 6-week IV ampicillin course (5/7-6/18) with no subsequent new infectious symptoms after antibiotic discontinuation and CVC removal last month, and CRP remains normal after stopping antibiotics. Says he's planned by ortho for repeat joint aspiration on 8/12.    Recs:  1.  Continue off antibiotics - completed 6-week course on 6/18  2.  Continue to follow with ortho as planned for 8/12 joint aspiration  3.  Continue to follow with primary care provider Dr. Augustin for remaining management      29 minutes spent by me on the date of the encounter doing chart review, history and exam, documentation and further activities per the note          I communicated with the patient at this visit.      Patient was seen on  07/18/2024 via video visit.    Zeyad House MD  Infectious Diseases      Subjective       HPI:  Kwabena Solorio is a 74 year old male with PMH including right hip PJI with recent hospitalization at [facility] 5/2024 for PJI, who presents for hospital discharge follow up visit (virtual visit a patient preference due to distance from his home).       Interval HPI:  7/18/24 update:   Since last virtual visit, patient reports that he completed IV ampicillin 6-week course on 6/18 without issues and had his CVC removed few days after stopping the IV antibiotic - prior CVC site has since healed without issues. Hip site also all healed up without any open wounds, just surgical scarring (some residual tenderness of the site but overall less than month ago). No redness of hip. No change in hip pain - does have hip soreness with sitting all day in a chair, also shoulder discomfort with using the walker. Some foot swelling for which he's wearing compression shocks. No fevers. Stool firmer now than few weeks ago. Planned for repeat hip aspiration on 8/12 with ortho and possible hip surgery on 8/30 if all goes well. Also follows up with his primary care provider Dr. Augustin on 8/8 and also says he's undergoing lip evaluation for possible pre-cancerous lesion.       PAST MEDICAL HISTORY  Past Medical History:   Diagnosis Date    BPH (benign prostatic hyperplasia)     Cardiac pacemaker in situ     Complete heart block (H)     Hydronephrosis, right     Lumbar radiculopathy     Lumbar scoliosis     Restless legs syndrome (RLS)      Otherwise as per HPI    PAST SURGICAL HISTORY  Past Surgical History:   Procedure Laterality Date    APPENDECTOMY      AS TOTAL KNEE ARTHROPLASTY Left 2010    CARPAL TUNNEL RELEASE RT/LT      CATARACT EXTRACTION      COLONOSCOPY      EP ICD      FOOT SURGERY Bilateral     HAND SURGERY      HC TOTAL HIP ARTHROPLASTY Right 2007    HEMORRHOIDECTOMY      HERNIA REPAIR      IR CVC TUNNEL PLACEMENT >  5 YRS OF AGE  5/10/2024    IRRIGATION AND DEBRIDEMENT HIP, PLACE ANTIBIOTIC CEMENT BEADS / SPACER Right 5/7/2024    Procedure: vancomycin and tobramycin impregnated cement spacer implantation;  Surgeon: Rd Dudley MD;  Location: [facility] OR    NASAL FRACTURE SURGERY      REMOVE HARDWARE ARTHROPLASTY HIP Right 5/7/2024    Procedure: Explant Right surface replacement Total Hip Arthroplasty ,;  Surgeon: Rd Dudley MD;  Location: [facility] OR    TONSILLECTOMY & ADENOIDECTOMY      urinary tract surgery       Otherwise as per HPI    ALLERGIES AND DRUG REACTIONS  No Known Allergies    FAMILY HISTORY  No known immunocompromising conditions or infections unless listed below  family history is not on file.    SOCIAL AND FUNCTIONAL HISTORY  Social History     Tobacco Use    Smoking status: Never     Passive exposure: Past    Smokeless tobacco: Never   Substance Use Topics    Alcohol use: Not Currently     Comment: quit 1990's    Drug use: Never     Otherwise as per HPI    CURRENT ANTIMICROBIALS  Other medications reviewed in [facility]  N/a    REVIEW OF SYSTEMS  ROS as above.    Objective   PHYSICAL EXAMINATION  (Physical exam limited by nature of virutal visit)  Constitutional:  appearance not in distress   Pulmonary: appears to have unlabored breathing   Skin: no rashes visible on video      Other Significant Information (Labs, cultures, radiology, etc)    Recent Labs   Lab Test 05/08/24  0328   CRPI 7.97*   6/26/24 CRP (OSH): 0.5    Recent micro reviewed:   4/25/24 right hip synovial cultures: +E.faecalis  5/7/24 right hip tissue cultures: E.faecalis, C.acnes    Recent radiology reviewed:      6/4/24 xray pelvis report:  IMPRESSION:   Normal positioning of an antibiotic impregnated cement spacer within the right hip.     Zeyad House MD

## 2024-07-31 ENCOUNTER — MYC MEDICAL ADVICE (OUTPATIENT)
Dept: ORTHOPEDICS | Facility: CLINIC | Age: 74
End: 2024-07-31

## 2024-07-31 ENCOUNTER — LAB (OUTPATIENT)
Dept: LAB | Facility: CLINIC | Age: 74
End: 2024-07-31
Payer: COMMERCIAL

## 2024-07-31 DIAGNOSIS — Z96.649 INFECTION OF PROSTHETIC TOTAL HIP JOINT, SUBSEQUENT ENCOUNTER: ICD-10-CM

## 2024-07-31 DIAGNOSIS — T84.59XD INFECTION OF PROSTHETIC TOTAL HIP JOINT, SUBSEQUENT ENCOUNTER: ICD-10-CM

## 2024-07-31 LAB
CRP SERPL-MCNC: <3 MG/L
ERYTHROCYTE [SEDIMENTATION RATE] IN BLOOD BY WESTERGREN METHOD: 2 MM/HR (ref 0–20)

## 2024-07-31 PROCEDURE — 86140 C-REACTIVE PROTEIN: CPT

## 2024-07-31 PROCEDURE — 85652 RBC SED RATE AUTOMATED: CPT

## 2024-07-31 PROCEDURE — 36415 COLL VENOUS BLD VENIPUNCTURE: CPT

## 2024-08-07 NOTE — PROGRESS NOTES
[facility]  Orthopaedic Surgery, Joint Replacement Consultation  by Rd Dudley M.D.     Kwabena Solorio MRN# [id number]     YOB: 1950      Requesting physician: No ref. provider found  No primary care provider on file.      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 Size 46 femoral head, 52-mm, acetabulum, Tobramycin Simplex cement.  ( Keyur Balderrama MD ) [facility]  3/18/2010, L TKA, Jaquan NexGen LPS Flex, 10mm insert, (Keyur Balderrama) [facility]  3/22/2013, Angel/Akin Bunionectomy, bilateral feet (R. Sticha) [facility]  3/10/2014, screw removal right foot (R. Sticha) [facility]  5/2/2014, screw removal left foot, (R. Sticha), [facility]  2/27/2020, Right CMC arthroplasty and Left hand thumb trigger finger release (JAQUI Prasad), [facility]  4/25/2024, R hip aspirate: WBC 21k, PMNs 89%, Culture Enterococcus faecalis, serum Chr 3.7 nl, serum Co 4.2 elevated.  5/7/24, Explant Right surface replacement Total Hip Arthroplasty and vancomycin and tobramycin impregnated cement spacer implantation, (Luis Enrique), [facility]  E Faecalis.  Ampicillin IV x 6 weeks.                Clinical History:   74 year old male who presents to clinic today for aspiration of the right hip following his 8-week antibiotic holiday.          Procedure:   The injection was performed using sterile technique.  Under ultrasound guidance a 3.5 inch 18-gauge needle was used to enter the femoracetabular joint.  Anterior approach was used, needle placement was visualized and documented with ultrasound.  Ultrasound was necessary due to pressure intra-articular positioning and to avoid vital adjacent structures.  Aspiration performed long axis to the probe.  11 mL of serosanguineous fluid was aspirated without complications.  Images were permanently stored for the patient's  record.    There were complications.  The patient tolerated the procedure well.  There was negligible bleeding     Large Joint Injection/Arthocentesis: R hip joint    Date/Time: 8/12/2024 1:06 PM    Performed by: Kaz Bowser PA-C  Authorized by: Kaz Bowser PA-C    Needle Size:  18 G  Guidance: ultrasound    Approach:  Anterior  Location:  Hip      Site:  R hip joint  Medications:  5 mL lidocaine 1 %  Aspirate amount (mL):  11  Aspirate comment:  Serosanguineous synovial fluid  Aspirate analysis: sent for lab analysis    Outcome:  Tolerated well, no immediate complications  Procedure discussed: discussed risks, benefits, and alternatives    Consent Given by:  Patient  Timeout: timeout called immediately prior to procedure    Prep: patient was prepped and draped in usual sterile fashion              Assessment and Plan:   Assessment:  74-year-old male who presents today status post explantation of right hip arthroplasty with antibiotic spacer implantation following his 8-week antibiotic holiday for aspiration of the joint.  Tolerated procedure well     Plan:  After consent was given the procedure was performed as above.  The patient tolerated it well.  A Band-Aid was replaced which the patient can remove this evening and start showering tomorrow.  I instructed them to watch for signs of infection including erythema, discharge and increased pain.  If they have any concerns I instructed them to call.  The patient will follow-up on 8/22/2024 with Dr. Pate to discuss results of the labs.     Kaz Bowser PA-C  Physician Assistant   Oncology and Adult Reconstructive Surgery  Dept Orthopaedic Surgery, [facility]

## 2024-08-08 ENCOUNTER — TRANSFERRED RECORDS (OUTPATIENT)
Dept: MULTI SPECIALTY CLINIC | Facility: CLINIC | Age: 74
End: 2024-08-08
Payer: COMMERCIAL

## 2024-08-08 LAB
CREATININE (EXTERNAL): 0.7 MG/DL (ref 0.73–1.18)
GFR ESTIMATED (EXTERNAL): >60 ML/MIN/1.73M2
GLUCOSE (EXTERNAL): 102 MG/DL (ref 74–100)
POTASSIUM (EXTERNAL): 4.5 MMOL/L (ref 3.5–5.1)

## 2024-08-12 ENCOUNTER — OFFICE VISIT (OUTPATIENT)
Dept: ORTHOPEDICS | Facility: CLINIC | Age: 74
End: 2024-08-12
Payer: COMMERCIAL

## 2024-08-12 DIAGNOSIS — Z96.649 INFECTION OF PROSTHETIC TOTAL HIP JOINT, SUBSEQUENT ENCOUNTER: Primary | ICD-10-CM

## 2024-08-12 DIAGNOSIS — T84.59XD INFECTION OF PROSTHETIC TOTAL HIP JOINT, SUBSEQUENT ENCOUNTER: Primary | ICD-10-CM

## 2024-08-12 DIAGNOSIS — R27.0 ATAXIA, UNSPECIFIED: ICD-10-CM

## 2024-08-12 LAB
APPEARANCE FLD: ABNORMAL
CELL COUNT BODY FLUID SOURCE: ABNORMAL
COLOR FLD: YELLOW
LYMPHOCYTES NFR FLD MANUAL: 64 %
MONOS+MACROS NFR FLD MANUAL: 26 %
NEUTS BAND NFR FLD MANUAL: 10 %
PERFORMING LABORATORY: NORMAL
SPECIMEN STATUS: NORMAL
TEST NAME: NORMAL
WBC # FLD AUTO: 429 /UL

## 2024-08-12 PROCEDURE — 99000 SPECIMEN HANDLING OFFICE-LAB: CPT | Performed by: PATHOLOGY

## 2024-08-12 PROCEDURE — 86140 C-REACTIVE PROTEIN: CPT | Performed by: PHYSICIAN ASSISTANT

## 2024-08-12 PROCEDURE — 87075 CULTR BACTERIA EXCEPT BLOOD: CPT | Performed by: PHYSICIAN ASSISTANT

## 2024-08-12 PROCEDURE — 83516 IMMUNOASSAY NONANTIBODY: CPT | Performed by: PHYSICIAN ASSISTANT

## 2024-08-12 PROCEDURE — 87070 CULTURE OTHR SPECIMN AEROBIC: CPT | Performed by: PHYSICIAN ASSISTANT

## 2024-08-12 PROCEDURE — 89050 BODY FLUID CELL COUNT: CPT | Performed by: PHYSICIAN ASSISTANT

## 2024-08-12 PROCEDURE — 99213 OFFICE O/P EST LOW 20 MIN: CPT | Mod: 25 | Performed by: PHYSICIAN ASSISTANT

## 2024-08-12 PROCEDURE — 87102 FUNGUS ISOLATION CULTURE: CPT | Performed by: PHYSICIAN ASSISTANT

## 2024-08-12 PROCEDURE — 20611 DRAIN/INJ JOINT/BURSA W/US: CPT | Mod: RT | Performed by: PHYSICIAN ASSISTANT

## 2024-08-12 RX ORDER — LIDOCAINE HYDROCHLORIDE 10 MG/ML
5 INJECTION, SOLUTION INFILTRATION; PERINEURAL
Status: DISCONTINUED | OUTPATIENT
Start: 2024-08-12 | End: 2024-09-03

## 2024-08-12 RX ADMIN — LIDOCAINE HYDROCHLORIDE 5 ML: 10 INJECTION, SOLUTION INFILTRATION; PERINEURAL at 13:06

## 2024-08-12 ASSESSMENT — HOOS JR
RISING FROM SITTING: MODERATE
LYING IN BED (TURNING OVER, MAINTAINING HIP POSITION): MODERATE
BENDING TO THE FLOOR TO PICK UP OBJECT: MODERATE
SITTING: MODERATE
GOING UP OR DOWN STAIRS: MODERATE
HOOS JR TOTAL INTERVAL SCORE: 58.93

## 2024-08-12 NOTE — NURSING NOTE
Pre-Op Teaching was done in person at the clinic.    Teaching Flowsheet   Relevant Diagnosis: Pre-Op Teaching  Teaching Topic: Explantation of right hip antibiotic spacer with revision right total hip arthroplasty Pre-Op     Person(s) involved in teaching:   Patient     Motivation Level:  Asks Questions: Yes  Eager to Learn: Yes  Cooperative: Yes  Receptive (willing/able to accept information): Yes  Any cultural factors/Lutheran beliefs that may influence understanding or compliance? No     Patient demonstrates understanding of the following:  Reason for the appointment, diagnosis and treatment plan: Yes  Knowledge of proper use of medications and conditions for which they are ordered (with special attention to potential side effects or drug interactions): Yes  Which situations necessitate calling provider and whom to contact: Yes- discussed the stoplight tool to help assist with this.      Teaching Concerns Addressed:      Proper use of surgical scrub explain: Yes    Nutritional needs and diet plan: Yes  Pain management techniques: Yes  Wound Care: Yes  How and/when to access community resources: Yes  Need for pre-op with in 30 days: Yes- asked that pre-op be done between 25-20 days before surgery is scheduled.   -I asked them to ensure they go over their daily medications during this visit and discuss what medications need to be stopped before surgery and when. If you are doing a pre-op with your PCP and they are not within the [facility], I ask them to fax it to our pre-op office. Patient verbalized understanding.      Instructional Materials Used/Given:  a bottle of chlorhexidine soap, a surgery packet, and a joint booklet given to patient in clinic.      - Important contact info/ phone numbers: emphasizing clinic number and after hours number  - Map/ location of surgery  - Showering instructions  - Stop light tool  - Your Guide to Joint Replacement Booklet   - Guidelines for post op antibiotics before  dentist appointments or procedure.  -  Instructions to sign up for online joint replacement class and Care Companion through [employer].     Additionally the following was discussed with patient:  - SisterTete, will be driving the patient to surgery and able to pick the patient up after discharge and staying with them for 4-5 days after surgery.  - Need to schedule a dentist appointment and get done any recommended dental work prior to surgery.   - Online joint replacement class and the use of [employer] to send educational messages. Asked patient to sign up for join class during visit and patient declined to sign up at this time and stated will sign up later. Sheet with sign up instruction given to patient.   - Told patient to check in with insurance to check about coverage.     -Next step: Surgery date: 8/30 and PAC appointment scheduled for tomorrow.    Time spent with patient: 15 minutes.

## 2024-08-12 NOTE — LETTER
8/12/2024      Kwabena Solorio  [address]  [address]  [facility] [address]      Dear Colleague,    Thank you for referring your patient, Kwabena Solorio, to the [facility] ORTHOPEDIC CLINIC [place]. Please see a copy of my visit note below.         [facility]  Orthopaedic Surgery, Joint Replacement Consultation  by Rd Dudley M.D.     Kwabena Solorio MRN# [id number]     YOB: 1950      Requesting physician: No ref. provider found  No primary care provider on file.      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 Size 46 femoral head, 52-mm, acetabulum, Tobramycin Simplex cement.  ( Keyur Balderrama MD ) [facility]  3/18/2010, L TKA, Jaquan NexGen LPS Flex, 10mm insert, (Keyur Balderrama) [facility]  3/22/2013, Angel/Akin Bunionectomy, bilateral feet (R. Sticha) [facility]  3/10/2014, screw removal right foot (R. Sticha) [facility]  5/2/2014, screw removal left foot, (R. Sticha), [facility]  2/27/2020, Right CMC arthroplasty and Left hand thumb trigger finger release (JAQUI Prasad), [facility]  4/25/2024, R hip aspirate: WBC 21k, PMNs 89%, Culture Enterococcus faecalis, serum Chr 3.7 nl, serum Co 4.2 elevated.  5/7/24, Explant Right surface replacement Total Hip Arthroplasty and vancomycin and tobramycin impregnated cement spacer implantation, (Luis Enrique), [facility]  E Faecalis.  Ampicillin IV x 6 weeks.                Clinical History:   74 year old male who presents to clinic today for aspiration of the right hip following his 8-week antibiotic holiday.          Procedure:   The injection was performed using sterile technique.  Under ultrasound guidance a 3.5 inch 18-gauge needle was used to enter the femoracetabular joint.  Anterior approach was used, needle placement was visualized and documented with ultrasound.  Ultrasound was  necessary due to pressure intra-articular positioning and to avoid vital adjacent structures.  Aspiration performed long axis to the probe.  11 mL of serosanguineous fluid was aspirated without complications.  Images were permanently stored for the patient's record.    There were complications.  The patient tolerated the procedure well.  There was negligible bleeding     Large Joint Injection/Arthocentesis: R hip joint    Date/Time: 8/12/2024 1:06 PM    Performed by: Kaz Bowser PA-C  Authorized by: Kaz Bowser PA-C    Needle Size:  18 G  Guidance: ultrasound    Approach:  Anterior  Location:  Hip      Site:  R hip joint  Medications:  5 mL lidocaine 1 %  Aspirate amount (mL):  11  Aspirate comment:  Serosanguineous synovial fluid  Aspirate analysis: sent for lab analysis    Outcome:  Tolerated well, no immediate complications  Procedure discussed: discussed risks, benefits, and alternatives    Consent Given by:  Patient  Timeout: timeout called immediately prior to procedure    Prep: patient was prepped and draped in usual sterile fashion              Assessment and Plan:   Assessment:  74-year-old male who presents today status post explantation of right hip arthroplasty with antibiotic spacer implantation following his 8-week antibiotic holiday for aspiration of the joint.  Tolerated procedure well     Plan:  After consent was given the procedure was performed as above.  The patient tolerated it well.  A Band-Aid was replaced which the patient can remove this evening and start showering tomorrow.  I instructed them to watch for signs of infection including erythema, discharge and increased pain.  If they have any concerns I instructed them to call.  The patient will follow-up on 8/22/2024 with Dr. Pate to discuss results of the labs.     Kaz Bowser PA-C  Physician Assistant   Oncology and Adult Reconstructive Surgery  Dept Orthopaedic Surgery, [facility]                        Again,  thank you for allowing me to participate in the care of your patient.        Sincerely,        Kaz Bowser PA-C

## 2024-08-12 NOTE — NURSING NOTE
[facility] ORTHOPEDIC CLINIC [address] [address]  [phone]  Dept: [phone]  ______________________________________________________________________________    Patient: Kwabena Solorio   : 1950   MRN: [id number]   2024    INVASIVE PROCEDURE SAFETY CHECKLIST    Date: 2024   Procedure:Right hip aspiration  Patient Name: Kwabena Solorio  MRN: [id number]  YOB: 1950    Action: Complete sections as appropriate. Any discrepancy results in a HARD COPY until resolved.     PRE PROCEDURE:  Patient ID verified with 2 identifiers (name and  or MRN): Yes  Procedure and site verified with patient/designee (when able): Yes  Accurate consent documentation in medical record: Yes  H&P (or appropriate assessment) documented in medical record: NA  H&P must be up to 20 days prior to procedure and updates within 24 hours of procedure as applicable: NA  Relevant diagnostic and radiology test results appropriately labeled and displayed as applicable: Yes  Procedure site(s) marked with provider initials: NA    TIMEOUT:  Time-Out performed immediately prior to starting procedure, including verbal and active participation of all team members addressing the following:Yes  * Correct patient identify  * Confirmed that the correct side and site are marked  * An accurate procedure consent form  * Agreement on the procedure to be done  * Correct patient position  * Relevant images and results are properly labeled and appropriately displayed  * The need to administer antibiotics or fluids for irrigation purposes during the procedure as applicable   * Safety precautions based on patient history or medication use    DURING PROCEDURE: Verification of correct person, site, and procedures any time the responsibility for care of the patient is transferred to another member of the care team.       The following medications were given:         Prior to  injection, verified patient identity using patient's name and date of birth.  Due to injection administration, patient instructed to remain in clinic for 15 minutes  afterwards, and to report any adverse reaction to me immediately.    Joint aspiration was preformed      Medication Name Lidocaine NDC 64685-829-71    Scribed by Vania Renee ATC for Kza Bowser PA-C on August 12, 2024 at 2:57p based on the provider's statements to me.     Vania Renee, ATC

## 2024-08-13 NOTE — TELEPHONE ENCOUNTER
FUTURE VISIT INFORMATION      SURGERY INFORMATION:  Date: 24  [facility]  Surgeon:  Rd Dudley MD   Anesthesia Type:  choice  Procedure: Explantation of right hip antibiotic spacer with revision right total hip arthroplasty     RECORDS REQUESTED FROM:       Primary Care Provider: Keyur Augustin MD - [facility]    Pertinent Medical History: abnormality of abdominal aorta, cardia pacemaker, chb    Most recent EKG+ Tracin24

## 2024-08-15 LAB
SCANNED LAB RESULT: NORMAL
TEST NAME: NORMAL

## 2024-08-17 LAB — BACTERIA SNV CULT: NO GROWTH

## 2024-08-19 ENCOUNTER — ANESTHESIA EVENT (OUTPATIENT)
Dept: SURGERY | Facility: CLINIC | Age: 74
DRG: 467 | End: 2024-08-19
Payer: COMMERCIAL

## 2024-08-19 ENCOUNTER — VIRTUAL VISIT (OUTPATIENT)
Dept: SURGERY | Facility: CLINIC | Age: 74
End: 2024-08-19
Payer: COMMERCIAL

## 2024-08-19 ENCOUNTER — PRE VISIT (OUTPATIENT)
Dept: SURGERY | Facility: CLINIC | Age: 74
End: 2024-08-19

## 2024-08-19 VITALS — HEIGHT: 65 IN | BODY MASS INDEX: 29.99 KG/M2 | WEIGHT: 180 LBS

## 2024-08-19 DIAGNOSIS — Z01.818 PRE-OP EVALUATION: Primary | ICD-10-CM

## 2024-08-19 PROCEDURE — 99214 OFFICE O/P EST MOD 30 MIN: CPT | Mod: 95 | Performed by: PHYSICIAN ASSISTANT

## 2024-08-19 RX ORDER — NAPROXEN 250 MG/1
250 TABLET ORAL 2 TIMES DAILY PRN
COMMUNITY

## 2024-08-19 ASSESSMENT — PAIN SCALES - GENERAL: PAINLEVEL: NO PAIN (0)

## 2024-08-19 ASSESSMENT — LIFESTYLE VARIABLES: TOBACCO_USE: 0

## 2024-08-19 ASSESSMENT — ENCOUNTER SYMPTOMS
SEIZURES: 0
DYSRHYTHMIAS: 1

## 2024-08-19 NOTE — PROGRESS NOTES
Kwabena is a 74 year old who is being evaluated via a billable video visit.    How would you like to obtain your AVS? MyChart  If the video visit is dropped, the invitation should be resent by: Text to cell phone: [phone]    Subjective   Kwabena is a 74 year old, presenting for the following health issues:  Pre-Op Exam      HPI         Physical Exam

## 2024-08-19 NOTE — H&P
Pre-Operative H & P     CC:  Preoperative exam to assess for increased cardiopulmonary risk while undergoing surgery and anesthesia.    Date of Encounter: 8/19/2024  Primary Care Physician:  Keyur Augustin     Reason for visit:   Encounter Diagnosis   Name Primary?    Pre-op evaluation Yes       HPI  Kwabena Solorio is a 74 year old male who presents for pre-operative H & P in preparation for  Procedure Information       Case: [id number] Date/Time: 08/30/24 1130    Procedures:       Explantation of right hip antibiotic spacer (Right: Hip)      with revision right total hip arthroplasty (Right: Hip)    Anesthesia type: Choice    Diagnosis: Infection associated with internal right hip prosthesis, subsequent encounter [T84.51XD]    Pre-op diagnosis: Infection associated with internal right hip prosthesis, subsequent encounter [T84.51XD]    Location:  OR  /  OR    Providers: Rd Dudley MD            Patient is being evaluated for comorbid conditions of complete heart block now s/p pacemaker placement, BPH, RLS, polymyalgia rheumatica    Mr. Solorio with a history of a right prosthetic hip placed in 2007.  He underwent lumbar imaging at an outside facility last winter and was found to have an abnormality with his hip joint.  Imaging revealed a large right iliopsoas bursal effusion and right acetabular osteolysis.  He has been following with Dr. Dudley and is now status post removal of hardware with antibiotic spacer placement.  The above procedure is now planned.    History is obtained from the patient and chart review    Hx of abnormal bleeding or anti-platelet use: denies      Past Medical History  Past Medical History:   Diagnosis Date    BPH (benign prostatic hyperplasia)     Cardiac pacemaker in situ     Complete heart block (H)     Hydronephrosis, right     Lumbar radiculopathy     Lumbar scoliosis     Restless legs syndrome (RLS)        Past Surgical History  Past Surgical History:   Procedure  Laterality Date    APPENDECTOMY      AS TOTAL KNEE ARTHROPLASTY Left 2010    CARPAL TUNNEL RELEASE RT/LT      CATARACT EXTRACTION      COLONOSCOPY      EP ICD      FOOT SURGERY Bilateral     HAND SURGERY      HC TOTAL HIP ARTHROPLASTY Right 2007    HEMORRHOIDECTOMY      HERNIA REPAIR      IR CVC TUNNEL PLACEMENT > 5 YRS OF AGE  5/10/2024    IRRIGATION AND DEBRIDEMENT HIP, PLACE ANTIBIOTIC CEMENT BEADS / SPACER Right 5/7/2024    Procedure: vancomycin and tobramycin impregnated cement spacer implantation;  Surgeon: Rd Dudley MD;  Location: [facility] OR    NASAL FRACTURE SURGERY      REMOVE HARDWARE ARTHROPLASTY HIP Right 5/7/2024    Procedure: Explant Right surface replacement Total Hip Arthroplasty ,;  Surgeon: Rd Dudley MD;  Location: [facility] OR    TONSILLECTOMY & ADENOIDECTOMY      urinary tract surgery         Prior to Admission Medications  Current Outpatient Medications   Medication Sig Dispense Refill    acetaminophen (TYLENOL) 325 MG tablet Take 2 tablets (650 mg) by mouth every 4 hours as needed for other (For optimal non-opioid multimodal pain management to improve pain control.)      amoxicillin (AMOXIL) 500 MG capsule Take 2,000 mg by mouth once Prior to dental appointments      calcium-magnesium (CALMAG) 500-250 MG TABS per tablet Take 1 tablet by mouth every morning With ZINC      Cholecalciferol (D 1000) 25 MCG (1000 UT) CAPS Take 1,000 Units by mouth daily (with lunch)      diclofenac (VOLTAREN) 1 % topical gel Apply 2 g topically 2 times daily as needed for moderate pain Applies before activity to right foot or in the evening to tingling hands      diphenhydrAMINE-zinc acetate (BENADRYL) 2-0.1 % external cream Apply topically 3 times daily as needed for itching      ferrous sulfate (FEROSUL) 325 (65 Fe) MG tablet Take 325 mg by mouth daily (with breakfast)      gabapentin (NEURONTIN) 300 MG capsule Take 3 capsules (900 mg) by mouth at bedtime      glucosamine-chondroitin 500-400 MG CAPS per capsule  Take 1 capsule by mouth every morning      ketoconazole (NIZORAL) 2 % external cream Apply thin layer twice daily as needed for rash on the face.      LORazepam (ATIVAN) 0.5 MG tablet Take 1 tablet (0.5 mg) by mouth nightly as needed for anxiety or sleep 10 tablet 0    LUTEIN PO Take 1 tablet by mouth every morning      Omega-3 Fatty Acids (SUPER OMEGA-3) 1000 MG CAPS Take 1,000 mg by mouth every morning      omeprazole (PRILOSEC) 20 MG DR capsule Take 1 capsule by mouth daily before breakfast      oxyCODONE (ROXICODONE) 5 MG tablet Take 1 tablet (5 mg) by mouth every 4 hours as needed for moderate pain (Patient not taking: Reported on 8/12/2024) 26 tablet 0    polyethylene glycol (MIRALAX) 17 GM/Dose powder Take 17 g by mouth daily Hold for loose stools.      pramipexole (MIRAPEX) 0.125 MG tablet Take 0.25 mg by mouth at bedtime      predniSONE (DELTASONE) 5 MG tablet Take 5 mg by mouth daily (with lunch)      Psyllium (METAMUCIL 4 IN 1 FIBER) 55.6 % POWD Take 1 Scoop by mouth every morning      senna-docusate (SENOKOT-S/PERICOLACE) 8.6-50 MG tablet Take 3 tablets by mouth 2 times daily 60 tablet 0    vitamin B-12 (CYANOCOBALAMIN) 500 MCG tablet Take 1,000 mcg by mouth daily (with lunch)      vitamin C (ASCORBIC ACID) 500 MG tablet Take 500 mg by mouth every morning      zolpidem (AMBIEN) 5 MG tablet Take 1 tablet (5 mg) by mouth nightly as needed for sleep 10 tablet 0       Allergies  No Known Allergies    Social History  Social History     Socioeconomic History    Marital status: Single     Spouse name: Not on file    Number of children: 0    Years of education: Not on file    Highest education level: Not on file   Occupational History    Occupation: retired   Tobacco Use    Smoking status: Never     Passive exposure: Past    Smokeless tobacco: Never   Substance and Sexual Activity    Alcohol use: Not Currently     Comment: quit 1990's    Drug use: Never    Sexual activity: Not on file   Other Topics Concern     Not on file   Social History Narrative    Not on file     Social Determinants of Health     Financial Resource Strain: Not on file   Food Insecurity: Not on file   Transportation Needs: Not on file   Physical Activity: Not on file   Stress: Not on file   Social Connections: Not on file   Interpersonal Safety: Not on file   Housing Stability: Not on file       Family History  Family History   Problem Relation Age of Onset    Anesthesia Reaction No family hx of     Deep Vein Thrombosis (DVT) No family hx of        Review of Systems  The complete review of systems is negative other than noted in the HPI or here.   Anesthesia Evaluation   Pt has had prior anesthetic.     No history of anesthetic complications       ROS/MED HX  ENT/Pulmonary:  - neg pulmonary ROS  (-) tobacco use   Neurologic: Comment: RLS     (-) no seizures and no CVA   Cardiovascular:     (+)  - -   -  - -              pacemaker, Reason placed: complete heart block. type: [employer] Accolade MRI, settings: DDDR ,       dysrhythmias,         Previous cardiac testing   Echo: Date: 2021 Results:  * The estimated ejection fraction is 50%.     * Left ventricular systolic function is low normal.     * Normal right ventricular systolic function.     * There is mild mitral regurgitation with multiple regurgitant jets..     * The pulmonary artery systolic pressures are normal,     * estimated pulmonary arterial systolic pressure is  31 mmHg, plus RAP.     * The IVC is normal in size (< 2.1 cm), > 50% respiratory variance, RA   pressure normal at 3 mmHg.     * There is no pericardial effusion visualized.     Stress Test:  Date: Results:    ECG Reviewed:  Date: Results:    Cath:  Date: Results:   (-) taking anticoagulants/antiplatelets   METS/Exercise Tolerance: 4 - Raking leaves, gardening Comment: Cross country skiier, biking in the summer. Denies exertional symptoms   Hematologic:     (+)      anemia, history of blood transfusion, no previous  transfusion reaction,     (-) history of blood clots   Musculoskeletal: Comment: S/p right CHRISS 2007- confirmed infection.  Rare hip/groin pain.    S/p left TKA  Has hardware in foot; s/p B/L surgeries  Polymyalgia rheumatica - on prednisone  Chronic back pain - Lumbar spondylosis & significant foraminal stenosis w/ radiculopathy. Followed by Dr. Merino.        GI/Hepatic:     (+) GERD, Asymptomatic on medication,               (-) liver disease   Renal/Genitourinary: Comment: Moderate right hydronephrosis, incidental finding.   Baseline creatinine 0.8-0.9  Prior injury 2/2 carl insertion for knee surgery.    (+)        BPH,      Endo:     (+)            Chronic steroid usage for Arthritis. Date most recently used: daily prednisone for PMR.     (-) Type II DM   Psychiatric/Substance Use: Comment: insomnia      Infectious Disease:  - neg infectious disease ROS     Malignancy:   (+) Malignancy, History of Skin.Skin CA Remission status post Surgery.      Other: Comment: Left foot - three toenails removed 3/19/24.  The big toe still has a little drainage.  Using SSD cream and dry dressing.  Was discussed with Dr. Dudley by patient at most recent visit.    - neg other ROS    (+)  , H/O Chronic Pain,         Virtual visit -  No vitals were obtained    Physical Exam  Constitutional: Awake, alert, cooperative, no apparent distress, and appears stated age.  HENT: Normocephalic  Respiratory: non labored breathing   Neurologic: Awake, alert, oriented to name, place and time.   Neuropsychiatric: Calm, cooperative. Normal affect.      Prior Labs/Diagnostic Studies   All labs and imaging personally reviewed     EKG/ stress test - if available please see in ROS above   No results found.       No data to display                  The patient's records and results personally reviewed by this provider.     Outside records reviewed from: Care Everywhere      Assessment    Kwabena Solorio is a 74 year old male seen as a PAC referral for  "risk assessment and optimization for anesthesia.    Plan/Recommendations  Pt will be optimized for the proposed procedure.  See below for details on the assessment, risk, and preoperative recommendations    NEUROLOGY  - No history of TIA, CVA or seizure  -RLS  -Post Op delirium risk factors:  No risk identified    ENT  - No current airway concerns.  Will need to be reassessed day of surgery.  Mallampati: Unable to assess  TM: Unable to assess    CARDIAC  -heart block now with [employer] Pacemaker present   -denies cardiac symptoms   -of note, patient admitted to ICU post op after surgery [id number] due to hypotension requiring IV fluids and pressors. Demand ischemia at the time. He then recovered and was moved to step down floor. Will request Pacemaker interrogation from July.   - METS (Metabolic Equivalents)  Patient performs 4 or more METS exercise without symptoms             Total Score: 0      RCRI-Very low risk: Class 1 0.4% complication rate             Total Score: 0        PULMONARY  DEQUAN Low Risk             Total Score: 2    DEQUAN: Over 50 ys old    DEQUAN: Male      - Denies asthma or inhaler use  - Tobacco History    History   Smoking Status    Never   Smokeless Tobacco    Never       GI  - GERD, well controlled   PONV Medium Risk  Total Score: 2           1 AN PONV: Patient is not a current smoker    1 AN PONV: Intended Post Op Opioids        /RENAL  - Baseline Creatinine 0.7    ENDOCRINE    - BMI: Estimated body mass index is 29.29 kg/m  as calculated from the following:    Height as of 6/13/24: 1.651 m (5' 5\").    Weight as of 6/13/24: 79.8 kg (176 lb).  Overweight (BMI 25.0-29.9)  - No history of Diabetes Mellitus  -using prednisone 5mg daily for PMR. Recommend stress dose steroids for upcoming procedure.     HEME  VTE Low Risk 0.5%             Total Score: 3    VTE: Greater than 59 yrs old    VTE: Male      - No history of abnormal bleeding or antiplatelet use.    MSK  -infection of prosthetic hip " joint now w/po antibiotic spacer placement. Above procedure now planned  Patient is NOT Frail             Total Score: 0      -PM&R referral not indicated     Different anesthesia methods/types have been discussed with the patient, but they are aware that the final plan will be decided by the assigned anesthesia provider on the date of service.  Patient was discussed with Dr Velazquez    The patient is optimized for their procedure. AVS with information on surgery time/arrival time, meds and NPO status given by nursing staff. No further diagnostic testing indicated.    Please refer to the physical examination documented by the anesthesiologist in the anesthesia record on the day of surgery.    Video-Visit Details    Type of service:  Video Visit    Provider received verbal consent for a Video Visit from the patient? Yes     Originating Location (pt. Location): Home    Distant Location (provider location):  Off-site  Mode of Communication:  Video Conference via Cogito  On the day of service:     Prep time: 14 minutes  Visit time: 12 minutes  Documentation time: 9 minutes  ------------------------------------------  Total time: 35 minutes      Faina Goldberg PA-C  Preoperative Assessment Center  [facility]  [facility]  Phone: [phone]  Fax: [phone]

## 2024-08-19 NOTE — PATIENT INSTRUCTIONS
Preparing for Your Surgery      Name:  Kwabena Solorio   MRN:  [id number]   :  1950   Today's Date:  2024       Arriving for surgery:  Surgery date:  24  Arrival time:  9 am    Please come to:     [facility] [address]   (Address takes you to the [facility].)  [address] [place] [address]. [place]  [address]     The Green Ramp for patients and visitors is beneath the [facility]. The parking facility entrance is at the intersection of [address] [place] [address] and [address]. Patients and visitors who self-park will receive the reduced hospital parking rate (no ticket validation needed).   [employer] parking, located at the [facility] main entrance on [address] [place] [address], is available Monday - Friday from 7 am to 3:30 pm.   Discounted parking pass options can be purchased from  attendants during business hours.     -Check in at the security desk in the [facility] ([facility])   Lobby. They will direct you to the correct elevators.   -Proceed to the 3rd floor, Adult Surgery Waiting Lounge. [phone]     If you need directions, a wheelchair or escort please stop at the Information Desk in the lobby.  Inform the information person you are here for surgery; a wheelchair and escort to [address] will be provided.   An escort to the Adult Surgery Waiting Lounge will be provided. .    What can I eat or drink?  -  You may eat and drink normally up to 8 hours prior to arrival time. (Until 1 am)  -  You may have clear liquids until 2 hours prior to arrival time. (Until 7 am)    Examples of clear liquids:  Water  Clear broth  Juices (apple, white grape, white cranberry  and cider) without pulp  Noncarbonated, powder based beverages  (lemonade and Papa-Aid)  Sodas (Sprite, 7-Up, ginger ale and seltzer)  Coffee or tea (without milk or cream)  Gatorade    -  No Alcohol or  cannabis products for at least 24 hours before surgery.     Which medicines can I take?  Hold Aspirin for 7 days before surgery.   Hold Multivitamins for 7 days before surgery. Take the last Multivitamin on 8-22-24, and then hold until surgery.  Hold Supplements for 7 days before surgery. Take the last Glucosamine-Chondroitin, Lutein, Saw Palmetto, and Omega-3 Fatty Acids on 8-22-24, and then hold until surgery.  Hold Ibuprofen (Advil, Motrin) for 1 day before surgery--unless otherwise directed by surgeon.  Hold Naproxen (Aleve) for 4 days before surgery.  Acetaminophen (Tylenol) is okay to take if needed.    Hold Diclofenac (Voltaren) gel for 24 hours prior to surgery.    -  DO NOT take these medications the day of surgery:  Calcium Magnesium (Calmag)  Cholecalciferol (Vitamin D)  Ferrous Sulfate (Ferosul)  Polyethylene Glycol (Miralax)  Psyllium (Metamucil)  Senna-Docusate (Senokot-S)  Vitamin B-12 (Cyanocobalamin)  Vitamin C (Ascorbic Acid)  Docusate Calcium  Diphenhydramine-Zinc Acetate (Benadryl) cream      -  PLEASE TAKE these medications the day of surgery:  Omeprazole (Prilosec)  Prednisone (Deltasone)  Acetaminophen (Tylenol) if needed    How do I prepare myself?  - Please take 2 showers (one the night prior to surgery and one the morning of surgery) using Scrubcare or Hibiclens soap.    You may use your own shampoo and conditioner. No other hair products.     Use this soap only from the neck to your toes.     Leave the soap on your skin for one minute--then rinse thoroughly.    - Please remove all jewelry and body piercings.  - No lotions, deodorants or fragrance.  - No makeup or fingernail polish.   - Bring your ID and insurance card.    -If you use a CPAP machine, please bring the CPAP machine, tubing, and mask to hospital.    -If you have a Deep Brain Stimulator, Spinal Cord Stimulator, or any Neuro Stimulator device---you must bring the remote control to the hospital.      ALL PATIENTS GOING HOME THE  SAME DAY OF SURGERY ARE REQUIRED TO HAVE A RESPONSIBLE ADULT TO DRIVE AND BE IN ATTENDANCE WITH THEM FOR 24 HOURS FOLLOWING SURGERY.    Covid testing policy as of 12/06/2022  Your surgeon will notify and schedule you for a COVID test if one is needed before surgery--please direct any questions or COVID symptoms to your surgeon      Questions or Concerns:    - For any questions regarding the day of surgery or your hospital stay, please contact the Pre Admission Nursing Office at [phone].       - If you have health changes between today and your surgery, please call your surgeon.       - For questions after surgery, please call your surgeons office.           Current Visitor Guidelines    You may have 2 visitors in the pre op area.    Visiting hours: 8 a.m. to 8:30 p.m.    Patients confirmed or suspected to have symptoms of COVID 19 or flu:     No visitors allowed for adult patients.   Children (under age 18) can have 1 named visitor.     People who are sick or showing symptoms of COVID 19 or flu:    Are not allowed to visit patients--we can only make exceptions in special situations.       Please follow these guidelines for your visit:          Please maintain social distance          Masking is optional--however at times you may be asked to wear a mask for the safety of yourself and others     Clean your hands with alcohol hand . Do this when you arrive at and leave the building and patient room,    And again after you touch your mask or anything in the room.     Go directly to and from the room you are visiting.     Stay in the patient s room during your visit. Limit going to other places in the hospital as much as possible     Leave bags and jackets at home or in the car.     For everyone s health, please don t come and go during your visit. That includes for smoking   during your visit.

## 2024-08-20 ENCOUNTER — LAB (OUTPATIENT)
Dept: LAB | Facility: CLINIC | Age: 74
End: 2024-08-20
Payer: COMMERCIAL

## 2024-08-20 ENCOUNTER — PREP FOR PROCEDURE (OUTPATIENT)
Dept: OTHER | Facility: CLINIC | Age: 74
End: 2024-08-20

## 2024-08-20 DIAGNOSIS — T84.59XD INFECTION OF PROSTHETIC TOTAL HIP JOINT, SUBSEQUENT ENCOUNTER: ICD-10-CM

## 2024-08-20 DIAGNOSIS — Z96.649 INFECTION OF PROSTHETIC TOTAL HIP JOINT, SUBSEQUENT ENCOUNTER: ICD-10-CM

## 2024-08-20 PROCEDURE — 36415 COLL VENOUS BLD VENIPUNCTURE: CPT

## 2024-08-20 PROCEDURE — 85652 RBC SED RATE AUTOMATED: CPT

## 2024-08-20 PROCEDURE — 86140 C-REACTIVE PROTEIN: CPT

## 2024-08-21 NOTE — PROGRESS NOTES
"SURGERY PLAN (PRE-OP PLAN)     Patient Position (indicated by x):  X  Lateral decubitus, Wixson hip positioner    x  Regular OR table    x  Lomeli Catheter    x  Revision CHRISS drape with plastic side bags   x   Blue U drape x2   Blue and white stockinet   Coban   Ioban      General Equipment Requests (indicated by x):    X  Small pituitary rongeur   X  Dudley's angled curettes, narrow shaft   X  Midas Joseph      Phenol 5%      Blunt Pelvic Retractor (.55, Blunt Hohmann with  slight bend)      Lambotte Osteotomes      Implant Extraction/Cement spacer tools (indicated by x):      Biomet Ultrasound cement removal      Midas Vini, AC-1 (1mm tiny dissecting tip with wire guide gold handpiece)    x  Flexible osteotomes (both black and wood handled with new replacement blades)   X  Universal stem extractor   X  \"L\" hook & hoop style stem extractor stem extractor (DePuy AML stem extractor)   X  Jaquan Explant cup removal osteotomes   X  Suction tip with debris trap (clear plastic disposable) - Fitz vac   X  Biomet offset implant impactor (white handle in HouseCall's cart)   X  Deneen IM canal long shaft cement removal gouges, pituitary ronguers)     CHRISS Requests (indicated by x):     Biomet ECHO Bimetric ingrowth stem      Biomet Integral cemented stem      Biomet RB cup, 28+32 heads      Biomet Bipolar cup     Jaquan Constrained liner with heads      Estes Nephew BHR resurfacing CHRISS      DePuy S-ROM long stems      George Restoration modular long stem   X  Biomet SONJA long stems, both interlocked and splined stems   X  Biomet G7 acetabulum cup + augments     Jaquan TM Acetabulum cup   X  Jaquan Shannon SL Revision hip system      [place] Tritanium TM cup +  augments    x  George GAP II acet cage + cemented poly cup (hold, have available)      Biomet OSS prox femur replacement CHRISS      Biomet OSS Compress fixation      IM flexible reamers + guidewire, < 9 mm   X  IM flexible reamers + guidewire, > 9 mm       Allograft: " femoral head      Allograft: distal femur      Allograft: proximal tibia      Bone mill   X   Allograft cancellous chips      Allograft cancellous crushed   X  Dall Miles Troch Claw + cable, insertion instruments HOLD   X  Dall Miles beaded cerclage cable, green cable  x2 , insertion instruments HOLD      Jaquan CTR Troch cable plate      AO pelvic instruments and clamps (angled) Blunt Hohmann & angled Osborne, large bone reduction forceps      Specimens and cultures (indicated by x):   X5  Tissue cultures, aerobic and anaerobic without gram stain      Frozen section      pathology specimens - fresh      pathology specimens - formalin      Plan:  Revision Right CHRISS, with explant of articulating hip spacer with Dr. Luis Enrique Grier MD  Orthopaedic Surgery, Adult Reconstruction Fellow

## 2024-08-22 ENCOUNTER — VIRTUAL VISIT (OUTPATIENT)
Dept: ORTHOPEDICS | Facility: CLINIC | Age: 74
End: 2024-08-22
Payer: COMMERCIAL

## 2024-08-22 VITALS — WEIGHT: 180 LBS | BODY MASS INDEX: 29.99 KG/M2 | HEIGHT: 65 IN

## 2024-08-22 DIAGNOSIS — T84.59XD INFECTION OF PROSTHETIC TOTAL HIP JOINT, SUBSEQUENT ENCOUNTER: Primary | ICD-10-CM

## 2024-08-22 DIAGNOSIS — Z96.649 INFECTION OF PROSTHETIC TOTAL HIP JOINT, SUBSEQUENT ENCOUNTER: Primary | ICD-10-CM

## 2024-08-22 PROCEDURE — 99213 OFFICE O/P EST LOW 20 MIN: CPT | Mod: 95 | Performed by: ORTHOPAEDIC SURGERY

## 2024-08-22 ASSESSMENT — PAIN SCALES - GENERAL: PAINLEVEL: NO PAIN (1)

## 2024-08-22 NOTE — NURSING NOTE
Current patient location: [address] [facility] [address]    Is the patient currently in the state of [place]? YES    Visit mode:VIDEO    If the visit is dropped, the patient can be reconnected by: VIDEO VISIT: Text to cell phone:   Telephone Information:   Mobile [phone]       Will anyone else be joining the visit? Yes  (If patient encounters technical issues they should call [phone]    How would you like to obtain your AVS? MyChart    Are changes needed to the allergy or medication list? No    Are refills needed on medications prescribed by this physician? NO    Rooming Documentation:  Questionnaire(s) completed      Reason for visit: JOYCE PALACIOS

## 2024-08-22 NOTE — PROGRESS NOTES
[facility] Physicians  Orthopaedic Surgery, Joint Replacement Consultation  by Rd Dudley M.D.     Kwabena Solorio MRN# [id number]     YOB: 1950      Requesting physician: No ref. provider found  No primary care provider on file.      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 Size 46 femoral head, 52-mm, acetabulum, Tobramycin Simplex cement.  ( Keyur Balderrama MD ) [facility]  3/18/2010, L TKA, Jaquan NexGen LPS Flex, 10mm insert, (Keyur Balderrama) [facility]  3/22/2013, Angel/Akin Bunionectomy, bilateral feet (R. Sticha) [facility]  3/10/2014, screw [facility] right foot ([facility] St[facility]  5/2/2014, screw removal left foot, (R. Sticha), [facility]  2/27/2020, Right CMC arthroplasty and Left hand thumb trigger finger release (JAQUI Prasad), [facility]  4/25/2024, R hip aspirate: WBC 21k, PMNs 89%, Culture Enterococcus faecalis, serum Chr 3.7 nl, serum Co 4.2 elevated.  5/7/24, Explant Right surface replacement Total Hip Arthroplasty and vancomycin and tobramycin impregnated cement spacer implantation, (Luis Enrique), [facility]  E Faecalis.  Ampicillin IV x 6 weeks.        Interval history: Kwabena Solorio is seen today in virtual visit in anticipation of his upcoming procedure for explantation of right hip antibiotic spacer and revision right total hip arthroplasty. He reports he is overall doing well.  Pain is well-controlled.  He is motivated and eager to have his upcoming surgery.  Denies new infectious complaints.  No other concerns today.    He had his right hip aspirated on 8/12/2024 and updated laboratory studies at that time.  These are outlined below.    Laboratory studies:    CRP 8/20 <3  ESR 8/20 2    30-Day Micro Results       Collected Updated Procedure Result Status      08/12/2024 1255 08/22/2024 1516 Anaerobic Bacterial Culture  Routine [[id number]]   Synovial fluid from Hip, Right    Preliminary result Component Value   Culture No anaerobic organisms isolated after 10 days  [P]                08/12/2024 1255 08/17/2024 0650 Synovial fluid Aerobic Bacterial Culture Routine Without Gram Stain [[id number]]   Synovial fluid from Hip, Right    Final result Component Value   Culture No Growth               08/12/2024 1255 08/12/2024 1915 Cell count with differential fluid [[id number]]    (Abnormal)   Synovial fluid from Hip, Right    Final result Component Value   No component results            08/12/2024 1255 08/22/2024 1501 Fungal or Yeast Culture Routine [[id number]]   Synovial fluid from Hip, Right    Preliminary result Component Value   Culture No growth after 10 days  [P]                08/12/2024 1255 08/12/2024 1913 Cell Count Body Fluid [[id number]]    (Abnormal)   Synovial fluid from Hip, Right    Final result Component Value Units   Color Yellow    Clarity Cloudy    Cell Count Fluid Source Hip, Right    Total Nucleated Cells 429 /uL            08/12/2024 1255 08/12/2024 1915 Differential Body Fluid [[id number]]    Synovial fluid from Hip, Right    Final result Component Value Units   % Neutrophils 10 %   % Lymphocytes 64 %   % Monocyte/Macrophages 26 %                  Assessment and plan:  74-year-old male with history of right hip prosthetic joint infection now status post explantation of right hip arthroplasty with antibiotic spacer implantation.  Completed course of ampicillin IV antibiotics followed by antibiotic holiday.  Hip reaspirated without any evidence of persistent infection.  Cultures negative, Synovasure negative, cell count reassuring with low neutrophil percentage.  Inflammatory markers normalized.    He is ready to proceed with second stage of revision right hip arthroplasty.    Will plan to obtain updated radiographs, AP pelvis and right hip x-rays to check for stability of his cystic areas in the  acetabulum.  This will be used for preoperative planning purposes.  He will complete these tomorrow and they will be reviewed prior to surgery.    Remainder of preoperative pathway as previously outlined.  Plan to proceed with revision right hip arthroplasty on 8/30/2024    Marcos Paulino MD  Orthopedic surgery resident    Patient seen and discussed with Dr. Dudley        Virtual-Visit Details    Type of service:  Video Visit  Visit total duration (including visit time, pre and post visit work time as documented above on the same day of service): 20  min  Video start time: 3:40  Video end time: 4:01 PM  Originating Location (pt. Location): Home  Distant Location (provider location):  [facility] ORTHOPEDIC [facility]   Platform used for Virtual Visit: [email or web address]

## 2024-08-22 NOTE — LETTER
8/22/2024      Kwabena Solorio  [address]  [address]  [place] [address]      Dear Colleague,    Thank you for referring your patient, Kwabena Solorio, to the [facility] [place]. Please see a copy of my visit note below.         [facility] Physicians  Orthopaedic Surgery, Joint Replacement Consultation  by Rd Dudley M.D.     Kwabena Solorio MRN# [id number]     YOB: 1950      Requesting physician: No ref. provider found  No primary care provider on file.      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  3/27/07, Right hip arthroplasty, [place] Hip Resurfacing. Size 46 femoral head, 52-mm, acetabulum, Tobramycin Simplex cement.  ( Keyur Balderrama MD ) [facility]  3/18/2010, L TKA, Jaquan NexGen LPS Flex, 10mm insert, (Keyur Balderrama) [facility]  3/22/2013, Angel/Akin Bunionectomy, bilateral feet (R. Sticha) [facility]  3/10/2014, screw removal right foot (R. Sticha) [facility]  5/2/2014, screw removal left foot, (R. Sticha), [facility]  2/27/2020, Right CMC arthroplasty and Left hand thumb trigger finger release (JAQUI Prasad), [facility]  4/25/2024, R hip aspirate: WBC 21k, PMNs 89%, Culture Enterococcus faecalis, serum Chr 3.7 nl, serum Co 4.2 elevated.  5/7/24, Explant Right surface replacement Total Hip Arthroplasty and vancomycin and tobramycin impregnated cement spacer implantation, (Luis Enrique), [facility]  E Faecalis.  Ampicillin IV x 6 weeks.        Interval history: Kwabena Solorio is seen today in virtual visit in anticipation of his upcoming procedure for explantation of right hip antibiotic spacer and revision right total hip arthroplasty. He reports he is overall doing well.  Pain is well-controlled.  He is motivated and eager to have his upcoming surgery.  Denies new infectious complaints.  No other concerns today.    He had his right hip aspirated on 8/12/2024 and  updated laboratory studies at that time.  These are outlined below.    Laboratory studies:    CRP 8/20 <3  ESR 8/20 2    30-Day Micro Results       Collected Updated Procedure Result Status      08/12/2024 1255 08/22/2024 1516 Anaerobic Bacterial Culture Routine [[id number]]   Synovial fluid from Hip, Right    Preliminary result Component Value   Culture No anaerobic organisms isolated after 10 days  [P]                08/12/2024 1255 08/17/2024 0650 Synovial fluid Aerobic Bacterial Culture Routine Without Gram Stain [[id number]]   Synovial fluid from Hip, Right    Final result Component Value   Culture No Growth               08/12/2024 1255 08/12/2024 1915 Cell count with differential fluid [[id number]]    (Abnormal)   Synovial fluid from Hip, Right    Final result Component Value   No component results            08/12/2024 1255 08/22/2024 1501 Fungal or Yeast Culture Routine [[id number]]   Synovial fluid from Hip, Right    Preliminary result Component Value   Culture No growth after 10 days  [P]                08/12/2024 1255 08/12/2024 1913 Cell Count Body Fluid [[id number]]    (Abnormal)   Synovial fluid from Hip, Right    Final result Component Value Units   Color Yellow    Clarity Cloudy    Cell Count Fluid Source Hip, Right    Total Nucleated Cells 429 /uL            08/12/2024 1255 08/12/2024 1915 Differential Body Fluid [[id number]]    Synovial fluid from Hip, Right    Final result Component Value Units   % Neutrophils 10 %   % Lymphocytes 64 %   % Monocyte/Macrophages 26 %                  Assessment and plan:  74-year-old male with history of right hip prosthetic joint infection now status post explantation of right hip arthroplasty with antibiotic spacer implantation.  Completed course of ampicillin IV antibiotics followed by antibiotic holiday.  Hip reaspirated without any evidence of persistent infection.  Cultures negative, Synovasure negative, cell count reassuring with low neutrophil  percentage.  Inflammatory markers normalized.    He is ready to proceed with second stage of revision right hip arthroplasty.    Will plan to obtain updated radiographs, AP pelvis and right hip x-rays to check for stability of his cystic areas in the acetabulum.  This will be used for preoperative planning purposes.  He will complete these tomorrow and they will be reviewed prior to surgery.    Remainder of preoperative pathway as previously outlined.  Plan to proceed with revision right hip arthroplasty on 8/30/2024    Marcos Paulino MD  Orthopedic surgery resident    Patient seen and discussed with Dr. Dudley        Virtual-Visit Details    Type of service:  Video Visit  Visit total duration (including visit time, pre and post visit work time as documented above on the same day of service): 20  min  Video start time: 3:40  Video end time: 4:01 PM  Originating Location (pt. Location): Home  Distant Location (provider location):  [facility] ORTHOPEDIC [facility]   Platform used for Virtual Visit: [email or web address]                      Again, thank you for allowing me to participate in the care of your patient.        Sincerely,        Rd Dudley MD

## 2024-08-23 ENCOUNTER — HOSPITAL ENCOUNTER (OUTPATIENT)
Dept: GENERAL RADIOLOGY | Facility: CLINIC | Age: 74
Discharge: HOME | End: 2024-08-23
Attending: ORTHOPAEDIC SURGERY | Admitting: ORTHOPAEDIC SURGERY
Payer: COMMERCIAL

## 2024-08-23 DIAGNOSIS — T84.59XD INFECTION OF PROSTHETIC TOTAL HIP JOINT, SUBSEQUENT ENCOUNTER: ICD-10-CM

## 2024-08-23 DIAGNOSIS — Z96.649 INFECTION OF PROSTHETIC TOTAL HIP JOINT, SUBSEQUENT ENCOUNTER: ICD-10-CM

## 2024-08-23 PROCEDURE — 73502 X-RAY EXAM HIP UNI 2-3 VIEWS: CPT

## 2024-08-26 LAB — BACTERIA SNV CULT: NORMAL

## 2024-08-30 ENCOUNTER — ANESTHESIA (OUTPATIENT)
Dept: SURGERY | Facility: CLINIC | Age: 74
DRG: 467 | End: 2024-08-30
Payer: COMMERCIAL

## 2024-08-30 ENCOUNTER — APPOINTMENT (OUTPATIENT)
Dept: GENERAL RADIOLOGY | Facility: CLINIC | Age: 74
DRG: 467 | End: 2024-08-30
Attending: PHYSICIAN ASSISTANT
Payer: COMMERCIAL

## 2024-08-30 ENCOUNTER — APPOINTMENT (OUTPATIENT)
Dept: GENERAL RADIOLOGY | Facility: CLINIC | Age: 74
DRG: 467 | End: 2024-08-30
Attending: ORTHOPAEDIC SURGERY
Payer: COMMERCIAL

## 2024-08-30 ENCOUNTER — HOSPITAL ENCOUNTER (INPATIENT)
Facility: CLINIC | Age: 74
LOS: 4 days | Discharge: HOME | DRG: 467 | End: 2024-09-03
Attending: ORTHOPAEDIC SURGERY | Admitting: ORTHOPAEDIC SURGERY
Payer: COMMERCIAL

## 2024-08-30 DIAGNOSIS — K21.00 GASTROESOPHAGEAL REFLUX DISEASE WITH ESOPHAGITIS, UNSPECIFIED WHETHER HEMORRHAGE: ICD-10-CM

## 2024-08-30 DIAGNOSIS — K59.03 CONSTIPATION DUE TO OPIOID THERAPY: ICD-10-CM

## 2024-08-30 DIAGNOSIS — Z96.649 STATUS POST REVISION OF TOTAL HIP: Primary | ICD-10-CM

## 2024-08-30 DIAGNOSIS — T40.2X5A CONSTIPATION DUE TO OPIOID THERAPY: ICD-10-CM

## 2024-08-30 LAB
ABO/RH(D): NORMAL
ANION GAP SERPL CALCULATED.3IONS-SCNC: 10 MMOL/L (ref 7–15)
ANION GAP SERPL CALCULATED.3IONS-SCNC: 10 MMOL/L (ref 7–15)
ANTIBODY SCREEN: NEGATIVE
BASE EXCESS BLDA CALC-SCNC: -0.3 MMOL/L (ref -3–3)
BASE EXCESS BLDA CALC-SCNC: -0.7 MMOL/L (ref -3–3)
BASE EXCESS BLDA CALC-SCNC: -0.9 MMOL/L (ref -3–3)
BASE EXCESS BLDA CALC-SCNC: -3 MMOL/L (ref -3–3)
BUN SERPL-MCNC: 14.8 MG/DL (ref 8–23)
BUN SERPL-MCNC: 15 MG/DL (ref 8–23)
CA-I BLD-MCNC: 4.1 MG/DL (ref 4.4–5.2)
CA-I BLD-MCNC: 4.4 MG/DL (ref 4.4–5.2)
CA-I BLD-MCNC: 4.5 MG/DL (ref 4.4–5.2)
CA-I BLD-MCNC: 4.9 MG/DL (ref 4.4–5.2)
CALCIUM SERPL-MCNC: 8.7 MG/DL (ref 8.8–10.4)
CALCIUM SERPL-MCNC: 9.1 MG/DL (ref 8.8–10.4)
CHLORIDE SERPL-SCNC: 100 MMOL/L (ref 98–107)
CHLORIDE SERPL-SCNC: 97 MMOL/L (ref 98–107)
CREAT SERPL-MCNC: 0.65 MG/DL (ref 0.67–1.17)
CREAT SERPL-MCNC: 0.81 MG/DL (ref 0.67–1.17)
EGFRCR SERPLBLD CKD-EPI 2021: >90 ML/MIN/1.73M2
EGFRCR SERPLBLD CKD-EPI 2021: >90 ML/MIN/1.73M2
ERYTHROCYTE [DISTWIDTH] IN BLOOD BY AUTOMATED COUNT: 13.3 % (ref 10–15)
ERYTHROCYTE [DISTWIDTH] IN BLOOD BY AUTOMATED COUNT: 13.4 % (ref 10–15)
GLUCOSE BLD-MCNC: 128 MG/DL (ref 70–99)
GLUCOSE BLD-MCNC: 134 MG/DL (ref 70–99)
GLUCOSE BLD-MCNC: 139 MG/DL (ref 70–99)
GLUCOSE BLD-MCNC: 155 MG/DL (ref 70–99)
GLUCOSE BLDC GLUCOMTR-MCNC: 132 MG/DL (ref 70–99)
GLUCOSE BLDC GLUCOMTR-MCNC: 97 MG/DL (ref 70–99)
GLUCOSE SERPL-MCNC: 100 MG/DL (ref 70–99)
GLUCOSE SERPL-MCNC: 153 MG/DL (ref 70–99)
HCO3 BLDA-SCNC: 22 MMOL/L (ref 21–28)
HCO3 BLDA-SCNC: 24 MMOL/L (ref 21–28)
HCO3 SERPL-SCNC: 23 MMOL/L (ref 22–29)
HCO3 SERPL-SCNC: 25 MMOL/L (ref 22–29)
HCT VFR BLD AUTO: 23.8 % (ref 40–53)
HCT VFR BLD AUTO: 37 % (ref 40–53)
HGB BLD-MCNC: 10.5 G/DL (ref 13.3–17.7)
HGB BLD-MCNC: 11.3 G/DL (ref 13.3–17.7)
HGB BLD-MCNC: 13 G/DL (ref 13.3–17.7)
HGB BLD-MCNC: 8.4 G/DL (ref 13.3–17.7)
HGB BLD-MCNC: 8.8 G/DL (ref 13.3–17.7)
HGB BLD-MCNC: 8.9 G/DL (ref 13.3–17.7)
INR PPP: 0.95 (ref 0.85–1.15)
LACTATE BLD-SCNC: 1.4 MMOL/L (ref 0.7–2)
LACTATE BLD-SCNC: 1.6 MMOL/L (ref 0.7–2)
LACTATE BLD-SCNC: 1.8 MMOL/L (ref 0.7–2)
LACTATE BLD-SCNC: 1.8 MMOL/L (ref 0.7–2)
MCH RBC QN AUTO: 29.6 PG (ref 26.5–33)
MCH RBC QN AUTO: 29.8 PG (ref 26.5–33)
MCHC RBC AUTO-ENTMCNC: 35.1 G/DL (ref 31.5–36.5)
MCHC RBC AUTO-ENTMCNC: 35.3 G/DL (ref 31.5–36.5)
MCV RBC AUTO: 84 FL (ref 78–100)
MCV RBC AUTO: 84 FL (ref 78–100)
O2/TOTAL GAS SETTING VFR VENT: 40 %
O2/TOTAL GAS SETTING VFR VENT: 42 %
O2/TOTAL GAS SETTING VFR VENT: 42 %
O2/TOTAL GAS SETTING VFR VENT: 50 %
OXYHGB MFR BLDA: 98 % (ref 92–100)
PCO2 BLDA: 35 MM HG (ref 35–45)
PCO2 BLDA: 36 MM HG (ref 35–45)
PCO2 BLDA: 37 MM HG (ref 35–45)
PCO2 BLDA: 39 MM HG (ref 35–45)
PH BLDA: 7.4 [PH] (ref 7.35–7.45)
PH BLDA: 7.4 [PH] (ref 7.35–7.45)
PH BLDA: 7.41 [PH] (ref 7.35–7.45)
PH BLDA: 7.43 [PH] (ref 7.35–7.45)
PLATELET # BLD AUTO: 174 10E3/UL (ref 150–450)
PLATELET # BLD AUTO: 241 10E3/UL (ref 150–450)
PO2 BLDA: 129 MM HG (ref 80–105)
PO2 BLDA: 165 MM HG (ref 80–105)
PO2 BLDA: 168 MM HG (ref 80–105)
PO2 BLDA: 171 MM HG (ref 80–105)
POTASSIUM BLD-SCNC: 3 MMOL/L (ref 3.4–5.3)
POTASSIUM BLD-SCNC: 3.5 MMOL/L (ref 3.4–5.3)
POTASSIUM BLD-SCNC: 3.6 MMOL/L (ref 3.4–5.3)
POTASSIUM BLD-SCNC: 3.7 MMOL/L (ref 3.4–5.3)
POTASSIUM SERPL-SCNC: 3.8 MMOL/L (ref 3.4–5.3)
POTASSIUM SERPL-SCNC: 4.1 MMOL/L (ref 3.4–5.3)
RBC # BLD AUTO: 2.82 10E6/UL (ref 4.4–5.9)
RBC # BLD AUTO: 4.39 10E6/UL (ref 4.4–5.9)
SAO2 % BLDA: 100 % (ref 95–96)
SAO2 % BLDA: 99 % (ref 95–96)
SODIUM BLD-SCNC: 133 MMOL/L (ref 135–145)
SODIUM BLD-SCNC: 133 MMOL/L (ref 135–145)
SODIUM BLD-SCNC: 134 MMOL/L (ref 135–145)
SODIUM BLD-SCNC: 135 MMOL/L (ref 135–145)
SODIUM SERPL-SCNC: 132 MMOL/L (ref 135–145)
SODIUM SERPL-SCNC: 133 MMOL/L (ref 135–145)
SPECIMEN EXPIRATION DATE: NORMAL
WBC # BLD AUTO: 11.4 10E3/UL (ref 4–11)
WBC # BLD AUTO: 4.9 10E3/UL (ref 4–11)

## 2024-08-30 PROCEDURE — 250N000011 HC RX IP 250 OP 636: Performed by: PHYSICIAN ASSISTANT

## 2024-08-30 PROCEDURE — 360N000078 HC SURGERY LEVEL 5, PER MIN: Performed by: ORTHOPAEDIC SURGERY

## 2024-08-30 PROCEDURE — 710N000011 HC RECOVERY PHASE 1, LEVEL 3, PER MIN: Performed by: ORTHOPAEDIC SURGERY

## 2024-08-30 PROCEDURE — 86923 COMPATIBILITY TEST ELECTRIC: CPT

## 2024-08-30 PROCEDURE — P9045 ALBUMIN (HUMAN), 5%, 250 ML: HCPCS | Performed by: NURSE ANESTHETIST, CERTIFIED REGISTERED

## 2024-08-30 PROCEDURE — 0SP90EZ REMOVAL OF ARTICULATING SPACER FROM RIGHT HIP JOINT, OPEN APPROACH: ICD-10-PCS | Performed by: ORTHOPAEDIC SURGERY

## 2024-08-30 PROCEDURE — 27134 REVISE HIP JOINT REPLACEMENT: CPT | Performed by: NURSE ANESTHETIST, CERTIFIED REGISTERED

## 2024-08-30 PROCEDURE — 82330 ASSAY OF CALCIUM: CPT

## 2024-08-30 PROCEDURE — 99100 ANES PT EXTEME AGE<1 YR&>70: CPT | Performed by: STUDENT IN AN ORGANIZED HEALTH CARE EDUCATION/TRAINING PROGRAM

## 2024-08-30 PROCEDURE — C1776 JOINT DEVICE (IMPLANTABLE): HCPCS | Performed by: ORTHOPAEDIC SURGERY

## 2024-08-30 PROCEDURE — P9045 ALBUMIN (HUMAN), 5%, 250 ML: HCPCS | Performed by: STUDENT IN AN ORGANIZED HEALTH CARE EDUCATION/TRAINING PROGRAM

## 2024-08-30 PROCEDURE — 84132 ASSAY OF SERUM POTASSIUM: CPT | Performed by: STUDENT IN AN ORGANIZED HEALTH CARE EDUCATION/TRAINING PROGRAM

## 2024-08-30 PROCEDURE — 250N000011 HC RX IP 250 OP 636: Performed by: NURSE ANESTHETIST, CERTIFIED REGISTERED

## 2024-08-30 PROCEDURE — 250N000025 HC SEVOFLURANE, PER MIN: Performed by: ORTHOPAEDIC SURGERY

## 2024-08-30 PROCEDURE — 87075 CULTR BACTERIA EXCEPT BLOOD: CPT | Performed by: ORTHOPAEDIC SURGERY

## 2024-08-30 PROCEDURE — 250N000009 HC RX 250

## 2024-08-30 PROCEDURE — C1713 ANCHOR/SCREW BN/BN,TIS/BN: HCPCS | Performed by: ORTHOPAEDIC SURGERY

## 2024-08-30 PROCEDURE — 250N000009 HC RX 250: Performed by: ORTHOPAEDIC SURGERY

## 2024-08-30 PROCEDURE — 250N000009 HC RX 250: Performed by: NURSE ANESTHETIST, CERTIFIED REGISTERED

## 2024-08-30 PROCEDURE — 999N000065 XR PELVIS AND HIP PORTABLE RIGHT 1 VIEW

## 2024-08-30 PROCEDURE — 250N000011 HC RX IP 250 OP 636: Performed by: STUDENT IN AN ORGANIZED HEALTH CARE EDUCATION/TRAINING PROGRAM

## 2024-08-30 PROCEDURE — 250N000011 HC RX IP 250 OP 636: Performed by: ANESTHESIOLOGY

## 2024-08-30 PROCEDURE — 85610 PROTHROMBIN TIME: CPT | Performed by: ANESTHESIOLOGY

## 2024-08-30 PROCEDURE — 0QU40KZ SUPPLEMENT RIGHT ACETABULUM WITH NONAUTOLOGOUS TISSUE SUBSTITUTE, OPEN APPROACH: ICD-10-PCS | Performed by: ORTHOPAEDIC SURGERY

## 2024-08-30 PROCEDURE — 999N000063 XR HIP RIGHT 1 VIEW: Mod: RT

## 2024-08-30 PROCEDURE — 258N000003 HC RX IP 258 OP 636: Performed by: NURSE ANESTHETIST, CERTIFIED REGISTERED

## 2024-08-30 PROCEDURE — 87102 FUNGUS ISOLATION CULTURE: CPT | Performed by: ORTHOPAEDIC SURGERY

## 2024-08-30 PROCEDURE — 370N000017 HC ANESTHESIA TECHNICAL FEE, PER MIN: Performed by: ORTHOPAEDIC SURGERY

## 2024-08-30 PROCEDURE — 87070 CULTURE OTHR SPECIMN AEROBIC: CPT | Performed by: ORTHOPAEDIC SURGERY

## 2024-08-30 PROCEDURE — 0SR903A REPLACEMENT OF RIGHT HIP JOINT WITH CERAMIC SYNTHETIC SUBSTITUTE, UNCEMENTED, OPEN APPROACH: ICD-10-PCS | Performed by: ORTHOPAEDIC SURGERY

## 2024-08-30 PROCEDURE — 85027 COMPLETE CBC AUTOMATED: CPT | Performed by: ANESTHESIOLOGY

## 2024-08-30 PROCEDURE — C1762 CONN TISS, HUMAN(INC FASCIA): HCPCS | Performed by: ORTHOPAEDIC SURGERY

## 2024-08-30 PROCEDURE — 999N000141 HC STATISTIC PRE-PROCEDURE NURSING ASSESSMENT: Performed by: ORTHOPAEDIC SURGERY

## 2024-08-30 PROCEDURE — 258N000003 HC RX IP 258 OP 636: Performed by: PHYSICIAN ASSISTANT

## 2024-08-30 PROCEDURE — 272N000001 HC OR GENERAL SUPPLY STERILE: Performed by: ORTHOPAEDIC SURGERY

## 2024-08-30 PROCEDURE — 99222 1ST HOSP IP/OBS MODERATE 55: CPT | Mod: AI

## 2024-08-30 PROCEDURE — 0QS804Z REPOSITION RIGHT FEMORAL SHAFT WITH INTERNAL FIXATION DEVICE, OPEN APPROACH: ICD-10-PCS | Performed by: ORTHOPAEDIC SURGERY

## 2024-08-30 PROCEDURE — 80048 BASIC METABOLIC PNL TOTAL CA: CPT | Performed by: ANESTHESIOLOGY

## 2024-08-30 PROCEDURE — 258N000003 HC RX IP 258 OP 636

## 2024-08-30 PROCEDURE — 99100 ANES PT EXTEME AGE<1 YR&>70: CPT | Performed by: NURSE ANESTHETIST, CERTIFIED REGISTERED

## 2024-08-30 PROCEDURE — 85027 COMPLETE CBC AUTOMATED: CPT

## 2024-08-30 PROCEDURE — 250N000013 HC RX MED GY IP 250 OP 250 PS 637: Performed by: PHYSICIAN ASSISTANT

## 2024-08-30 PROCEDURE — 86900 BLOOD TYPING SEROLOGIC ABO: CPT | Performed by: NURSE ANESTHETIST, CERTIFIED REGISTERED

## 2024-08-30 PROCEDURE — 73501 X-RAY EXAM HIP UNI 1 VIEW: CPT | Mod: 26 | Performed by: STUDENT IN AN ORGANIZED HEALTH CARE EDUCATION/TRAINING PROGRAM

## 2024-08-30 PROCEDURE — 200N000002 HC R&B ICU UMMC

## 2024-08-30 PROCEDURE — 258N000003 HC RX IP 258 OP 636: Performed by: STUDENT IN AN ORGANIZED HEALTH CARE EDUCATION/TRAINING PROGRAM

## 2024-08-30 PROCEDURE — 27134 REVISE HIP JOINT REPLACEMENT: CPT | Performed by: STUDENT IN AN ORGANIZED HEALTH CARE EDUCATION/TRAINING PROGRAM

## 2024-08-30 PROCEDURE — 36620 INSERTION CATHETER ARTERY: CPT | Mod: 59 | Performed by: ANESTHESIOLOGY

## 2024-08-30 DEVICE — IMPLANTABLE DEVICE: Type: IMPLANTABLE DEVICE | Site: HIP | Status: FUNCTIONAL

## 2024-08-30 DEVICE — SCREW BONE SELF TAP 6.5 X 25 00-6250-065-25: Type: IMPLANTABLE DEVICE | Site: HIP | Status: FUNCTIONAL

## 2024-08-30 DEVICE — IMP SCR ZIM 6.5X30MM ACET CUP SELF TAP 00-6250-065-30: Type: IMPLANTABLE DEVICE | Site: HIP | Status: FUNCTIONAL

## 2024-08-30 DEVICE — IMP SCR ZIM 6.5X20MM ACET CUP SELF TAP 00-6250-065-20: Type: IMPLANTABLE DEVICE | Site: HIP | Status: FUNCTIONAL

## 2024-08-30 DEVICE — GRAFT BONE CHIPS CANC CUBE 30CC 100330: Type: IMPLANTABLE DEVICE | Site: HIP | Status: FUNCTIONAL

## 2024-08-30 DEVICE — IMPLANTABLE DEVICE
Type: IMPLANTABLE DEVICE | Site: HIP | Status: FUNCTIONAL
Brand: G7® VIVACIT-E®

## 2024-08-30 DEVICE — IMP HEAD FEM ZIM BIOLOX DELTA CER 36MM +0 00-8775-036-02: Type: IMPLANTABLE DEVICE | Site: HIP | Status: FUNCTIONAL

## 2024-08-30 DEVICE — IMP SCR ZIM 6.5X40MM ACET CUP SELF TAP 00-6250-065-40: Type: IMPLANTABLE DEVICE | Site: HIP | Status: FUNCTIONAL

## 2024-08-30 RX ORDER — FENTANYL CITRATE 50 UG/ML
25 INJECTION, SOLUTION INTRAMUSCULAR; INTRAVENOUS EVERY 5 MIN PRN
Status: DISCONTINUED | OUTPATIENT
Start: 2024-08-30 | End: 2024-08-30 | Stop reason: HOSPADM

## 2024-08-30 RX ORDER — CEFAZOLIN SODIUM/WATER 2 G/20 ML
2 SYRINGE (ML) INTRAVENOUS
Status: DISCONTINUED | OUTPATIENT
Start: 2024-08-30 | End: 2024-08-30 | Stop reason: HOSPADM

## 2024-08-30 RX ORDER — AMOXICILLIN 250 MG
1-2 CAPSULE ORAL 2 TIMES DAILY
Qty: 30 TABLET | Refills: 0 | Status: SHIPPED | OUTPATIENT
Start: 2024-08-30 | End: 2024-09-04

## 2024-08-30 RX ORDER — POLYETHYLENE GLYCOL 3350 17 G/17G
1 POWDER, FOR SOLUTION ORAL DAILY
Qty: 7 PACKET | Refills: 0 | Status: SHIPPED | OUTPATIENT
Start: 2024-08-30

## 2024-08-30 RX ORDER — LIDOCAINE 40 MG/G
CREAM TOPICAL
Status: DISCONTINUED | OUTPATIENT
Start: 2024-08-30 | End: 2024-09-03 | Stop reason: HOSPADM

## 2024-08-30 RX ORDER — ONDANSETRON 2 MG/ML
INJECTION INTRAMUSCULAR; INTRAVENOUS PRN
Status: DISCONTINUED | OUTPATIENT
Start: 2024-08-30 | End: 2024-08-30

## 2024-08-30 RX ORDER — METHOCARBAMOL 500 MG/1
500 TABLET, FILM COATED ORAL EVERY 6 HOURS PRN
Status: DISCONTINUED | OUTPATIENT
Start: 2024-08-30 | End: 2024-09-03 | Stop reason: HOSPADM

## 2024-08-30 RX ORDER — HYDROMORPHONE HYDROCHLORIDE 1 MG/ML
0.2 INJECTION, SOLUTION INTRAMUSCULAR; INTRAVENOUS; SUBCUTANEOUS EVERY 5 MIN PRN
Status: DISCONTINUED | OUTPATIENT
Start: 2024-08-30 | End: 2024-08-30 | Stop reason: HOSPADM

## 2024-08-30 RX ORDER — ASPIRIN 81 MG/1
162 TABLET ORAL DAILY
Qty: 60 TABLET | Refills: 0 | Status: SHIPPED | OUTPATIENT
Start: 2024-08-30 | End: 2024-09-04

## 2024-08-30 RX ORDER — BISACODYL 10 MG
10 SUPPOSITORY, RECTAL RECTAL DAILY PRN
Status: DISCONTINUED | OUTPATIENT
Start: 2024-08-30 | End: 2024-08-30

## 2024-08-30 RX ORDER — GABAPENTIN 300 MG/1
900 CAPSULE ORAL AT BEDTIME
Status: DISCONTINUED | OUTPATIENT
Start: 2024-08-31 | End: 2024-09-03 | Stop reason: HOSPADM

## 2024-08-30 RX ORDER — FENTANYL CITRATE 50 UG/ML
50 INJECTION, SOLUTION INTRAMUSCULAR; INTRAVENOUS EVERY 5 MIN PRN
Status: DISCONTINUED | OUTPATIENT
Start: 2024-08-30 | End: 2024-08-30 | Stop reason: HOSPADM

## 2024-08-30 RX ORDER — PRAMIPEXOLE DIHYDROCHLORIDE 0.12 MG/1
0.25 TABLET ORAL AT BEDTIME
Status: DISCONTINUED | OUTPATIENT
Start: 2024-08-31 | End: 2024-09-03 | Stop reason: HOSPADM

## 2024-08-30 RX ORDER — PREDNISONE 5 MG/1
5 TABLET ORAL DAILY
Status: DISCONTINUED | OUTPATIENT
Start: 2024-08-31 | End: 2024-09-03 | Stop reason: HOSPADM

## 2024-08-30 RX ORDER — ONDANSETRON 2 MG/ML
4 INJECTION INTRAMUSCULAR; INTRAVENOUS EVERY 6 HOURS PRN
Status: DISCONTINUED | OUTPATIENT
Start: 2024-08-30 | End: 2024-08-30

## 2024-08-30 RX ORDER — OXYCODONE HYDROCHLORIDE 5 MG/1
5-10 TABLET ORAL EVERY 4 HOURS PRN
Qty: 26 TABLET | Refills: 0 | Status: SHIPPED | OUTPATIENT
Start: 2024-08-30 | End: 2024-09-04

## 2024-08-30 RX ORDER — FENTANYL CITRATE 50 UG/ML
INJECTION, SOLUTION INTRAMUSCULAR; INTRAVENOUS PRN
Status: DISCONTINUED | OUTPATIENT
Start: 2024-08-30 | End: 2024-08-30

## 2024-08-30 RX ORDER — POLYETHYLENE GLYCOL 3350 17 G/17G
17 POWDER, FOR SOLUTION ORAL DAILY PRN
Status: DISCONTINUED | OUTPATIENT
Start: 2024-08-30 | End: 2024-09-03 | Stop reason: HOSPADM

## 2024-08-30 RX ORDER — SODIUM CHLORIDE, SODIUM LACTATE, POTASSIUM CHLORIDE, CALCIUM CHLORIDE 600; 310; 30; 20 MG/100ML; MG/100ML; MG/100ML; MG/100ML
INJECTION, SOLUTION INTRAVENOUS CONTINUOUS PRN
Status: DISCONTINUED | OUTPATIENT
Start: 2024-08-30 | End: 2024-08-30

## 2024-08-30 RX ORDER — LIDOCAINE HYDROCHLORIDE 20 MG/ML
INJECTION, SOLUTION INFILTRATION; PERINEURAL PRN
Status: DISCONTINUED | OUTPATIENT
Start: 2024-08-30 | End: 2024-08-30

## 2024-08-30 RX ORDER — SODIUM CHLORIDE, SODIUM LACTATE, POTASSIUM CHLORIDE, CALCIUM CHLORIDE 600; 310; 30; 20 MG/100ML; MG/100ML; MG/100ML; MG/100ML
INJECTION, SOLUTION INTRAVENOUS CONTINUOUS
Status: DISCONTINUED | OUTPATIENT
Start: 2024-08-30 | End: 2024-09-03 | Stop reason: HOSPADM

## 2024-08-30 RX ORDER — NALOXONE HYDROCHLORIDE 0.4 MG/ML
0.4 INJECTION, SOLUTION INTRAMUSCULAR; INTRAVENOUS; SUBCUTANEOUS
Status: DISCONTINUED | OUTPATIENT
Start: 2024-08-30 | End: 2024-09-03 | Stop reason: HOSPADM

## 2024-08-30 RX ORDER — HYDROMORPHONE HYDROCHLORIDE 1 MG/ML
0.4 INJECTION, SOLUTION INTRAMUSCULAR; INTRAVENOUS; SUBCUTANEOUS
Status: DISCONTINUED | OUTPATIENT
Start: 2024-08-30 | End: 2024-09-03 | Stop reason: HOSPADM

## 2024-08-30 RX ORDER — DEXAMETHASONE SODIUM PHOSPHATE 4 MG/ML
4 INJECTION, SOLUTION INTRA-ARTICULAR; INTRALESIONAL; INTRAMUSCULAR; INTRAVENOUS; SOFT TISSUE
Status: DISCONTINUED | OUTPATIENT
Start: 2024-08-30 | End: 2024-08-30 | Stop reason: HOSPADM

## 2024-08-30 RX ORDER — ONDANSETRON 4 MG/1
4 TABLET, ORALLY DISINTEGRATING ORAL EVERY 30 MIN PRN
Status: DISCONTINUED | OUTPATIENT
Start: 2024-08-30 | End: 2024-08-30 | Stop reason: HOSPADM

## 2024-08-30 RX ORDER — HYDROXYZINE HYDROCHLORIDE 10 MG/1
10 TABLET, FILM COATED ORAL EVERY 6 HOURS PRN
Status: DISCONTINUED | OUTPATIENT
Start: 2024-08-30 | End: 2024-09-03 | Stop reason: HOSPADM

## 2024-08-30 RX ORDER — PROPOFOL 10 MG/ML
INJECTION, EMULSION INTRAVENOUS PRN
Status: DISCONTINUED | OUTPATIENT
Start: 2024-08-30 | End: 2024-08-30

## 2024-08-30 RX ORDER — POLYETHYLENE GLYCOL 3350 17 G/17G
17 POWDER, FOR SOLUTION ORAL DAILY
Status: DISCONTINUED | OUTPATIENT
Start: 2024-08-31 | End: 2024-09-02

## 2024-08-30 RX ORDER — ACETAMINOPHEN 325 MG/1
975 TABLET ORAL EVERY 8 HOURS
Status: COMPLETED | OUTPATIENT
Start: 2024-08-30 | End: 2024-09-02

## 2024-08-30 RX ORDER — NICOTINE POLACRILEX 4 MG
15-30 LOZENGE BUCCAL
Status: DISCONTINUED | OUTPATIENT
Start: 2024-08-30 | End: 2024-09-03 | Stop reason: HOSPADM

## 2024-08-30 RX ORDER — ONDANSETRON 2 MG/ML
4 INJECTION INTRAMUSCULAR; INTRAVENOUS EVERY 30 MIN PRN
Status: DISCONTINUED | OUTPATIENT
Start: 2024-08-30 | End: 2024-08-30 | Stop reason: HOSPADM

## 2024-08-30 RX ORDER — PROCHLORPERAZINE MALEATE 5 MG
5 TABLET ORAL EVERY 6 HOURS PRN
Status: DISCONTINUED | OUTPATIENT
Start: 2024-08-30 | End: 2024-09-03 | Stop reason: HOSPADM

## 2024-08-30 RX ORDER — NALOXONE HYDROCHLORIDE 0.4 MG/ML
0.1 INJECTION, SOLUTION INTRAMUSCULAR; INTRAVENOUS; SUBCUTANEOUS
Status: DISCONTINUED | OUTPATIENT
Start: 2024-08-30 | End: 2024-08-30 | Stop reason: HOSPADM

## 2024-08-30 RX ORDER — POTASSIUM CHLORIDE 7.45 MG/ML
10 INJECTION INTRAVENOUS
Status: DISCONTINUED | OUTPATIENT
Start: 2024-08-30 | End: 2024-08-30 | Stop reason: HOSPADM

## 2024-08-30 RX ORDER — ACETAMINOPHEN 325 MG/1
650 TABLET ORAL EVERY 4 HOURS PRN
Qty: 100 TABLET | Refills: 0 | Status: SHIPPED | OUTPATIENT
Start: 2024-08-30 | End: 2024-09-04

## 2024-08-30 RX ORDER — ASPIRIN 81 MG/1
162 TABLET ORAL DAILY
Status: DISCONTINUED | OUTPATIENT
Start: 2024-08-31 | End: 2024-09-03 | Stop reason: HOSPADM

## 2024-08-30 RX ORDER — CELECOXIB 200 MG/1
400 CAPSULE ORAL ONCE
Status: COMPLETED | OUTPATIENT
Start: 2024-08-30 | End: 2024-08-30

## 2024-08-30 RX ORDER — OXYCODONE HYDROCHLORIDE 10 MG/1
10 TABLET ORAL EVERY 4 HOURS PRN
Status: DISCONTINUED | OUTPATIENT
Start: 2024-08-30 | End: 2024-09-03 | Stop reason: HOSPADM

## 2024-08-30 RX ORDER — SODIUM CHLORIDE, SODIUM LACTATE, POTASSIUM CHLORIDE, CALCIUM CHLORIDE 600; 310; 30; 20 MG/100ML; MG/100ML; MG/100ML; MG/100ML
INJECTION, SOLUTION INTRAVENOUS CONTINUOUS
Status: DISCONTINUED | OUTPATIENT
Start: 2024-08-30 | End: 2024-08-30 | Stop reason: HOSPADM

## 2024-08-30 RX ORDER — ONDANSETRON 4 MG/1
4 TABLET, ORALLY DISINTEGRATING ORAL EVERY 6 HOURS PRN
Status: DISCONTINUED | OUTPATIENT
Start: 2024-08-30 | End: 2024-08-30

## 2024-08-30 RX ORDER — KETAMINE HYDROCHLORIDE 10 MG/ML
INJECTION INTRAMUSCULAR; INTRAVENOUS PRN
Status: DISCONTINUED | OUTPATIENT
Start: 2024-08-30 | End: 2024-08-30

## 2024-08-30 RX ORDER — ALBUMIN HUMAN 5 %
INTRAVENOUS SOLUTION INTRAVENOUS PRN
Status: DISCONTINUED | OUTPATIENT
Start: 2024-08-30 | End: 2024-08-30

## 2024-08-30 RX ORDER — TRANEXAMIC ACID 650 MG/1
1950 TABLET ORAL ONCE
Status: COMPLETED | OUTPATIENT
Start: 2024-08-30 | End: 2024-08-30

## 2024-08-30 RX ORDER — AMOXICILLIN 250 MG
1 CAPSULE ORAL 2 TIMES DAILY
Status: DISCONTINUED | OUTPATIENT
Start: 2024-08-30 | End: 2024-09-03 | Stop reason: HOSPADM

## 2024-08-30 RX ORDER — ACETAMINOPHEN 325 MG/1
650 TABLET ORAL EVERY 4 HOURS PRN
Status: DISCONTINUED | OUTPATIENT
Start: 2024-09-02 | End: 2024-09-03 | Stop reason: HOSPADM

## 2024-08-30 RX ORDER — AMOXICILLIN 250 MG
2 CAPSULE ORAL 2 TIMES DAILY PRN
Status: DISCONTINUED | OUTPATIENT
Start: 2024-08-30 | End: 2024-09-03 | Stop reason: HOSPADM

## 2024-08-30 RX ORDER — EPHEDRINE SULFATE 50 MG/ML
INJECTION, SOLUTION INTRAMUSCULAR; INTRAVENOUS; SUBCUTANEOUS PRN
Status: DISCONTINUED | OUTPATIENT
Start: 2024-08-30 | End: 2024-08-30

## 2024-08-30 RX ORDER — ONDANSETRON 4 MG/1
4 TABLET, ORALLY DISINTEGRATING ORAL EVERY 6 HOURS PRN
Status: DISCONTINUED | OUTPATIENT
Start: 2024-08-30 | End: 2024-09-03 | Stop reason: HOSPADM

## 2024-08-30 RX ORDER — AMOXICILLIN 250 MG
1 CAPSULE ORAL 2 TIMES DAILY PRN
Status: DISCONTINUED | OUTPATIENT
Start: 2024-08-30 | End: 2024-09-03 | Stop reason: HOSPADM

## 2024-08-30 RX ORDER — CEFAZOLIN SODIUM/WATER 2 G/20 ML
2 SYRINGE (ML) INTRAVENOUS SEE ADMIN INSTRUCTIONS
Status: DISCONTINUED | OUTPATIENT
Start: 2024-08-30 | End: 2024-08-30 | Stop reason: HOSPADM

## 2024-08-30 RX ORDER — PANTOPRAZOLE SODIUM 40 MG/1
40 TABLET, DELAYED RELEASE ORAL
Status: DISCONTINUED | OUTPATIENT
Start: 2024-08-31 | End: 2024-09-03 | Stop reason: HOSPADM

## 2024-08-30 RX ORDER — HYDROMORPHONE HYDROCHLORIDE 1 MG/ML
0.4 INJECTION, SOLUTION INTRAMUSCULAR; INTRAVENOUS; SUBCUTANEOUS EVERY 5 MIN PRN
Status: DISCONTINUED | OUTPATIENT
Start: 2024-08-30 | End: 2024-08-30 | Stop reason: HOSPADM

## 2024-08-30 RX ORDER — NALOXONE HYDROCHLORIDE 0.4 MG/ML
0.2 INJECTION, SOLUTION INTRAMUSCULAR; INTRAVENOUS; SUBCUTANEOUS
Status: DISCONTINUED | OUTPATIENT
Start: 2024-08-30 | End: 2024-09-03 | Stop reason: HOSPADM

## 2024-08-30 RX ORDER — OXYCODONE HYDROCHLORIDE 5 MG/1
5 TABLET ORAL EVERY 4 HOURS PRN
Status: DISCONTINUED | OUTPATIENT
Start: 2024-08-30 | End: 2024-09-03 | Stop reason: HOSPADM

## 2024-08-30 RX ORDER — ACETAMINOPHEN 325 MG/10.15ML
650 LIQUID ORAL EVERY 4 HOURS PRN
Status: DISCONTINUED | OUTPATIENT
Start: 2024-08-30 | End: 2024-08-30

## 2024-08-30 RX ORDER — DEXTROSE MONOHYDRATE 25 G/50ML
25-50 INJECTION, SOLUTION INTRAVENOUS
Status: DISCONTINUED | OUTPATIENT
Start: 2024-08-30 | End: 2024-09-03 | Stop reason: HOSPADM

## 2024-08-30 RX ORDER — HYDROMORPHONE HYDROCHLORIDE 1 MG/ML
0.2 INJECTION, SOLUTION INTRAMUSCULAR; INTRAVENOUS; SUBCUTANEOUS
Status: DISCONTINUED | OUTPATIENT
Start: 2024-08-30 | End: 2024-09-03 | Stop reason: HOSPADM

## 2024-08-30 RX ORDER — CEFAZOLIN SODIUM 2 G/100ML
2 INJECTION, SOLUTION INTRAVENOUS EVERY 8 HOURS
Status: DISCONTINUED | OUTPATIENT
Start: 2024-08-30 | End: 2024-08-31

## 2024-08-30 RX ORDER — ACETAMINOPHEN 325 MG/1
975 TABLET ORAL ONCE
Status: COMPLETED | OUTPATIENT
Start: 2024-08-30 | End: 2024-08-30

## 2024-08-30 RX ORDER — BISACODYL 10 MG
10 SUPPOSITORY, RECTAL RECTAL DAILY PRN
Status: DISCONTINUED | OUTPATIENT
Start: 2024-08-30 | End: 2024-09-03 | Stop reason: HOSPADM

## 2024-08-30 RX ORDER — CEFADROXIL 500 MG/1
500 CAPSULE ORAL 2 TIMES DAILY
Qty: 28 CAPSULE | Refills: 0 | Status: SHIPPED | OUTPATIENT
Start: 2024-08-30 | End: 2024-09-04

## 2024-08-30 RX ORDER — PHENYLEPHRINE HCL IN 0.9% NACL 50MG/250ML
.5-1.25 PLASTIC BAG, INJECTION (ML) INTRAVENOUS CONTINUOUS
Status: DISCONTINUED | OUTPATIENT
Start: 2024-08-30 | End: 2024-08-30 | Stop reason: HOSPADM

## 2024-08-30 RX ORDER — ONDANSETRON 2 MG/ML
4 INJECTION INTRAMUSCULAR; INTRAVENOUS EVERY 6 HOURS PRN
Status: DISCONTINUED | OUTPATIENT
Start: 2024-08-30 | End: 2024-09-03 | Stop reason: HOSPADM

## 2024-08-30 RX ORDER — ACETAMINOPHEN 325 MG/1
650 TABLET ORAL EVERY 4 HOURS PRN
Status: DISCONTINUED | OUTPATIENT
Start: 2024-08-30 | End: 2024-08-30

## 2024-08-30 RX ADMIN — PROPOFOL 100 MG: 10 INJECTION, EMULSION INTRAVENOUS at 13:10

## 2024-08-30 RX ADMIN — PHENYLEPHRINE HYDROCHLORIDE 200 MCG: 10 INJECTION INTRAVENOUS at 16:30

## 2024-08-30 RX ADMIN — FENTANYL CITRATE 50 MCG: 50 INJECTION INTRAMUSCULAR; INTRAVENOUS at 13:12

## 2024-08-30 RX ADMIN — PHENYLEPHRINE HYDROCHLORIDE 150 MCG: 10 INJECTION INTRAVENOUS at 17:22

## 2024-08-30 RX ADMIN — HYDROMORPHONE HYDROCHLORIDE 0.2 MG: 1 INJECTION, SOLUTION INTRAMUSCULAR; INTRAVENOUS; SUBCUTANEOUS at 19:24

## 2024-08-30 RX ADMIN — SODIUM CHLORIDE, POTASSIUM CHLORIDE, SODIUM LACTATE AND CALCIUM CHLORIDE 500 ML: 600; 310; 30; 20 INJECTION, SOLUTION INTRAVENOUS at 18:31

## 2024-08-30 RX ADMIN — LIDOCAINE HYDROCHLORIDE 60 MG: 20 INJECTION, SOLUTION INFILTRATION; PERINEURAL at 13:10

## 2024-08-30 RX ADMIN — PHENYLEPHRINE HYDROCHLORIDE 100 MCG: 10 INJECTION INTRAVENOUS at 19:38

## 2024-08-30 RX ADMIN — SODIUM CHLORIDE, POTASSIUM CHLORIDE, SODIUM LACTATE AND CALCIUM CHLORIDE 500 ML: 600; 310; 30; 20 INJECTION, SOLUTION INTRAVENOUS at 19:45

## 2024-08-30 RX ADMIN — Medication 2 G: at 14:45

## 2024-08-30 RX ADMIN — HYDROMORPHONE HYDROCHLORIDE 0.25 MG: 1 INJECTION, SOLUTION INTRAMUSCULAR; INTRAVENOUS; SUBCUTANEOUS at 15:12

## 2024-08-30 RX ADMIN — HYDROMORPHONE HYDROCHLORIDE 0.2 MG: 1 INJECTION, SOLUTION INTRAMUSCULAR; INTRAVENOUS; SUBCUTANEOUS at 20:10

## 2024-08-30 RX ADMIN — SODIUM CHLORIDE, POTASSIUM CHLORIDE, SODIUM LACTATE AND CALCIUM CHLORIDE: 600; 310; 30; 20 INJECTION, SOLUTION INTRAVENOUS at 14:09

## 2024-08-30 RX ADMIN — Medication 10 MG: at 19:38

## 2024-08-30 RX ADMIN — FENTANYL CITRATE 50 MCG: 50 INJECTION INTRAMUSCULAR; INTRAVENOUS at 13:19

## 2024-08-30 RX ADMIN — ALBUMIN (HUMAN) 250 ML: 12.5 SOLUTION INTRAVENOUS at 16:42

## 2024-08-30 RX ADMIN — PHENYLEPHRINE HYDROCHLORIDE 0.1 MCG/KG/MIN: 10 INJECTION INTRAVENOUS at 14:08

## 2024-08-30 RX ADMIN — HYDROMORPHONE HYDROCHLORIDE 0.2 MG: 1 INJECTION, SOLUTION INTRAMUSCULAR; INTRAVENOUS; SUBCUTANEOUS at 20:01

## 2024-08-30 RX ADMIN — EPHEDRINE SULFATE 10 MG: 5 INJECTION INTRAVENOUS at 14:04

## 2024-08-30 RX ADMIN — KETAMINE HYDROCHLORIDE 5 MG/HR: 10 INJECTION INTRAMUSCULAR; INTRAVENOUS at 22:58

## 2024-08-30 RX ADMIN — SODIUM CHLORIDE, POTASSIUM CHLORIDE, SODIUM LACTATE AND CALCIUM CHLORIDE: 600; 310; 30; 20 INJECTION, SOLUTION INTRAVENOUS at 20:51

## 2024-08-30 RX ADMIN — ALBUMIN (HUMAN) 250 ML: 12.5 SOLUTION INTRAVENOUS at 16:30

## 2024-08-30 RX ADMIN — PHENYLEPHRINE HYDROCHLORIDE 100 MCG: 10 INJECTION INTRAVENOUS at 15:35

## 2024-08-30 RX ADMIN — Medication 50 MG: at 13:10

## 2024-08-30 RX ADMIN — HYDROMORPHONE HYDROCHLORIDE 0.2 MG: 1 INJECTION, SOLUTION INTRAMUSCULAR; INTRAVENOUS; SUBCUTANEOUS at 19:46

## 2024-08-30 RX ADMIN — FENTANYL CITRATE 25 MCG: 50 INJECTION INTRAMUSCULAR; INTRAVENOUS at 19:14

## 2024-08-30 RX ADMIN — PHENYLEPHRINE HYDROCHLORIDE 100 MCG: 10 INJECTION INTRAVENOUS at 16:27

## 2024-08-30 RX ADMIN — ACETAMINOPHEN 975 MG: 325 TABLET ORAL at 10:09

## 2024-08-30 RX ADMIN — EPHEDRINE SULFATE 10 MG: 5 INJECTION INTRAVENOUS at 13:38

## 2024-08-30 RX ADMIN — ONDANSETRON 4 MG: 2 INJECTION INTRAMUSCULAR; INTRAVENOUS at 13:28

## 2024-08-30 RX ADMIN — EPHEDRINE SULFATE 5 MG: 5 INJECTION INTRAVENOUS at 15:06

## 2024-08-30 RX ADMIN — SUGAMMADEX 200 MG: 100 INJECTION, SOLUTION INTRAVENOUS at 17:49

## 2024-08-30 RX ADMIN — HYDROMORPHONE HYDROCHLORIDE 0.4 MG: 1 INJECTION, SOLUTION INTRAMUSCULAR; INTRAVENOUS; SUBCUTANEOUS at 21:08

## 2024-08-30 RX ADMIN — PHENYLEPHRINE HYDROCHLORIDE 100 MCG: 10 INJECTION INTRAVENOUS at 16:18

## 2024-08-30 RX ADMIN — PROPOFOL 50 MG: 10 INJECTION, EMULSION INTRAVENOUS at 16:17

## 2024-08-30 RX ADMIN — PROPOFOL 80 MG: 10 INJECTION, EMULSION INTRAVENOUS at 14:50

## 2024-08-30 RX ADMIN — PHENYLEPHRINE HYDROCHLORIDE 100 MCG: 10 INJECTION INTRAVENOUS at 17:11

## 2024-08-30 RX ADMIN — CELECOXIB 400 MG: 200 CAPSULE ORAL at 10:09

## 2024-08-30 RX ADMIN — SODIUM CHLORIDE, SODIUM LACTATE, POTASSIUM CHLORIDE, CALCIUM CHLORIDE: 600; 310; 30; 20 INJECTION, SOLUTION INTRAVENOUS at 15:54

## 2024-08-30 RX ADMIN — CEFAZOLIN SODIUM 2 G: 2 INJECTION, SOLUTION INTRAVENOUS at 23:46

## 2024-08-30 RX ADMIN — FENTANYL CITRATE 25 MCG: 50 INJECTION INTRAMUSCULAR; INTRAVENOUS at 18:46

## 2024-08-30 RX ADMIN — PHENYLEPHRINE HYDROCHLORIDE 100 MCG: 10 INJECTION INTRAVENOUS at 15:14

## 2024-08-30 RX ADMIN — FENTANYL CITRATE 25 MCG: 50 INJECTION INTRAMUSCULAR; INTRAVENOUS at 19:00

## 2024-08-30 RX ADMIN — HYDROCORTISONE SODIUM SUCCINATE 100 MG: 100 INJECTION, POWDER, FOR SOLUTION INTRAMUSCULAR; INTRAVENOUS at 14:42

## 2024-08-30 RX ADMIN — PHENYLEPHRINE HYDROCHLORIDE 200 MCG: 10 INJECTION INTRAVENOUS at 13:10

## 2024-08-30 RX ADMIN — ALBUMIN HUMAN 12.5 G: 0.05 INJECTION, SOLUTION INTRAVENOUS at 18:30

## 2024-08-30 RX ADMIN — HYDROMORPHONE HYDROCHLORIDE 0.25 MG: 1 INJECTION, SOLUTION INTRAMUSCULAR; INTRAVENOUS; SUBCUTANEOUS at 15:44

## 2024-08-30 RX ADMIN — ACETAMINOPHEN 975 MG: 325 TABLET ORAL at 21:09

## 2024-08-30 RX ADMIN — PHENYLEPHRINE HYDROCHLORIDE 200 MCG: 10 INJECTION INTRAVENOUS at 14:00

## 2024-08-30 RX ADMIN — TRANEXAMIC ACID 1950 MG: 650 TABLET ORAL at 10:09

## 2024-08-30 RX ADMIN — HYDROMORPHONE HYDROCHLORIDE 0.5 MG: 1 INJECTION, SOLUTION INTRAMUSCULAR; INTRAVENOUS; SUBCUTANEOUS at 18:00

## 2024-08-30 RX ADMIN — MIDAZOLAM 2 MG: 1 INJECTION INTRAMUSCULAR; INTRAVENOUS at 13:12

## 2024-08-30 RX ADMIN — PHENYLEPHRINE HYDROCHLORIDE 200 MCG: 10 INJECTION INTRAVENOUS at 13:29

## 2024-08-30 RX ADMIN — SENNOSIDES AND DOCUSATE SODIUM 1 TABLET: 50; 8.6 TABLET ORAL at 21:09

## 2024-08-30 RX ADMIN — FENTANYL CITRATE 25 MCG: 50 INJECTION INTRAMUSCULAR; INTRAVENOUS at 18:35

## 2024-08-30 RX ADMIN — PHENYLEPHRINE HYDROCHLORIDE 200 MCG: 10 INJECTION INTRAVENOUS at 15:57

## 2024-08-30 RX ADMIN — HYDROMORPHONE HYDROCHLORIDE 0.4 MG: 1 INJECTION, SOLUTION INTRAMUSCULAR; INTRAVENOUS; SUBCUTANEOUS at 23:54

## 2024-08-30 RX ADMIN — POTASSIUM CHLORIDE 10 MEQ: 7.46 INJECTION, SOLUTION INTRAVENOUS at 19:04

## 2024-08-30 RX ADMIN — SODIUM CHLORIDE, SODIUM LACTATE, POTASSIUM CHLORIDE, CALCIUM CHLORIDE: 600; 310; 30; 20 INJECTION, SOLUTION INTRAVENOUS at 13:10

## 2024-08-30 RX ADMIN — SODIUM CHLORIDE, POTASSIUM CHLORIDE, SODIUM LACTATE AND CALCIUM CHLORIDE: 600; 310; 30; 20 INJECTION, SOLUTION INTRAVENOUS at 13:10

## 2024-08-30 RX ADMIN — EPHEDRINE SULFATE 10 MG: 50 INJECTION, SOLUTION INTRAMUSCULAR; INTRAVENOUS; SUBCUTANEOUS at 19:38

## 2024-08-30 RX ADMIN — PHENYLEPHRINE HYDROCHLORIDE 200 MCG: 10 INJECTION INTRAVENOUS at 16:36

## 2024-08-30 ASSESSMENT — ACTIVITIES OF DAILY LIVING (ADL)
ADLS_ACUITY_SCORE: 24
WEAR_GLASSES_OR_BLIND: NO
ADLS_ACUITY_SCORE: 24
FALL_HISTORY_WITHIN_LAST_SIX_MONTHS: NO
TOILETING_ISSUES: NO
ADLS_ACUITY_SCORE: 24
ADLS_ACUITY_SCORE: 26
DRESSING/BATHING_DIFFICULTY: NO
ADLS_ACUITY_SCORE: 24
DOING_ERRANDS_INDEPENDENTLY_DIFFICULTY: NO
ADLS_ACUITY_SCORE: 24
ADLS_ACUITY_SCORE: 26
ADLS_ACUITY_SCORE: 26
ADLS_ACUITY_SCORE: 24
EQUIPMENT_CURRENTLY_USED_AT_HOME: WALKER, STANDARD
DIFFICULTY_EATING/SWALLOWING: NO
WALKING_OR_CLIMBING_STAIRS: AMBULATION DIFFICULTY, REQUIRES EQUIPMENT;TRANSFERRING DIFFICULTY, REQUIRES EQUIPMENT
DIFFICULTY_COMMUNICATING: NO
CONCENTRATING,_REMEMBERING_OR_MAKING_DECISIONS_DIFFICULTY: NO
ADLS_ACUITY_SCORE: 24
ADLS_ACUITY_SCORE: 22
CHANGE_IN_FUNCTIONAL_STATUS_SINCE_ONSET_OF_CURRENT_ILLNESS/INJURY: YES
ADLS_ACUITY_SCORE: 24
ADLS_ACUITY_SCORE: 24
WALKING_OR_CLIMBING_STAIRS_DIFFICULTY: YES

## 2024-08-30 ASSESSMENT — ENCOUNTER SYMPTOMS: DYSRHYTHMIAS: 1

## 2024-08-30 NOTE — BRIEF OP NOTE
Orthopaedic Surgery Brief Op-Note      Patient: Kwabena Solorio  : 1950  Date of Service: 2024 5:06 PM    Pre-operative Diagnosis: Infection associated with internal right hip prosthesis, subsequent encounter [T84.51XD]  Post-operative Diagnosis: same    Procedure(s) Performed: Procedure(s):  Explantation of Right Hip Antibiotic Spacer  with Revision Right Total Hip Arthroplasty    Staff: Dr. Dudley  Assistants:   Kaz Bowser PA-C    Anesthesia: General  EBL: 300 cc  UOP: see anesthesia record    Implants:   Implant Name Type Inv. Item Serial No.  Lot No. LRB No. Used Action   GRAFT BONE CHIPS CANC CUBE 30CC [id number] - [id number] Bone/Tissue/Biologic GRAFT BONE CHIPS CANC CUBE 30CC [id number] [facility]  Right 1 Implanted   SHELL ACTB 60MM HIP MLHL CLR CD OSSEOTI G7 G HMSPHR OFST - [id number] Total Joint Component/Insert SHELL ACTB 60MM HIP MLHL CLR CD OSSEOTI G7 G HMSPHR OFST  [employer] [id number] Right 1 Implanted   Hill self tapping bone screw 6.5 x 60mm    HILL [id number] Right 1 Implanted   IMP SCR ZIM 6.5X30MM ACET CUP SELF TAP -946-30 - [id number] Metallic Hardware/[place] IMP SCR ZIM 6.5X30MM ACET CUP SELF TAP -635-30  [employer] [id number] Right 1 Implanted   SCREW BONE SELF TAP 6.5 X 25 -334-25 - [id number] Metallic Hardware/[place] SCREW BONE SELF TAP 6.5 X 25 -075-25  [employer] [id number] Right 1 Implanted   IMP SCR ZIM 6.5X40MM ACET CUP SELF TAP -216-40 - [id number] Metallic Hardware/[place] IMP SCR ZIM 6.5X40MM ACET CUP SELF TAP -025-40  [employer] [id number] Right 1 Implanted   IMP SCR ZIM 6.5X20MM ACET CUP SELF TAP -173-20 - [id number] Metallic Hardware/[place] IMP SCR ZIM 6.5X20MM ACET CUP SELF TAP -126-20  [employer] [id number] Right 1 Implanted   IMP SPACER G21 CEMENT 10MM SPACEFLEX HIP MTL STEM 37025 10 - [id number] Total Joint Component/Insert IMP SPACER G21 CEMENT 10MM  SPACEFLEX HIP MTL STEM 66170 10  [employer] [id number] Right 1 Explanted   LINER ACTB G7 G 36MM VIVACIT-E HIP STRL LUM LF [id number] - [id number] Total Joint Component/Insert LINER ACTB G7 G 36MM VIVACIT-E HIP STRL LUM LF [id number]  [employer] [id number] Right 1 Implanted   IMP CABLE ZIM CERCLAGE 1.8X25MM 2232-04-18 - [id number] Metallic Hardware/[place] IMP CABLE ZIM CERCLAGE 1.8X25MM 2232-04-18  [employer] [id number] Right 1 Implanted   IMP CABLE ZIM CERCLAGE 1.8X25MM 2232-04-18 - [id number] Metallic Hardware/[place] IMP CABLE ZIM CERCLAGE 1.8X25MM 2232-04-18  [employer] [id number] Right 1 Implanted   IMP HEAD FEM ZIM BIOLOX DELTA CER 36MM +0 -205-02 - [id number] Total Joint Component/Insert IMP HEAD FEM ZIM BIOLOX DELTA CER 36MM +0 -250-02  [employer] [id number] Right 1 Implanted   STEM FEM 225MM 17MM WGNR SL RV PROTASUL-100 POR 135D 12/14 - [id number] Total Joint Component/Insert STEM FEM 225MM 17MM WGNR SL RV PROTASUL-100 POR 135D 12/14  [employer] [id number] Right 1 Implanted     Drains: KEYUR until discharge  Intra-op Labs/Cxs/Specimens:   ID Type Source Tests Collected by Time Destination   A :  Tissue Hip, Right ANAEROBIC BACTERIAL CULTURE ROUTINE, FUNGAL OR YEAST CULTURE ROUTINE, AEROBIC BACTERIAL CULTURE ROUTINE Rd Dudley MD 8/30/2024  3:22 PM    B :  Tissue Hip, Right ANAEROBIC BACTERIAL CULTURE ROUTINE, FUNGAL OR YEAST CULTURE ROUTINE, AEROBIC BACTERIAL CULTURE ROUTINE Rd Dudley MD 8/30/2024  3:24 PM    C :  Tissue Hip, Right ANAEROBIC BACTERIAL CULTURE ROUTINE, FUNGAL OR YEAST CULTURE ROUTINE, AEROBIC BACTERIAL CULTURE ROUTINE Rd Dudley MD 8/30/2024  3:24 PM    D :  Tissue Hip, Right ANAEROBIC BACTERIAL CULTURE ROUTINE, FUNGAL OR YEAST CULTURE ROUTINE, AEROBIC BACTERIAL CULTURE ROUTINE Rd Dudley MD 8/30/2024  3:24 PM    E :  Tissue Hip, Right ANAEROBIC BACTERIAL CULTURE ROUTINE, FUNGAL OR YEAST CULTURE ROUTINE, AEROBIC BACTERIAL CULTURE ROUTINE Luis Enrique  Rd ROJAS MD 8/30/2024  3:24 PM      Complications: No apparent complications during procedure  Findings: Please see dictated operative note for details    Disposition: Stable to PACU, then admit to Orthopaedics     POST OPERATIVE PLAN    Assessment/Plan: Kwabena Solorio is a 74 year old male s/p Procedure(s):  Explantation of Right Hip Antibiotic Spacer  with Revision Right Total Hip Arthroplasty on 8/30/2024 with Dr. Dudley.    Activity: Up with assist and assistive devices as needed until independent. Posterior hip precautions to operative hip x 3 months:  1) No hip flexion beyond 90 degrees  2) No adduction beyond midline  3) No internal rotation beyond neutral   Weight bearing status: TTWB for 3 months   Antibiotics:  cefazolin until discharge when duricef for 2 weeks  Diet: Begin with clear fluids and progress diet as tolerated. Bowel regimen. Anti-emetics PRN.    DVT prophylaxis: Mechanical while in hospital with Aspirin 162mg daily x 4 weeks, starting morning of POD 1  Elevation: Elevate heels off of bed on pillows   Wound Care: Alginate, tegaderm to be changed PRN by ortho  Drains: KEYUR until discharge if intra op cultures remain negative  Lomeli: Intra op. Discontinue POD#1   Pain management: Orals PRN, IV for breakthrough only  X-rays: AP Pelvis and lateral in PACU  Physical Therapy: Mobilization, ROM, ADL's, Posterior hip precautions  Occupational Therapy: ADL's  Labs: Trend Hgb on POD #1, 2  Consults: PT, OT. Hospitalist, appreciate assistance in caring for this patient throughout the perioperative period    Future Appointments   Date Time Provider Department Center   8/31/2024  9:00 AM Alyssa Stewart, JAYE URPT [place]   8/31/2024  9:30 AM Maria T Iniguez OT UROT [place]   9/30/2024 12:30 PM Kaz Bowser, PARIMA [facility]       Disposition: Pending progress with therapies, pain control on orals, and medical stability, anticipate discharge to Home vs TCU on POD #1-2.  Follow up: Plan for  follow up with Dr. Dudley in 4 weeks     I assisted with positioning, prepping and draping, and closure.    Kaz Bowser PA-C  8/30/2024 5:06 PM  Physician Assistant   Oncology and Adult Reconstructive Surgery  Dept Orthopaedic Surgery, [facility]     Thank you for allowing me to participate in this patient's care. Please page me directly any questions/concerns.   Securely message with the Vocera Web Console (learn more here)  Text page via [employer] Paging/Directory    If there is no response, if it is a weekend, or if it is during evening hours, please page the orthopaedic surgery resident on call via [employer] Paging/Directory

## 2024-08-30 NOTE — ANESTHESIA CARE TRANSFER NOTE
Patient: Kwabena Solorio    Procedure: Procedure(s):  Explantation of Right Hip Antibiotic Spacer  with Revision Right Total Hip Arthroplasty       Diagnosis: Infection associated with internal right hip prosthesis, subsequent encounter [T84.51XD]  Diagnosis Additional Information: No value filed.    Anesthesia Type:   General     Note:    Oropharynx: oropharynx clear of all foreign objects  Level of Consciousness: drowsy  Oxygen Supplementation: face mask  Level of Supplemental Oxygen (L/min / FiO2): 6  Independent Airway: airway patency satisfactory and stable  Dentition: dentition unchanged  Vital Signs Stable: post-procedure vital signs reviewed and stable  Report to RN Given: handoff report given  Patient transferred to: PACU    Handoff Report: Identifed the Patient, Identified the Reponsible Provider, Reviewed the pertinent medical history, Discussed the surgical course, Reviewed Intra-OP anesthesia mangement and issues during anesthesia, Set expectations for post-procedure period and Allowed opportunity for questions and acknowledgement of understanding      Vitals:  Vitals Value Taken Time   BP 94/81 08/30/24 1800   Temp     Pulse 65 08/30/24 1803   Resp 0 08/30/24 1803   SpO2 98 % 08/30/24 1803   Vitals shown include unfiled device data.    Electronically Signed By: GIORGI Miller CRNA  August 30, 2024  6:03 PM

## 2024-08-30 NOTE — ANESTHESIA PROCEDURE NOTES
Airway       Patient location during procedure: OR       Procedure Start/Stop Times: 8/30/2024 1:31 PM  Staff -        CRNA: Larry Ramirez APRN CRNA       Performed By: CRNA  Consent for Airway        Urgency: elective  Indications and Patient Condition       Indications for airway management: diaz-procedural       Induction type:intravenous       Mask difficulty assessment: 1 - vent by mask    Final Airway Details       Final airway type: endotracheal airway       Successful airway: ETT - single  Endotracheal Airway Details        ETT size (mm): 7.5       Cuffed: yes       Successful intubation technique: video laryngoscopy       VL Blade Size: Glidescope 3       Grade View of Cords: 1       Position: Right       Measured from: lips    Post intubation assessment        Placement verified by: capnometry and equal breath sounds        Number of attempts at approach: 1       Number of other approaches attempted: 1       Ease of procedure: easy       Dentition: Intact    Medication(s) Administered   Medication Administration Time: 8/30/2024 1:31 PM

## 2024-08-30 NOTE — OR NURSING
PACU to Inpatient Nursing Handoff    Patient Kwabena Solorio is a 74 year old male who speaks English.   Procedure Procedure(s):  Explantation of Right Hip Antibiotic Spacer  with Revision Right Total Hip Arthroplasty   Surgeon(s) Primary: Rd Dudley MD  Fellow - Assisting: Xu Grier MD     No Known Allergies    Isolation  None    Past Medical History   has a past medical history of BPH (benign prostatic hyperplasia), Cardiac pacemaker in situ, Complete heart block (H), Hydronephrosis, right, Lumbar radiculopathy, Lumbar scoliosis, and Restless legs syndrome (RLS).    Anesthesia Choice   Dermatome Level     Preop Meds acetaminophen (Tylenol) - time given: 1009  celecoxib (Celebrex) - time given: 1009  TXA - time given: 1009   Nerve block Not applicable   Intraop Meds fentanyl (Sublimaze): 100 mcg total  hydromorphone (Dilaudid): 0.5 mg total  ondansetron (Zofran): last given at 1328  Ephedrine; phenylephrine; hydrocortisone   Local Meds Yes - Local Cocktail (morphine, ropivacaine, epinephrine, Toradol)   Antibiotics cefazolin (Ancef) - last given at 1445     Pain Patient Currently in Pain: no   PACU meds  {PACU meds - how much and time given:326021}   PCA / epidural {PCA/epidural infusion?:384655}   Capnography     Telemetry     Inpatient Telemetry Monitor Ordered? {Yes/No:190732}        Labs Glucose Lab Results   Component Value Date     08/30/2024     08/30/2024    GLC 97 08/30/2024       Hgb Lab Results   Component Value Date    HGB 10.5 08/30/2024    HGB 13.0 08/30/2024       INR Lab Results   Component Value Date    INR 0.95 08/30/2024      PACU Imaging {not applicable, completed, not completed:422525}     Wound/Incision Incision/Surgical Site 08/30/24 (Active)   Number of days: 0       Incision/Surgical Site 08/30/24 Right;Lateral Greater Trochanter (Active)   Number of days: 0      CMS        Equipment {equipment:204641}   Other LDA ETT Cuffed 7.5 mm (Active)   Number of days: 0         IV Access Peripheral IV 08/30/24 Anterior;Left Lower forearm (Active)   Site Assessment WDL 08/30/24 1000   Line Status Saline locked 08/30/24 1000   Dressing Transparent 08/30/24 1000   Dressing Status clean;dry;intact 08/30/24 1000   Dressing Intervention New dressing  08/30/24 1000   Line Intervention Flushed 08/30/24 1000   Phlebitis Scale 0-->no symptoms 08/30/24 1000   Infiltration? no 08/30/24 1000   Number of days: 0       Peripheral IV Left Upper forearm (Active)   Number of days:        CVC Single Lumen Right Internal jugular Tunneled (Active)   Number of days: 112      Blood Products Not applicable  mL   Intake/Output Date 08/30/24 0700 - 08/31/24 0659   Shift 6534-0916 8816-9624 3083-5904 24 Hour Total   INTAKE   I.V. 1000 1800  2800   Shift Total(mL/kg) 1000(12.48) 1800(22.47)  2800(34.96)   OUTPUT   Urine  700  700   Shift Total(mL/kg)  700(8.74)  700(8.74)   Weight (kg) 80.1 80.1 80.1 80.1      Drains / Lomeli Closed/Suction Drain 1 Proximal;Right Thigh Bulb 15 French (Active)   Number of days: 0       Urethral Catheter 08/30/24 Coude 18 fr (Active)   Number of days: 0      Time of void PreOp Time of Void Prior to Procedure: 1115 (08/30/24 1124)    PostOp      Diapered? {Yes/No:679997}   Bladder Scan     PO    {PO Intake:186108}     Vitals    B/P: 139/87  T: 98.2  F (36.8  C)    Temp src: Oral  P:  Pulse: 64 (08/30/24 0912)          R: 16  O2:  SpO2: 95 %    O2 Device: None (Room air) (08/30/24 0912)              Family/support present {family:286150}   Patient belongings     Patient transported on {patient surface/transport:433308}   DC meds/scripts (obs/outpt) E-prescribed to [facility] after closing time - should be delivered tomorrow   Inpatient Pain Meds Released? {Yes/No:170984}       Special needs/considerations {none or specify:865243}   Tasks needing completion Per Op note:   Activity: Up with assist and assistive devices as needed until independent. Posterior hip  precautions to operative hip x 3 months:  1) No hip flexion beyond 90 degrees  2) No adduction beyond midline  3) No internal rotation beyond neutral   Weight bearing status: TTWB for 3 months   Antibiotics:  cefazolin until discharge when duricef for 2 weeks  Diet: Begin with clear fluids and progress diet as tolerated. Bowel regimen. Anti-emetics PRN.    DVT prophylaxis: Mechanical while in hospital with Aspirin 162mg daily x 4 weeks, starting morning of POD 1  Elevation: Elevate heels off of bed on pillows   Wound Care: Alginate, tegaderm to be changed PRN by ortho  Drains: KEYUR until discharge if intra op cultures remain negative  Lomeli: Intra op. Discontinue POD#1   Pain management: Orals PRN, IV for breakthrough only  X-rays: AP Pelvis and lateral in PACU  Physical Therapy: Mobilization, ROM, ADL's, Posterior hip precautions  Occupational Therapy: ADL's  Labs: Trend Hgb on POD #1, 2  Consults: PT, OT. Hospitalist, appreciate assistance in caring for this patient throughout the perioperative period       VERÓNICA Chang

## 2024-08-30 NOTE — ANESTHESIA PROCEDURE NOTES
Arterial Line Procedure Note    Pre-Procedure   Staff -        Anesthesiologist:  Simone Gómez DO       Performed By: anesthesiologist       Pre-Anesthestic Checklist: patient identified, IV checked, risks and benefits discussed, informed consent, monitors and equipment checked, pre-op evaluation and at physician/surgeon's request  Timeout:       Correct Patient: Yes        Correct Procedure: Yes        Correct Site: Yes        Correct Position: Yes   Line Placement:   This line was placed Post Induction starting at 8/30/2024 1:33 PM  Procedure   Procedure: arterial line       Laterality: left       Insertion Site: radial.  Sterile Prep        Skin prep: Chloraprep  Insertion/Injection        Technique: ultrasound guided        1. Ultrasound was used to evaluate the access site.       2. Artery evaluated via ultrasound for patency/adequacy.       3. Using real-time ultrasound the needle/catheter was observed entering the artery/vein.  Narrative         Secured by: suture       Tegaderm dressing used.       Complications: None apparent,        Arterial waveform: Yes

## 2024-08-30 NOTE — ANESTHESIA PREPROCEDURE EVALUATION
Anesthesia Pre-Procedure Evaluation    Patient: Kwabena Solorio   MRN: [id number] : 1950        Procedure : Procedure(s):  Explantation of Right Hip Antibiotic Spacer  with Revision Right Total Hip Arthroplasty          Past Medical History:   Diagnosis Date    BPH (benign prostatic hyperplasia)     Cardiac pacemaker in situ     Complete heart block (H)     Hydronephrosis, right     Lumbar radiculopathy     Lumbar scoliosis     Restless legs syndrome (RLS)       Past Surgical History:   Procedure Laterality Date    APPENDECTOMY      AS TOTAL KNEE ARTHROPLASTY Left     CARPAL TUNNEL RELEASE RT/LT      CATARACT EXTRACTION      COLONOSCOPY      EP ICD      FOOT SURGERY Bilateral     HAND SURGERY      HC TOTAL HIP ARTHROPLASTY Right     HEMORRHOIDECTOMY      HERNIA REPAIR      IR CVC TUNNEL PLACEMENT > 5 YRS OF AGE  5/10/2024    IRRIGATION AND DEBRIDEMENT HIP, PLACE ANTIBIOTIC CEMENT BEADS / SPACER Right 2024    Procedure: vancomycin and tobramycin impregnated cement spacer implantation;  Surgeon: Rd Dudley MD;  Location: [facility] OR    NASAL FRACTURE SURGERY      REMOVE HARDWARE ARTHROPLASTY HIP Right 2024    Procedure: Explant Right surface replacement Total Hip Arthroplasty ,;  Surgeon: Rd Dudley MD;  Location: [facility] OR    TONSILLECTOMY & ADENOIDECTOMY      urinary tract surgery        No Known Allergies   Social History     Tobacco Use    Smoking status: Never     Passive exposure: Past    Smokeless tobacco: Never   Substance Use Topics    Alcohol use: Not Currently     Comment: quit       Wt Readings from Last 1 Encounters:   24 80.1 kg (176 lb 9.4 oz)        Anesthesia Evaluation            ROS/MED HX  ENT/Pulmonary:       Neurologic:       Cardiovascular:     (+)  - -   -  - -                        dysrhythmias, 3rd Deg Heart Block,             METS/Exercise Tolerance:     Hematologic:       Musculoskeletal:       GI/Hepatic:       Renal/Genitourinary:     (+) renal  "disease,             Endo:       Psychiatric/Substance Use:       Infectious Disease:       Malignancy:       Other:            Physical Exam    Airway        Mallampati: II   TM distance: > 3 FB   Neck ROM: full   Mouth opening: > 3 cm    Respiratory Devices and Support         Dental       (+) Multiple crowns, permanant bridges      Cardiovascular   cardiovascular exam normal          Pulmonary   pulmonary exam normal                OUTSIDE LABS:  CBC:   Lab Results   Component Value Date    WBC 4.8 05/11/2024    WBC 5.0 05/10/2024    HGB 7.6 (L) 05/13/2024    HGB 8.0 (L) 05/12/2024    HCT 21.4 (L) 05/11/2024    HCT 22.6 (L) 05/10/2024     05/11/2024     05/10/2024     BMP:   Lab Results   Component Value Date     05/13/2024     05/12/2024    POTASSIUM 3.8 05/13/2024    POTASSIUM 4.3 05/12/2024    CHLORIDE 102 05/13/2024    CHLORIDE 103 05/12/2024    CO2 29 05/13/2024    CO2 30 (H) 05/12/2024    BUN 11.3 05/13/2024    BUN 11.3 05/12/2024    CR 0.87 05/13/2024    CR 0.82 05/12/2024    GLC 97 08/30/2024    GLC 91 05/13/2024     COAGS: No results found for: \"PTT\", \"INR\", \"FIBR\"  POC: No results found for: \"BGM\", \"HCG\", \"HCGS\"  HEPATIC:   Lab Results   Component Value Date    ALBUMIN 3.6 05/08/2024    PROTTOTAL 4.7 (L) 05/08/2024    ALT 17 05/08/2024    AST 26 05/08/2024    ALKPHOS 43 05/08/2024    BILITOTAL 0.8 05/08/2024     OTHER:   Lab Results   Component Value Date    LACT 1.7 05/08/2024    LIDYA 8.2 (L) 05/13/2024    PHOS 3.8 05/11/2024    MAG 2.3 05/13/2024    SED 2 08/20/2024       Anesthesia Plan    ASA Status:  3       Anesthesia Type: General.     - Airway: ETT   Induction: Intravenous.      Techniques and Equipment:     - Lines/Monitors: 2nd IV, Arterial Line     Consents    Anesthesia Plan(s) and associated risks, benefits, and realistic alternatives discussed. Questions answered and patient/representative(s) expressed understanding.     - Discussed:     - Discussed with:  Patient " "     - Extended Intubation/Ventilatory Support Discussed: No.      - Patient is DNR/DNI Status: No     Use of blood products discussed: Yes.     Postoperative Care    Pain management: IV analgesics, Oral pain medications.   PONV prophylaxis: Ondansetron (or other 5HT-3), Dexamethasone or Solumedrol     Comments:               Simone Gómez DO    I have reviewed the pertinent notes and labs in the chart from the past 30 days and (re)examined the patient.  Any updates or changes from those notes are reflected in this note.              # Overweight: Estimated body mass index is 29.39 kg/m  as calculated from the following:    Height as of this encounter: 1.651 m (5' 5\").    Weight as of this encounter: 80.1 kg (176 lb 9.4 oz).      "

## 2024-08-31 LAB
ANION GAP SERPL CALCULATED.3IONS-SCNC: 8 MMOL/L (ref 7–15)
BLD PROD TYP BPU: NORMAL
BLOOD COMPONENT TYPE: NORMAL
BUN SERPL-MCNC: 15.2 MG/DL (ref 8–23)
CA-I BLD-MCNC: 4.7 MG/DL (ref 4.4–5.2)
CALCIUM SERPL-MCNC: 8.1 MG/DL (ref 8.8–10.4)
CHLORIDE SERPL-SCNC: 101 MMOL/L (ref 98–107)
CODING SYSTEM: NORMAL
CREAT SERPL-MCNC: 0.63 MG/DL (ref 0.67–1.17)
CROSSMATCH: NORMAL
EGFRCR SERPLBLD CKD-EPI 2021: >90 ML/MIN/1.73M2
ERYTHROCYTE [DISTWIDTH] IN BLOOD BY AUTOMATED COUNT: 13.5 % (ref 10–15)
GLUCOSE BLDC GLUCOMTR-MCNC: 101 MG/DL (ref 70–99)
GLUCOSE BLDC GLUCOMTR-MCNC: 102 MG/DL (ref 70–99)
GLUCOSE BLDC GLUCOMTR-MCNC: 86 MG/DL (ref 70–99)
GLUCOSE SERPL-MCNC: 118 MG/DL (ref 70–99)
HCO3 SERPL-SCNC: 26 MMOL/L (ref 22–29)
HCT VFR BLD AUTO: 20.2 % (ref 40–53)
HGB BLD-MCNC: 10.1 G/DL (ref 13.3–17.7)
HGB BLD-MCNC: 10.2 G/DL (ref 13.3–17.7)
HGB BLD-MCNC: 7 G/DL (ref 13.3–17.7)
HGB BLD-MCNC: 8.5 G/DL (ref 13.3–17.7)
ISSUE DATE AND TIME: NORMAL
MAGNESIUM SERPL-MCNC: 1.4 MG/DL (ref 1.7–2.3)
MAGNESIUM SERPL-MCNC: 1.9 MG/DL (ref 1.7–2.3)
MAGNESIUM SERPL-MCNC: 2.3 MG/DL (ref 1.7–2.3)
MCH RBC QN AUTO: 29.2 PG (ref 26.5–33)
MCHC RBC AUTO-ENTMCNC: 34.7 G/DL (ref 31.5–36.5)
MCV RBC AUTO: 84 FL (ref 78–100)
PHOSPHATE SERPL-MCNC: 3.8 MG/DL (ref 2.5–4.5)
PLATELET # BLD AUTO: 140 10E3/UL (ref 150–450)
POTASSIUM SERPL-SCNC: 4.3 MMOL/L (ref 3.4–5.3)
RBC # BLD AUTO: 2.4 10E6/UL (ref 4.4–5.9)
SODIUM SERPL-SCNC: 135 MMOL/L (ref 135–145)
UNIT ABO/RH: NORMAL
UNIT NUMBER: NORMAL
UNIT STATUS: NORMAL
UNIT TYPE ISBT: 6200
WBC # BLD AUTO: 8 10E3/UL (ref 4–11)

## 2024-08-31 PROCEDURE — 250N000013 HC RX MED GY IP 250 OP 250 PS 637: Performed by: ORTHOPAEDIC SURGERY

## 2024-08-31 PROCEDURE — 85018 HEMOGLOBIN: CPT

## 2024-08-31 PROCEDURE — 250N000013 HC RX MED GY IP 250 OP 250 PS 637: Performed by: PHYSICIAN ASSISTANT

## 2024-08-31 PROCEDURE — 83735 ASSAY OF MAGNESIUM: CPT

## 2024-08-31 PROCEDURE — 250N000011 HC RX IP 250 OP 636

## 2024-08-31 PROCEDURE — 250N000011 HC RX IP 250 OP 636: Performed by: PHYSICIAN ASSISTANT

## 2024-08-31 PROCEDURE — 258N000003 HC RX IP 258 OP 636

## 2024-08-31 PROCEDURE — 250N000009 HC RX 250

## 2024-08-31 PROCEDURE — 84100 ASSAY OF PHOSPHORUS: CPT

## 2024-08-31 PROCEDURE — 120N000002 HC R&B MED SURG/OB UMMC

## 2024-08-31 PROCEDURE — 250N000013 HC RX MED GY IP 250 OP 250 PS 637

## 2024-08-31 PROCEDURE — P9016 RBC LEUKOCYTES REDUCED: HCPCS

## 2024-08-31 PROCEDURE — 36415 COLL VENOUS BLD VENIPUNCTURE: CPT | Performed by: ORTHOPAEDIC SURGERY

## 2024-08-31 PROCEDURE — 36415 COLL VENOUS BLD VENIPUNCTURE: CPT

## 2024-08-31 PROCEDURE — 83735 ASSAY OF MAGNESIUM: CPT | Performed by: ORTHOPAEDIC SURGERY

## 2024-08-31 PROCEDURE — 250N000012 HC RX MED GY IP 250 OP 636 PS 637

## 2024-08-31 PROCEDURE — 82330 ASSAY OF CALCIUM: CPT

## 2024-08-31 PROCEDURE — 80048 BASIC METABOLIC PNL TOTAL CA: CPT

## 2024-08-31 PROCEDURE — 85027 COMPLETE CBC AUTOMATED: CPT

## 2024-08-31 PROCEDURE — 258N000003 HC RX IP 258 OP 636: Performed by: PHYSICIAN ASSISTANT

## 2024-08-31 PROCEDURE — 99232 SBSQ HOSP IP/OBS MODERATE 35: CPT | Performed by: STUDENT IN AN ORGANIZED HEALTH CARE EDUCATION/TRAINING PROGRAM

## 2024-08-31 PROCEDURE — 250N000013 HC RX MED GY IP 250 OP 250 PS 637: Performed by: STUDENT IN AN ORGANIZED HEALTH CARE EDUCATION/TRAINING PROGRAM

## 2024-08-31 PROCEDURE — 99233 SBSQ HOSP IP/OBS HIGH 50: CPT | Performed by: PHYSICIAN ASSISTANT

## 2024-08-31 RX ORDER — METHYLPREDNISOLONE SODIUM SUCCINATE 125 MG/2ML
125 INJECTION, POWDER, LYOPHILIZED, FOR SOLUTION INTRAMUSCULAR; INTRAVENOUS
Status: DISCONTINUED | OUTPATIENT
Start: 2024-08-31 | End: 2024-09-03 | Stop reason: HOSPADM

## 2024-08-31 RX ORDER — MEPERIDINE HYDROCHLORIDE 25 MG/ML
25 INJECTION INTRAMUSCULAR; INTRAVENOUS; SUBCUTANEOUS EVERY 30 MIN PRN
Status: DISCONTINUED | OUTPATIENT
Start: 2024-08-31 | End: 2024-09-03 | Stop reason: HOSPADM

## 2024-08-31 RX ORDER — METHYLPREDNISOLONE SODIUM SUCCINATE 125 MG/2ML
125 INJECTION, POWDER, LYOPHILIZED, FOR SOLUTION INTRAMUSCULAR; INTRAVENOUS ONCE
Status: DISCONTINUED | OUTPATIENT
Start: 2024-08-31 | End: 2024-08-31

## 2024-08-31 RX ORDER — MAGNESIUM SULFATE HEPTAHYDRATE 40 MG/ML
4 INJECTION, SOLUTION INTRAVENOUS ONCE
Status: COMPLETED | OUTPATIENT
Start: 2024-08-31 | End: 2024-08-31

## 2024-08-31 RX ORDER — DIPHENHYDRAMINE HYDROCHLORIDE 50 MG/ML
50 INJECTION INTRAMUSCULAR; INTRAVENOUS
Status: DISCONTINUED | OUTPATIENT
Start: 2024-08-31 | End: 2024-09-03 | Stop reason: HOSPADM

## 2024-08-31 RX ORDER — DIPHENHYDRAMINE HCL 25 MG
25 CAPSULE ORAL ONCE
Status: DISCONTINUED | OUTPATIENT
Start: 2024-08-31 | End: 2024-08-31

## 2024-08-31 RX ORDER — ALBUTEROL SULFATE 0.83 MG/ML
2.5 SOLUTION RESPIRATORY (INHALATION)
Status: DISCONTINUED | OUTPATIENT
Start: 2024-08-31 | End: 2024-09-03 | Stop reason: HOSPADM

## 2024-08-31 RX ORDER — CEFAZOLIN SODIUM 2 G/100ML
2 INJECTION, SOLUTION INTRAVENOUS EVERY 8 HOURS
Status: DISCONTINUED | OUTPATIENT
Start: 2024-08-31 | End: 2024-09-03 | Stop reason: HOSPADM

## 2024-08-31 RX ORDER — DIPHENHYDRAMINE HYDROCHLORIDE 50 MG/ML
25 INJECTION INTRAMUSCULAR; INTRAVENOUS ONCE
Status: DISCONTINUED | OUTPATIENT
Start: 2024-08-31 | End: 2024-08-31

## 2024-08-31 RX ORDER — ZOLPIDEM TARTRATE 10 MG/1
10 TABLET ORAL
COMMUNITY
Start: 2024-08-14

## 2024-08-31 RX ORDER — MAGNESIUM SULFATE HEPTAHYDRATE 40 MG/ML
4 INJECTION, SOLUTION INTRAVENOUS ONCE
Status: DISCONTINUED | OUTPATIENT
Start: 2024-08-31 | End: 2024-08-31

## 2024-08-31 RX ORDER — ALBUTEROL SULFATE 90 UG/1
1-2 AEROSOL, METERED RESPIRATORY (INHALATION)
Status: DISCONTINUED | OUTPATIENT
Start: 2024-08-31 | End: 2024-09-03 | Stop reason: HOSPADM

## 2024-08-31 RX ORDER — LORAZEPAM 1 MG/1
1 TABLET ORAL
COMMUNITY
Start: 2024-08-14

## 2024-08-31 RX ADMIN — PANTOPRAZOLE SODIUM 40 MG: 40 TABLET, DELAYED RELEASE ORAL at 08:19

## 2024-08-31 RX ADMIN — ACETAMINOPHEN 975 MG: 325 TABLET ORAL at 12:06

## 2024-08-31 RX ADMIN — OXYCODONE HYDROCHLORIDE 10 MG: 10 TABLET ORAL at 08:19

## 2024-08-31 RX ADMIN — Medication 5 MG: at 20:04

## 2024-08-31 RX ADMIN — METHOCARBAMOL 500 MG: 500 TABLET ORAL at 08:19

## 2024-08-31 RX ADMIN — PRAMIPEXOLE DIHYDROCHLORIDE 0.25 MG: 0.12 TABLET ORAL at 22:33

## 2024-08-31 RX ADMIN — HYDROMORPHONE HYDROCHLORIDE 0.4 MG: 1 INJECTION, SOLUTION INTRAMUSCULAR; INTRAVENOUS; SUBCUTANEOUS at 10:35

## 2024-08-31 RX ADMIN — PREDNISONE 5 MG: 5 TABLET ORAL at 08:20

## 2024-08-31 RX ADMIN — HYDROMORPHONE HYDROCHLORIDE 0.4 MG: 1 INJECTION, SOLUTION INTRAMUSCULAR; INTRAVENOUS; SUBCUTANEOUS at 20:03

## 2024-08-31 RX ADMIN — HYDROMORPHONE HYDROCHLORIDE 0.4 MG: 1 INJECTION, SOLUTION INTRAMUSCULAR; INTRAVENOUS; SUBCUTANEOUS at 17:56

## 2024-08-31 RX ADMIN — SENNOSIDES AND DOCUSATE SODIUM 1 TABLET: 50; 8.6 TABLET ORAL at 08:19

## 2024-08-31 RX ADMIN — KETAMINE HYDROCHLORIDE 10 MG/HR: 10 INJECTION INTRAMUSCULAR; INTRAVENOUS at 10:27

## 2024-08-31 RX ADMIN — HYDROMORPHONE HYDROCHLORIDE 0.4 MG: 1 INJECTION, SOLUTION INTRAMUSCULAR; INTRAVENOUS; SUBCUTANEOUS at 06:10

## 2024-08-31 RX ADMIN — HYDROMORPHONE HYDROCHLORIDE 0.4 MG: 1 INJECTION, SOLUTION INTRAMUSCULAR; INTRAVENOUS; SUBCUTANEOUS at 14:53

## 2024-08-31 RX ADMIN — KETAMINE HYDROCHLORIDE 10 MG/HR: 10 INJECTION INTRAMUSCULAR; INTRAVENOUS at 01:54

## 2024-08-31 RX ADMIN — OXYCODONE HYDROCHLORIDE 10 MG: 10 TABLET ORAL at 16:10

## 2024-08-31 RX ADMIN — CEFAZOLIN SODIUM 2 G: 2 INJECTION, SOLUTION INTRAVENOUS at 21:33

## 2024-08-31 RX ADMIN — ASPIRIN 162 MG: 81 TABLET, COATED ORAL at 08:20

## 2024-08-31 RX ADMIN — METHOCARBAMOL 500 MG: 500 TABLET ORAL at 21:32

## 2024-08-31 RX ADMIN — CEFAZOLIN SODIUM 2 G: 2 INJECTION, SOLUTION INTRAVENOUS at 13:52

## 2024-08-31 RX ADMIN — METHOCARBAMOL 500 MG: 500 TABLET ORAL at 14:57

## 2024-08-31 RX ADMIN — KETAMINE HYDROCHLORIDE 10 MG/HR: 10 INJECTION INTRAMUSCULAR; INTRAVENOUS at 20:23

## 2024-08-31 RX ADMIN — SODIUM CHLORIDE, POTASSIUM CHLORIDE, SODIUM LACTATE AND CALCIUM CHLORIDE: 600; 310; 30; 20 INJECTION, SOLUTION INTRAVENOUS at 22:39

## 2024-08-31 RX ADMIN — ACETAMINOPHEN 975 MG: 325 TABLET ORAL at 06:09

## 2024-08-31 RX ADMIN — GABAPENTIN 900 MG: 300 CAPSULE ORAL at 22:33

## 2024-08-31 RX ADMIN — ACETAMINOPHEN 975 MG: 325 TABLET ORAL at 21:31

## 2024-08-31 RX ADMIN — MAGNESIUM SULFATE HEPTAHYDRATE 4 G: 40 INJECTION, SOLUTION INTRAVENOUS at 08:08

## 2024-08-31 RX ADMIN — SENNOSIDES AND DOCUSATE SODIUM 1 TABLET: 50; 8.6 TABLET ORAL at 20:04

## 2024-08-31 RX ADMIN — HYDROMORPHONE HYDROCHLORIDE 0.4 MG: 1 INJECTION, SOLUTION INTRAMUSCULAR; INTRAVENOUS; SUBCUTANEOUS at 22:34

## 2024-08-31 RX ADMIN — POLYETHYLENE GLYCOL 3350 17 G: 17 POWDER, FOR SOLUTION ORAL at 08:20

## 2024-08-31 RX ADMIN — OXYCODONE HYDROCHLORIDE 10 MG: 10 TABLET ORAL at 21:32

## 2024-08-31 ASSESSMENT — ACTIVITIES OF DAILY LIVING (ADL)
ADLS_ACUITY_SCORE: 32
ADLS_ACUITY_SCORE: 36
ADLS_ACUITY_SCORE: 36
ADLS_ACUITY_SCORE: 32
ADLS_ACUITY_SCORE: 36
ADLS_ACUITY_SCORE: 32
ADLS_ACUITY_SCORE: 36
ADLS_ACUITY_SCORE: 32

## 2024-08-31 NOTE — PROGRESS NOTES
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 Underwent explant of right total hip arthroplasty with antibiotic spacer implantation 5/7/2024.     Admitted 8/30/2024 for explantation of right hip antibiotic spacer and revision of right total hip arthroplasty by Dr. Dudley. Admitted to ICU post-operatively for hypotension requiring phenylephrine infusion in PACU, phenylephrine off at time of ICU admission     CHANGES and MAJOR THINGS TODAY:   Advance diet   Saline lock MIVF   Confer with orthopedics re: additional abx plan-> pt c/o of burning sensation in hands/feet within 20 mins of  Ancef administration this am   Add scheduled melatonin for sleep.  Remove arterial line   Transfer to floor later this am.       PLAN:  Neuro:  # Acute pain, post-op    - Tylenol 975 mg q 8 hours x 3 days  - Oxycodone 5 - 10 mg q 4 hours PRN  - Dilaudid 0.2 - 0.4 mg q 2 hours PRN   - Robaxin 500 mg q 6 hours PRN  - Ketamine infusion if ongoing issues with pain control despite above     # PMR  - PTA Prednisone, currently 5 mg daily     # RLS  # Insomnia  - PTA Gabapentin 900 mg daily at bedtime  - PTA Mirapex at bedtime   - HOLD PRN Lorazepam and Ambien for insomnia      Pulmonary:  # No active issues  Extubated post-op to 6L via Oxymask, now weaned to 2L NC    - Supplemental O2 for goal SpO2 > 92%. Incentive spirometry   - Pulm hygiene: Encourage cough & deep breathing, IS/Acapella       Cardiovascular:  # Hypotension post-op, suspect due to hypovolemia   Pre-op Hgb 13.0, post-op POCT Hgb 8.9. EBL 1700 mL. Noted to be hypotensive post operatively, received 1L bolus LR in PACU, 12.5g 5% Albumin, started on  phenylephrine infusion. Lactic acid 1.6. Suspect hypovolemia 2/2 acute blood loss, now resolved.   - Last TTE  2021, LVEF 50%, normal RV systolic function, mild MR  - MAP goal > 65 mmHg without pressors since admit to ICU   - Consider SDS if requiring pressors given chronic steroid use although prednisone low dose 5mg      # Hx CHB s/p ICD  Device interrogation 7/18/24 with no arrhythmias noted and normal device function.      GI/Nutrition:  # GERD  - PPI daily (Omeprazole outpatient)  - ADAT. SL MIVF when tolerating PO well.   - Bowel regimen: Miralax daily, Senna-doc BID  - Zofran PRN     Renal/Fluids/Electrolytes:  # Hyponatremia, mild. Now resolved.  Baseline creat 0.7-0.8. Pre-op Na 132, post-op 133.     - BMP, Mg, Phos daily  - High ICU Electrolyte replacement protocol   - Na level 135    # hypomagnesia  # Relative hypokalemia  - level 1.4, replaced, recheck pending   - K level 3.8, replaced     # Right Hydronephrosis, chronic  # BPH  Incidental finding of moderate R hydronephrosis and proximal hydroureter on CT A/P (done for neutropenia) on 2/2024. CT abd/pelvis from 2017 has very similar findings with chronic hydronephrosis and proximal hydroureter. Urology eval 3/28/2024, underwent MAG3 renogram with plan for urology follow up once completed with orthopedic procedures.   - Monitor urine output once carl removed     Endocrine:  # Chronic steroid use 2/2 PMR   # Stress-induced hyperglycemia  - Hypoglycemia protocol ordered.   - Currently no indication for sliding scale insulin. Start insulin in BG >180     ID:  # Infected R CHRISS s/p Explantation of Right Hip Antibiotic Spacer with Revision Right CHRISS on 8/30/24 with Dr. Dudley  # Hx E.Faecalis PJI (fluid aspiration 4/25)   # Hx E.Faecalis, C.Acnes (OR cultures 5/7)  Evaluated by ortho 1/2024 for hip pain, referred to [facility] Ortho/reconstructive team for eval of diaz-acetabular osteolysis and soft tissue reaction 2/2 metal on metal bearing. 4/2024 cultures grew  Enterococcus faecalis treated with 6 weeks IV Ampicillin (5/7 - 6/18) + explant of CHRISS with antibiotic (Tobramycin + Vancomycin) spacer placement on 5/7/24. Right hip joint aspiration 8/12/2024 with cultures no growth. Now s/p explantation of right hip antibiotic spacer with revision R CHRISS 8/30/2024.   - Intra-op cultures obtained, follow culture results   - Cefazolin until discharge, then Cefadroxil for 2 weeks per Ortho   --> Pt reporting burning symptoms in hand/feest,approx 20 mins into Ancef administration. Ancef  stopped, Steroids ordered but symptoms stopped prior to administration. Patient unsure if tolerated Ancef previously although doses given  periop without reports of similar symptoms. Discussed with Orthopedics fellow, would liek to trial Ancef again as pt tolerated prior dosing.      Hematology:    # Anemia, acute blood loss due to surgical blood loss   # Hx iron deficiency anemia  EBL ~ 1.7L, Hgb 13-->8.8 post op. Small amount of sanguinous drainage from R hip drain.  - s/p one unit pRBC ordered for transfusion this am. Transfusions without symptoms   - Transfuse for Hgb < 7 or active signs of bleeding  - Hold PTA ferrous sulfate     Musculoskeletal:  # Infected R CHRISS s/p Explantation of Right Hip Antibiotic Spacer with Revision Right CHRISS on 8/30/24 with Dr. Dudley  # Hx Right total hip arthroplasty 2007  # Lumbosacral spondylosis with radiculopathy  # Degenerative scoliosis  # Lumbar spondylosis with myelopathy  4/2024 cultures grew Enterococcus faecalis treated with 6 weeks IV Ampicillin + explant of CHRISS with antibiotic (Tobramycin + Vancomycin) spacer placement on 5/7/24.     - Ortho recs:   Activity: Up with assist and assistive devices as needed until independent. Posterior hip precautions to operative hip x 3 months:  1) No hip flexion beyond 90 degrees  2) No adduction beyond midline  3) No internal rotation beyond neutral   Weight bearing status: TTWB for 3 months  Antibiotics: Cefazolin until  discharge when duricef for 2 weeks  DVT prophylaxis: Mechanical while in hospital with Aspirin 162mg daily x 4 weeks, starting morning of POD 1  Elevation: Elevate heels off of bed on pillows  Wound Care: Alginate, tegaderm to be changed PRN by ortho  Drains: KEYUR until discharge if intra op cultures remain negative  Pain management: Orals PRN, IV for breakthrough only, Ketamine gtt as per above  Physical Therapy: Mobilization, ROM, ADL's, Posterior hip precautions  Occupational Therapy: ADL's        Skin:  # Surgical incision  - Wound care per ortho recs     General Cares/Prophylaxis:    DVT Prophylaxis: Pneumatic Compression Devices  GI Prophylaxis: PPI--home ppI  Restraints: None  Code Status: FULL     Lines/tubes/drains:  - Art line  - PIVs  - Lomeli     Disposition:  - transfer to floor       Patient seen, findings and plan discussed with surgical ICU staff, Dr. Ga     Time spent on this Encounter   Billing:  I spent 40 minutes bedside and on the inpatient unit today managing the  care of Kwabena Solorio in relation to the issues listed in this note.      [place] PA-C  ====================================  INTERVAL HISTORY:  Course reviewed.  Events since admission to ICU reviewed. This am, reported burning sensation in hands, feet and toes, no dsypnea or respiratory distress, developed symptoms approx 20 mins into Ancef administration. Symptoms resolved approx 45 min afterward. Denies chest pain, SOB, dyspnea, HA or vision changes.      OBJECTIVE:   1. VITAL SIGNS:   Temp:  [97.3  F (36.3  C)-98.7  F (37.1  C)] 98.4  F (36.9  C)  Pulse:  [56-78] 70  Resp:  [7-18] 18  BP: ()/() 118/69  MAP:  [31 mmHg-99 mmHg] 87 mmHg  Arterial Line BP: ()/(24-91) 111/91  SpO2:  [91 %-100 %] 100 %  Resp: 18    2. INTAKE/ OUTPUT:   I/O last 3 completed shifts:  In: 5036.25 [I.V.:3486.25; IV Piggyback:1000]  Out: 3385 [Urine:1475; Drains:210; Blood:1700]    3. PHYSICAL EXAMINATION:  General: laying in  bed, awake interactive  HEENT: PERRLA. OP clear  Neuro: A&Ox3, NAD.   Pulm/Resp: Clear breath sounds bilaterally without rhonchi, crackles or wheeze, breathing non-labored on RA.   CV: S1/S2  Abdomen: Soft, non-distended, non-tender,  no rebound tenderness or guarding, no masses  : (+) carl catheter in place, urine yellow and clear  Incisions/Skin: skin warm and dry, incision dressed, no rashes.   MSK/Extremities: KEYUR drain with serosanguinous drainage in tubing, to thumbprint suction.  Extremities warm and compressible. no peripheral edema, moving all extremities, peripheral pulses intact, calves soft and compressible.     4. INVESTIGATIONS:   Arterial Blood Gases   Recent Labs   Lab 08/30/24 1945 08/30/24 1815 08/30/24  1646 08/30/24  1603   PH 7.43 7.40 7.40 7.41   PCO2 36 35 39 37   PO2 165* 129* 168* 171*   HCO3 24 22 24 24     Complete Blood Count   Recent Labs   Lab 08/31/24  0308 08/30/24 2117 08/30/24 1945 08/30/24 1815 08/30/24  1603 08/30/24  1235   WBC 8.0 11.4*  --   --   --  4.9   HGB 7.0* 8.4* 8.8* 8.9*   < > 13.0*   * 174  --   --   --  241    < > = values in this interval not displayed.     Basic Metabolic Panel  Recent Labs   Lab 08/31/24  0612 08/31/24 0308 08/30/24 2036 08/30/24 1945 08/30/24  1942 08/30/24  1815 08/30/24  1603 08/30/24  1235   NA  --  135  --  133* 133* 134*   < > 132*   POTASSIUM  --  4.3  --  3.5 3.8 3.0*   < > 4.1   CHLORIDE  --  101  --   --  100  --   --  97*   CO2  --  26  --   --  23  --   --  25   BUN  --  15.2  --   --  14.8  --   --  15.0   CR  --  0.63*  --   --  0.65*  --   --  0.81   * 118* 132* 155* 153* 134*   < > 100*    < > = values in this interval not displayed.     Liver Function Tests  Recent Labs   Lab 08/30/24  1235   INR 0.95     Pancreatic Enzymes  No lab results found in last 7 days.  Coagulation Profile  Recent Labs   Lab 08/30/24  1235   INR 0.95         5. RADIOLOGY:   Recent Results (from the past 24 hour(s))   XR Hip Right 1  View    Narrative    EXAM: XR HIP RIGHT 1 VIEW  LOCATION: [facility]  DATE: 8/30/2024    INDICATION: intra op  COMPARISON: 08/23/2024      Impression    IMPRESSION: Intraoperative image with changes of interval right revision hip arthroplasty in expected surrounding intraoperative edema and gas.   XR Pelvis w Hip Port Right 1 View    Narrative    EXAM: XR PELVIS AND HIP PORTABLE RIGHT 1 VIEW  LOCATION: [facility]  DATE: 8/30/2024    INDICATION: Postoperative follow-up.  COMPARISON: 8/30/2024.      Impression    IMPRESSION: Completed right total hip arthroplasty. Cerclage wires encircling the proximal femur. Subtle lucencies projecting over the lateral cortex of the greater trochanter, without definite cortical step-off or displaced fracture fragment; recommend   attention to these on follow-up. No periprosthetic fracture visualized otherwise. Normal joint alignment. Soft tissue air and drain in place. Catheter in place with the pelvis. Degenerative changes in the lower lumbar spine and SI joints.       =========================================

## 2024-08-31 NOTE — OP NOTE
[facility] Orthopaedic Surgery  Operative Note            Procedure date 8/30/2024   Pre-operative diagnosis: Infection associated with internal right hip prosthesis, subsequent encounter [T84.51XD]   Post-operative diagnosis same as pre-operative   Procedure:   Explantation of R hip antibiotic spacer   Revision R total hip arthroplasty     Surgeon: Rd Dudley MD   Assistants(s): Xu Grier MD - Fellow  LUNA Contreras   Anesthesia: Choice    Estimated blood loss: 1700 cc   Total IV fluids: (See anesthesia record)   Blood transfusion: (See anesthesia record)   Total urine output: (See anesthesia record)   Drains: Stanislav-Devries     Specimens:  ID Type Source Tests Collected by Time Destination   A :  Tissue Hip, Right ANAEROBIC BACTERIAL CULTURE ROUTINE, FUNGAL OR YEAST CULTURE ROUTINE, AEROBIC BACTERIAL CULTURE ROUTINE Rd Dudley MD 8/30/2024  3:22 PM    B :  Tissue Hip, Right ANAEROBIC BACTERIAL CULTURE ROUTINE, FUNGAL OR YEAST CULTURE ROUTINE, AEROBIC BACTERIAL CULTURE ROUTINE Rd Dudley MD 8/30/2024  3:24 PM    C :  Tissue Hip, Right ANAEROBIC BACTERIAL CULTURE ROUTINE, FUNGAL OR YEAST CULTURE ROUTINE, AEROBIC BACTERIAL CULTURE ROUTINE Rd Dudley MD 8/30/2024  3:24 PM    D :  Tissue Hip, Right ANAEROBIC BACTERIAL CULTURE ROUTINE, FUNGAL OR YEAST CULTURE ROUTINE, AEROBIC BACTERIAL CULTURE ROUTINE Rd Dudley MD 8/30/2024  3:24 PM    E :  Tissue Hip, Right ANAEROBIC BACTERIAL CULTURE ROUTINE, FUNGAL OR YEAST CULTURE ROUTINE, AEROBIC BACTERIAL CULTURE ROUTINE Rd Dudley MD 8/30/2024  3:24 PM        Implants:  Implant Name Type Inv. Item Serial No.  Lot No. LRB No. Used Action   GRAFT BONE CHIPS CANC CUBE 30CC [id number] - [id number] Bone/Tissue/Biologic GRAFT BONE CHIPS CANC CUBE 30CC [id number] [employer]  Right 1 Implanted   SHELL ACTB 60MM HIP MLHL CLR CD OSSEOTI G7 G HMSPHR OFST -  [id number] Total Joint Component/Insert SHELL ACTB 60MM HIP MLHL CLR CD OSSEOTI G7 G HMSPHR OFST  [employer] [id number] Right 1 Implanted   Hill self tapping bone screw 6.5 x 60mm    HILL [id number] Right 1 Implanted   IMP SCR ZIM 6.5X30MM ACET CUP SELF TAP -065-30 - [id number] Metallic Hardware/[place] IMP SCR ZIM 6.5X30MM ACET CUP SELF TAP -065-30  [employer] [id number] Right 1 Implanted   SCREW BONE SELF TAP 6.5 X 25 -065-25 - [id number] Metallic Hardware/[place] SCREW BONE SELF TAP 6.5 X 25 -065-25  [employer] [id number] Right 1 Implanted   IMP SCR ZIM 6.5X40MM ACET CUP SELF TAP -065-40 - [id number] Metallic Hardware/[place] IMP SCR ZIM 6.5X40MM ACET CUP SELF TAP -065-40  [employer] [id number] Right 1 Implanted   IMP SCR ZIM 6.5X20MM ACET CUP SELF TAP -065-20 - [id number] Metallic Hardware/[place] IMP SCR ZIM 6.5X20MM ACET CUP SELF TAP -065-20  [employer] [id number] Right 1 Implanted   IMP SPACER G21 CEMENT 10MM SPACEFLEX HIP MTL STEM 12133 10 - [id number] Total Joint Component/Insert IMP SPACER G21 CEMENT 10MM SPACEFLEX HIP MTL STEM 66818 10  [employer] [id number] Right 1 Explanted   LINER ACTB G7 G 36MM VIVACIT-E HIP STRL LUM LF [id number] - [id number] Total Joint Component/Insert LINER ACTB G7 G 36MM VIVACIT-E HIP STRL LUM LF [id number]  [employer] [id number] Right 1 Implanted   STEM FEM 190MM 16MM WGNR SL RV PROTASUL-100 POR 135D 12/14 - [id number] Total Joint Component/Insert STEM FEM 190MM 16MM WGNR SL RV PROTASUL-100 POR 135D 12/14  [employer] [id number] Right 1 Wasted   IMP CABLE DALL MILES CBL W/SLEEVE SET 2MM 6704-0-510 - [id number] Metallic Hardware/[place] IMP CABLE DALL MILES CBL W/SLEEVE SET 2MM 6704-0-510  [employer] [id number] Right 1 Wasted   IMP CABLE ZIM CERCLAGE 1.8X25MM 2232-04-18 - [id number] Metallic Hardware/[place] IMP CABLE ZIM CERCLAGE 1.8X25MM 2232-04-18  [employer] [id number] Right 1 Implanted   IMP CABLE  ZIM CERCLAGE 1.8X25MM 2232-04-18 - [id number] Metallic Hardware/[place] IMP CABLE ZIM CERCLAGE 1.8X25MM 2232-04-18  [employer] [id number] Right 1 Implanted   IMP HEAD FEM ZIM BIOLOX DELTA CER 36MM +0 -532-02 - [id number] Total Joint Component/Insert IMP HEAD FEM ZIM BIOLOX DELTA CER 36MM +0 -019-02  [employer] [id number] Right 1 Implanted   STEM FEM 225MM 17MM WGNR SL RV PROTASUL-100 POR 135D 12/14 - [id number] Total Joint Component/Insert STEM FEM 225MM 17MM WGNR SL RV PROTASUL-100 POR 135D 12/14  [employer] [id number] Right 1 Implanted       Findings:   Cystic lesions anterosuperiorly and anteroinferiorly in acetabulum with fibrous tissue  Intact posterior wall and anterior rim of acetabulum  Acetabular component well positioned and secure.    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  The patient underwent a stage I revision with explantation of the hip resurfacing and placement of at antibiotic spacer in May 2024 with Dr. Pate.  He underwent 6 weeks of IV ampicillin treatments.  He then underwent a right hip aspiration following a 8-week antibiotic holiday.  The results of this aspiration were consistent with eradication of the infection within the right hip.  As a result the patient was indicated to undergo stage II right total hip arthroplasty.  All the risk, benefits, and indications of the surgery were discussed at length with the patient.  The patient expressed understanding throughout the course of the discussion.  He has elected to proceed with the planned procedure.    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 Size 46 femoral head, 52-mm, acetabulum, Tobramycin Simplex cement.  ( Keyur Balderrama MD ) [facility]  3/18/2010, L TKA, Jaquan NexGen LPS Flex, 10mm insert, (Keyur Balderrama) [facility]  3/22/2013, Angel/Akin Bunionectomy, bilateral feet (R. Sticha) [facility]  3/10/2014, screw removal right foot (R. Sticha) [facility]  5/2/2014, screw removal left foot, (R. Sticha), [facility]  2/27/2020, Right CMC arthroplasty and Left hand thumb trigger finger release (JAQUI Prasad), [facility]  4/25/2024, R hip aspirate: WBC 21k, PMNs 89%, Culture Enterococcus faecalis, serum Chr 3.7 nl, serum Co 4.2 elevated.  5/7/24, Explant Right surface replacement Total Hip Arthroplasty and vancomycin and tobramycin impregnated cement spacer implantation, (Luis Enrique), [facility]  E Faecalis.  Ampicillin IV x 6 weeks.    \  Procedure details:  Patient was seen in the preoperative area where we again reviewed the risks, benefits, and alternatives to total hip arthroplasty.  Patient understands and agrees to the treatment plan as set forth. Informed written consent was obtained.  The operative right hip was marked with an indelible marker after patient confirmation of the operative site.  All the patient's questions were answered.  The patient was taken to the operating room and underwent induction of  General  anesthesia.  The patient was placed on the operating table in the lateral decubitus position with the operative site up.   An SCD was placed on the nonoperative leg.   Bony prominences were well padded and was secured to the table with the Wixon positioner. The patient was prepped and draped in the normal sterile fashion.         After the completion of a timeout procedure a posterior approach utilizing the patient's prior incision over the greater trochanter and taking this down through  the subcutaneous tissue. The gluteus hetal and IT band were identified and then split in the direction of its fibers. A charnley retractor was placed and hemostasis was obtained.  After internal rotation of the femur, posterior capsular tissue was released.  We further released the inferior and superior capsule to relieve tension and the limb was positioned in the sleep position and the hip was dislocated. We then further released the capsular layer anteriorly and posteriorly along the acetabular rim. Exposure of the ikmplant was quite difficult due to the scarred osft tissue bed.  Furthermore the lack of extermity and joint motion inreased the level of difficulty.  The soft tissues under the greater trochanter were debrided, and the antibiotic spacer was exposed. The spacer was then removed enbloc utilizing and offset drift. Care was taken to avoid femoral fracture.      The revision arthroplasty was now performed.  The acetabulum was exposed with an anterior vasques's crook retractor and posteroinferior retractor as well as a self-retaining retractor.  The remaining fibrinous tissue was removed circumferentially around the acetabular rim. The medial wall of the acetabulum was then exposed.  Combination of curettes and pituitary rongeurs were utilized to excise the fibrinous tissue within the 2 large cystic lesions along the anterior superior and anterior inferior walls of the acetabulum.  After debridement we then utilized cancellous allograft bone to fill the bony voids.  This bone graft was then impacted utilizing reverse reaming.  We then began to ream the acetabulum in incremental fashion.  At this point the sclerotic bone was removed back to healthy bleeding cancelleous bone.  The 60mm G7 multihole cup was placed.  It had tenuous initial stability.  A total of 4 acetabular screws were placed further securing the cup.  Any prominent anterior and inferior osteophytes were debrided and carefully removed.  The  acetabular component was irrigated and cleaned.  The definitive neutral liner was placed without complications.    Attention was turned to the femur, which was exposed in the standard fashion with a femoral elevator.  Care was taken to not damage the gluteus medius.  The area under the greater trochanter and around the proximal metaphysis was debrided of all fibrinous tissue.  A guidewire was then passed down the femur to check for any residual cement plugs, of which there were none.  Guidewire was then advanced down to the level of the knee.  We then subsequently reamed the intramedullary canal from 8 mm up to 17 mm.  The 16 mm solid reamer demonstrated good diaphyseal fit for a Shannon stem.  We then placed a trial stem with a -3.5 mm head and reduced the hip.  This initial trial appeared to fit well.    A 16 mm trial Shannon stem was placed with a standard, 36 mm head.  The hip was reduced.  The stability was examined and  was found to be secure and stable in full extension and external rotation of > 45 degrees as well as flexion of 90 degrees combined with internal rotation > 60 degrees.  Leg lengths were judged to be within 5 mm of symmetry.     The hip was dislocated and the trial femoral component was removed.   The femur was exposed and the 16mm  stem with was placed and noted to subside.  Intraoperative examination of the calcar demonstrated a nondisplaced longitudinal fracture within the calcar.  The 16 mm stem was then removed.  At this point the incision was extended proximally in order to better expose some of the proximal diaphysis.  The fracture line did not propagate beyond 2 cm distal to the calcar.  2 cerclage cables were then placed proximal femur to reduce the fracture.  Given the subsidence of the 16 mm stem, we then reexposed the proximal femur with a femoral elevator and reamed the canal utilizing a solid 17 mm tapered reamer.  We then placed a 17 mm Shannon stem into the femur.  This  demonstrated good fit and maintenance of reduction of the calcar fracture.     The hip was trialed with various head lengths and the 0mm head had the best fit. .  Again, the  stability was examined and  was found to be secure and stable in full extension and external rotation of > 45 degrees as well as flexion of 90 degrees combined with internal rotation > 60 degrees.  Leg lengths were judged to be within 5 mm of symmetry as evaluated by limb palpation and the markings on the greater trochanter. The hip was dislocated and the trial head was removed.  The trunion was washed and dried and the final standard 36 mm head was impacted into place.  The hip was reduced and again stable as listed above.      The hip was lavaged with dilute betaine irrigant followed by sterile saline.  Periarticular injection of ropivicaine, toradol, and epi was injected into the soft tissue.  The capsule was reattached to the greater trochanter using a 2 mm drill and suture passer.  The capsular tissue was then repaired to the greater trochanter through bone tunnels..  The IT and gluteal fascia was closed with #1 Vicryl figure of 8 interrupted sutures.  The deep dermal layer was closed with 0 Vicryl and 2-0 Vicryl. The skin was closed with 3-0 PDS, Steri-Strips and a sterile dressing consisting of alginate and Tegaderm. The patient was positioned supine and awoken from anesthesia.  The patient was transferred to the PACU in stable condition.    The -22 modifier is appended due to the increased level of difficulty in performing the procedure.  As noted above , this patient had mutliple prior surgeries that greatly complicated subsequent joint arthroplasty.    The operative time of 4 hours 35 min is >50% over normal.    Postoperative plan:  Activity: Up with assist and assistive devices as needed until independent. Posterior hip precautions to operative hip x 3 months:  1) No hip flexion beyond 90 degrees  2) No adduction beyond midline  3)  No internal rotation beyond neutral   Weight bearing status: TTWB for 3 months   Antibiotics:  cefazolin until discharge when duricef for 2 weeks  Diet: Begin with clear fluids and progress diet as tolerated. Bowel regimen. Anti-emetics PRN.    DVT prophylaxis: Mechanical while in hospital with Aspirin 162mg daily x 4 weeks, starting morning of POD 1  Elevation: Elevate heels off of bed on pillows   Wound Care: Alginate, tegaderm to be changed PRN by ortho  Drains: KEYUR until discharge if intra op cultures remain negative  Lomeli: Intra op. Discontinue POD#1   Pain management: Orals PRN, IV for breakthrough only  X-rays: AP Pelvis and lateral in PACU  Physical Therapy: Mobilization, ROM, ADL's, Posterior hip precautions  Occupational Therapy: ADL's  Labs: Trend Hgb on POD #1, 2  Consults: PT, OT. Hospitalist, appreciate assistance in caring for this patient throughout the perioperative period  Disposition: Pending progress with therapies, pain control on orals, and medical stability, anticipate discharge to Home vs TCU on POD #1-2.  Follow up: Plan for follow up with Dr. Dudley in 4 weeks       Xu Grier MD  Adult Reconstruction Fellow    Attending MD (Dr. Rd Dudley) Attestation:  I was present during the key portions of the procedure and I was immediately available for the entire procedure between opening and closing.    Rd Dudley MD  [facility] Family Professor  Oncology and Adult Reconstructive Surgery  Dept Orthopaedic Surgery, [facility]

## 2024-08-31 NOTE — PROGRESS NOTES
Pt arrived from OR to PACU at 1800. At 1805 pt's BP decreased to 57/38 MDA aware and came to bedside. Pt received phenylephrine and calcium gluconate from CRNA. Pt also received 500 LR bolus and 250 ml albumin infusion. ISTAT labs ran. Refer to results review. Potassium replaced. Pt's BP's now maintaining >65.

## 2024-08-31 NOTE — PROGRESS NOTES
Discharge medications arrived in PACU. Discharge medications given to Laurence CAMILO his nurse in ICU for securement.

## 2024-08-31 NOTE — H&P
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 Underwent explant of right total hip arthroplasty with antibiotic spacer implantation 5/7/2024. Admitted 8/30/2024 for explantation of right hip antibiotic spacer and revision of right total hip arthroplasty by Dr. Dudley. Admitted to ICU post-operatively for hypotension requiring phenylephrine infusion.       CHANGES and MAJOR THINGS TODAY:   - Admit to ICU        PLAN:     Neuro:  # Acute pain, post-op    - Tylenol 975 mg q 8 hours x 3 days  - Oxycodone 5 - 10 mg q 4 hours PRN  - Dilaudid 0.2 - 0.4 mg q 2 hours PRN   - Robaxin 500 mg q 6 hours PRN  - Ketamine infusion if ongoing issues with pain control despite above     # PMR  - PTA Prednisone, currently 5 mg daily     # RLS  # Insomnia  - PTA Gabapentin 900 mg daily at bedtime  - PTA Mirapex at bedtime   - HOLD PRN Lorazepam, Ambien for insomnia      Pulmonary:  # No active issues  Extubated post-op to 6L via Oxymask.   - Supplemental O2 for goal SpO2 > 92%  - Wean O2 as able  - Pulm hygiene: Encourage cough & deep breathing, IS q 2 hours while awake      Cardiovascular:  # Hypotension post-op  Pre-op Hgb 13.0, post-op POCT Hgb 8.9. EBL 1700 mL. Noted to be hypotensive post operatively, received 1L bolus LR in PACU, 12.5g 5% Albumin, started on phenylephrine infusion. Lactic acid 1.6. Suspect hypovolemia 2/2 acute blood loss intra-op  - Last TTE  2021, LVEF 50%, normal RV systolic function, mild MR  - Trend hgb level, monitor drain output  - Peripheral Phenylephrine  - MAP  goal > 65 mmHg  - Consider SDS if requiring pressors given chronic steroid use    # Hx CHB s/p ICD  Device interrogation 7/18/24 with no arrhythmias noted and normal device function.      GI/Nutrition:  # GERD  - PPI daily (Omeprazole outpatient)  - ADAT   - Bowel regimen: Miralax daily, Senna-doc BID  - Zofran PRN        Renal/Fluids/Electrolytes:  # Hyponatremia, mild  Baseline creat 0.7-0.8. Pre-op Na 132, post-op 133.     - BMP, Mg, Phos in AM  - High ICU Electrolyte replacement protocol   - Strict I&O monitoring     # Right Hydronephrosis, chronic  # BPH  Incidental finding of moderate R hydronephrosis and proximal hydroureter on CT A/P (done for neutropenia) on 2/2024. CT abd/pelvis from 2017 has very similar findings with chronic hydronephrosis and proximal hydroureter. Urology eval 3/28/2024, underwent MAG3 renogram with plan for urology follow up once completed with orthopedic procedures.   - Monitor urine output once carl removed       Endocrine:  # Chronic steroid use 2/2 PMR   # Stress-induced hyperglycemia  - Hypoglycemia protocol   - Currently no indication for sliding scale insulin     ID:  # Infected R CHRISS s/p Explantation of Right Hip Antibiotic Spacer with Revision Right CHRISS on 8/30/24 with Dr. Dudley  # Hx E.Faecalis PJI (fluid aspiration 4/25)   # Hx E.Faecalis, C.Acnes (OR cultures 5/7)  Evaluated by ortho 1/2024 for hip pain, referred to [facility] Ortho/reconstructive team for eval of diaz-acetabular osteolysis and soft tissue reaction 2/2 metal on metal bearing. 4/2024 cultures grew Enterococcus faecalis treated with 6 weeks IV Ampicillin (5/7 - 6/18) + explant of CHRISS with antibiotic (Tobramycin + Vancomycin) spacer placement on 5/7/24. Right hip joint aspiration 8/12/2024 with cultures no growth. Now s/p explantation of right hip antibiotic spacer with revision R CHRISS 8/30/2024.   - Intra-op cultures obtained, follow  - Cefazolin until discharge, then Cefadroxil for 2 weeks per Ortho        Hematology:    # Anemia, acute blood loss   # Hx iron deficiency anemia  EBL ~ 1.7L, Hgb 13-->8.8 post op. Small amount of sanguinous drainage from R hip drain.   - CBC upon arrival to ICU  - Consider Hgb trend q 8 hours pending recheck  - Transfuse for Hgb < 7 or active signs of bleeding  - Hold PTA ferrous sulfate     Musculoskeletal:  # Infected R CHRISS s/p Explantation of Right Hip Antibiotic Spacer with Revision Right CHRISS on 8/30/24 with Dr. Dudley  # Hx Right total hip arthroplasty 2007  # Lumbosacral spondylosis with radiculopathy  # Degenerative scoliosis  # Lumbar spondylosis with myelopathy  4/2024 cultures grew Enterococcus faecalis treated with 6 weeks IV Ampicillin + explant of CHRISS with antibiotic (Tobramycin + Vancomycin) spacer placement on 5/7/24.   - Ortho recs:   Activity: Up with assist and assistive devices as needed until independent. Posterior hip precautions to operative hip x 3 months:  1) No hip flexion beyond 90 degrees  2) No adduction beyond midline  3) No internal rotation beyond neutral   Weight bearing status: TTWB for 3 months  Antibiotics: Cefazolin until discharge when duricef for 2 weeks  DVT prophylaxis: Mechanical while in hospital with Aspirin 162mg daily x 4 weeks, starting morning of POD 1  Elevation: Elevate heels off of bed on pillows  Wound Care: Alginate, tegaderm to be changed PRN by ortho  Drains: KEYUR until discharge if intra op cultures remain negative  Pain management: Orals PRN, IV for breakthrough only, Ketamine gtt as per above  Physical Therapy: Mobilization, ROM, ADL's, Posterior hip precautions  Occupational Therapy: ADL's       Skin:  # Surgical incision  - Wound care per ortho recs        General Cares/Prophylaxis:    DVT Prophylaxis: Pneumatic Compression Devices  GI Prophylaxis: PPI  Restraints: None  Family Communication: Patient's sisterTete, updated on phone  Code Status: FULL    Lines/tubes/drains:  - Art line  - PIVs  - Lomeli    [facility]      GIORGI [place] CNP      Clinically Significant Risk Factors Present on Admission        # Hypokalemia: Lowest K = 3 mmol/L in last 2 days, will replace as needed   # Hypocalcemia: Lowest iCa = 4.1 mg/dL in last 2 days, will monitor and replace as appropriate           # Circulatory Shock: required vasopressors within past 24 hours     # Anemia: based on hgb <11       # Overweight: Estimated body mass index is 29.39 kg/m  as calculated from the following:    Height as of this encounter: 1.651 m (5' 5\").    Weight as of this encounter: 80.1 kg (176 lb 9.4 oz).       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 Underwent explant of right total hip arthroplasty with antibiotic spacer implantation 5/7/2024. Admitted 8/30/2024 for explantation of right hip antibiotic spacer and revision of right total hip arthroplasty by Dr. Dudley. Admitted to ICU post-operatively for hypotension. Intra-op EBL 1700 mL, Hgb drop 13--> 8.8. Noted to become hypotensive in PACU with BP 50s/30s. Received 500 mL bolus LR x2, 12.5g 5% Albumin, calcium gluconate, started on phenylephrine infusion.  Upon arrival to ICU, phenylephrine stopped, maintaining MAP > 65 mmHg. Alert, oriented x4. Reports pain to R hip which patient reports is improving. Denies chest pain, shortness of breath, nausea/vomiting. On 6L O2 via oxymask, SpO2 100%.              REVIEW OF SYSTEMS: Negative except for above    PAST MEDICAL HISTORY:   Past Medical History:   Diagnosis Date    BPH (benign prostatic hyperplasia)     Cardiac pacemaker in situ  "    Complete heart block (H)     Hydronephrosis, right     Lumbar radiculopathy     Lumbar scoliosis     Restless legs syndrome (RLS)      SURGICAL HISTORY:  Past Surgical History:   Procedure Laterality Date    APPENDECTOMY      AS TOTAL KNEE ARTHROPLASTY Left 2010    CARPAL TUNNEL RELEASE RT/LT      CATARACT EXTRACTION      COLONOSCOPY      EP ICD      FOOT SURGERY Bilateral     HAND SURGERY      HC TOTAL HIP ARTHROPLASTY Right 2007    HEMORRHOIDECTOMY      HERNIA REPAIR      IR CVC TUNNEL PLACEMENT > 5 YRS OF AGE  5/10/2024    IRRIGATION AND DEBRIDEMENT HIP, PLACE ANTIBIOTIC CEMENT BEADS / SPACER Right 5/7/2024    Procedure: vancomycin and tobramycin impregnated cement spacer implantation;  Surgeon: Rd Dudley MD;  Location: [facility] OR    NASAL FRACTURE SURGERY      REMOVE HARDWARE ARTHROPLASTY HIP Right 5/7/2024    Procedure: Explant Right surface replacement Total Hip Arthroplasty ,;  Surgeon: Rd Dudley MD;  Location: [facility] OR    TONSILLECTOMY & ADENOIDECTOMY      urinary tract surgery       SOCIAL HISTORY:  Social History     Socioeconomic History    Marital status: Single     Spouse name: None    Number of children: 0    Years of education: None    Highest education level: None   Occupational History    Occupation: retired   Tobacco Use    Smoking status: Never     Passive exposure: Past    Smokeless tobacco: Never   Substance and Sexual Activity    Alcohol use: Not Currently     Comment: quit 1990's    Drug use: Never     FAMILY HISTORY:   Family History   Problem Relation Age of Onset    Anesthesia Reaction No family hx of     Deep Vein Thrombosis (DVT) No family hx of      ALLERGIES:   No Known Allergies  MEDICATIONS:  Current Facility-Administered Medications   Medication Dose Route Frequency Provider Last Rate Last Admin    [START ON 9/2/2024] acetaminophen (TYLENOL) tablet 650 mg  650 mg Oral Q4H PRN Kaz Bowser, PA-C        acetaminophen (TYLENOL) tablet 975 mg  975 mg Oral Q8H Mague  Kaz KHAN PA-C        [START ON 8/31/2024] aspirin EC tablet 162 mg  162 mg Oral Daily Kaz Bowser PA-C        bisacodyl (DULCOLAX) suppository 10 mg  10 mg Rectal Daily PRN Kaz Bowser PA-C        ceFAZolin (ANCEF) 2 g in 100 mL D5W intermittent infusion  2 g Intravenous Q8H Kaz Bowser PA-C        dexAMETHasone (DECADRON) injection 4 mg  4 mg Intravenous Once PRN Simone Gómez, DO        fentaNYL (PF) (SUBLIMAZE) injection 25 mcg  25 mcg Intravenous Q5 Min PRN Simone Gómez, DO   25 mcg at 08/30/24 1914    fentaNYL (PF) (SUBLIMAZE) injection 50 mcg  50 mcg Intravenous Q5 Min PRN Simone Gómez, DO        HYDROmorphone (PF) (DILAUDID) injection 0.2 mg  0.2 mg Intravenous Q5 Min PRN Simone Gómez, DO   0.2 mg at 08/30/24 2001    HYDROmorphone (PF) (DILAUDID) injection 0.2 mg  0.2 mg Intravenous Q2H PRN Kaz Bowser PA-C        Or    HYDROmorphone (PF) (DILAUDID) injection 0.4 mg  0.4 mg Intravenous Q2H PRN Kaz Bowser PA-C        HYDROmorphone (PF) (DILAUDID) injection 0.4 mg  0.4 mg Intravenous Q5 Min PRN Simone Gómez, DO        hydrOXYzine HCl (ATARAX) tablet 10 mg  10 mg Oral Q6H PRN Kaz Bowser PA-C        ketamine (KETALAR) 2 mg/mL in sodium chloride 0.9 % 50 mL ANALGESIA infusion  10 mg/hr Intravenous Continuous Janee Ho MD        ketamine (KETALAR) injection 10 mg  10 mg Intravenous Once Janee Ho MD        ketamine (KETALAR) injection 10 mg  10 mg Intravenous Once Lynnette Villa MD        lactated ringers BOLUS 500 mL  500 mL Intravenous Once Janee Ho MD 1,000 mL/hr at 08/30/24 1945 500 mL at 08/30/24 1945    lactated ringers infusion   Intravenous Continuous Simone Gómez DO        lidocaine 1 % 0.1-1 mL  0.1-1 mL Other Q1H PRN Kaz Bowser PA-C        magnesium hydroxide (MILK OF MAGNESIA) suspension 30 mL  30 mL Oral Daily PRN Kaz Bowser PA-C        methocarbamol (ROBAXIN) tablet 500 mg  500 mg  Oral Q6H PRN Kaz Bowser PA-C        naloxone (NARCAN) injection 0.1 mg  0.1 mg Intravenous Q2 Min PRN Simone Gómez, DO        ondansetron (ZOFRAN ODT) ODT tab 4 mg  4 mg Oral Q30 Min PRN Kewalramani, Simone, DO        Or    ondansetron (ZOFRAN) injection 4 mg  4 mg Intravenous Q30 Min PRN Simone Gómez, DO        ondansetron (ZOFRAN ODT) ODT tab 4 mg  4 mg Oral Q6H PRN Kaz Bowser PA-C        Or    ondansetron (ZOFRAN) injection 4 mg  4 mg Intravenous Q6H PRN Kaz Bowser PA-C        oxyCODONE (ROXICODONE) tablet 5 mg  5 mg Oral Q4H PRN Kaz Bowser PA-C        Or    oxyCODONE IR (ROXICODONE) tablet 10 mg  10 mg Oral Q4H PRN Kaz Bowser PA-C        phenylephrine (KETTY-SYNEPHRINE) 50 mg in NaCl 0.9 % 250 mL infusion PERIPHERAL  0.5-1.25 mcg/kg/min Intravenous Continuous Lynnette Villa MD        [START ON 8/31/2024] polyethylene glycol (MIRALAX) Packet 17 g  17 g Oral Daily Kaz Bowser PA-C        potassium chloride 10 mEq in 100 mL sterile water infusion  10 mEq Intravenous Q1H PRN Janee Ho  mL/hr at 08/30/24 1904 10 mEq at 08/30/24 1904    prochlorperazine (COMPAZINE) injection 5 mg  5 mg Intravenous Q6H PRN Kaz Bowser PA-C        Or    prochlorperazine (COMPAZINE) tablet 5 mg  5 mg Oral Q6H PRN Kaz Bowser PA-C        senna-docusate (SENOKOT-S/PERICOLACE) 8.6-50 MG per tablet 1 tablet  1 tablet Oral BID Kaz Bowser PA-C        sodium chloride (PF) 0.9% PF flush 3 mL  3 mL Intracatheter Q8H Kaz Bowser PA-C        sodium chloride (PF) 0.9% PF flush 3 mL  3 mL Intracatheter q1 min prn Kaz Bowser, PA-C           PHYSICAL EXAMINATION:  Temp:  [97.3  F (36.3  C)-98.2  F (36.8  C)] 97.3  F (36.3  C)  Pulse:  [56-78] 63  Resp:  [7-18] 12  BP: ()/() 121/71  MAP:  [31 mmHg-91 mmHg] 47 mmHg  Arterial Line BP: ()/(24-79) 147/74  SpO2:  [91 %-100 %] 100 %  General: NAD  HEENT: Normocephalic, atraumatic. PERRL,  sclera anicteric. Mucous membranes dry, intact.   Neuro: A&Ox3, moving all extremities.   Pulm/Resp: Clear breath sounds bilaterally without rhonchi, crackles or wheeze, breathing non-labored on oxymask  CV: RRR, S1S2, no murmur, rub, or gallop.   Abdomen: Soft, non-distended, non-tender, hypoactive bowel sounds.   : Lomeli catheter in place, urine yellow and clear  Ext: Pulses 2+ radial, pedal bilaterally.   Incisions/Skin: Pale, warm, dry. Right lateral hip incision with dressing intact, KEYUR drain with small amount of sanguineous output.     LABS: Reviewed.   Arterial Blood Gases   Recent Labs   Lab 08/30/24 1945 08/30/24 1815 08/30/24 1646 08/30/24  1603   PH 7.43 7.40 7.40 7.41   PCO2 36 35 39 37   PO2 165* 129* 168* 171*   HCO3 24 22 24 24     Complete Blood Count   Recent Labs   Lab 08/30/24 1945 08/30/24 1815 08/30/24 1646 08/30/24  1603 08/30/24  1235   WBC  --   --   --   --  4.9   HGB 8.8* 8.9* 10.5* 11.3* 13.0*   PLT  --   --   --   --  241     Basic Metabolic Panel  Recent Labs   Lab 08/30/24 1945 08/30/24 1942 08/30/24 1815 08/30/24 1646 08/30/24  1603 08/30/24  1235   * 133* 134* 133*   < > 132*   POTASSIUM 3.5 3.8 3.0* 3.7   < > 4.1   CHLORIDE  --  100  --   --   --  97*   CO2  --  23  --   --   --  25   BUN  --  14.8  --   --   --  15.0   CR  --  0.65*  --   --   --  0.81   * 153* 134* 139*   < > 100*    < > = values in this interval not displayed.     Liver Function Tests  Recent Labs   Lab 08/30/24  1235   INR 0.95     Coagulation Profile  Recent Labs   Lab 08/30/24  1235   INR 0.95       IMAGING:  Recent Results (from the past 24 hour(s))   XR Hip Right 1 View    Narrative    EXAM: XR HIP RIGHT 1 VIEW  LOCATION: [facility]  DATE: 8/30/2024    INDICATION: intra op  COMPARISON: 08/23/2024      Impression    IMPRESSION: Intraoperative image with changes of interval right revision hip arthroplasty in expected surrounding  intraoperative edema and gas.   XR Pelvis w Hip Port Right 1 View    Narrative    EXAM: XR PELVIS AND HIP PORTABLE RIGHT 1 VIEW  LOCATION: [facility]  DATE: 8/30/2024    INDICATION: Postoperative follow-up.  COMPARISON: 8/30/2024.      Impression    IMPRESSION: Completed right total hip arthroplasty. Cerclage wires encircling the proximal femur. Subtle lucencies projecting over the lateral cortex of the greater trochanter, without definite cortical step-off or displaced fracture fragment; recommend   attention to these on follow-up. No periprosthetic fracture visualized otherwise. Normal joint alignment. Soft tissue air and drain in place. Catheter in place with the pelvis. Degenerative changes in the lower lumbar spine and SI joints.

## 2024-08-31 NOTE — PROGRESS NOTES
Admission Note    Reason for admission: R CHRISS  Primary team notified of pt arrival.  Admitted from: ICU  Via: Cart  Accompanied by: Family  Belongings: Placed in closet; valuables sent home with family  Admission Required Doc Completed: Yes  Teaching: Orientation to unit and call light- call light within reach, use of console, meal times, when to call for the RN, and enforced importance of safety.  IV Access: 2xL PIV and 1xR PIV infusing   Telemetry: Yes  Ht./Wt.: Completed  Code Status verified on armband: Yes  2 RN Skin Assessment Completed with: Aaron Patel RN    Intact except R Hip Surgical incision and healed removed toenails on L foot.  Suction/Ambu bag/Flowmeter at bedside: Yes/No    Pt status:     Temp:  [97.3  F (36.3  C)-98.7  F (37.1  C)] 98.2  F (36.8  C)  Pulse:  [56-84] 66  Resp:  [7-18] 18  BP: ()/(38-88) 127/65  MAP:  [31 mmHg-99 mmHg] 87 mmHg  Arterial Line BP: ()/(24-91) 85/73  SpO2:  [91 %-100 %] 97 %

## 2024-08-31 NOTE — PROGRESS NOTES
Orthopedic Surgery Progress Note 08/31/2024    S: Patient seen this morning. Patient transferred to ICU post op 2/2 hypotension. Patient complaining of insomnia and right thigh pain. Denies any new numbness. Hb dropped from 13 to 7. BP has improved to MAPs>80.     O:  Temp: 98.4  F (36.9  C) Temp src: Oral BP: 118/69 Pulse: 70   Resp: 18 SpO2: 100 % O2 Device: Oxymask Oxygen Delivery: 2 LPM    Exam:  Gen: alert and oriented, responds to questions appropriately  Resp: non-labored on RA  MSK:    RLE:  - Dressings c/d/i  - SILT femoral/tibial/sural/saphenous/DP/SP nerves  - Fires quad, TA, EHL, FHL, GaSC  - PT/DP pulses 2+, foot wwp    Drain output: 210cc/12hrs.    Recent Labs   Lab 08/31/24  0308 08/30/24  2117 08/30/24  1945 08/30/24  1603 08/30/24  1235   WBC 8.0 11.4*  --   --  4.9   HGB 7.0* 8.4* 8.8*   < > 13.0*   * 174  --   --  241    < > = values in this interval not displayed.       Culture results: NGTD     Assessment: Kwabena Solorio is a 74 year old male s/p Right stage 2 revision CHRISS on 8/30/24 with Dr. Dudley.     Plan:  ICU primary, with Ortho as primary once transferred to floor and medicine co-management  Activity: Up with assist.  Weight bearing status: TTWB RLE, posterior hip precautions.  Antibiotics: continue periop Ancef 2g q8h until cultures result, then duricef x 2 weeks on discharge  Diet: Progress diet as tolerated.  DVT prophylaxis: SCDs and mechanical while in the hospital. ASA 81 mg BID x 4 weeks.   Elevation: Elevate operative extremity as tolerated.   Wound Care: Keep dressing in place until POD #7.  Drains: Please document output per shift, discontinue per ortho. Plan to discontinue once output is <20cc/shift  Pain management: Utilize all oral meds first, IV meds for severe breakthrough pain after PO meds given adequate time to take effect.  X-rays: Completed in PACU.  Therapy: PT/OT evaluate prior to discharge.  Cultures: Pending, follow culture results closely.  Consults: PT,  OT, medicine. Appreciate assistance in caring for this patient.  Disposition: Pending progress with therapies, pain control on orals, and medical stability. Anticipate transfer to floor today pending stable BP  Follow-up: Clinic with Dr. Dudley in 4 weeks.    Orthopedic surgery staff for this patient is Dr. Dudley. Discussed.

## 2024-09-01 ENCOUNTER — APPOINTMENT (OUTPATIENT)
Dept: PHYSICAL THERAPY | Facility: CLINIC | Age: 74
DRG: 467 | End: 2024-09-01
Attending: ORTHOPAEDIC SURGERY
Payer: COMMERCIAL

## 2024-09-01 LAB
ANION GAP SERPL CALCULATED.3IONS-SCNC: 8 MMOL/L (ref 7–15)
BUN SERPL-MCNC: 10.8 MG/DL (ref 8–23)
CALCIUM SERPL-MCNC: 8 MG/DL (ref 8.8–10.4)
CHLORIDE SERPL-SCNC: 101 MMOL/L (ref 98–107)
CREAT SERPL-MCNC: 0.86 MG/DL (ref 0.67–1.17)
EGFRCR SERPLBLD CKD-EPI 2021: 90 ML/MIN/1.73M2
ERYTHROCYTE [DISTWIDTH] IN BLOOD BY AUTOMATED COUNT: 14.2 % (ref 10–15)
GLUCOSE SERPL-MCNC: 99 MG/DL (ref 70–99)
HCO3 SERPL-SCNC: 25 MMOL/L (ref 22–29)
HCT VFR BLD AUTO: 24.8 % (ref 40–53)
HGB BLD-MCNC: 8.1 G/DL (ref 13.3–17.7)
HGB BLD-MCNC: 8.6 G/DL (ref 13.3–17.7)
HGB BLD-MCNC: 8.6 G/DL (ref 13.3–17.7)
HGB BLD-MCNC: 8.9 G/DL (ref 13.3–17.7)
MAGNESIUM SERPL-MCNC: 1.6 MG/DL (ref 1.7–2.3)
MCH RBC QN AUTO: 30 PG (ref 26.5–33)
MCHC RBC AUTO-ENTMCNC: 34.7 G/DL (ref 31.5–36.5)
MCV RBC AUTO: 86 FL (ref 78–100)
PHOSPHATE SERPL-MCNC: 3.1 MG/DL (ref 2.5–4.5)
PLATELET # BLD AUTO: 167 10E3/UL (ref 150–450)
POTASSIUM SERPL-SCNC: 3.6 MMOL/L (ref 3.4–5.3)
RBC # BLD AUTO: 2.87 10E6/UL (ref 4.4–5.9)
SODIUM SERPL-SCNC: 134 MMOL/L (ref 135–145)
WBC # BLD AUTO: 8.7 10E3/UL (ref 4–11)

## 2024-09-01 PROCEDURE — 250N000013 HC RX MED GY IP 250 OP 250 PS 637: Performed by: ORTHOPAEDIC SURGERY

## 2024-09-01 PROCEDURE — 84100 ASSAY OF PHOSPHORUS: CPT

## 2024-09-01 PROCEDURE — 99232 SBSQ HOSP IP/OBS MODERATE 35: CPT | Performed by: STUDENT IN AN ORGANIZED HEALTH CARE EDUCATION/TRAINING PROGRAM

## 2024-09-01 PROCEDURE — 250N000013 HC RX MED GY IP 250 OP 250 PS 637: Performed by: STUDENT IN AN ORGANIZED HEALTH CARE EDUCATION/TRAINING PROGRAM

## 2024-09-01 PROCEDURE — 97161 PT EVAL LOW COMPLEX 20 MIN: CPT | Mod: GP

## 2024-09-01 PROCEDURE — 250N000012 HC RX MED GY IP 250 OP 636 PS 637

## 2024-09-01 PROCEDURE — 36415 COLL VENOUS BLD VENIPUNCTURE: CPT

## 2024-09-01 PROCEDURE — 120N000002 HC R&B MED SURG/OB UMMC

## 2024-09-01 PROCEDURE — 250N000009 HC RX 250

## 2024-09-01 PROCEDURE — 85027 COMPLETE CBC AUTOMATED: CPT

## 2024-09-01 PROCEDURE — 999N000111 HC STATISTIC OT IP EVAL DEFER

## 2024-09-01 PROCEDURE — 83735 ASSAY OF MAGNESIUM: CPT

## 2024-09-01 PROCEDURE — 250N000011 HC RX IP 250 OP 636: Performed by: PHYSICIAN ASSISTANT

## 2024-09-01 PROCEDURE — 258N000003 HC RX IP 258 OP 636

## 2024-09-01 PROCEDURE — 250N000013 HC RX MED GY IP 250 OP 250 PS 637: Performed by: PHYSICIAN ASSISTANT

## 2024-09-01 PROCEDURE — 85018 HEMOGLOBIN: CPT

## 2024-09-01 PROCEDURE — 250N000013 HC RX MED GY IP 250 OP 250 PS 637

## 2024-09-01 PROCEDURE — 80048 BASIC METABOLIC PNL TOTAL CA: CPT

## 2024-09-01 PROCEDURE — 250N000011 HC RX IP 250 OP 636: Mod: JZ | Performed by: PHYSICIAN ASSISTANT

## 2024-09-01 PROCEDURE — 97530 THERAPEUTIC ACTIVITIES: CPT | Mod: GP

## 2024-09-01 RX ORDER — POTASSIUM CHLORIDE 1500 MG/1
20 TABLET, EXTENDED RELEASE ORAL ONCE
Status: COMPLETED | OUTPATIENT
Start: 2024-09-01 | End: 2024-09-01

## 2024-09-01 RX ORDER — MAGNESIUM OXIDE 400 MG/1
400 TABLET ORAL EVERY 4 HOURS
Status: COMPLETED | OUTPATIENT
Start: 2024-09-01 | End: 2024-09-01

## 2024-09-01 RX ADMIN — POLYETHYLENE GLYCOL 3350 17 G: 17 POWDER, FOR SOLUTION ORAL at 08:07

## 2024-09-01 RX ADMIN — MAGNESIUM OXIDE TAB 400 MG (241.3 MG ELEMENTAL MG) 400 MG: 400 (241.3 MG) TAB at 11:08

## 2024-09-01 RX ADMIN — HYDROMORPHONE HYDROCHLORIDE 0.4 MG: 1 INJECTION, SOLUTION INTRAMUSCULAR; INTRAVENOUS; SUBCUTANEOUS at 01:47

## 2024-09-01 RX ADMIN — OXYCODONE HYDROCHLORIDE 5 MG: 5 TABLET ORAL at 08:44

## 2024-09-01 RX ADMIN — MAGNESIUM OXIDE TAB 400 MG (241.3 MG ELEMENTAL MG) 400 MG: 400 (241.3 MG) TAB at 08:44

## 2024-09-01 RX ADMIN — ACETAMINOPHEN 975 MG: 325 TABLET ORAL at 13:13

## 2024-09-01 RX ADMIN — OXYCODONE HYDROCHLORIDE 5 MG: 5 TABLET ORAL at 17:33

## 2024-09-01 RX ADMIN — HYDROMORPHONE HYDROCHLORIDE 0.4 MG: 1 INJECTION, SOLUTION INTRAMUSCULAR; INTRAVENOUS; SUBCUTANEOUS at 09:33

## 2024-09-01 RX ADMIN — METHOCARBAMOL 500 MG: 500 TABLET ORAL at 08:02

## 2024-09-01 RX ADMIN — GABAPENTIN 900 MG: 300 CAPSULE ORAL at 21:38

## 2024-09-01 RX ADMIN — KETAMINE HYDROCHLORIDE 10 MG/HR: 10 INJECTION INTRAMUSCULAR; INTRAVENOUS at 16:49

## 2024-09-01 RX ADMIN — ACETAMINOPHEN 975 MG: 325 TABLET ORAL at 20:38

## 2024-09-01 RX ADMIN — METHOCARBAMOL 500 MG: 500 TABLET ORAL at 20:37

## 2024-09-01 RX ADMIN — SENNOSIDES AND DOCUSATE SODIUM 1 TABLET: 50; 8.6 TABLET ORAL at 20:38

## 2024-09-01 RX ADMIN — Medication 5 MG: at 20:37

## 2024-09-01 RX ADMIN — OXYCODONE HYDROCHLORIDE 10 MG: 10 TABLET ORAL at 13:14

## 2024-09-01 RX ADMIN — PANTOPRAZOLE SODIUM 40 MG: 40 TABLET, DELAYED RELEASE ORAL at 08:02

## 2024-09-01 RX ADMIN — PREDNISONE 5 MG: 5 TABLET ORAL at 08:02

## 2024-09-01 RX ADMIN — CEFAZOLIN SODIUM 2 G: 2 INJECTION, SOLUTION INTRAVENOUS at 13:52

## 2024-09-01 RX ADMIN — KETAMINE HYDROCHLORIDE 10 MG/HR: 10 INJECTION INTRAMUSCULAR; INTRAVENOUS at 06:29

## 2024-09-01 RX ADMIN — SENNOSIDES AND DOCUSATE SODIUM 1 TABLET: 50; 8.6 TABLET ORAL at 08:02

## 2024-09-01 RX ADMIN — CEFAZOLIN SODIUM 2 G: 2 INJECTION, SOLUTION INTRAVENOUS at 04:52

## 2024-09-01 RX ADMIN — PRAMIPEXOLE DIHYDROCHLORIDE 0.25 MG: 0.12 TABLET ORAL at 21:38

## 2024-09-01 RX ADMIN — OXYCODONE HYDROCHLORIDE 5 MG: 5 TABLET ORAL at 21:38

## 2024-09-01 RX ADMIN — OXYCODONE HYDROCHLORIDE 10 MG: 10 TABLET ORAL at 04:51

## 2024-09-01 RX ADMIN — SENNOSIDES AND DOCUSATE SODIUM 2 TABLET: 50; 8.6 TABLET ORAL at 13:13

## 2024-09-01 RX ADMIN — POTASSIUM CHLORIDE 20 MEQ: 1500 TABLET, EXTENDED RELEASE ORAL at 08:44

## 2024-09-01 RX ADMIN — METHOCARBAMOL 500 MG: 500 TABLET ORAL at 14:30

## 2024-09-01 RX ADMIN — CEFAZOLIN SODIUM 2 G: 2 INJECTION, SOLUTION INTRAVENOUS at 21:37

## 2024-09-01 RX ADMIN — ACETAMINOPHEN 975 MG: 325 TABLET ORAL at 04:51

## 2024-09-01 RX ADMIN — ASPIRIN 162 MG: 81 TABLET, COATED ORAL at 08:03

## 2024-09-01 ASSESSMENT — ACTIVITIES OF DAILY LIVING (ADL)
ADLS_ACUITY_SCORE: 30
ADLS_ACUITY_SCORE: 31
ADLS_ACUITY_SCORE: 30
ADLS_ACUITY_SCORE: 32
ADLS_ACUITY_SCORE: 32
ADLS_ACUITY_SCORE: 31
ADLS_ACUITY_SCORE: 32
ADLS_ACUITY_SCORE: 32
ADLS_ACUITY_SCORE: 31
ADLS_ACUITY_SCORE: 30
ADLS_ACUITY_SCORE: 32
ADLS_ACUITY_SCORE: 31
ADLS_ACUITY_SCORE: 30
ADLS_ACUITY_SCORE: 32
ADLS_ACUITY_SCORE: 31
ADLS_ACUITY_SCORE: 32
ADLS_ACUITY_SCORE: 30
ADLS_ACUITY_SCORE: 32
ADLS_ACUITY_SCORE: 32
ADLS_ACUITY_SCORE: 30
ADLS_ACUITY_SCORE: 30

## 2024-09-01 NOTE — PLAN OF CARE
Occupational Therapy: Orders received. Chart reviewed and discussed with care team.? Occupational Therapy not indicated as patient is familiar with TTWB and post op precautions, has DME in place and will have assist.? Defer discharge recommendations to PT.? Will complete orders.

## 2024-09-01 NOTE — PROGRESS NOTES
Orthopedic Surgery Progress Note 09/01/2024    S: Patient seen this morning. Patient transferred to floor yesterday. He is feeling much better today. Pain is improved, still requiring some dilaudid. He was able to sleep. He was transfused 1 unit of RBC yesterday. Pending working with PT.     O:  Temp: 98.4  F (36.9  C) Temp src: Oral BP: 100/65 Pulse: 73   Resp: 16 SpO2: 99 % O2 Device: None (Room air) Oxygen Delivery: 2 LPM    Exam:  Gen: alert and oriented, responds to questions appropriately  Resp: non-labored on RA  MSK:    RLE:  - Dressings c/d/i  - SILT femoral/tibial/sural/saphenous/DP/SP nerves  - Fires quad, TA, EHL, FHL, GaSC  - PT/DP pulses 2+, foot wwp     Drain output: 250cc/12hrs.    Recent Labs   Lab 09/01/24  0628 08/31/24  2206 08/31/24  1635 08/31/24  1015 08/31/24  0308 08/30/24  2117   WBC 8.7  --   --   --  8.0 11.4*   HGB 8.6*  8.6* 10.1* 10.2*   < > 7.0* 8.4*     --   --   --  140* 174    < > = values in this interval not displayed.       Culture results: NGTD    Assessment: Kwabena Solorio is a 74 year old male s/p Right stage 2 revision CHRISS on 8/30/24 with Dr. Dudley.      Plan:  Orthopedics primary, medicine co-management  Activity: Up with assist.  Weight bearing status: TTWB RLE, posterior hip precautions.  Antibiotics: continue periop Ancef 2g q8h until cultures result, then duricef x 2 weeks on discharge  Diet: Progress diet as tolerated.  DVT prophylaxis: SCDs and mechanical while in the hospital. ASA 81 mg BID x 4 weeks.   Elevation: Elevate operative extremity as tolerated.   Wound Care: Keep dressing in place until POD #7.  Drains: Please document output per shift, discontinue per ortho. Plan to discontinue once output is <20cc/shift  Pain management: Utilize all oral meds first, IV meds for severe breakthrough pain after PO meds given adequate time to take effect.  X-rays: Completed in PACU.  Labs: monitor Hb  Therapy: PT/OT evaluate prior to discharge.  Cultures: Pending,  follow culture results closely.  Consults: PT, OT, medicine. Appreciate assistance in caring for this patient.  Disposition: Pending progress with therapies, pain control on orals, and medical stability.   Follow-up: Clinic with Dr. Dudley in 4 weeks.     Discontinue carl today.     Orthopedic surgery staff for this patient is Dr. Dudley. Discussed.

## 2024-09-01 NOTE — PROGRESS NOTES
"[facility]    Medicine Progress Note - Hospitalist Service, GOLD TEAM 21    Date of Admission:  8/30/2024    Assessment & Plan    75 yo M with PMH of CHB s/p PPM, polymyalgia rheumatica, RLS, GERD, BPH, and R CHRISS (2007) with recent prosthetic joint infection s/p explant with spacer, now is admitted after explant of spacer and revision of R CHRISS, with post op hypotension requiring ICU stay for pressor support. Hemodynamics now improved and transferred to floor on 08/31/2024      R CHRISS prosthetic joint infection s/p explant and antibiotic spacer, now with spacer explant and revision R CHRISS   - Management per primary team.   Plan for carl removal     Anemia  -likely due to acute blood loss,   With ongoing losses in drain   Would continue to monitor with CBC daily   Transfuse for Hb<7 or if hemodynamic instability      CHB s/p PPM   Stable rhythm on telemetry   Can discontinue cardiac monitor         Polymyalgia rheumatica   Continue PTA medications - Prednisone 5 mg daily      RLS   Continue PTA Mirapex 0.25 at bedtime      Insomnia - Patient notes that he is weaned off Ambien and Ativan, will not resume at discharge      GERD   PTA - Prilosec   Switched to Pantoprazole while inpatient           Diet: Discharge Instruction - Regular Diet Adult  Regular Diet Adult    DVT Prophylaxis: Defer to primary service  Carl Catheter: PRESENT, indication: Surgical procedure  Lines: None     Cardiac Monitoring: None  Code Status: Full Code      Clinically Significant Risk Factors        # Hypokalemia: Lowest K = 3 mmol/L in last 2 days, will replace as needed   # Hypocalcemia: Lowest iCa = 4.1 mg/dL in last 2 days, will monitor and replace as appropriate   # Hypomagnesemia: Lowest Mg = 1.4 mg/dL in last 2 days, will replace as needed                 # Overweight: Estimated body mass index is 29.72 kg/m  as calculated from the following:    Height as of this encounter: 1.651 m (5' 5\").   "  Weight as of this encounter: 81 kg (178 lb 9.2 oz)., PRESENT ON ADMISSION     # Financial/Environmental Concerns:                 Disposition Plan     Medically Ready for Discharge:              Brayden Pablo MD  Hospitalist Service, GOLD TEAM 21  M [facility]  Securely message with [employer] (more info)  Text page via [employer] Paging/Directory   See signed in provider for up to date coverage information  ______________________________________________________________________    Interval History   Kwabena notes feeling fatigued and having low energy. No chest discomfort or dyspnea.     Physical Exam   Vital Signs: Temp: 98.4  F (36.9  C) Temp src: Oral BP: 100/65 Pulse: 73   Resp: 16 SpO2: 99 % O2 Device: None (Room air) Oxygen Delivery: 2 LPM  Weight: 178 lbs 9.16 oz    Gen: Awake and alert   Resp: bilateral clear, no wheeze   CVS: Normal S1 S2  Abd: Soft   Ext: R hip with drain with bloody discharge           Medical Decision Making             Data     I have personally reviewed the following data over the past 24 hrs:    8.7  \   8.6 (L); 8.6 (L)   / 167     134 (L) 101 10.8 /  99   3.6 25 0.86 \       Imaging results reviewed over the past 24 hrs:   No results found for this or any previous visit (from the past 24 hour(s)).

## 2024-09-01 NOTE — CARE PLAN
"Shift: 0700 - 1530    BP (!) 147/99 (BP Location: Right arm)   Pulse 74   Temp 98.1  F (36.7  C) (Axillary)   Resp 14   Ht 1.651 m (5' 5\")   Wt 81 kg (178 lb 9.2 oz)   SpO2 99%   BMI 29.72 kg/m      Patient is alert and oriented x 4,VSS, Denies shortness of breath,chest pain, numbness/tingling present in BLE,  denies nausea/vomiting, no fever or chills. Pt reports 10/10 pain in R hip managed with PRN IV dilaudid, oxycodone. Family at bedside and involves in care. Abduction pillows between legs and secured. Pain is improving; 6/10 on reassessment   Pt is able to make needs know, call light is in reach, continue with POC.    "

## 2024-09-01 NOTE — PROGRESS NOTES
09/01/24 1044   Appointment Info   Signing Clinician's Name / Credentials (PT) Anyi An DPT       Present no   Language English   Living Environment   Current Living Arrangements house   Home Accessibility no concerns   Transportation Anticipated family or friend will provide   Self-Care   Usual Activity Tolerance moderate   Current Activity Tolerance moderate   Regular Exercise No   Equipment Currently Used at Home walker, standard   Fall history within last six months no   General Information   Onset of Illness/Injury or Date of Surgery 08/30/24   Referring Physician Kaz Bowser, PA-C   Patient/Family Therapy Goals Statement (PT) Did not endorse   Pertinent History of Current Problem (include personal factors and/or comorbidities that impact the POC) 74 year old male s/p Right stage 2 revision CHRISS on 8/30/24 with Dr. Dudley.   Existing Precautions/Restrictions fall;no hip IR;no hip ADD past midline;90 degree hip flexion;no pivoting or twisting   Weight-Bearing Status - LUE full weight-bearing   Weight-Bearing Status - RUE full weight-bearing   Weight-Bearing Status - LLE full weight-bearing   Weight-Bearing Status - RLE toe touch weight-bearing   General Observations Supine in bed upon arrival, agreeable to PT   Cognition   Affect/Mental Status (Cognition) WNL   Orientation Status (Cognition) oriented x 3   Follows Commands (Cognition) WNL   Pain Assessment   Patient Currently in Pain Yes, see Vital Sign flowsheet   Integumentary/Edema   Integumentary/Edema Comments Patient with normal post-surgical swelling present to R LE   Posture    Posture Forward head position;Protracted shoulders;Kyphosis   Range of Motion (ROM)   ROM Comment Did not formally assess, demonstrates functional ROM with mobility   Strength (Manual Muscle Testing)   Strength Comments Did not formally assess, demonstrates functional strength with mobility   Bed Mobility   Comment, (Bed Mobility) Completes  supine<>sit transfer with SBA using leg    Transfers   Comment, (Transfers) Completes sit<>stand transfer with CGA and use of walker   Gait/Stairs (Locomotion)   Comment, (Gait/Stairs) Tolerated ambulating with CGA and use of walker, TTWB maintained throughout   Balance   Balance Comments Independent sitting balance, CGA for standing balance with UE support from walker   Sensory Examination   Sensory Perception WNL   Clinical Impression   Criteria for Skilled Therapeutic Intervention Yes, treatment indicated   PT Diagnosis (PT) impaired functional mobility   Influenced by the following impairments increased post-op pain, precautions, decreased R LE strength and ROM   Functional limitations due to impairments impaired bed mobility, transfers and ambulation   Clinical Presentation (PT Evaluation Complexity) stable   Clinical Presentation Rationale Per clinical judgment   Clinical Decision Making (Complexity) low complexity   Planned Therapy Interventions (PT) balance training;bed mobility training;gait training;home exercise program;strengthening;transfer training;progressive activity/exercise;risk factor education;home program guidelines   Risk & Benefits of therapy have been explained evaluation/treatment results reviewed;risks/benefits reviewed;care plan/treatment goals reviewed;current/potential barriers reviewed   PT Total Evaluation Time   PT Eval, Low Complexity Minutes (70462) 6   Physical Therapy Goals   PT Frequency Daily   PT Predicted Duration/Target Date for Goal Attainment 09/04/24   PT Goals Bed Mobility;Transfers;Gait;PT Goal 1   PT: Bed Mobility Modified independent;Supine to/from sit;Within precautions   PT: Transfers Modified independent;Sit to/from stand;Bed to/from chair;Assistive device;Within precautions   PT: Gait Modified independent;Assistive device;Within precautions;100 feet   PT: Goal 1 Independent with HEP per CHRISS protocol   Therapeutic Activity   Therapeutic Activities: dynamic  activities to improve functional performance Minutes (73582) 24   Symptoms Noted During/After Treatment Fatigue;Increased pain   Treatment Detail/Skilled Intervention PT: Supine in bed upon arrival,agreeable to PT. Educated on precautions following surgery, verbalized understanding. Initiated supine HEP per CHRISS protocol, demonstrated good understanding. Patient completes a few reps of each before wanting to move on including AP, QS, GS, HS, heelslides, SAQ. Good strength throughout. Patient then completes supine<>sit transfer with SBA and use of leg . Sat EOB with good tolerance. Completes sit<>stand transfer with CGA and use of walker. Patient then tolerates ambulating x 60' with CGA and use of walker, TTWB mainted throughout. Patient then sits EOB to rest. Requests to go to the bathroom. Stands again with SBA and ambulates to/from bathroom with walker. Patient needs increased cues for safety as tends to move quickly with all mobility. Significant education throughout session on precautions, activity progression, safety in the home. Appears patient will likely do what he wants despite therapist education. Ended back in bed with needs in reach.   PT Discharge Planning   PT Plan PT: Review functional mobility, gait progression as able. Likely 1 more session before discharge   PT Discharge Recommendation (DC Rec) home with assist   PT Rationale for DC Rec PT: Anticipate once medically cleared will be safe to return home. Will have sisters available to assist if needed   PT Brief overview of current status PT: SBA for all mobility with use of walker, TTWB maintained   Total Session Time   Timed Code Treatment Minutes 24   Total Session Time (sum of timed and untimed services) 30

## 2024-09-01 NOTE — PROGRESS NOTES
Brief progress note     Kwabena is transferred out of ICU. Medicine team consulted for co-management     S- Kwabena was seen briefly after transfer. He notes significant pain and muscle cramps during transfer which have since resolved on current analgesic regimen. No other significant symptoms at this time      On exam - he is oriented and afebrile, with bilateral clear breath sounds, regular S1 S2, and non tender abdomen     A/P-     75 yo M with PMH of CHB s/p PPM, polymyalgia rheumatica, RLS, GERD, BPH, and R CHRISS (2007) with recent prosthetic joint infection s/p explant with spacer, now is admitted after explant of spacer and revision of R CHRISS, with post op hypotension requiring ICU stay for pressor support. Hemodynamics now improved and stable for transfer to floor.     R CHRISS prosthetic joint infection s/p explant and antibiotic spacer, now with spacer explant and revision R CHRISS   - Management per primary team.     CHB s/p PPM   Stable rhythm on telemetry       Polymyalgia rheumatica   Continue PTA medications - Prednisone 5 mg daily     RLS   Continue PTA Mirapex 0.25 at bedtime     Insomnia - Patient notes that he is weaned off Ambien and Ativan, will not resume at discharge     GERD   PTA - Prilosec   Switched to Pantoprazole while inpatient

## 2024-09-01 NOTE — PROGRESS NOTES
VS: Temp: 98.1  F (36.7  C) Temp src: Axillary BP: (!) 147/99 Pulse: 74   Resp: 14 SpO2: 99 % O2 Device: None (Room air) Oxygen Delivery: 2 LPM     O2: On CAPNO, no cough noted, denies SOB   Output: Lomeli catheter   Last BM: 9/1- smear, passed gas   Activity: Up with extensive assistance of 1-2, GB/ walker onto commode   Skin: Intact with the exception of the right hip incision   Pain: 8-9/10 on a constant, on continuous ketamine drip, scheduled tylenol, Oxy, IV dilaudid. Patient frequently seeking pain medications and wants them around the clock   CMS: Intact   Dressing: CDI   Diet: REG   LDA: PIV to trish forearms, patent, infusing   Equipment: Bedside commode, walker, personal belongings   Plan: TBD   Additional Info:

## 2024-09-01 NOTE — PLAN OF CARE
"Goal Outcome Evaluation:      Plan of Care Reviewed With: patient    Overall Patient Progress: improvingOverall Patient Progress: improving    VS: /65 (BP Location: Right arm)   Pulse 85   Temp 98.9  F (37.2  C) (Oral)   Resp 16   Ht 1.651 m (5' 5\")   Wt 81 kg (178 lb 9.2 oz)   SpO2 99%   BMI 29.72 kg/m     O2: >90% on RA  Denies SOB and chest pain    Output: Voids spontaneously without difficulty   Last BM: 9/1/2024   Activity: Standby assist with gait belt and walker  Toe touch weight bearing on R leg   Skin: R hip surgical incision, bruising around the area   Pain: Mild-moderate pain controlled with scheduled tylenol and PRN oxy and robaxin.   No dilaudid given this shift    CMS: AxO x4  Reports no numbness or tingling    Dressing: R hip surgical dressing CDI   Diet: Regular   LDA: 2 L PIVs  Proximal infusing continuous ketamine drip, distal is SL   Equipment: IV pole, walker, call light, personal belongings   Plan: Pending PT, potential discharge tomorrow    Additional Info: RN managed K+, phosphorus, and magnesium   permanent pacemaker in place, tele discontinued           "

## 2024-09-01 NOTE — PLAN OF CARE
"Goal Outcome Evaluation:      Plan of Care Reviewed With: patient    Overall Patient Progress: improvingOverall Patient Progress: improving       VS: Blood pressure 100/65, pulse 73, temperature 98.4  F (36.9  C), temperature source Oral, resp. rate 16, height 1.651 m (5' 5\"), weight 81 kg (178 lb 9.2 oz), SpO2 99%.   O2: SpO2>92% on RA, denies SOB and CP.   Output: Voids adequately using urinal.   Last BM: 9/1/24, passed gas   Activity: SBA with GB/W   Skin: R hip surgical incision   Pain: 5/10 at rest. Managed with continuous ketamine drip, scheduled tylenol, PRN oxy, IV dilaudid, and robaxin. Wants them around the clock.    CMS: A&O x 4   Dressing: CDI   Diet: Regular diet   LDA: L upper forearm PIV infusing ketamine; L lower forearm PIV SL   Equipment: Bedside commode, walker, personal belongings   Plan: Discharge pending PT.    Additional Info:        "

## 2024-09-01 NOTE — ANESTHESIA POSTPROCEDURE EVALUATION
Patient: Kwabena Solorio    Procedure: Procedure(s):  Explantation of Right Hip Antibiotic Spacer  with Revision Right Total Hip Arthroplasty       Anesthesia Type:  General    Note:  Disposition: Admission; ICU; Disposition Change/Cancellation            ICU Sign Out: Anesthesiologist/ICU physician sign out WAS performed            Disposition Change: Unplanned admission/ICU admission   Postop Pain Control: Uneventful            Sign Out: Well controlled pain   PONV: No   Neuro/Psych: Uneventful            Sign Out: Acceptable/Baseline neuro status   Airway/Respiratory: Uneventful            Sign Out: Acceptable/Baseline resp. status   CV/Hemodynamics:             Events: Refractory hypOtension   Other NRE:    DID A NON-ROUTINE EVENT OCCUR? YES           Last vitals:  Vitals Value Taken Time   /73 08/30/24 2015   Temp 36.3  C (97.3  F) 08/30/24 1835   Pulse 69 08/30/24 2024   Resp 13 08/30/24 2024   SpO2 100 % 08/30/24 2024   Vitals shown include unfiled device data.    Electronically Signed By: Janee Ho MD  September 1, 2024  4:39 PM

## 2024-09-02 ENCOUNTER — APPOINTMENT (OUTPATIENT)
Dept: PHYSICAL THERAPY | Facility: CLINIC | Age: 74
DRG: 467 | End: 2024-09-02
Attending: ORTHOPAEDIC SURGERY
Payer: COMMERCIAL

## 2024-09-02 LAB
ANION GAP SERPL CALCULATED.3IONS-SCNC: 7 MMOL/L (ref 7–15)
BUN SERPL-MCNC: 10.1 MG/DL (ref 8–23)
CALCIUM SERPL-MCNC: 8 MG/DL (ref 8.8–10.4)
CHLORIDE SERPL-SCNC: 103 MMOL/L (ref 98–107)
CREAT SERPL-MCNC: 0.82 MG/DL (ref 0.67–1.17)
EGFRCR SERPLBLD CKD-EPI 2021: >90 ML/MIN/1.73M2
ERYTHROCYTE [DISTWIDTH] IN BLOOD BY AUTOMATED COUNT: 14.1 % (ref 10–15)
GLUCOSE SERPL-MCNC: 104 MG/DL (ref 70–99)
HCO3 SERPL-SCNC: 26 MMOL/L (ref 22–29)
HCT VFR BLD AUTO: 22.6 % (ref 40–53)
HGB BLD-MCNC: 7.6 G/DL (ref 13.3–17.7)
HGB BLD-MCNC: 7.6 G/DL (ref 13.3–17.7)
HGB BLD-MCNC: 8.3 G/DL (ref 13.3–17.7)
MAGNESIUM SERPL-MCNC: 2.2 MG/DL (ref 1.7–2.3)
MCH RBC QN AUTO: 29.6 PG (ref 26.5–33)
MCHC RBC AUTO-ENTMCNC: 33.6 G/DL (ref 31.5–36.5)
MCV RBC AUTO: 88 FL (ref 78–100)
PHOSPHATE SERPL-MCNC: 3.6 MG/DL (ref 2.5–4.5)
PLATELET # BLD AUTO: 145 10E3/UL (ref 150–450)
POTASSIUM SERPL-SCNC: 3.6 MMOL/L (ref 3.4–5.3)
RBC # BLD AUTO: 2.57 10E6/UL (ref 4.4–5.9)
SODIUM SERPL-SCNC: 136 MMOL/L (ref 135–145)
WBC # BLD AUTO: 5.1 10E3/UL (ref 4–11)

## 2024-09-02 PROCEDURE — 250N000011 HC RX IP 250 OP 636: Mod: JZ | Performed by: PHYSICIAN ASSISTANT

## 2024-09-02 PROCEDURE — 250N000013 HC RX MED GY IP 250 OP 250 PS 637: Performed by: PHYSICIAN ASSISTANT

## 2024-09-02 PROCEDURE — 258N000003 HC RX IP 258 OP 636

## 2024-09-02 PROCEDURE — 85027 COMPLETE CBC AUTOMATED: CPT

## 2024-09-02 PROCEDURE — 36415 COLL VENOUS BLD VENIPUNCTURE: CPT

## 2024-09-02 PROCEDURE — 84100 ASSAY OF PHOSPHORUS: CPT

## 2024-09-02 PROCEDURE — 80048 BASIC METABOLIC PNL TOTAL CA: CPT

## 2024-09-02 PROCEDURE — 250N000012 HC RX MED GY IP 250 OP 636 PS 637

## 2024-09-02 PROCEDURE — 97530 THERAPEUTIC ACTIVITIES: CPT | Mod: GP

## 2024-09-02 PROCEDURE — 250N000013 HC RX MED GY IP 250 OP 250 PS 637: Performed by: STUDENT IN AN ORGANIZED HEALTH CARE EDUCATION/TRAINING PROGRAM

## 2024-09-02 PROCEDURE — 83735 ASSAY OF MAGNESIUM: CPT

## 2024-09-02 PROCEDURE — 250N000013 HC RX MED GY IP 250 OP 250 PS 637

## 2024-09-02 PROCEDURE — 99232 SBSQ HOSP IP/OBS MODERATE 35: CPT | Performed by: STUDENT IN AN ORGANIZED HEALTH CARE EDUCATION/TRAINING PROGRAM

## 2024-09-02 PROCEDURE — 250N000013 HC RX MED GY IP 250 OP 250 PS 637: Performed by: NURSE PRACTITIONER

## 2024-09-02 PROCEDURE — 250N000009 HC RX 250

## 2024-09-02 PROCEDURE — 250N000013 HC RX MED GY IP 250 OP 250 PS 637: Performed by: ORTHOPAEDIC SURGERY

## 2024-09-02 PROCEDURE — 120N000002 HC R&B MED SURG/OB UMMC

## 2024-09-02 PROCEDURE — 85018 HEMOGLOBIN: CPT

## 2024-09-02 RX ORDER — TRIAMCINOLONE ACETONIDE 1 MG/G
CREAM TOPICAL 3 TIMES DAILY PRN
Status: DISCONTINUED | OUTPATIENT
Start: 2024-09-02 | End: 2024-09-03 | Stop reason: HOSPADM

## 2024-09-02 RX ORDER — POLYETHYLENE GLYCOL 3350 17 G/17G
17 POWDER, FOR SOLUTION ORAL 2 TIMES DAILY
Status: DISCONTINUED | OUTPATIENT
Start: 2024-09-02 | End: 2024-09-03 | Stop reason: HOSPADM

## 2024-09-02 RX ADMIN — KETAMINE HYDROCHLORIDE 10 MG/HR: 10 INJECTION INTRAMUSCULAR; INTRAVENOUS at 11:42

## 2024-09-02 RX ADMIN — OXYCODONE HYDROCHLORIDE 5 MG: 5 TABLET ORAL at 19:18

## 2024-09-02 RX ADMIN — OXYCODONE HYDROCHLORIDE 5 MG: 5 TABLET ORAL at 12:55

## 2024-09-02 RX ADMIN — POLYETHYLENE GLYCOL 3350 17 G: 17 POWDER, FOR SOLUTION ORAL at 20:04

## 2024-09-02 RX ADMIN — ACETAMINOPHEN 975 MG: 325 TABLET ORAL at 07:12

## 2024-09-02 RX ADMIN — SENNOSIDES AND DOCUSATE SODIUM 1 TABLET: 50; 8.6 TABLET ORAL at 08:48

## 2024-09-02 RX ADMIN — POLYETHYLENE GLYCOL 3350 17 G: 17 POWDER, FOR SOLUTION ORAL at 08:47

## 2024-09-02 RX ADMIN — Medication 5 MG: at 20:04

## 2024-09-02 RX ADMIN — TRIAMCINOLONE ACETONIDE: 1 CREAM TOPICAL at 19:09

## 2024-09-02 RX ADMIN — CEFAZOLIN SODIUM 2 G: 2 INJECTION, SOLUTION INTRAVENOUS at 06:16

## 2024-09-02 RX ADMIN — TRIAMCINOLONE ACETONIDE: 1 CREAM TOPICAL at 21:58

## 2024-09-02 RX ADMIN — PANTOPRAZOLE SODIUM 40 MG: 40 TABLET, DELAYED RELEASE ORAL at 07:12

## 2024-09-02 RX ADMIN — ASPIRIN 162 MG: 81 TABLET, COATED ORAL at 08:48

## 2024-09-02 RX ADMIN — KETAMINE HYDROCHLORIDE 10 MG/HR: 10 INJECTION INTRAMUSCULAR; INTRAVENOUS at 03:06

## 2024-09-02 RX ADMIN — SENNOSIDES AND DOCUSATE SODIUM 1 TABLET: 50; 8.6 TABLET ORAL at 20:04

## 2024-09-02 RX ADMIN — PRAMIPEXOLE DIHYDROCHLORIDE 0.25 MG: 0.12 TABLET ORAL at 21:56

## 2024-09-02 RX ADMIN — PREDNISONE 5 MG: 5 TABLET ORAL at 08:48

## 2024-09-02 RX ADMIN — OXYCODONE HYDROCHLORIDE 5 MG: 5 TABLET ORAL at 07:12

## 2024-09-02 RX ADMIN — ACETAMINOPHEN 975 MG: 325 TABLET ORAL at 14:16

## 2024-09-02 RX ADMIN — GABAPENTIN 900 MG: 300 CAPSULE ORAL at 21:56

## 2024-09-02 RX ADMIN — OXYCODONE HYDROCHLORIDE 5 MG: 5 TABLET ORAL at 03:01

## 2024-09-02 RX ADMIN — CEFAZOLIN SODIUM 2 G: 2 INJECTION, SOLUTION INTRAVENOUS at 12:49

## 2024-09-02 RX ADMIN — CEFAZOLIN SODIUM 2 G: 2 INJECTION, SOLUTION INTRAVENOUS at 21:22

## 2024-09-02 RX ADMIN — PSYLLIUM HUSK 1 PACKET: 3.4 POWDER ORAL at 11:07

## 2024-09-02 ASSESSMENT — ACTIVITIES OF DAILY LIVING (ADL)
ADLS_ACUITY_SCORE: 30
ADLS_ACUITY_SCORE: 31
ADLS_ACUITY_SCORE: 30
ADLS_ACUITY_SCORE: 30
ADLS_ACUITY_SCORE: 31
ADLS_ACUITY_SCORE: 30
ADLS_ACUITY_SCORE: 30
ADLS_ACUITY_SCORE: 31
ADLS_ACUITY_SCORE: 30
ADLS_ACUITY_SCORE: 30
DEPENDENT_IADLS:: INDEPENDENT
ADLS_ACUITY_SCORE: 30
ADLS_ACUITY_SCORE: 31
ADLS_ACUITY_SCORE: 30

## 2024-09-02 NOTE — PROGRESS NOTES
[facility]    Medicine Progress Note - Hospitalist Service, GOLD TEAM 21    Date of Admission:  8/30/2024    Assessment & Plan   75 yo M with PMH of CHB s/p PPM, polymyalgia rheumatica, RLS, GERD, BPH, and R CHRISS (2007) with recent prosthetic joint infection s/p explant with spacer, now is admitted after explant of spacer and revision of R CHRISS, with post op hypotension requiring ICU stay for pressor support. Hemodynamics improved and transferred to floor on 08/31/2024      R CHRISS prosthetic joint infection s/p explant and antibiotic spacer, now with spacer explant and revision R CHRISS   - Management per primary team.   Lomeli removed   - Drain with continued output     Chronic constipation   Possible OIC   Kwabena notes that he consistently uses 2 stool softeners, 1 packet metamucil, and 2-3 senna tabs daily for consistent bowel movement. Currently has been passing flatus but no significant bowel movement   - Increased bowel regimen to Miralax BID, Continue Senna BID, utilize bisacodyl supp PRN if no BM in 24 hours, resumed Metamucil daily       Anemia  -likely due to acute blood loss,   H/H stable/ slow down trending likely due to ongoing losses in drain    Would continue to monitor with CBC daily   Transfuse for Hb<7 or if hemodynamic instability      CHB s/p PPM   Stable rhythm on telemetry x 24hours   Can discontinue cardiac monitor         Polymyalgia rheumatica   Continue PTA medications - Prednisone 5 mg daily      RLS   Continue PTA Mirapex 0.25 at bedtime      Insomnia - Patient notes that he is weaned off Ambien and Ativan, will not resume at discharge      GERD   PTA - Prilosec   Switched to Pantoprazole while inpatient           Diet: Discharge Instruction - Regular Diet Adult  Regular Diet Adult    DVT Prophylaxis: Defer to primary service  Lomeli Catheter: Not present  Lines: None     Cardiac Monitoring: None  Code Status: Full Code      Clinically Significant Risk  "Factors            # Hypomagnesemia: Lowest Mg = 1.6 mg/dL in last 2 days, will replace as needed                 # Overweight: Estimated body mass index is 29.72 kg/m  as calculated from the following:    Height as of this encounter: 1.651 m (5' 5\").    Weight as of this encounter: 81 kg (178 lb 9.2 oz)., PRESENT ON ADMISSION     # Financial/Environmental Concerns:                 Disposition Plan     Medically Ready for Discharge:              Brayden Pablo MD  Hospitalist Service, GOLD TEAM 21  M [facility]  Securely message with [employer] (more info)  Text page via [facility] Paging/Directory   See signed in provider for up to date coverage information  ______________________________________________________________________    Interval History   Kwabena notes that pain is improved. He has been passing flatus but has not had BM since surgery. He notes having chronic constipation which has been well managed with his home laxative regimen.  No dyspnea, chest discomfort, palpitations. No dizziness, presyncope or syncope.     Physical Exam   Vital Signs: Temp: 98.5  F (36.9  C) Temp src: Oral BP: 117/75 Pulse: 85   Resp: 16 SpO2: 96 % O2 Device: None (Room air)    Weight: 178 lbs 9.16 oz    Gen: Awake and alert, sitting in bed   Resp: bilateral clear all lung fields   CVS: Normal S1 S2, no murmur   Abd: Non tender   Ext: Drain with sero- sanguinous output, appears more clear than before.         Medical Decision Making             Data     I have personally reviewed the following data over the past 24 hrs:    5.1  \   7.6 (L); 7.6 (L)   / 145 (L)     136 103 10.1 /  104 (H)   3.6 26 0.82 \       Imaging results reviewed over the past 24 hrs:   No results found for this or any previous visit (from the past 24 hour(s)).  "

## 2024-09-02 NOTE — PROGRESS NOTES
"  VS: /70 (BP Location: Right arm)   Pulse 68   Temp 98.2  F (36.8  C) (Oral)   Resp 16   Ht 1.651 m (5' 5\")   Wt 81 kg (178 lb 9.2 oz)   SpO2 97%   BMI 29.72 kg/m      O2: Room air saturations 97%.    Output: Using urinal to void clear clarissa urine.    Last BM: Pt reports \" I had a very hard stool yesterday\" 9/1/2024   Activity: Pt able to get up and sit up in the chair. Using walker.    Skin: Right hip dressing. Keyur to suction at site. Pt complaining of feeling itchy on right back area. Notified DR Pablo to make him aware that patient wanted a topical cream to be ordered to apply onto right back rash.    Pain: Pt continue to be on continuous Ketamine gtt at 10 mg an hour. Pt also is taking Oxycodone . Pt had questions about plan of cares about when Ketamine gtt will be discontinued.    CMS:    Dressing: Right hip dressing. Continues to have KEYUR to suction. Drained 30 ml on day shift today.    Diet: Regular. Good appetite   LDA: X 1 IV infiltrated. Switched Ketamine gtt to SL that was patent.    Equipment: Ketamine gtt, IS, PCDS, ice packs, Pt has a permanent pacemaker.    Plan: Pt is hoping to discharge to home with his sisters care taking him.    Additional Info: Pt had a lot of questions for ortho .Pt is resting comfortably between cares.       "

## 2024-09-02 NOTE — PLAN OF CARE
Physical Therapy Discharge Summary    Reason for therapy discharge:    All goals and outcomes met, no further needs identified.    Progress towards therapy goal(s). [facility] for goal details.  Goals met    Therapy recommendation(s):    No further therapy is recommended. Recommend patient continues to mobilize out of bed with nursing to maintain strength and independence with mobility.

## 2024-09-02 NOTE — PLAN OF CARE
Problem: Adult Inpatient Plan of Care  Goal: Plan of Care Review  Outcome Evaluation: Patient anticipates discharging to home when medically ready.  Plan of Care Reviewed With:   Patient   Family  Overall Patient Progress: Improving  Goal: Readiness for Transition of Care  Recent Flowsheet Documentation  Taken 9/2/2024 1129 by Berta Thomas RN  Transportation Anticipated: Family or friend will provide  Concerns to be Addressed: Discharge planning  Intervention: Mutually Develop Transition Plan  Recent Flowsheet Documentation  Taken 9/2/2024 1129 by Berta Thomas RN  Readmission Within the Last 30 Days: No previous admission in last 30 days  Transportation Anticipated: Family or friend will provide  Transportation Concerns: None  Concerns to be Addressed: Discharge planning  Patient/Family Anticipated Services at Transition: Outpatient care  Patient/Family Anticipates Transition to: Home with family   Goal Outcome Evaluation:  Plan of care reviewed with: Patient and his sisters  Overall patient progress: Improving  Outcome evaluation: Patient anticipates discharging to home when medically ready.

## 2024-09-02 NOTE — CONSULTS
Care Management Initial Consult    General Information  Assessment completed with: Patient  Type of CM/SW Visit: Initial assessment  Primary Care Provider verified and updated as needed: Yes   Readmission within the last 30 days: No previous admission in last 30 days   Reason for Consult: Discharge planning, elevated unplanned readmission risk score  Advance Care Planning: Reviewed; present on chart; no concerns identified        Communication Assessment  Patient's communication style: Spoken language (English or Bilingual)    Hearing Difficulty or Deaf: No   Wear Glasses or Blind: No    Cognitive  Cognitive/Neuro/Behavioral: WDL  Level of Consciousness: alert  Arousal Level: opens eyes spontaneously   Mood/Behavior: calm, cooperative  Best Language: 0 - No aphasia  Speech: clear, spontaneous, logical    Living Environment:   People in home: Alone     Current living Arrangements: House      Able to return to prior arrangements: Yes     Family/Social Support:  Care provided by: Self  Provides care for: Pet(s)  Marital Status: Single  Support system: Sibling(s)          Description of Support System: Supportive, involved    Support Assessment: Adequate family and caregiver support    Current Resources:   Patient receiving home care services: No   Community Resources: None  Equipment currently used at home: Walker, standard  Supplies currently used at home: None    Employment/Financial:  Employment Status: Retired      Financial Concerns: None   Referral to Financial Worker: No     Does the patient's insurance plan have a 3 day qualifying hospital stay waiver?  No    Lifestyle & Psychosocial Needs:  Social Determinants of Health     Food Insecurity: Not on file   Depression: Not at risk (8/22/2024)    PHQ-2     PHQ-2 Score: 0   Housing Stability: Not on file   Tobacco Use: Low Risk  (8/30/2024)    Patient History     Smoking Tobacco Use: Never     Smokeless Tobacco Use: Never     Passive Exposure: Past   Financial  Resource Strain: Not on file   Alcohol Use: Not on file   Transportation Needs: Not on file   Physical Activity: Not on file   Interpersonal Safety: Low Risk  (8/30/2024)    Interpersonal Safety     Do you feel physically and emotionally safe where you currently live?: Yes     Within the past 12 months, have you been hit, slapped, kicked or otherwise physically hurt by someone?: No     Within the past 12 months, have you been humiliated or emotionally abused in other ways by your partner or ex-partner?: No   Stress: Not on file   Social Connections: Not on file   Health Literacy: Not on file     Functional Status:  Prior to admission patient needed assistance:   Dependent ADLs:: Ambulation-walker  Dependent IADLs:: Independent     Mental Health Status:  Mental Health Status: No current concerns       Chemical Dependency Status:  Chemical Dependency Status: No current concerns           Values/Beliefs:  Spiritual, Cultural Beliefs, Mandaeism Practices, Values that affect care: No               Discussed  Partnership in Safe Discharge Planning  document with patient/family: No    Additional Information:  Met with patient to introduce self/role and complete initial assessment in response to elevated unplanned readmission risk score; patients sisters present for interview. Address, PCP, and insurance verified. Patient states he lives alone; plans on returning home when medically ready; states his sisters will be taking turns assisting him until he has recovered. Denies any current home care services or the use of community resources. States his sister will provide transportation at discharge. No discharge needs identified.       No further care management intervention anticipated at this time; care management signing off; please re-consult if further needs arise.        Berta Thomas, RNCC  Covering for [facility] 5O    [place] [facility] [place] ([phone]) Saturday & Sunday; ([phone]) [facility] Recognized Holidays     Units: 5A Onc  Vocera & 5C Vocera   Units: 6B Vocera & 6C Vocera   Units: 7A SOT RNCC Vocera, 7B Med Surg Vocera, & 7C Med Surg Vocera  Units: 6A Vocera & 4A CVICU Vocera, 4C MICU Vocera, and 4E SICU Vocera   Units: 5 Ortho Vocera & 5 Med Surg Vocera    Units: 6 Med Surg Vocera & 8 Med Surg Vocera

## 2024-09-02 NOTE — PROGRESS NOTES
Orthopedic Surgery Progress Note 09/02/2024    S: Patient seen this morning. Patient worked with PT yesterday He is feeling much better today. Pain is improved. He was able to sleep. He was transfused 1 unit of RBC on 8/31. Hb stable.     O:  Temp: 98.5  F (36.9  C) Temp src: Oral BP: 117/75 Pulse: 85   Resp: 16 SpO2: 96 % O2 Device: None (Room air)      Exam:  Gen: alert and oriented, responds to questions appropriately  Resp: non-labored on RA  MSK:     RLE:  - Dressings c/d/i  - SILT femoral/tibial/sural/saphenous/DP/SP nerves  - Fires quad, TA, EHL, FHL, GaSC  - PT/DP pulses 2+, foot wwp     Drain output: 190cc/24hrs.      Recent Labs   Lab 09/01/24  2239 09/01/24  1345 09/01/24  0628 08/31/24  1015 08/31/24  0308 08/30/24  2117   WBC  --   --  8.7  --  8.0 11.4*   HGB 8.1* 8.9* 8.6*  8.6*   < > 7.0* 8.4*   PLT  --   --  167  --  140* 174    < > = values in this interval not displayed.       Culture results: NGTD    Assessment: Kwabena Solorio is a 74 year old male s/p Right stage 2 revision CHRISS on 8/30/24 with Dr. Dudley.      Plan:  Orthopedics primary, medicine co-management  Activity: Up with assist.  Weight bearing status: TTWB RLE, posterior hip precautions.  Antibiotics: continue periop Ancef 2g q8h until cultures result, then duricef x 2 weeks on discharge  Diet: Progress diet as tolerated.  DVT prophylaxis: SCDs and mechanical while in the hospital. ASA 81 mg BID x 4 weeks.   Elevation: Elevate operative extremity as tolerated.   Wound Care: Keep dressing in place until POD #7.  Drains: Please document output per shift, discontinue per ortho. Plan to discontinue once output is <20cc/shift  Pain management: Utilize all oral meds first, IV meds for severe breakthrough pain after PO meds given adequate time to take effect.  X-rays: Completed in PACU.  Labs: monitor Hb  Therapy: PT/OT evaluate prior to discharge.  Cultures: Pending, follow culture results closely.  Consults: PT, OT, medicine. Appreciate  assistance in caring for this patient.  Disposition: Pending progress with therapies, pain control on orals, and medical stability. Pending drain removal once output decreases.  Follow-up: Clinic with Dr. Dudley in 4 weeks.     Orthopedic surgery staff for this patient is Dr. Dudley. Discussed.

## 2024-09-02 NOTE — PLAN OF CARE
Goal Outcome Evaluation:       Pt. A&Ox4. VSS. Afebrile. Lung sounds CTA. Maintaining sats on RA. Bowel sounds active, LBM 9/1. CMS and neuro's are intact. Denies numbness and tingling in all extremities. Denies nausea, shortness of breath, and chest pain. RLE pain managed w/ scheduled Tylenol, prn Oxycodone and continuous Ketamine drip. Voids spontaneously without difficulty in the via urinal. Tolerating regular diet. RLE incisional dressing is CDI. KEYUR in place with 50mL output last evening and 30mL output overnight. Pt up with SBA, GB and Walker, TTWB to RLE. PIV is patent and SL between abx. Call light is within reach, pt able to make needs known and is resting comfortably between cares.   Discharge pending PT & drain output.

## 2024-09-03 ENCOUNTER — DOCUMENTATION ONLY (OUTPATIENT)
Dept: MEDSURG UNIT | Facility: CLINIC | Age: 74
End: 2024-09-03
Payer: COMMERCIAL

## 2024-09-03 VITALS
DIASTOLIC BLOOD PRESSURE: 66 MMHG | OXYGEN SATURATION: 90 % | HEART RATE: 58 BPM | RESPIRATION RATE: 18 BRPM | WEIGHT: 178.57 LBS | HEIGHT: 65 IN | SYSTOLIC BLOOD PRESSURE: 100 MMHG | TEMPERATURE: 98.7 F | BODY MASS INDEX: 29.75 KG/M2

## 2024-09-03 LAB
ANION GAP SERPL CALCULATED.3IONS-SCNC: 9 MMOL/L (ref 7–15)
BUN SERPL-MCNC: 8.4 MG/DL (ref 8–23)
CALCIUM SERPL-MCNC: 8 MG/DL (ref 8.8–10.4)
CHLORIDE SERPL-SCNC: 99 MMOL/L (ref 98–107)
CREAT SERPL-MCNC: 0.78 MG/DL (ref 0.67–1.17)
EGFRCR SERPLBLD CKD-EPI 2021: >90 ML/MIN/1.73M2
ERYTHROCYTE [DISTWIDTH] IN BLOOD BY AUTOMATED COUNT: 14 % (ref 10–15)
GLUCOSE SERPL-MCNC: 103 MG/DL (ref 70–99)
HCO3 SERPL-SCNC: 28 MMOL/L (ref 22–29)
HCT VFR BLD AUTO: 24 % (ref 40–53)
HGB BLD-MCNC: 8.4 G/DL (ref 13.3–17.7)
HGB BLD-MCNC: 9.1 G/DL (ref 13.3–17.7)
MAGNESIUM SERPL-MCNC: 1.6 MG/DL (ref 1.7–2.3)
MCH RBC QN AUTO: 30.2 PG (ref 26.5–33)
MCHC RBC AUTO-ENTMCNC: 35 G/DL (ref 31.5–36.5)
MCV RBC AUTO: 86 FL (ref 78–100)
PHOSPHATE SERPL-MCNC: 3.3 MG/DL (ref 2.5–4.5)
PLATELET # BLD AUTO: 178 10E3/UL (ref 150–450)
POTASSIUM SERPL-SCNC: 3.8 MMOL/L (ref 3.4–5.3)
RBC # BLD AUTO: 2.78 10E6/UL (ref 4.4–5.9)
SODIUM SERPL-SCNC: 136 MMOL/L (ref 135–145)
WBC # BLD AUTO: 6.2 10E3/UL (ref 4–11)

## 2024-09-03 PROCEDURE — 250N000011 HC RX IP 250 OP 636: Mod: JZ | Performed by: PHYSICIAN ASSISTANT

## 2024-09-03 PROCEDURE — 85027 COMPLETE CBC AUTOMATED: CPT

## 2024-09-03 PROCEDURE — 83735 ASSAY OF MAGNESIUM: CPT

## 2024-09-03 PROCEDURE — 250N000013 HC RX MED GY IP 250 OP 250 PS 637: Performed by: STUDENT IN AN ORGANIZED HEALTH CARE EDUCATION/TRAINING PROGRAM

## 2024-09-03 PROCEDURE — 99232 SBSQ HOSP IP/OBS MODERATE 35: CPT | Performed by: STUDENT IN AN ORGANIZED HEALTH CARE EDUCATION/TRAINING PROGRAM

## 2024-09-03 PROCEDURE — 250N000013 HC RX MED GY IP 250 OP 250 PS 637: Performed by: ORTHOPAEDIC SURGERY

## 2024-09-03 PROCEDURE — 84100 ASSAY OF PHOSPHORUS: CPT

## 2024-09-03 PROCEDURE — 250N000013 HC RX MED GY IP 250 OP 250 PS 637: Performed by: PHYSICIAN ASSISTANT

## 2024-09-03 PROCEDURE — 250N000012 HC RX MED GY IP 250 OP 636 PS 637

## 2024-09-03 PROCEDURE — 85018 HEMOGLOBIN: CPT

## 2024-09-03 PROCEDURE — 80048 BASIC METABOLIC PNL TOTAL CA: CPT

## 2024-09-03 PROCEDURE — 36415 COLL VENOUS BLD VENIPUNCTURE: CPT

## 2024-09-03 RX ORDER — PANTOPRAZOLE SODIUM 40 MG/1
40 TABLET, DELAYED RELEASE ORAL
Qty: 30 TABLET | Refills: 0 | Status: SHIPPED | OUTPATIENT
Start: 2024-09-04

## 2024-09-03 RX ORDER — POTASSIUM CHLORIDE 1500 MG/1
20 TABLET, EXTENDED RELEASE ORAL ONCE
Status: COMPLETED | OUTPATIENT
Start: 2024-09-03 | End: 2024-09-03

## 2024-09-03 RX ORDER — HYDROXYZINE HYDROCHLORIDE 10 MG/1
10 TABLET, FILM COATED ORAL EVERY 6 HOURS PRN
Qty: 30 TABLET | Refills: 0 | Status: SHIPPED | OUTPATIENT
Start: 2024-09-03

## 2024-09-03 RX ORDER — METHOCARBAMOL 500 MG/1
500 TABLET, FILM COATED ORAL EVERY 6 HOURS PRN
Qty: 30 TABLET | Refills: 0 | Status: SHIPPED | OUTPATIENT
Start: 2024-09-03

## 2024-09-03 RX ORDER — MAGNESIUM OXIDE 400 MG/1
400 TABLET ORAL EVERY 4 HOURS
Status: COMPLETED | OUTPATIENT
Start: 2024-09-03 | End: 2024-09-03

## 2024-09-03 RX ORDER — POLYETHYLENE GLYCOL 3350 17 G/17G
17 POWDER, FOR SOLUTION ORAL DAILY
Qty: 510 G | Refills: 0 | Status: SHIPPED | OUTPATIENT
Start: 2024-09-03 | End: 2024-09-04

## 2024-09-03 RX ORDER — BISACODYL 10 MG
10 SUPPOSITORY, RECTAL RECTAL DAILY PRN
Qty: 15 SUPPOSITORY | Refills: 0 | Status: SHIPPED | OUTPATIENT
Start: 2024-09-03

## 2024-09-03 RX ADMIN — OXYCODONE HYDROCHLORIDE 10 MG: 10 TABLET ORAL at 00:03

## 2024-09-03 RX ADMIN — CEFAZOLIN SODIUM 2 G: 2 INJECTION, SOLUTION INTRAVENOUS at 13:15

## 2024-09-03 RX ADMIN — TRIAMCINOLONE ACETONIDE: 1 CREAM TOPICAL at 08:14

## 2024-09-03 RX ADMIN — MAGNESIUM OXIDE TAB 400 MG (241.3 MG ELEMENTAL MG) 400 MG: 400 (241.3 MG) TAB at 12:38

## 2024-09-03 RX ADMIN — CEFAZOLIN SODIUM 2 G: 2 INJECTION, SOLUTION INTRAVENOUS at 04:06

## 2024-09-03 RX ADMIN — OXYCODONE HYDROCHLORIDE 5 MG: 5 TABLET ORAL at 12:38

## 2024-09-03 RX ADMIN — PANTOPRAZOLE SODIUM 40 MG: 40 TABLET, DELAYED RELEASE ORAL at 08:05

## 2024-09-03 RX ADMIN — POTASSIUM CHLORIDE 20 MEQ: 1500 TABLET, EXTENDED RELEASE ORAL at 08:05

## 2024-09-03 RX ADMIN — POLYETHYLENE GLYCOL 3350 17 G: 17 POWDER, FOR SOLUTION ORAL at 08:07

## 2024-09-03 RX ADMIN — PSYLLIUM HUSK 1 PACKET: 3.4 POWDER ORAL at 08:07

## 2024-09-03 RX ADMIN — ASPIRIN 162 MG: 81 TABLET, COATED ORAL at 08:05

## 2024-09-03 RX ADMIN — MAGNESIUM OXIDE TAB 400 MG (241.3 MG ELEMENTAL MG) 400 MG: 400 (241.3 MG) TAB at 08:05

## 2024-09-03 RX ADMIN — PREDNISONE 5 MG: 5 TABLET ORAL at 08:05

## 2024-09-03 RX ADMIN — OXYCODONE HYDROCHLORIDE 10 MG: 10 TABLET ORAL at 08:05

## 2024-09-03 RX ADMIN — OXYCODONE HYDROCHLORIDE 10 MG: 10 TABLET ORAL at 04:05

## 2024-09-03 RX ADMIN — OXYCODONE HYDROCHLORIDE 10 MG: 10 TABLET ORAL at 16:31

## 2024-09-03 RX ADMIN — SENNOSIDES AND DOCUSATE SODIUM 1 TABLET: 50; 8.6 TABLET ORAL at 08:05

## 2024-09-03 ASSESSMENT — ACTIVITIES OF DAILY LIVING (ADL)
ADLS_ACUITY_SCORE: 31
ADLS_ACUITY_SCORE: 25
ADLS_ACUITY_SCORE: 25
ADLS_ACUITY_SCORE: 31
ADLS_ACUITY_SCORE: 31
ADLS_ACUITY_SCORE: 25
ADLS_ACUITY_SCORE: 31
ADLS_ACUITY_SCORE: 25
ADLS_ACUITY_SCORE: 25
ADLS_ACUITY_SCORE: 31
ADLS_ACUITY_SCORE: 25

## 2024-09-03 NOTE — PLAN OF CARE
"Goal Outcome Evaluation:      Plan of Care Reviewed With: patient    Overall Patient Progress: improvingOverall Patient Progress: improving    VS: /80 (BP Location: Right arm)   Pulse 67   Temp 97.6  F (36.4  C) (Oral)   Resp 17   Ht 1.651 m (5' 5\")   Wt 81 kg (178 lb 9.2 oz)   SpO2 97%   BMI 29.72 kg/m      O2: >90% on RA  Denies SOB and chest pain    Output: Voiding without difficulty, has a BSU but has been getting up to use the bathroom, uses call light before getting up   Last BM: 9/1/2024   Activity: Standby assist with walker and gaitbelt   Skin: R hip surgical incision, rash on right side of back  Edema present in feet    Pain: Rating mild pain this shift, edema in feet has pt feeling uncomfortable and rash on back is \"itching and burning\", triamcinolone cream ordered and applied.  Ketamine drip discontinued, PRN oxy given once this shift    CMS: AxO x4  Numbness and tingling present in both feet. Pt reports baseline    Dressing: R hip surgical and KEYUR drain dressing has old dried drainage    Diet: Regular    LDA: L PIV SL, KEYUR drain on R hip   Equipment: IV pole, walker, call light, personal belongings    Plan: Potential discharge home tomorrow    Additional Info: One IV was infiltrated earlier today on L upper arm. Swelling present, hot pack applied and wrapped in coband, pt reports relief.  Pt requested to have ketamine drip discontinued to have IV antibiotics given to avoid getting another IV put in, pain has been well managed since            "

## 2024-09-03 NOTE — PROGRESS NOTES
[facility]    Medicine Progress Note - Hospitalist Service, GOLD TEAM 21    Date of Admission:  8/30/2024    Assessment & Plan   75 yo M with PMH of CHB s/p PPM, polymyalgia rheumatica, RLS, GERD, BPH, and R CHRISS (2007) with recent prosthetic joint infection s/p explant with spacer, now is admitted after explant of spacer and revision of R CHRISS, with post op hypotension requiring ICU stay for pressor support. Hemodynamics improved and transferred to floor on 08/31/2024      R CHRISS prosthetic joint infection s/p explant and antibiotic spacer, now with spacer explant and revision R CHRISS   - Management per primary team.   Lomeli removed   - Drain with continued output     Chronic constipation   Possible OIC   Kwabena notes that he consistently uses 2 stool softeners, 1 packet metamucil, and 2-3 senna tabs daily for consistent bowel movement. Currently has been passing flatus but no significant bowel movement   - Increased bowel regimen to Miralax BID, Continue Senna BID, utilize bisacodyl supp PRN if no BM in 24 hours, resumed Metamucil daily       Anemia  -likely due to acute blood loss,   H/H stable/ slow down trending likely due to ongoing losses in drain    Would continue to monitor with CBC daily   Transfuse for Hb<7 or if hemodynamic instability      CHB s/p PPM   Stable rhythm on telemetry x 24hours   Can discontinue cardiac monitor         Polymyalgia rheumatica   Continue PTA medications - Prednisone 5 mg daily      RLS   Continue PTA Mirapex 0.25 at bedtime      Insomnia - Patient notes that he is weaned off Ambien and Ativan, will not resume at discharge      GERD   PTA - Prilosec   Switched to Pantoprazole while inpatient           Diet: Discharge Instruction - Regular Diet Adult  Regular Diet Adult    DVT Prophylaxis: Defer to primary service  Lomeli Catheter: Not present  Lines: None     Cardiac Monitoring: None  Code Status: Full Code      Clinically Significant Risk  "Factors            # Hypomagnesemia: Lowest Mg = 1.6 mg/dL in last 2 days, will replace as needed                 # Overweight: Estimated body mass index is 29.72 kg/m  as calculated from the following:    Height as of this encounter: 1.651 m (5' 5\").    Weight as of this encounter: 81 kg (178 lb 9.2 oz).        # Financial/Environmental Concerns: none               Disposition Plan     Medically Ready for Discharge:              [facility] Hospitalist Service, GOLD TEAM 21  [facility]  Securely message with [employer] (more info)  Text page via [employer] Paging/Directory   See signed in provider for up to date coverage information  ______________________________________________________________________    Interval History   Kwabena states that he is doing well and very hopeful he can discharge today. Discussed that from my standpoint he is good to go but ortho is still managing his drain. He had more flatus and small \"dagoberto\" for BM this AM. Reports pain is under control.     Physical Exam   Vital Signs: Temp: 98.7  F (37.1  C) Temp src: Oral BP: 100/66 Pulse: 58   Resp: 18 SpO2: 90 % O2 Device: None (Room air)    Weight: 178 lbs 9.16 oz    Gen: Awake and alert, sitting in bed   Resp: breathing comfortably on room air  Abd: Non tender   Ext: Drain with sero- sanguinous output        Medical Decision Making             Data     I have personally reviewed the following data over the past 24 hrs:    6.2  \   8.4 (L)   / 178     136 99 8.4 /  103 (H)   3.8 28 0.78 \       Imaging results reviewed over the past 24 hrs:   No results found for this or any previous visit (from the past 24 hour(s)).  "

## 2024-09-03 NOTE — PLAN OF CARE
Goal Outcome Evaluation:       DISCHARGE SUMMARY    Pt discharging to: home  Transportation: family  AVS given and discussed: Pt was given AVS and pt states understanding of content. Pt has no further questions.   Stoplight Tool given and discussed: Yes, no further questions.  Medications given: Yes, discussed. No further questions.   Belongings returned: Yes, ensured all belongings packed and sent with pt. No items in security.   Comments: Escorted safely to elevators. Pt left at 1635.

## 2024-09-03 NOTE — PROGRESS NOTES
Prior Authorization **INITIATED**    Medication: HYDROXYZINE HCL 25 MG PO TABS  Insurance Company: [employer] [place] - Phone [phone] Fax [phone]  Pharmacy Filling the Rx: [facility] - [place], [place] - [address]  Filling Pharmacy Phone: [phone]  Filling Pharmacy Fax: [phone]  Start Date: 9/3/2024  Reference #: Iraj: [id number] PA Case ID #: [id number]  Comments:  Discharge pharmacy sent patient with supply while auth is pending. Will retroactively bill once determination received.      Annabelle Maddox CPhT  Discharge Pharmacy Liaison  [facility]  Pronouns: She/Her/Hers    Securely message with [employer], [employer] Secure Chat, or [employer]  Phone: [phone]  Fax: [phone]  [email or web address]

## 2024-09-03 NOTE — PROGRESS NOTES
Orthopedic Surgery Progress Note 09/03/2024    S: Patient seen on morning rounds. No acute events overnight. Pain is improving and appropriately controlled. Able to mobilize well, passed PT/discharged from their inpatient services. Denies chest pain, SOB, fever, chills, n/v.    O:  Temp: 97.6  F (36.4  C) Temp src: Oral BP: 124/80 Pulse: 67   Resp: 17 SpO2: 97 % O2 Device: None (Room air)      Exam:  Gen: alert and oriented, responds to questions appropriately  Resp: non-labored on RA  MSK:     RLE:  - Dressings c/d/i  - SILT femoral/tibial/sural/saphenous/DP/SP nerves  - Fires quad, TA, EHL, FHL, GaSC  - foot wwp     Drain output: 145cc/24hrs.      Recent Labs   Lab 09/03/24  0545 09/02/24  1351 09/02/24  0612 09/01/24  1345 09/01/24  0628   WBC 6.2  --  5.1  --  8.7   HGB 8.4* 8.3* 7.6*  7.6*   < > 8.6*  8.6*     --  145*  --  167    < > = values in this interval not displayed.       Culture results:   30-Day Micro Results       Collected Updated Procedure Result Status      08/30/2024 1524 09/02/2024 1816 Anaerobic Bacterial Culture Routine [[id number]]   Tissue from Hip, Right    Preliminary result Component Value   Culture No anaerobic organisms isolated after 3 days  [P]                08/30/2024 1524 09/02/2024 1831 Anaerobic Bacterial Culture Routine [[id number]]   Tissue from Hip, Right    Preliminary result Component Value   Culture No anaerobic organisms isolated after 3 days  [P]                08/30/2024 1524 09/02/2024 1846 Anaerobic Bacterial Culture Routine [[id number]]   Tissue from Hip, Right    Preliminary result Component Value   Culture No anaerobic organisms isolated after 3 days  [P]                08/30/2024 1524 09/02/2024 1846 Anaerobic Bacterial Culture Routine [[id number]]   Tissue from Hip, Right    Preliminary result Component Value   Culture No anaerobic organisms isolated after 3 days  [P]                08/30/2024 1524 09/02/2024 1816 Fungal or Yeast Culture Routine  [[id number]]   Tissue from Hip, Right    Preliminary result Component Value   Culture No growth after 3 days  [P]                08/30/2024 1524 09/02/2024 1831 Fungal or Yeast Culture Routine [[id number]]   Tissue from Hip, Right    Preliminary result Component Value   Culture No growth after 3 days  [P]                08/30/2024 1524 09/02/2024 1846 Fungal or Yeast Culture Routine [[id number]]   Tissue from Hip, Right    Preliminary result Component Value   Culture No growth after 3 days  [P]                08/30/2024 1524 09/02/2024 1846 Fungal or Yeast Culture Routine [[id number]]   Tissue from Hip, Right    Preliminary result Component Value   Culture No growth after 3 days  [P]                08/30/2024 1524 09/02/2024 0732 Tissue Aerobic Bacterial Culture Routine [[id number]]   Tissue from Hip, Right    Preliminary result Component Value   Culture No growth after 2 days  [P]                08/30/2024 1524 09/02/2024 0732 Tissue Aerobic Bacterial Culture Routine [[id number]]   Tissue from Hip, Right    Preliminary result Component Value   Culture No growth after 2 days  [P]                08/30/2024 1524 09/02/2024 0732 Tissue Aerobic Bacterial Culture Routine [[id number]]   Tissue from Hip, Right    Preliminary result Component Value   Culture No growth after 2 days  [P]                08/30/2024 1524 09/02/2024 0732 Tissue Aerobic Bacterial Culture Routine [[id number]]   Tissue from Hip, Right    Preliminary result Component Value   Culture No growth after 2 days  [P]                08/30/2024 1522 09/02/2024 1816 Anaerobic Bacterial Culture Routine [[id number]]   Tissue from Hip, Right    Preliminary result Component Value   Culture No anaerobic organisms isolated after 3 days  [P]                08/30/2024 1522 09/02/2024 1816 Fungal or Yeast Culture Routine [[id number]]   Tissue from Hip, Right    Preliminary result Component Value   Culture No growth after 3 days  [P]                 08/30/2024 1522 09/02/2024 0732 Tissue Aerobic Bacterial Culture Routine [[id number]]   Tissue from Hip, Right    Preliminary result Component Value   Culture No growth after 2 days  [P]                08/12/2024 1255 08/26/2024 0751 Anaerobic Bacterial Culture Routine [[id number]]   Synovial fluid from Hip, Right    Final result Component Value   Culture No anaerobic organisms isolated               08/12/2024 1255 08/17/2024 0650 Synovial fluid Aerobic Bacterial Culture Routine Without Gram Stain [[id number]]   Synovial fluid from Hip, Right    Final result Component Value   Culture No Growth               08/12/2024 1255 08/12/2024 1915 Cell count with differential fluid [[id number]]    (Abnormal)   Synovial fluid from Hip, Right    Final result Component Value   No component results            08/12/2024 1255 09/02/2024 1501 Fungal or Yeast Culture Routine [[id number]]   Synovial fluid from Hip, Right    Preliminary result Component Value   Culture No growth after 21 days  [P]                08/12/2024 1255 08/12/2024 1913 Cell Count Body Fluid [[id number]]    (Abnormal)   Synovial fluid from Hip, Right    Final result Component Value Units   Color Yellow    Clarity Cloudy    Cell Count Fluid Source Hip, Right    Total Nucleated Cells 429 /uL            08/12/2024 1255 08/12/2024 1915 Differential Body Fluid [[id number]]    Synovial fluid from Hip, Right    Final result Component Value Units   % Neutrophils 10 %   % Lymphocytes 64 %   % Monocyte/Macrophages 26 %                      Assessment: Kwabena Solorio is a 74 year old male s/p Right stage 2 revision CHRISS on 8/30/24 with Dr. Dudley.     Pain well controlled  Passed PT for discharge home  Postop XR complete  Will discuss drain removal with Dr. Dudley    Anticipate discharge to home once drain removed.     Plan:  Orthopedics primary, medicine co-management  Activity: Up with assist.  Weight bearing status: TTWB RLE, posterior hip  precautions.  Antibiotics: continue periop Ancef 2g q8h until cultures result, then duricef x 2 weeks on discharge  Diet: Progress diet as tolerated.  DVT prophylaxis: SCDs and mechanical while in the hospital. ASA 81 mg BID x 4 weeks.   Elevation: Elevate operative extremity as tolerated.   Wound Care: Keep dressing in place until POD #7.  Drains: Please document output per shift, discontinue per ortho. Plan to discontinue once output is <20cc/shift  Pain management: Utilize all oral meds first, IV meds for severe breakthrough pain after PO meds given adequate time to take effect.  X-rays: Completed in PACU.  Labs: monitor Hb  Therapy: PT/OT evaluate prior to discharge.  Cultures: Pending, follow culture results closely.  Consults: PT, OT, medicine. Appreciate assistance in caring for this patient.  Disposition: Pending progress with therapies, pain control on orals, and medical stability. Pending drain removal once output decreases.  Follow-up: Clinic with Dr. Dudley in 4 weeks.     Orthopedic surgery staff for this patient is Dr. Dudley. Discussed.       Marcos Paulino MD  Ortho resident

## 2024-09-03 NOTE — PLAN OF CARE
"Goal Outcome Evaluation:      Plan of Care Reviewed With: patient    Overall Patient Progress: no changeOverall Patient Progress: no change       VS: Blood pressure 100/66, pulse 58, temperature 98.7  F (37.1  C), temperature source Oral, resp. rate 18, height 1.651 m (5' 5\"), weight 81 kg (178 lb 9.2 oz), SpO2 90%.   O2: RA. Denies SOB and chest pain.   Output: Uses urinal at bedside   Last BM: 9/3   Activity: TTWB RLE  SBA with walker   Skin: R hip surgical incision, rash on right side of back  Edema present in feet    Pain: Currently rates pain 2/10 if not moving. Manages with oxycodone.    CMS: A&O x 4. Baseline neuropathy to BLE.    Dressing: Right hip - dry drainage   KEYUR drain- Removed   Diet: Regular diet   LDA: None   Equipment: Pt belongings and IV pole   Plan: Pending KEYUR drain removal, possibly tomorrow.    Additional Info:             "

## 2024-09-03 NOTE — PLAN OF CARE
"Goal Outcome Evaluation:           Overall Patient Progress: no changeOverall Patient Progress: no change    Outcome Evaluation: patient would like to discharge today      VS: /80 (BP Location: Right arm)   Pulse 67   Temp 97.6  F (36.4  C) (Oral)   Resp 17   Ht 1.651 m (5' 5\")   Wt 81 kg (178 lb 9.2 oz)   SpO2 97%   BMI 29.72 kg/m      O2: Stable on room air. Denies SOB, CP   Output: Using urinal at bedside   Last BM: 9/1   Activity: TTWB RLE  Stand by assist with walker and gait belt   Skin: R hip surgical incision, rash on right side of back  Edema present in feet    Pain: 5/10 pain right hip. Patient requested 10 mg of oxycodone PRN   X 2   CMS: AXO X 4  Baseline neuropathy to BLE   Dressing: Right hip -dry drainage   KEYUR drain-dry drainage   Diet: Reg diet   LDA: L PIV  saline locked  KEYUR drain   Equipment: Patient belongings   Plan: Potential discharge home today   Additional Info:       "

## 2024-09-04 DIAGNOSIS — Z96.649 STATUS POST REVISION OF TOTAL HIP: ICD-10-CM

## 2024-09-04 LAB
BACTERIA TISS BX CULT: NO GROWTH

## 2024-09-04 RX ORDER — AMOXICILLIN 250 MG
1-2 CAPSULE ORAL 2 TIMES DAILY
Qty: 30 TABLET | Refills: 0 | Status: SHIPPED | OUTPATIENT
Start: 2024-09-04

## 2024-09-04 RX ORDER — OXYCODONE HYDROCHLORIDE 5 MG/1
5-10 TABLET ORAL EVERY 4 HOURS PRN
Qty: 26 TABLET | Refills: 0 | Status: CANCELLED | OUTPATIENT
Start: 2024-09-04

## 2024-09-04 RX ORDER — POLYETHYLENE GLYCOL 3350 17 G/17G
17 POWDER, FOR SOLUTION ORAL DAILY
Qty: 510 G | Refills: 0 | Status: SHIPPED | OUTPATIENT
Start: 2024-09-04

## 2024-09-04 RX ORDER — CEFADROXIL 500 MG/1
500 CAPSULE ORAL 2 TIMES DAILY
Qty: 28 CAPSULE | Refills: 0 | Status: SHIPPED | OUTPATIENT
Start: 2024-09-04 | End: 2024-09-18

## 2024-09-04 RX ORDER — ASPIRIN 81 MG/1
162 TABLET ORAL DAILY
Qty: 60 TABLET | Refills: 0 | Status: SHIPPED | OUTPATIENT
Start: 2024-09-04

## 2024-09-04 RX ORDER — CEFADROXIL 500 MG/1
500 CAPSULE ORAL 2 TIMES DAILY
Qty: 28 CAPSULE | Refills: 0 | Status: SHIPPED | OUTPATIENT
Start: 2024-09-04

## 2024-09-04 RX ORDER — ACETAMINOPHEN 325 MG/1
650 TABLET ORAL EVERY 4 HOURS PRN
Qty: 100 TABLET | Refills: 0 | Status: SHIPPED | OUTPATIENT
Start: 2024-09-04

## 2024-09-04 RX ORDER — OXYCODONE HYDROCHLORIDE 5 MG/1
5-10 TABLET ORAL EVERY 4 HOURS PRN
Qty: 26 TABLET | Refills: 0 | Status: SHIPPED | OUTPATIENT
Start: 2024-09-04

## 2024-09-04 NOTE — TELEPHONE ENCOUNTER
- Message reviewed from patient that antibiotic was not being covered by insurance. Asking me to look into this.     - Called [facility] - [place], [place] - [address] They said Since this was filled somewhere else they could not get it covered by insurance. They said it would need to be cancelled in order for insurance to cover.     - Called [facility] at [phone]. Asking them to cancel this prescription. Thy said it was sent to the floor and they are unable to cancel this if it is not in there possessions.      - Called [facility] back and the ran it again after it was cancelled at [facility] and were able to get this approved.

## 2024-09-04 NOTE — TELEPHONE ENCOUNTER
- A call was placed to the patient.     - They were sent home with:  Atarax, robaxin, and dulcolax    Needs:   Oxycodone - will send request to PA to sign.   Durefef - sent to pharmacy  Aspirin - will use home supply, instructed patient to take for 4 weeks.    Tylenol- will use home supply  Miralax - will use home supply   Senna - will use home supply     - I told patient I will get these in ASAP. I let him know to reach out if he needs additional refills.      - Patient verbalized understanding of plan and all questions were answered. Call back number to clinic was given and patient was told to call if they had an further questions.

## 2024-09-04 NOTE — TELEPHONE ENCOUNTER
----- Message from Ina VARGAS sent at 9/4/2024  7:06 AM CDT -----  Regarding: FW: Patient sent home without oxy and other meds    ----- Message -----  From: Mayte Gomez MD  Sent: 9/3/2024   9:46 PM CDT  To: Marcos Paulino MD; GIORGI Oropeza CNP; #  Subject: Patient sent home without oxy and other meds     Hi schedulers,    This patient discharged 9/3. He had a R Revision CHRISS with Dr. Dudley on 8/30/2024. He was sent home without oxycodone and some other medications.    Please send the oxycodone and other medications that were not filled to his preferred pharmacy in [place], [place] ([facility]). Please do this first thing in the morning. I have also instructed the patient to call clinic tomorrow to request this, in case you don't see my message.    In the meantime, I have instructed him to utilize methocarbamol, tyenol, and advil. I have also instructed him to elevate his RLE on pillows as much as possible to help with general RLE swelling.    Mayte

## 2024-09-04 NOTE — DISCHARGE SUMMARY
ORTHOPAEDIC SURGERY DISCHARGE SUMMARY     Date of Admission: 8/30/2024  Date of Discharge: 9/3/2024  5:00 PM  Disposition: Home  Staff Physician: No att. 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  The patient underwent a stage I revision with explantation of the hip resurfacing and placement of at antibiotic spacer in May 2024 with Dr. Dudley.  He underwent 6 weeks of IV ampicillin treatments.  He then underwent a right hip aspiration following a 8-week antibiotic holiday.  The results of this aspiration were consistent with eradication of the infection within the right hip.  As a result the patient was indicated to undergo stage II right total hip arthroplasty. . The patient has failed conservative treatment of symptoms and desires more definitive intervention. Therefore, after reviewing non-operative and operative options including the risks and benefits associated with each, the patient elected to proceed with the above stated procedure.     HOSPITAL COURSE:    The patient was admitted following the above listed procedures for pain control and rehabilitation. Kwabena Solorio did well post-operatively. Medicine was consulted post operatively to aid in management of medical co-morbidities. The patient received routine nursing cares and at the time of discharge was medically stable. Vital signs were stable throughout admission. The patient is tolerating a regular diet and is voiding spontaneously. All PT/OT goals have been met for safe mobility. Pain is now controlled on oral  medications which will be available on discharge. Stool softeners have been used while taking pain medications to help prevent constipation. Kwabena Solorio is deemed medically safe to discharge.     Antibiotics:  Ancef given while admitted and discharged on prophylactic Duricef  DVT prophylaxis:  Mechanical and Aspirin 162mg daily x 4 weeks, starting morning of POD 1   PT Progress:  Has met PT/OT goals for safe mobility.    Pain Meds:  Weaned off all IV pain meds by discharge.  Inpatient Events: No significant events or complications.     PHYSICAL EXAM:    See progress note from date of discharge    FOLLOWUP:    follow up with Dr. Dudley in 4 weeks     Future Appointments   Date Time Provider Department Center   9/30/2024 12:30 PM Kaz Bowser, NEVIN JULES [facility]       Orthopaedic Surgery appointments are at the [facility] [place] ([address] [place] [address]). Call [phone] to schedule a follow-up appointment at this location with your provider.     PLANNED DISCHARGE ORDERS:     DVT Prophylaxis: Mechanical & Aspirin 162mg daily x 4 weeks, starting morning of POD 1      Activity: WBAT      Wound Care: see Below      Discharge Medication List as of 9/3/2024  3:26 PM        START taking these medications    Details   aspirin 81 MG EC tablet Take 2 tablets (162 mg) by mouth daily., Disp-60 tablet, R-0, E-Prescribe      bisacodyl (DULCOLAX) 10 MG suppository Place 1 suppository (10 mg) rectally daily as needed for constipation., Disp-15 suppository, R-0, E-Prescribe      cefadroxil (DURICEF) 500 MG capsule Take 1 capsule (500 mg) by mouth 2 times daily for 14 days., Disp-28 capsule, R-0, E-Prescribe      hydrOXYzine HCl (ATARAX) 10 MG tablet Take 1 tablet (10 mg) by mouth every 6 hours as needed for other (adjuvant pain)., Disp-30 tablet, R-0, E-Prescribe      methocarbamol (ROBAXIN) 500 MG tablet Take 1 tablet (500 mg) by mouth every 6 hours as needed for muscle spasms., Disp-30  tablet, R-0, E-Prescribe      oxyCODONE (ROXICODONE) 5 MG tablet Take 1-2 tablets (5-10 mg) by mouth every 4 hours as needed for moderate to severe pain., Disp-26 tablet, R-0, E-Prescribe      pantoprazole (PROTONIX) 40 MG EC tablet Take 1 tablet (40 mg) by mouth every morning (before breakfast)., Disp-30 tablet, R-0, E-Prescribe           CONTINUE these medications which have CHANGED    Details   acetaminophen (TYLENOL) 325 MG tablet Take 2 tablets (650 mg) by mouth every 4 hours as needed for other (mild pain)., Disp-100 tablet, R-0, E-Prescribe      !! polyethylene glycol (MIRALAX) 17 g packet Take 17 g by mouth daily., Disp-7 packet, R-0, E-Prescribe      !! polyethylene glycol (MIRALAX) 17 GM/Dose powder Take 17 g by mouth daily., Disp-510 g, R-0, E-Prescribe      senna-docusate (SENOKOT-S/PERICOLACE) 8.6-50 MG tablet Take 1-2 tablets by mouth 2 times daily. Take while on oral narcotics to prevent or treat constipation., Disp-30 tablet, R-0, E-PrescribeWhile taking narcotics       !! - Potential duplicate medications found. Please discuss with provider.        CONTINUE these medications which have NOT CHANGED    Details   amoxicillin (AMOXIL) 500 MG capsule Take 2,000 mg by mouth once Prior to dental appointments, Historical      calcium-magnesium (CALMAG) 500-250 MG TABS per tablet Take 1 tablet by mouth every morning With ZINC, Historical      Cholecalciferol (D 1000) 25 MCG (1000 UT) CAPS Take 1,000 Units by mouth daily (with lunch), Historical      diclofenac (VOLTAREN) 1 % topical gel Apply 2 g topically 2 times daily as needed for moderate pain Applies before activity to right foot or in the evening to tingling hands, Historical      diphenhydrAMINE-zinc acetate (BENADRYL) 2-0.1 % external cream Apply topically 3 times daily as needed for itchingTransitional      DOCUSATE CALCIUM PO Take 1 tablet by mouth every morning, Historical      ferrous sulfate (FEROSUL) 325 (65 Fe) MG tablet Take 325 mg by mouth  daily (with breakfast), Historical      gabapentin (NEURONTIN) 300 MG capsule Take 3 capsules (900 mg) by mouth at bedtime, Transitional      glucosamine-chondroitin 500-400 MG CAPS per capsule Take 1 capsule by mouth every morning, Historical      ketoconazole (NIZORAL) 2 % external cream Apply thin layer twice daily as needed for rash on the face.Historical      LORazepam (ATIVAN) 1 MG tablet Take 1 mg by mouth nightly as needed for anxiety., Historical      LUTEIN PO Take 1 tablet by mouth every morning, Historical      naproxen (NAPROSYN) 250 MG tablet Take 250 mg by mouth 2 times daily as needed for moderate pain, Historical      omeprazole (PRILOSEC) 20 MG DR capsule Take 1 capsule by mouth daily before breakfast, Historical      pramipexole (MIRAPEX) 0.125 MG tablet Take 0.25 mg by mouth at bedtime, Historical      predniSONE (DELTASONE) 5 MG tablet Take 5 mg by mouth daily (with lunch), Historical      Saw Palmetto, Serenoa repens, (SAW PALMETTO PO) Take 1 capsule by mouth every morning, Historical      vitamin B-12 (CYANOCOBALAMIN) 500 MCG tablet Take 5,000 mcg by mouth daily (with lunch), Historical      zolpidem (AMBIEN) 10 MG tablet Take 10 mg by mouth nightly as needed for sleep., Historical      Multiple Vitamins-Minerals (MENS 50+ MULTI VITAMIN/MIN PO) Take 1 tablet by mouth every morning, Historical      Omega-3 Fatty Acids (SUPER OMEGA-3) 1000 MG CAPS Take 1,000 mg by mouth every morning, Historical      Psyllium (METAMUCIL 4 IN 1 FIBER) 55.6 % POWD Take 1 Scoop by mouth every morning, Historical      vitamin C (ASCORBIC ACID) 500 MG tablet Take 500 mg by mouth every morning, Historical               Discharge Procedure Orders   Referral Order to Outpatient Physical Therapy   Standing Status: Future   Referral Priority: Routine Referral Type: Rehab Therapy Physical Therapy   Number of Visits Requested: 1     Reason for your hospital stay   Order Comments: Revision right total hip arthroplasty  "    When to call - Contact Surgeon Team   Order Comments: You may experience symptoms that require follow-up before your scheduled appointment. Refer to the \"Stoplight Tool\" in your discharge papers for instructions on when to contact Dr. Dudley's office if you are concerned about pain control, blood clots, constipation, or if you are unable to urinate.  You may also contact Dr. Dudley's team via [employer] messaging.    Regular business hours (Monday - Friday, 8am - 5pm):  [facility]: [phone]    After hours and weekends:  [facility] on call Orthopedic resident: [phone]     When to call - Reach out to Urgent Care   Order Comments: If you are not able to reach Dr. Dudley's office and you need immediate care, go to the Orthopedic Urgent Care at your Surgeon's office.  Do NOT go to the Emergency Room unless you have shortness of breath, chest pain, or other signs of a medical emergency.     When to call - Reasons to Call 911   Order Comments: Call 911 immediately if you experience sudden-onset chest pain, arm weakness/numbness, slurred speech, or shortness of breath     Discharge Instruction - Breathing exercises   Order Comments: Perform breathing exercises using your Incentive Spirometer 10 times per hour while awake for 2 weeks.     Symptoms - Fever Management   Order Comments: A low grade fever can be expected after surgery.  Use acetaminophen (TYLENOL) as needed for fever management.  Contact your Surgeon Team if you have a fever greater than 101.5 F, chills, and/or night sweats.     Symptoms - Constipation management   Order Comments: Constipation (hard, dry bowel movements) is expected after surgery due to the combination of being less active, the anesthetic, and the opioid pain medication.  You can do the following to help reduce constipation:  ~  FLUIDS:  Drink clear liquids (water or Gatorade), or juice (apple/prune).  ~  DIET:  Eat a fiber " rich diet.    ~  ACTIVITY:  Get up and move around several times a day.  Increase your activity as you are able.  MEDICATIONS:  Reduce the risk of constipation by starting medications before you are constipated.  You can take Miralax   (1 packet as directed) and/or a stool softener (Senokot 1-2 tablets 1-2 times a day).  If you already have constipation and these medications are not working, you can get magnesium citrate and use as directed.  If you continue to have constipation you can try an over the counter suppository or enema.  Call your Surgeon Team if it has been greater than 3 days since your last bowel movement.     Symptoms - Reduced Urine Output   Order Comments: Changes in the amount of fluids you drank before and after surgery may result in problems urinating.  It is important to stay well-hydrated after surgery and drink plenty of water. If it has been greater than 8 hours since you have urinated despite drinking plenty of water, call your Surgeon Team.     Medication instructions -  Anticoagulation - aspirin   Order Comments: Take the aspirin as prescribed for a total of four weeks after surgery.  This is given to help minimize your risk of blood clot.     Activity - Exercises to prevent blood clots   Order Comments: Unless otherwise directed by your Surgeon team, perform the following exercises at least three times per day for the first four weeks after surgery to prevent blood clots in your legs: 1) Point and flex your feet (Ankle Pumps), 2) Move your ankle around in big circles, 3) Wiggle your toes, 4) Walk, even for short distances, several times a day, will help decrease the risk of blood clots.     Order Specific Question Answer Comments   Is discharge order? Yes      Comfort and Pain Management - Pain after Surgery   Order Comments: Pain after surgery is normal and expected.  You will have some amount of pain for several weeks after surgery.  Your pain will improve with time.  There are several  "things you can do to help reduce your pain including: rest, ice, elevation, and using pain medications as needed. Contact your Surgeon Team if you have pain that persists or worsens after surgery despite rest, ice, elevation, and taking your medication(s) as prescribed. Contact your Surgeon Team if you have new numbness, tingling, or weakness in your operative extremity.     Comfort and Pain Management - Swelling after Surgery   Order Comments: Swelling and/or bruising of the surgical extremity is common and may persist for several months after surgery. In addition to frequent icing and elevation, gentle compressive support with an ACE wrap or tubigrip may help with swelling. Apply compression regularly, removing at least twice daily to perform skin checks. Contact your Surgeon Team if your swelling increases and is NOT associated with an increase in your activity level, or if your swelling increases and is associated with redness and pain.     Comfort and Pain Management - LOWER Extremity Elevation   Order Comments: Swelling is expected for several months after surgery. This type of swelling is usually associated with gravity and activity, and can be improved with elevation.   The best way to do this is to get your \"toes above your nose\" by laying down and placing several pillows lengthwise under your calf and heel. When elevating your leg keep your knee completely straight. Perform this elevation as often as possible especially for the first two weeks after surgery.     Comfort and Pain Management - Cold therapy   Order Comments: Ice can be used to control swelling and discomfort after surgery. Place a thin towel over your operative site and apply the ice pack overtop. Leave ice pack in place for 20 minutes, then remove for 20 minutes. Repeat this 20 minutes on/20 minutes off routine as often as tolerated.     Medication Instructions - Acetaminophen (TYLENOL) Instructions   Order Comments: You were discharged with " "acetaminophen (TYLENOL) for pain management after surgery. Acetaminophen most effectively manages pain symptoms when it is taken on a schedule without missing doses (every four, six, or eight hours). Your Provider will prescribe a safe daily dose between 3000 - 4000 mg.  Do NOT exceed this daily dose. Most patients use acetaminophen for pain control for the first four weeks after surgery.  You can wean from this medication as your pain decreases.     Medication Instructions - NSAID Instructions   Order Comments: You were discharged with an anti-inflammatory medication for pain management to use in combination with acetaminophen (TYLENOL) and the narcotic pain medication.  Take this medication exactly as directed.  You should only take one anti-inflammatory at a time.  Some common anti-inflammatories include: ibuprofen (ADVIL, MOTRIN), naproxen (ALEVE, NAPROSYN), celecoxib (CELEBREX), meloxicam (MOBIC), ketorolac (TORADOL).  Take this medication with food and water.     Follow Up Care   Order Comments: Follow up with Dr. Dudley or Kaz Bowser PA-C in 4 weeks.     [facility]  [address] [place] [id number]  [phone]     Activity - Total Hip Arthroplasty   Order Comments: Refer to the [facility] \"Your Guide to Total Joint Replacement\" for recommendations on activities and Exercises.     Order Specific Question Answer Comments   Is discharge order? Yes      Return to Driving   Order Comments: Return to driving - Driving is NOT permitted until directed by your provider. Under no circumstance are you permitted to drive while using narcotic pain medications.     Order Specific Question Answer Comments   Is discharge order? Yes      No flexion past 90 degrees   Order Comments: No bending at waist past 90 degrees.     Order Specific Question Answer Comments   Is discharge order? Yes      No internal rotation   Order Comments: No pivoting on your " operative leg.     Order Specific Question Answer Comments   Is discharge order? Yes      No adduction past midline   Order Comments: No crossing your operative leg.     Order Specific Question Answer Comments   Is discharge order? Yes      Weight bearing as tolerated   Order Comments: Weight bearing as tolerated on your operative extremity.     Order Specific Question Answer Comments   Is discharge order? Yes      Dressing / Wound Care - Wound   Order Comments: Remove dressings after 7 days when it is OK to shower. Sponge baths only for 7 days. No submersing the incision in a bath for 4 weeks. OK to cut suture tails at skin after 2 weeks.     Dressing / Wound care - Shower with wound/dressing covered   Order Comments: You must COVER your dressing or incision with saran wrap (or any other non-permeable covering) to allow the incision to remain dry while showering.  You may shower 7 days after surgery when is it OK for the incision to get wet. You are strictly prohibited from soaking   or submerging the surgical wound underwater until your follow up visit.     Dressing / Wound Care - NO Tub Bathing   Order Comments: Tub bathing, swimming, or any other activities that will cause your incision to be submerged in water should be avoided until allowed by your Surgeon.     Opioid Instructions (Greater than or equal to 65 years)   Order Comments: You were discharged with an opioid medication (hydromorphone, oxycodone, hydrocodone, or tramadol). This medication should only be taken for breakthrough pain that is not controlled with acetaminophen (TYLENOL). If you rate your pain less than 3 you do not need this medication. Pain rating 0-3: You do not need this medication Pain rating 4-6: Take 1/2 tablet every 4-6 hours as needed Pain rating 7-10: Take 1 tablet every 4-6 hours as needed Do not exceed 4 tablets per day     Medication Instructions - Opioids - Tapering Instructions   Order Comments: In the first three days  following surgery, your symptoms may warrant use of the narcotic pain medication every four to six hours as prescribed. This is normal. As your pain symptoms improve, focus your efforts on decreasing (tapering) use of narcotic medications. The most successful tapering strategy is to first, decrease the number of tablets you take every 4-6 hours to the minimum prescribed. Then, increase the amount of time between doses. For example: First, taper to   or 1 tablet every 4-6 hours. Then, taper to   or 1 tablet every 6-8 hours. Then, taper to   or 1 tablet every 8-10 hours. Then, taper to   or 1 tablet every 10-12 hours. Then, taper to   or 1 tablet at bedtime. The bedtime dose can help with comfort during sleep and is typically the last dose to be discontinued after surgery.     Toe touch weight bearing   Order Comments: Toe touch weight bearing for 3 months     Order Specific Question Answer Comments   Is discharge order? Yes      Crutches DME   Order Comments: DME Documentation: Describe the reason for need to support medical necessity: Impaired gait status post hip surgery. I, the undersigned, certify that the above prescribed supplies are medically necessary for this patient and is both reasonable and necessary in reference to accepted standards of medical practice in the treatment of this patient's condition and is not prescribed as a convenience.     Order Specific Question Answer Comments   Medical Equipment (DME) Supplier: [employer]    PATIENT INSTRUCTIONS: If you did not receive this ordered item today, please contact [employer] for availability ([place] Locations: [phone], [place]: [phone]).    Crutch Type: Standard    Crutches Add On: NA    Length of Need: Lifetime      Cane DME   Order Comments: DME Documentation: Describe the reason for need to support medical necessity: Impaired gait status post hip surgery. I, the undersigned, certify that the above  prescribed supplies are medically necessary for this patient and is both reasonable and necessary in reference to accepted standards of medical practice in the treatment of this patient's condition and is not prescribed as a convenience.     Order Specific Question Answer Comments   Medical Equipment (DME) Supplier: [employer]    PATIENT INSTRUCTIONS: If you did not receive this ordered item today, please contact [employer] for availability ([facility] Locations: [phone], [place]: [phone]).    Cane Type: Single Tip    Reminder: Patient can typically get 1 every 5 years      Walker DME   Order Comments: DME Documentation: Describe the reason for need to support medical necessity: Impaired gait status post hip surgery. I, the undersigned, certify that the above prescribed supplies are medically necessary for this patient and is both reasonable and necessary in reference to accepted standards of medical practice in the treatment of this patient's condition and is not prescribed as a convenience.     Order Specific Question Answer Comments   Medical Equipment (DME) Supplier: [employer]    PATIENT INSTRUCTIONS: If you did not receive this ordered item today, please contact [employer] for availability ([facility] Locations: [phone], [place]: [phone]).    Walker Type: Standard (2 Wheel)    Accessories: N/A      Discharge Instruction - Regular Diet Adult     Order Specific Question Answer Comments   Is discharge order? Yes          Marcos Paulino MD   Orthopaedic Surgery, PGY4

## 2024-09-06 LAB
BACTERIA TISS BX CULT: NORMAL

## 2024-09-09 LAB — BACTERIA SNV CULT: NO GROWTH

## 2024-09-20 DIAGNOSIS — Z96.649 STATUS POST REVISION OF TOTAL HIP: Primary | ICD-10-CM

## 2024-09-20 NOTE — PROGRESS NOTES
[facility]  Orthopaedic Surgery  by Kaz Bowser PA-C    Kwabena Solorio MRN# [id number]    YOB: 1950     Background history:  DX:  PMR on prednisone     TREATMENTS:  10/9/1979, left knee arthroscpy medial menisectomy, ([place]), [facility]  3/27/07, Right hip arthroplasty, [facility] Hip Resurfacing. Size 46 femoral head, 52-mm, acetabulum, Tobramycin Simplex cement.  ( Keyur Balderrama MD ) [facility]  3/18/2010, L TKA, Jaquan NexGen LPS Flex, 10mm insert, (Keyur Balderrama) [facility]  3/22/2013, Angel/Akin Bunionectomy, bilateral feet (R. Sticha) [facility]  3/10/2014, screw removal right foot (R. Sticha) [facility]  5/2/2014, screw removal left foot, (R. Sticha), [facility]  2/27/2020, Right CMC arthroplasty and Left hand thumb trigger finger release (JAQUI Prasad), [facility]  4/25/2024, R hip aspirate: WBC 21k, PMNs 89%, Culture Enterococcus faecalis, serum Chr 3.7 nl, serum Co 4.2 elevated.  5/7/24, Explant Right surface replacement Total Hip Arthroplasty and vancomycin and tobramycin impregnated cement spacer implantation, (Luis Enrique), [facility]  E Faecalis.  Ampicillin IV x 6 weeks.    8/30/24, Explantation of Right Hip Antibiotic Spacer and Revision Right Total Hip Arthroplasty, (Luis Enrique), [facility]           Assessment and Plan:   Assessment:  74-year-old male who presents approximate 1 month status post explantation of antibiotic spacer with revision right total hip arthroplasty.  Recovering appropriately     Plan:  The incision is healing well so he can now get it wet but should avoid submersion for another 2 weeks.  Continue toe-touch weightbearing until 3 months postop  Posterior precautions until 3 months postop  Okay to work on low resistance strengthening exercises and physical therapy  Cultures reviewed from surgery which are negative for infection  Follow-up 3 months postop with repeat x-rays     Kaz Bowser PA-C  Physician Assistant   Oncology and Adult Reconstructive  Surgery  Dept Orthopaedic Surgery, [facility]             History of Present Illness:   74 year old male who presents the approximate 1 month status post the above-mentioned procedure.  His pain is well-controlled.  Has been compliant with posterior precautions.  Has been compliant with toe-touch weightbearing status.  He is working on strengthening exercises and physical therapy.  He is anxious to resume weightbearing as tolerated.  He is discontinued aspirin for DVT prophylaxis.  He denies any chest pain shortness of breath or calf pain.         Physical Exam:     EXAMINATION pertinent findings:   PSYCH: Pleasant, healthy-appearing, alert, oriented x3, cooperative. Normal mood and affect.  VITAL SIGNS: There were no vitals taken for this visit..  Reviewed nursing intake notes.   There is no height or weight on file to calculate BMI.  RESP: non labored breathing   ABD: benign, soft, non-tender, no acute peritoneal findings  SKIN: grossly normal   LYMPHATIC: grossly normal, no adenopathy, no extremity edema  NEURO: grossly normal , no motor deficits  VASCULAR: satisfactory perfusion of all extremities   MUSCULOSKELETAL:     Right hip: The incision is clean, dry, and intact with no erythema, dehiscence, or discharge.  No peripheral edema.  Calves are soft nontender with negative Homans' sign             Data:   All laboratory data reviewed  All imaging studies reviewed by me     XR AP pelvis lateral right hip on 9/30/2024:  My interpretation: Status post right total hip arthroplasty with cerclage wire placement.  Adequate sizing, fixation and orientation of components.  No appreciable fracture callus formation.  No signs of immediate postoperative complications.     08/30/2024 1524 09/06/2024 1009 Anaerobic Bacterial Culture Routine [[id number]]   Tissue from Hip, Right    Final result Component Value   Culture No anaerobic organisms isolated          08/30/2024 1524 09/06/2024 1008 Anaerobic Bacterial  Culture Routine [[id number]]   Tissue from Hip, Right    Final result Component Value   Culture No anaerobic organisms isolated          08/30/2024 1524 09/06/2024 1009 Anaerobic Bacterial Culture Routine [[id number]]   Tissue from Hip, Right    Final result Component Value   Culture No anaerobic organisms isolated          08/30/2024 1524 09/06/2024 1008 Anaerobic Bacterial Culture Routine [[id number]]   Tissue from Hip, Right    Final result Component Value   Culture No anaerobic organisms isolated          08/30/2024 1524 09/27/2024 0753 Fungal or Yeast Culture Routine [[id number]]   Tissue from Hip, Right    Final result Component Value   Culture No Growth          08/30/2024 1524 09/27/2024 0758 Fungal or Yeast Culture Routine [[id number]]   Tissue from Hip, Right    Final result Component Value   Culture No Growth          08/30/2024 1524 09/27/2024 0753 Fungal or Yeast Culture Routine [[id number]]   Tissue from Hip, Right    Final result Component Value   Culture No Growth          08/30/2024 1524 09/27/2024 0754 Fungal or Yeast Culture Routine [[id number]]   Tissue from Hip, Right    Final result Component Value   Culture No Growth          08/30/2024 1524 09/04/2024 0711 Tissue Aerobic Bacterial Culture Routine [[id number]]   Tissue from Hip, Right    Final result Component Value   Culture No Growth          08/30/2024 1524 09/04/2024 0708 Tissue Aerobic Bacterial Culture Routine [[id number]]   Tissue from Hip, Right    Final result Component Value   Culture No Growth          08/30/2024 1524 09/04/2024 0705 Tissue Aerobic Bacterial Culture Routine [[id number]]   Tissue from Hip, Right    Final result Component Value   Culture No Growth          08/30/2024 1524 09/04/2024 0708 Tissue Aerobic Bacterial Culture Routine [[id number]]   Tissue from Hip, Right    Final result Component Value   Culture No Growth          08/30/2024 1522 09/06/2024 1008 Anaerobic Bacterial Culture Routine  [[id number]]   Tissue from Hip, Right    Final result Component Value   Culture No anaerobic organisms isolated          08/30/2024 1522 09/27/2024 0753 Fungal or Yeast Culture Routine [[id number]]   Tissue from Hip, Right    Final result Component Value   Culture No Growth          08/30/2024 1522 09/04/2024 0711 Tissue Aerobic Bacterial Culture Routine [[id number]]   Tissue from Hip, Right    Final result Culture No Growth          DATA for DOCUMENTATION:         Past Medical History:     Patient Active Problem List   Diagnosis    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)    Infection of prosthetic total hip joint, subsequent encounter    Abnormal blood level of chromium    Abnormality of abdominal aorta    RORY positive    BPH with urinary obstruction    Carcinoma of lip    Cardiac pacemaker    CHB (complete heart block) (H)    CMC arthritis, thumb, degenerative    Primary osteoarthritis of first carpometacarpal joint of right hand    CRP elevated    Current use of steroid medication    Degenerative scoliosis in adult patient    DJD (degenerative joint disease) of knee    Fixation hardware in foot    Hallux valgus, acquired    History of urticaria    Lumbosacral spondylosis with radiculopathy    Lumbar spondylosis with myelopathy    Onychomycosis    Orthopedic device, implant, or graft complication (H)    Osteopenia of neck of left femur    Other hemorrhoids    Pain in both hands    Numbness and tingling of both legs below knees    Paresthesia of both feet    PMR (polymyalgia rheumatica) (H)    Polyneuropathy, unspecified    Primary insomnia    Restless legs    Toxic effect of chromium and its compounds, undetermined, subsequent encounter    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       Past Surgical History:     Past Surgical History:   Procedure Laterality Date    APPENDECTOMY      ARTHROPLASTY REVISION HIP Right 8/30/2024    Procedure: with Revision Right Total Hip Arthroplasty;  Surgeon: Rd Dudley MD;  Location: [facility] OR    AS TOTAL KNEE ARTHROPLASTY Left 2010    CARPAL TUNNEL RELEASE RT/LT      CATARACT EXTRACTION      COLONOSCOPY      EP ICD      FOOT SURGERY Bilateral     HAND SURGERY      HC TOTAL HIP ARTHROPLASTY Right 2007    HEMORRHOIDECTOMY      HERNIA REPAIR      IR CVC TUNNEL PLACEMENT > 5 YRS OF AGE  5/10/2024    IRRIGATION AND DEBRIDEMENT HIP, PLACE ANTIBIOTIC CEMENT BEADS / SPACER Right 5/7/2024    Procedure: vancomycin and tobramycin impregnated cement spacer implantation;  Surgeon: Rd Dudley MD;  Location: [facility] OR    NASAL FRACTURE SURGERY      REMOVE ANTIBIOTIC CEMENT BEADS / SPACER HIP Right 8/30/2024    Procedure: Explantation of Right Hip Antibiotic Spacer;  Surgeon: Rd Dudley MD;  Location: [facility] OR    REMOVE HARDWARE ARTHROPLASTY HIP Right 5/7/2024    Procedure: Explant Right surface replacement Total Hip Arthroplasty ,;  Surgeon: Rd Dudley MD;  Location: [facility] OR    TONSILLECTOMY & ADENOIDECTOMY      urinary tract surgery              Social History:     Social History     Socioeconomic History    Marital status: Single     Spouse name: Not on file    Number of children: 0    Years of education: Not on file    Highest education level: Not on file   Occupational History    Occupation: retired   Tobacco Use    Smoking status: Never     Passive exposure: Past    Smokeless tobacco: Never   Substance and Sexual Activity    Alcohol use: Not Currently     Comment: quit 1990's    Drug use: Never    Sexual activity: Not on file   Other Topics Concern    Not on file   Social History Narrative    Not on file     Social Determinants of Health      Financial Resource Strain: Not on file   Food Insecurity: Not on file   Transportation Needs: Not on file   Physical Activity: Not on file   Stress: Not on file   Social Connections: Not on file   Interpersonal Safety: Low Risk  (8/30/2024)    Interpersonal Safety     Do you feel physically and emotionally safe where you currently live?: Yes     Within the past 12 months, have you been hit, slapped, kicked or otherwise physically hurt by someone?: No     Within the past 12 months, have you been humiliated or emotionally abused in other ways by your partner or ex-partner?: No   Housing Stability: Not on file            Family History:       Family History   Problem Relation Age of Onset    Anesthesia Reaction No family hx of     Deep Vein Thrombosis (DVT) No family hx of             Medications:     Current Outpatient Medications   Medication Sig Dispense Refill    acetaminophen (TYLENOL) 325 MG tablet Take 2 tablets (650 mg) by mouth every 4 hours as needed for other (mild pain). 100 tablet 0    amoxicillin (AMOXIL) 500 MG capsule Take 2,000 mg by mouth once Prior to dental appointments      aspirin 81 MG EC tablet Take 2 tablets (162 mg) by mouth daily. 60 tablet 0    bisacodyl (DULCOLAX) 10 MG suppository Place 1 suppository (10 mg) rectally daily as needed for constipation. 15 suppository 0    calcium-magnesium (CALMAG) 500-250 MG TABS per tablet Take 1 tablet by mouth every morning With ZINC      cefadroxil (DURICEF) 500 MG capsule Take 1 capsule (500 mg) by mouth 2 times daily. 28 capsule 0    Cholecalciferol (D 1000) 25 MCG (1000 UT) CAPS Take 1,000 Units by mouth daily (with lunch)      diclofenac (VOLTAREN) 1 % topical gel Apply 2 g topically 2 times daily as needed for moderate pain Applies before activity to right foot or in the evening to tingling hands      diphenhydrAMINE-zinc acetate (BENADRYL) 2-0.1 % external cream Apply topically 3 times daily as needed for itching      DOCUSATE CALCIUM PO Take  1 tablet by mouth every morning      ferrous sulfate (FEROSUL) 325 (65 Fe) MG tablet Take 325 mg by mouth daily (with breakfast)      gabapentin (NEURONTIN) 300 MG capsule Take 3 capsules (900 mg) by mouth at bedtime      glucosamine-chondroitin 500-400 MG CAPS per capsule Take 1 capsule by mouth every morning      ketoconazole (NIZORAL) 2 % external cream Apply thin layer twice daily as needed for rash on the face.      LORazepam (ATIVAN) 1 MG tablet Take 1 mg by mouth nightly as needed for anxiety.      LUTEIN PO Take 1 tablet by mouth every morning      methocarbamol (ROBAXIN) 500 MG tablet Take 1 tablet (500 mg) by mouth every 6 hours as needed for muscle spasms. 30 tablet 0    Multiple Vitamins-Minerals (MENS 50+ MULTI VITAMIN/MIN PO) Take 1 tablet by mouth every morning      naproxen (NAPROSYN) 250 MG tablet Take 250 mg by mouth 2 times daily as needed for moderate pain      Omega-3 Fatty Acids (SUPER OMEGA-3) 1000 MG CAPS Take 1,000 mg by mouth every morning      omeprazole (PRILOSEC) 20 MG DR capsule Take 1 capsule by mouth daily before breakfast      pantoprazole (PROTONIX) 40 MG EC tablet Take 1 tablet (40 mg) by mouth every morning (before breakfast). 30 tablet 0    pramipexole (MIRAPEX) 0.125 MG tablet Take 0.25 mg by mouth at bedtime      predniSONE (DELTASONE) 5 MG tablet Take 5 mg by mouth daily (with lunch)      Psyllium (METAMUCIL 4 IN 1 FIBER) 55.6 % POWD Take 1 Scoop by mouth every morning      Saw Palmetto, Serenoa repens, (SAW PALMETTO PO) Take 1 capsule by mouth every morning      vitamin B-12 (CYANOCOBALAMIN) 500 MCG tablet Take 5,000 mcg by mouth daily (with lunch)      vitamin C (ASCORBIC ACID) 500 MG tablet Take 500 mg by mouth every morning      zolpidem (AMBIEN) 10 MG tablet Take 10 mg by mouth nightly as needed for sleep.      hydrOXYzine HCl (ATARAX) 10 MG tablet Take 1 tablet (10 mg) by mouth every 6 hours as needed for other (adjuvant pain). (Patient not taking: Reported on 9/30/2024)  30 tablet 0    oxyCODONE (ROXICODONE) 5 MG tablet Take 1-2 tablets (5-10 mg) by mouth every 4 hours as needed for moderate to severe pain. (Patient not taking: Reported on 9/30/2024) 26 tablet 0    polyethylene glycol (MIRALAX) 17 g packet Take 17 g by mouth daily. (Patient not taking: Reported on 9/30/2024) 7 packet 0    polyethylene glycol (MIRALAX) 17 GM/Dose powder Take 17 g by mouth daily. (Patient not taking: Reported on 9/30/2024) 510 g 0    senna-docusate (SENOKOT-S/PERICOLACE) 8.6-50 MG tablet Take 1-2 tablets by mouth 2 times daily. Take while on oral narcotics to prevent or treat constipation. (Patient not taking: Reported on 9/30/2024) 30 tablet 0     No current facility-administered medications for this visit.              Review of Systems:   A comprehensive 10 point review of systems (constitutional, ENT, cardiac, peripheral vascular, lymphatic, respiratory, GI, , Musculoskeletal, skin, Neurological) was performed and found to be negative except as described in this note.

## 2024-09-27 LAB
BACTERIA TISS BX CULT: NO GROWTH

## 2024-09-30 ENCOUNTER — ANCILLARY PROCEDURE (OUTPATIENT)
Dept: GENERAL RADIOLOGY | Facility: CLINIC | Age: 74
End: 2024-09-30
Attending: PHYSICIAN ASSISTANT
Payer: COMMERCIAL

## 2024-09-30 ENCOUNTER — OFFICE VISIT (OUTPATIENT)
Dept: ORTHOPEDICS | Facility: CLINIC | Age: 74
End: 2024-09-30
Payer: COMMERCIAL

## 2024-09-30 DIAGNOSIS — Z96.649 STATUS POST REVISION OF TOTAL HIP: Primary | ICD-10-CM

## 2024-09-30 DIAGNOSIS — Z96.649 STATUS POST REVISION OF TOTAL HIP: ICD-10-CM

## 2024-09-30 PROCEDURE — 99024 POSTOP FOLLOW-UP VISIT: CPT | Performed by: PHYSICIAN ASSISTANT

## 2024-09-30 PROCEDURE — 73502 X-RAY EXAM HIP UNI 2-3 VIEWS: CPT | Mod: RT | Performed by: RADIOLOGY

## 2024-09-30 ASSESSMENT — HOOS JR: HOOS JR TOTAL INTERVAL SCORE: 100

## 2024-09-30 NOTE — LETTER
9/30/2024      Kwabena Solorio  [address]  [address]  [facility] [address]      Dear Colleague,    Thank you for referring your patient, Kwabena Solorio, to the [facility]. Please see a copy of my visit note below.          [facility]  Orthopaedic Surgery  by Kaz Bowser PA-C    Kwabena Solorio MRN# [id number]    YOB: 1950     Background history:  DX:  PMR on prednisone     TREATMENTS:  10/9/1979, left knee arthroscpy medial menisectomy, (Oly), [facility]  3/27/07, Right hip arthroplasty, Warren Hip Resurfacing. Size 46 femoral head, 52-mm, acetabulum, Tobramycin Simplex cement.  ( Keyur Balderrama MD ) [facility]  3/18/2010, L TKA, Jaquan NexGen LPS Flex, 10mm insert, (Keyur Balderrama) [facility]  3/22/2013, Angel/Akin Bunionectomy, bilateral feet (R. Sticha) [facility]  3/10/2014, screw removal right foot (R. Sticha) [facility]  5/2/2014, screw removal left foot, (R. Sticha), [facility]  2/27/2020, Right CMC arthroplasty and Left hand thumb trigger finger release (JAQUI Prasad), [facility]  4/25/2024, R hip aspirate: WBC 21k, PMNs 89%, Culture Enterococcus faecalis, serum Chr 3.7 nl, serum Co 4.2 elevated.  5/7/24, Explant Right surface replacement Total Hip Arthroplasty and vancomycin and tobramycin impregnated cement spacer implantation, (Luis Enrique) [facility]  E Faecalis.  Ampicillin IV x 6 weeks.    8/30/24, Explantation of Right Hip Antibiotic Spacer and Revision Right Total Hip Arthroplasty, (Luis Enrique), [facility]           Assessment and Plan:   Assessment:  74-year-old male who presents approximate 1 month status post explantation of antibiotic spacer with revision right total hip arthroplasty.  Recovering appropriately     Plan:  The incision is healing well so he can now get it wet but should avoid submersion for another 2 weeks.  Continue toe-touch weightbearing until 3 months postop  Posterior precautions until 3 months postop  Okay to work on low  resistance strengthening exercises and physical therapy  Cultures reviewed from surgery which are negative for infection  Follow-up 3 months postop with repeat x-rays     Kaz Bowser PA-C  Physician Assistant   Oncology and Adult Reconstructive Surgery  Dept Orthopaedic Surgery, [facility]             History of Present Illness:   74 year old male who presents the approximate 1 month status post the above-mentioned procedure.  His pain is well-controlled.  Has been compliant with posterior precautions.  Has been compliant with toe-touch weightbearing status.  He is working on strengthening exercises and physical therapy.  He is anxious to resume weightbearing as tolerated.  He is discontinued aspirin for DVT prophylaxis.  He denies any chest pain shortness of breath or calf pain.         Physical Exam:     EXAMINATION pertinent findings:   PSYCH: Pleasant, healthy-appearing, alert, oriented x3, cooperative. Normal mood and affect.  VITAL SIGNS: There were no vitals taken for this visit..  Reviewed nursing intake notes.   There is no height or weight on file to calculate BMI.  RESP: non labored breathing   ABD: benign, soft, non-tender, no acute peritoneal findings  SKIN: grossly normal   LYMPHATIC: grossly normal, no adenopathy, no extremity edema  NEURO: grossly normal , no motor deficits  VASCULAR: satisfactory perfusion of all extremities   MUSCULOSKELETAL:     Right hip: The incision is clean, dry, and intact with no erythema, dehiscence, or discharge.  No peripheral edema.  Calves are soft nontender with negative Homans' sign             Data:   All laboratory data reviewed  All imaging studies reviewed by me     XR AP pelvis lateral right hip on 9/30/2024:  My interpretation: Status post right total hip arthroplasty with cerclage wire placement.  Adequate sizing, fixation and orientation of components.  No appreciable fracture callus formation.  No signs of immediate postoperative complications.      08/30/2024 1524 09/06/2024 1009 Anaerobic Bacterial Culture Routine [[id number]]   Tissue from Hip, Right    Final result Component Value   Culture No anaerobic organisms isolated          08/30/2024 1524 09/06/2024 1008 Anaerobic Bacterial Culture Routine [[id number]]   Tissue from Hip, Right    Final result Component Value   Culture No anaerobic organisms isolated          08/30/2024 1524 09/06/2024 1009 Anaerobic Bacterial Culture Routine [[id number]]   Tissue from Hip, Right    Final result Component Value   Culture No anaerobic organisms isolated          08/30/2024 1524 09/06/2024 1008 Anaerobic Bacterial Culture Routine [[id number]]   Tissue from Hip, Right    Final result Component Value   Culture No anaerobic organisms isolated          08/30/2024 1524 09/27/2024 0753 Fungal or Yeast Culture Routine [[id number]]   Tissue from Hip, Right    Final result Component Value   Culture No Growth          08/30/2024 1524 09/27/2024 0758 Fungal or Yeast Culture Routine [[id number]]   Tissue from Hip, Right    Final result Component Value   Culture No Growth          08/30/2024 1524 09/27/2024 0753 Fungal or Yeast Culture Routine [[id number]]   Tissue from Hip, Right    Final result Component Value   Culture No Growth          08/30/2024 1524 09/27/2024 0754 Fungal or Yeast Culture Routine [[id number]]   Tissue from Hip, Right    Final result Component Value   Culture No Growth          08/30/2024 1524 09/04/2024 0711 Tissue Aerobic Bacterial Culture Routine [[id number]]   Tissue from Hip, Right    Final result Component Value   Culture No Growth          08/30/2024 1524 09/04/2024 0708 Tissue Aerobic Bacterial Culture Routine [[id number]]   Tissue from Hip, Right    Final result Component Value   Culture No Growth          08/30/2024 1524 09/04/2024 0705 Tissue Aerobic Bacterial Culture Routine [[id number]]   Tissue from Hip, Right    Final result Component Value   Culture No Growth           08/30/2024 1524 09/04/2024 0708 Tissue Aerobic Bacterial Culture Routine [[id number]]   Tissue from Hip, Right    Final result Component Value   Culture No Growth          08/30/2024 1522 09/06/2024 1008 Anaerobic Bacterial Culture Routine [[id number]]   Tissue from Hip, Right    Final result Component Value   Culture No anaerobic organisms isolated          08/30/2024 1522 09/27/2024 0753 Fungal or Yeast Culture Routine [[id number]]   Tissue from Hip, Right    Final result Component Value   Culture No Growth          08/30/2024 1522 09/04/2024 0711 Tissue Aerobic Bacterial Culture Routine [[id number]]   Tissue from Hip, Right    Final result Culture No Growth          DATA for DOCUMENTATION:         Past Medical History:     Patient Active Problem List   Diagnosis     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)     Infection of prosthetic total hip joint, subsequent encounter     Abnormal blood level of chromium     Abnormality of abdominal aorta     RORY positive     BPH with urinary obstruction     Carcinoma of lip     Cardiac pacemaker     CHB (complete heart block) (H)     CMC arthritis, thumb, degenerative     Primary osteoarthritis of first carpometacarpal joint of right hand     CRP elevated     Current use of steroid medication     Degenerative scoliosis in adult patient     DJD (degenerative joint disease) of knee     Fixation hardware in foot     Hallux valgus, acquired     History of urticaria     Lumbosacral spondylosis with radiculopathy     Lumbar spondylosis with myelopathy     Onychomycosis     Orthopedic device, implant, or graft complication (H)     Osteopenia of neck of left femur     Other hemorrhoids     Pain in both hands     Numbness and tingling of both legs below knees     Paresthesia of both feet     PMR (polymyalgia rheumatica) (H)     Polyneuropathy, unspecified     Primary insomnia     Restless legs     Toxic effect of chromium and its compounds, undetermined, subsequent  encounter     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       Past Surgical History:     Past Surgical History:   Procedure Laterality Date     APPENDECTOMY       ARTHROPLASTY REVISION HIP Right 8/30/2024    Procedure: with Revision Right Total Hip Arthroplasty;  Surgeon: Rd Dudley MD;  [facility]     AS TOTAL KNEE ARTHROPLASTY Left 2010     CARPAL TUNNEL RELEASE RT/LT       CATARACT EXTRACTION       COLONOSCOPY       EP ICD       FOOT SURGERY Bilateral      HAND SURGERY       HC TOTAL HIP ARTHROPLASTY Right 2007     HEMORRHOIDECTOMY       HERNIA REPAIR       IR CVC TUNNEL PLACEMENT > 5 YRS OF AGE  5/10/2024     IRRIGATION AND DEBRIDEMENT HIP, PLACE ANTIBIOTIC CEMENT BEADS / SPACER Right 5/7/2024    Procedure: vancomycin and tobramycin impregnated cement spacer implantation;  Surgeon: Rd Dudley MD;  [facility]     NASAL FRACTURE SURGERY       REMOVE ANTIBIOTIC CEMENT BEADS / SPACER HIP Right 8/30/2024    Procedure: Explantation of Right Hip Antibiotic Spacer;  Surgeon: Rd Dudley MD;  [facility]     REMOVE HARDWARE ARTHROPLASTY HIP Right 5/7/2024    Procedure: Explant Right surface replacement Total Hip Arthroplasty ,;  Surgeon: Rd Dudley MD;  [facility]     TONSILLECTOMY & ADENOIDECTOMY       urinary tract surgery              Social History:     Social History     Socioeconomic History     Marital status: Single     Spouse name: Not on file     Number of children: 0     Years of education: Not on file     Highest education level: Not on file   Occupational History     Occupation: retired   Tobacco Use     Smoking status: Never      Passive exposure: Past     Smokeless tobacco: Never   Substance and Sexual Activity     Alcohol use: Not Currently     Comment: quit 1990's     Drug use: Never     Sexual activity: Not on file   Other Topics Concern     Not on file   Social History Narrative     Not on file     Social Determinants of Health     Financial Resource Strain: Not on file   Food Insecurity: Not on file   Transportation Needs: Not on file   Physical Activity: Not on file   Stress: Not on file   Social Connections: Not on file   Interpersonal Safety: Low Risk  (8/30/2024)    Interpersonal Safety      Do you feel physically and emotionally safe where you currently live?: Yes      Within the past 12 months, have you been hit, slapped, kicked or otherwise physically hurt by someone?: No      Within the past 12 months, have you been humiliated or emotionally abused in other ways by your partner or ex-partner?: No   Housing Stability: Not on file            Family History:       Family History   Problem Relation Age of Onset     Anesthesia Reaction No family hx of      Deep Vein Thrombosis (DVT) No family hx of             Medications:     Current Outpatient Medications   Medication Sig Dispense Refill     acetaminophen (TYLENOL) 325 MG tablet Take 2 tablets (650 mg) by mouth every 4 hours as needed for other (mild pain). 100 tablet 0     amoxicillin (AMOXIL) 500 MG capsule Take 2,000 mg by mouth once Prior to dental appointments       aspirin 81 MG EC tablet Take 2 tablets (162 mg) by mouth daily. 60 tablet 0     bisacodyl (DULCOLAX) 10 MG suppository Place 1 suppository (10 mg) rectally daily as needed for constipation. 15 suppository 0     calcium-magnesium (CALMAG) 500-250 MG TABS per tablet Take 1 tablet by mouth every morning With ZINC       cefadroxil (DURICEF) 500 MG capsule Take 1 capsule (500 mg) by mouth 2 times daily. 28 capsule 0     Cholecalciferol (D 1000) 25 MCG (1000 UT) CAPS Take 1,000 Units by mouth daily (with lunch)        diclofenac (VOLTAREN) 1 % topical gel Apply 2 g topically 2 times daily as needed for moderate pain Applies before activity to right foot or in the evening to tingling hands       diphenhydrAMINE-zinc acetate (BENADRYL) 2-0.1 % external cream Apply topically 3 times daily as needed for itching       DOCUSATE CALCIUM PO Take 1 tablet by mouth every morning       ferrous sulfate (FEROSUL) 325 (65 Fe) MG tablet Take 325 mg by mouth daily (with breakfast)       gabapentin (NEURONTIN) 300 MG capsule Take 3 capsules (900 mg) by mouth at bedtime       glucosamine-chondroitin 500-400 MG CAPS per capsule Take 1 capsule by mouth every morning       ketoconazole (NIZORAL) 2 % external cream Apply thin layer twice daily as needed for rash on the face.       LORazepam (ATIVAN) 1 MG tablet Take 1 mg by mouth nightly as needed for anxiety.       LUTEIN PO Take 1 tablet by mouth every morning       methocarbamol (ROBAXIN) 500 MG tablet Take 1 tablet (500 mg) by mouth every 6 hours as needed for muscle spasms. 30 tablet 0     Multiple Vitamins-Minerals (MENS 50+ MULTI VITAMIN/MIN PO) Take 1 tablet by mouth every morning       naproxen (NAPROSYN) 250 MG tablet Take 250 mg by mouth 2 times daily as needed for moderate pain       Omega-3 Fatty Acids (SUPER OMEGA-3) 1000 MG CAPS Take 1,000 mg by mouth every morning       omeprazole (PRILOSEC) 20 MG DR capsule Take 1 capsule by mouth daily before breakfast       pantoprazole (PROTONIX) 40 MG EC tablet Take 1 tablet (40 mg) by mouth every morning (before breakfast). 30 tablet 0     pramipexole (MIRAPEX) 0.125 MG tablet Take 0.25 mg by mouth at bedtime       predniSONE (DELTASONE) 5 MG tablet Take 5 mg by mouth daily (with lunch)       Psyllium (METAMUCIL 4 IN 1 FIBER) 55.6 % POWD Take 1 Scoop by mouth every morning       Saw Palmetto, Serenoa repens, (SAW PALMETTO PO) Take 1 capsule by mouth every morning       vitamin B-12 (CYANOCOBALAMIN) 500 MCG tablet Take 5,000 mcg by mouth daily  (with lunch)       vitamin C (ASCORBIC ACID) 500 MG tablet Take 500 mg by mouth every morning       zolpidem (AMBIEN) 10 MG tablet Take 10 mg by mouth nightly as needed for sleep.       hydrOXYzine HCl (ATARAX) 10 MG tablet Take 1 tablet (10 mg) by mouth every 6 hours as needed for other (adjuvant pain). (Patient not taking: Reported on 9/30/2024) 30 tablet 0     oxyCODONE (ROXICODONE) 5 MG tablet Take 1-2 tablets (5-10 mg) by mouth every 4 hours as needed for moderate to severe pain. (Patient not taking: Reported on 9/30/2024) 26 tablet 0     polyethylene glycol (MIRALAX) 17 g packet Take 17 g by mouth daily. (Patient not taking: Reported on 9/30/2024) 7 packet 0     polyethylene glycol (MIRALAX) 17 GM/Dose powder Take 17 g by mouth daily. (Patient not taking: Reported on 9/30/2024) 510 g 0     senna-docusate (SENOKOT-S/PERICOLACE) 8.6-50 MG tablet Take 1-2 tablets by mouth 2 times daily. Take while on oral narcotics to prevent or treat constipation. (Patient not taking: Reported on 9/30/2024) 30 tablet 0     No current facility-administered medications for this visit.              Review of Systems:   A comprehensive 10 point review of systems (constitutional, ENT, cardiac, peripheral vascular, lymphatic, respiratory, GI, , Musculoskeletal, skin, Neurological) was performed and found to be negative except as described in this note.                           Again, thank you for allowing me to participate in the care of your patient.        Sincerely,        Kaz Bowser PA-C

## 2024-10-13 ENCOUNTER — APPOINTMENT (OUTPATIENT)
Dept: GENERAL RADIOLOGY | Facility: CLINIC | Age: 74
End: 2024-10-13
Attending: FAMILY MEDICINE
Payer: COMMERCIAL

## 2024-10-13 ENCOUNTER — TRANSFERRED RECORDS (OUTPATIENT)
Dept: HEALTH INFORMATION MANAGEMENT | Facility: CLINIC | Age: 74
End: 2024-10-13

## 2024-10-13 ENCOUNTER — HOSPITAL ENCOUNTER (EMERGENCY)
Facility: CLINIC | Age: 74
Discharge: HOME | End: 2024-10-13
Attending: EMERGENCY MEDICINE | Admitting: EMERGENCY MEDICINE
Payer: COMMERCIAL

## 2024-10-13 VITALS
OXYGEN SATURATION: 96 % | BODY MASS INDEX: 28.89 KG/M2 | WEIGHT: 173.6 LBS | RESPIRATION RATE: 12 BRPM | HEART RATE: 54 BPM | SYSTOLIC BLOOD PRESSURE: 106 MMHG | TEMPERATURE: 98.1 F | DIASTOLIC BLOOD PRESSURE: 67 MMHG

## 2024-10-13 DIAGNOSIS — S73.004A DISLOCATION OF RIGHT HIP, INITIAL ENCOUNTER (H): ICD-10-CM

## 2024-10-13 DIAGNOSIS — Z96.649 STATUS POST REVISION OF TOTAL HIP: ICD-10-CM

## 2024-10-13 LAB
ATRIAL RATE - MUSE: 52 BPM
DIASTOLIC BLOOD PRESSURE - MUSE: NORMAL MMHG
INTERPRETATION ECG - MUSE: NORMAL
P AXIS - MUSE: 33 DEGREES
PR INTERVAL - MUSE: 210 MS
QRS DURATION - MUSE: 198 MS
QT - MUSE: 518 MS
QTC - MUSE: 481 MS
R AXIS - MUSE: -74 DEGREES
SYSTOLIC BLOOD PRESSURE - MUSE: NORMAL MMHG
T AXIS - MUSE: 89 DEGREES
VENTRICULAR RATE- MUSE: 52 BPM

## 2024-10-13 PROCEDURE — 258N000003 HC RX IP 258 OP 636: Performed by: EMERGENCY MEDICINE

## 2024-10-13 PROCEDURE — 93010 ELECTROCARDIOGRAM REPORT: CPT | Performed by: EMERGENCY MEDICINE

## 2024-10-13 PROCEDURE — 93005 ELECTROCARDIOGRAM TRACING: CPT | Performed by: EMERGENCY MEDICINE

## 2024-10-13 PROCEDURE — 999N000065 XR PELVIS AND HIP PORTABLE RIGHT 2 VIEWS

## 2024-10-13 PROCEDURE — 250N000011 HC RX IP 250 OP 636: Performed by: EMERGENCY MEDICINE

## 2024-10-13 PROCEDURE — 99285 EMERGENCY DEPT VISIT HI MDM: CPT | Performed by: EMERGENCY MEDICINE

## 2024-10-13 PROCEDURE — 99285 EMERGENCY DEPT VISIT HI MDM: CPT | Mod: 25 | Performed by: EMERGENCY MEDICINE

## 2024-10-13 RX ORDER — PROPOFOL 10 MG/ML
0.25 INJECTION, EMULSION INTRAVENOUS
Status: DISCONTINUED | OUTPATIENT
Start: 2024-10-13 | End: 2024-10-13 | Stop reason: HOSPADM

## 2024-10-13 RX ORDER — ONDANSETRON 2 MG/ML
4 INJECTION INTRAMUSCULAR; INTRAVENOUS ONCE
Status: COMPLETED | OUTPATIENT
Start: 2024-10-13 | End: 2024-10-13

## 2024-10-13 RX ORDER — PROPOFOL 10 MG/ML
1 INJECTION, EMULSION INTRAVENOUS ONCE
Status: COMPLETED | OUTPATIENT
Start: 2024-10-13 | End: 2024-10-13

## 2024-10-13 RX ORDER — PROPOFOL 10 MG/ML
INJECTION, EMULSION INTRAVENOUS DAILY PRN
Status: COMPLETED | OUTPATIENT
Start: 2024-10-13 | End: 2024-10-13

## 2024-10-13 RX ORDER — FLUMAZENIL 0.1 MG/ML
0.2 INJECTION, SOLUTION INTRAVENOUS
Status: DISCONTINUED | OUTPATIENT
Start: 2024-10-13 | End: 2024-10-13 | Stop reason: HOSPADM

## 2024-10-13 RX ORDER — SODIUM CHLORIDE 9 MG/ML
INJECTION, SOLUTION INTRAVENOUS CONTINUOUS PRN
Status: COMPLETED | OUTPATIENT
Start: 2024-10-13 | End: 2024-10-13

## 2024-10-13 RX ADMIN — PROPOFOL 80 MG: 10 INJECTION, EMULSION INTRAVENOUS at 09:10

## 2024-10-13 RX ADMIN — SODIUM CHLORIDE 125 ML/HR: 0.9 INJECTION, SOLUTION INTRAVENOUS at 09:02

## 2024-10-13 RX ADMIN — ONDANSETRON 4 MG: 2 INJECTION INTRAMUSCULAR; INTRAVENOUS at 10:43

## 2024-10-13 RX ADMIN — PROPOFOL 60 MG: 10 INJECTION, EMULSION INTRAVENOUS at 08:59

## 2024-10-13 RX ADMIN — PROPOFOL 20 MG: 10 INJECTION, EMULSION INTRAVENOUS at 09:01

## 2024-10-13 ASSESSMENT — ACTIVITIES OF DAILY LIVING (ADL)
ADLS_ACUITY_SCORE: 38
ADLS_ACUITY_SCORE: 38
ADLS_ACUITY_SCORE: 36
ADLS_ACUITY_SCORE: 38

## 2024-10-13 NOTE — ED PROVIDER NOTES
ED Provider Note  [facility]      History   CC: Hip dislocation    HPI  Kwabena Solorio is a 74 year old male who has a past medical history of right total hip arthroplasty with revision on August 30, 2024 presenting with dislocation of his hip.  He says he was stretching and felt it pop out of place.  Denies any falls or trauma.  The hip was dislocated about 10:30 PM last night.  No fevers.  He is otherwise been feeling well.  He presented to an outside emergency department where he was sedated and attempt was made to reduce the hip but was unsuccessful.  He did become hypoxic with sedation there, he was given etomidate.  See outside physician contacted the orthopedic surgeon on-call and decision was made to send him here to see orthopedic surgery.    Past Medical History  Past Medical History:   Diagnosis Date    BPH (benign prostatic hyperplasia)     Cardiac pacemaker in situ     Complete heart block (H)     Hydronephrosis, right     Lumbar radiculopathy     Lumbar scoliosis     Restless legs syndrome (RLS)      Past Surgical History:   Procedure Laterality Date    APPENDECTOMY      ARTHROPLASTY REVISION HIP Right 8/30/2024    Procedure: with Revision Right Total Hip Arthroplasty;  Surgeon: Rd Dudley MD;  [facility]    AS TOTAL KNEE ARTHROPLASTY Left 2010    CARPAL TUNNEL RELEASE RT/LT      CATARACT EXTRACTION      COLONOSCOPY      EP ICD      FOOT SURGERY Bilateral     HAND SURGERY      HC TOTAL HIP ARTHROPLASTY Right 2007    HEMORRHOIDECTOMY      HERNIA REPAIR      IR CVC TUNNEL PLACEMENT > 5 YRS OF AGE  5/10/2024    IRRIGATION AND DEBRIDEMENT HIP, PLACE ANTIBIOTIC CEMENT BEADS / SPACER Right 5/7/2024    Procedure: vancomycin and tobramycin impregnated cement spacer implantation;  Surgeon: Rd Dudley MD;  [facility]    NASAL FRACTURE SURGERY      REMOVE ANTIBIOTIC CEMENT BEADS / SPACER HIP Right 8/30/2024    Procedure: Explantation of Right Hip Antibiotic  Spacer;  Surgeon: Rd Dudley MD;  [facility]    REMOVE HARDWARE ARTHROPLASTY HIP Right 5/7/2024    Procedure: Explant Right surface replacement Total Hip Arthroplasty ,;  Surgeon: Rd Dudley MD;  [facility]    TONSILLECTOMY & ADENOIDECTOMY      urinary tract surgery       acetaminophen (TYLENOL) 325 MG tablet  amoxicillin (AMOXIL) 500 MG capsule  aspirin 81 MG EC tablet  bisacodyl (DULCOLAX) 10 MG suppository  calcium-magnesium (CALMAG) 500-250 MG TABS per tablet  cefadroxil (DURICEF) 500 MG capsule  Cholecalciferol (D 1000) 25 MCG (1000 UT) CAPS  diclofenac (VOLTAREN) 1 % topical gel  diphenhydrAMINE-zinc acetate (BENADRYL) 2-0.1 % external cream  DOCUSATE CALCIUM PO  ferrous sulfate (FEROSUL) 325 (65 Fe) MG tablet  gabapentin (NEURONTIN) 300 MG capsule  glucosamine-chondroitin 500-400 MG CAPS per capsule  hydrOXYzine HCl (ATARAX) 10 MG tablet  ketoconazole (NIZORAL) 2 % external cream  LORazepam (ATIVAN) 1 MG tablet  LUTEIN PO  methocarbamol (ROBAXIN) 500 MG tablet  Multiple Vitamins-Minerals (MENS 50+ MULTI VITAMIN/MIN PO)  naproxen (NAPROSYN) 250 MG tablet  Omega-3 Fatty Acids (SUPER OMEGA-3) 1000 MG CAPS  omeprazole (PRILOSEC) 20 MG DR capsule  oxyCODONE (ROXICODONE) 5 MG tablet  pantoprazole (PROTONIX) 40 MG EC tablet  polyethylene glycol (MIRALAX) 17 g packet  polyethylene glycol (MIRALAX) 17 GM/Dose powder  pramipexole (MIRAPEX) 0.125 MG tablet  predniSONE (DELTASONE) 5 MG tablet  Psyllium (METAMUCIL 4 IN 1 FIBER) 55.6 % POWD  Saw Palmetto, Serenoa repens, (SAW PALMETTO PO)  senna-docusate (SENOKOT-S/PERICOLACE) 8.6-50 MG tablet  vitamin B-12 (CYANOCOBALAMIN) 500 MCG tablet  vitamin C (ASCORBIC ACID) 500 MG tablet  zolpidem (AMBIEN) 10 MG tablet      No Known Allergies  Family History  Family History   Problem Relation Age of Onset    Anesthesia Reaction No family hx of     Deep Vein Thrombosis (DVT) No family hx of      Social History   Social History     Tobacco Use    Smoking status:  Never     Passive exposure: Past    Smokeless tobacco: Never   Substance Use Topics    Alcohol use: Not Currently     Comment: quit 1990's    Drug use: Never      A medically appropriate review of systems was performed with pertinent positives and negatives noted in the HPI, and all other systems negative.    Physical Exam      Physical Exam  Physical Exam   Constitutional: oriented to person, place, and time. appears well-developed and well-nourished.   HENT:   Head: Normocephalic and atraumatic.   Neck: Normal range of motion.   Pulmonary/Chest: Effort normal. No respiratory distress.   Cardiac: No murmurs, rubs, gallops. RRR.  Abdominal: Abdomen soft, nontender, nondistended. No rebound tenderness.  MSK: Right leg shortened, internally rotated, pedal pulses 2+ and symmetric.  Feet are warm and well-perfused.  Neurological: alert and oriented to person, place, and time.  Sensation gross intact over the lower extremities.  Skin: Skin is warm and dry.   Psychiatric:  normal mood and affect.  behavior is normal. Thought content normal.       ED Course, Procedures, & Data      [facility]    Procedure: Sedation    Date/Time: 10/13/2024 9:13 AM    Performed by: Zeyad Stone MD  Authorized by: Zeyad Stone MD    Risks, benefits and alternatives discussed.      PROCEDURE  Describe Procedure: Patient was sedated with propofol.  Initial bolus 60 mg followed by 20 mg bolus.  Vitals remained stable throughout.  Patient woke up without complications.  Patient Tolerance:  Patient tolerated the procedure well with no immediate complications         EKG Interpretation:      Interpreted by Zeyad Stone MD  Time reviewed:820   Symptoms at time of EKG: pre sedation   Rhythm: Normal sinus  and paced  Rate: Normal  Axis: Left Axis Deviation  Ectopy: None  Conduction: Normal and Left bundle branch block (complete)  ST Segments/ T Waves: No ST-T wave changes and No  acute ischemic changes  Q Waves: None and Nonspecific  Comparison to prior: Unchanged    Clinical Impression: Sinus rhythm with occasional paced beats, no signs of ischemia or infarction.            No results found for any visits on 10/13/24.  Medications - No data to display  Labs Ordered and Resulted from Time of ED Arrival to Time of ED Departure - No data to display  No orders to display          Critical care was not performed.     Medical Decision Making  The patient's presentation was of high complexity (an acute health issue posing potential threat to life or bodily function).    The patient's evaluation involved:  review of external note(s) from 1 sources (outside x-ray reviewed with hip dislocation)  ordering and/or review of 2 test(s) in this encounter (see separate area of note for details)  independent interpretation of testing performed by another health professional (repeat x-ray here with reduced hip, no fracture noted on my read)  discussion of management or test interpretation with another health professional (orthopedic surgeon)    The patient's management necessitated high risk (a parenteral controlled substance), high risk (drug therapy requiring intensive monitoring (propofol)), and high risk (a decision regarding emergency major procedure (procedural sedation)).    Assessment & Plan    MDM  Patient with hip dislocation.  I did discuss orthopedic surgery who did help reduce the hip with propofol here.  Patient did well.  I did discuss postreduction precautions and they are largely unchanged.  He was sent home with a knee immobilizer and follow-up in clinic in 2 weeks.    I have reviewed the nursing notes. I have reviewed the findings, diagnosis, plan and need for follow up with the patient.    New Prescriptions    No medications on file       Final diagnoses:   Dislocation of right hip, initial encounter (H)       Zeyad Stone  [facility] EMERGENCY DEPARTMENT  10/13/2024      Zeyad Stone MD  10/13/24 1018

## 2024-10-13 NOTE — CONSULTS
Orthopaedic Surgery Consultation    DATE OF SERVICE:  10/13/2024    This is a consultation requested by Dr welch for right prosthetic hip dislocation.    CHIEF COMPLAINT:  right hip pain    HISTORY OBTAINED FROM: chart, patient    HISTORY:  This is a 74 year old old male with PMH complete heart block s/p pacer hx [place] CHRISS 2007 w/ Dr Balderrama s/p two stage revision for infection (enterococcus faecium) w/ Dr Dudley (5/7/24, 8/30/24) who presents with right hip pain after dislocation today. Patient was performing his PT stretches last night and felt pain/clunk when flexing the hip. Patient does report maintaining his posterior hip precautions and dislocation occurred as he was brining the hip from flexion into extension. This is the patient's first dislocation. Unsuccessful reduction performed at OSH and he presents here for reduction. Has been staying at home since discharge from [place] 9/3/24. Has been independently mobilizing w/ walker with TTWB restrictions. No other areas of pain. Denies numbness, paresthesias, head trauma, LOC, pain anywhere else in the body. Denies antecedent pain in the right hip.    PAST MEDICAL HISTORY:    Past Medical History:   Diagnosis Date    BPH (benign prostatic hyperplasia)     Cardiac pacemaker in situ     Complete heart block (H)     Hydronephrosis, right     Lumbar radiculopathy     Lumbar scoliosis     Restless legs syndrome (RLS)        PAST SURGICAL HISTORY:    Past Surgical History:   Procedure Laterality Date    APPENDECTOMY      ARTHROPLASTY REVISION HIP Right 8/30/2024    Procedure: with Revision Right Total Hip Arthroplasty;  Surgeon: Rd Dudley MD;  Location: [facility]    AS TOTAL KNEE ARTHROPLASTY Left 2010    CARPAL TUNNEL RELEASE RT/LT      CATARACT EXTRACTION      COLONOSCOPY      EP ICD      FOOT SURGERY Bilateral     HAND SURGERY      HC TOTAL HIP ARTHROPLASTY Right 2007    HEMORRHOIDECTOMY      HERNIA REPAIR      IR CVC TUNNEL PLACEMENT > 5 YRS OF AGE   5/10/2024    IRRIGATION AND DEBRIDEMENT HIP, PLACE ANTIBIOTIC CEMENT BEADS / SPACER Right 5/7/2024    Procedure: vancomycin and tobramycin impregnated cement spacer implantation;  Surgeon: Rd Dudley MD;  Location: [facility] OR    NASAL FRACTURE SURGERY      REMOVE ANTIBIOTIC CEMENT BEADS / SPACER HIP Right 8/30/2024    Procedure: Explantation of Right Hip Antibiotic Spacer;  Surgeon: Rd Dudley MD;  [facility]    REMOVE HARDWARE ARTHROPLASTY HIP Right 5/7/2024    Procedure: Explant Right surface replacement Total Hip Arthroplasty ,;  Surgeon: Rd Dudley MD;  [facility]    TONSILLECTOMY & ADENOIDECTOMY      urinary tract surgery         FAMILY HISTORY:  Reviewed with patient and is non-contributory.   No personal or family history of bleeding or clotting disorders  No personal or family history of adverse reactions to anesthesia     SOCIAL HISTORY:   Retired, enjoys [employer] skiing, quite active    ALLERGIES:   No Known Allergies    MEDS:   Blood Thinners: ASA  Prior to Admission medications    Medication Sig Last Dose Taking? Auth Provider Long Term End Date   acetaminophen (TYLENOL) 325 MG tablet Take 2 tablets (650 mg) by mouth every 4 hours as needed for other (mild pain).   Julio Hubbard APRN CNP No    amoxicillin (AMOXIL) 500 MG capsule Take 2,000 mg by mouth once Prior to dental appointments   Reported, Patient     aspirin 81 MG EC tablet Take 2 tablets (162 mg) by mouth daily.   Julio Hubbard APRN CNP     bisacodyl (DULCOLAX) 10 MG suppository Place 1 suppository (10 mg) rectally daily as needed for constipation.   Alondra Rajput     calcium-magnesium (CALMAG) 500-250 MG TABS per tablet Take 1 tablet by mouth every morning With ZINC   Reported, Patient     cefadroxil (DURICEF) 500 MG capsule Take 1 capsule (500 mg) by mouth 2 times daily.   Rd Dudley MD     Cholecalciferol (D 1000) 25 MCG (1000 UT) CAPS Take 1,000 Units by mouth daily (with lunch)   Reported, Patient     diclofenac  (VOLTAREN) 1 % topical gel Apply 2 g topically 2 times daily as needed for moderate pain Applies before activity to right foot or in the evening to tingling hands   Unknown, Entered By History     diphenhydrAMINE-zinc acetate (BENADRYL) 2-0.1 % external cream Apply topically 3 times daily as needed for itching   Jeanmarie Rocha MD     DOCUSATE CALCIUM PO Take 1 tablet by mouth every morning   Reported, Patient     ferrous sulfate (FEROSUL) 325 (65 Fe) MG tablet Take 325 mg by mouth daily (with breakfast)   Reported, Patient     gabapentin (NEURONTIN) 300 MG capsule Take 3 capsules (900 mg) by mouth at bedtime   Jeanmarie Rocha MD Yes    glucosamine-chondroitin 500-400 MG CAPS per capsule Take 1 capsule by mouth every morning   Reported, Patient     hydrOXYzine HCl (ATARAX) 10 MG tablet Take 1 tablet (10 mg) by mouth every 6 hours as needed for other (adjuvant pain).  Patient not taking: Reported on 9/30/2024   Alondra Rajput     ketoconazole (NIZORAL) 2 % external cream Apply thin layer twice daily as needed for rash on the face.   Reported, Patient     LORazepam (ATIVAN) 1 MG tablet Take 1 mg by mouth nightly as needed for anxiety.   Unknown, Entered By History No    LUTEIN PO Take 1 tablet by mouth every morning   Reported, Patient     methocarbamol (ROBAXIN) 500 MG tablet Take 1 tablet (500 mg) by mouth every 6 hours as needed for muscle spasms.   Alondra Rajput     Multiple Vitamins-Minerals (MENS 50+ MULTI VITAMIN/MIN PO) Take 1 tablet by mouth every morning   Reported, Patient     naproxen (NAPROSYN) 250 MG tablet Take 250 mg by mouth 2 times daily as needed for moderate pain   Reported, Patient No    Omega-3 Fatty Acids (SUPER OMEGA-3) 1000 MG CAPS Take 1,000 mg by mouth every morning   Reported, Patient     omeprazole (PRILOSEC) 20 MG DR capsule Take 1 capsule by mouth daily before breakfast   Reported, Patient  2/5/25   oxyCODONE (ROXICODONE) 5 MG tablet Take 1-2 tablets (5-10 mg) by  mouth every 4 hours as needed for moderate to severe pain.  Patient not taking: Reported on 9/30/2024   Julio Hubbard APRN CNP No    pantoprazole (PROTONIX) 40 MG EC tablet Take 1 tablet (40 mg) by mouth every morning (before breakfast).   Atiya Alondra     polyethylene glycol (MIRALAX) 17 g packet Take 17 g by mouth daily.  Patient not taking: Reported on 9/30/2024   Kaz Bowser, NEVIN     polyethylene glycol (MIRALAX) 17 GM/Dose powder Take 17 g by mouth daily.  Patient not taking: Reported on 9/30/2024   Julio Hubbard APRN CNP     pramipexole (MIRAPEX) 0.125 MG tablet Take 0.25 mg by mouth at bedtime   Reported, Patient Yes    predniSONE (DELTASONE) 5 MG tablet Take 5 mg by mouth daily (with lunch)   Reported, Patient     Psyllium (METAMUCIL 4 IN 1 FIBER) 55.6 % POWD Take 1 Scoop by mouth every morning   Reported, Patient     Saw Palmetto, Serenoa repens, (SAW PALMETTO PO) Take 1 capsule by mouth every morning   Reported, Patient     senna-docusate (SENOKOT-S/PERICOLACE) 8.6-50 MG tablet Take 1-2 tablets by mouth 2 times daily. Take while on oral narcotics to prevent or treat constipation.  Patient not taking: Reported on 9/30/2024   Julio Hubbard APRN CNP     vitamin B-12 (CYANOCOBALAMIN) 500 MCG tablet Take 5,000 mcg by mouth daily (with lunch)   Reported, Patient     vitamin C (ASCORBIC ACID) 500 MG tablet Take 500 mg by mouth every morning   Reported, Patient     zolpidem (AMBIEN) 10 MG tablet Take 10 mg by mouth nightly as needed for sleep.   Unknown, Entered By History No          REVIEW OF SYSTEMS:  An 11-point systems review was performed and negative except as noted in the HPI.      PHYSICAL EXAMINATION:    Vitals:    10/13/24 0730   BP: (!) 146/99   Resp: 16   Temp: 98.1  F (36.7  C)         Gen:  Awake and Alert, pleasant, interactive, no acute distress.    Psych:  Articulates and Communicates with a normal affect    HEENT:  Normal and atraumatic    Pulm:   Non-labored breathing at rest.  Saturating well on RA.   CV:  RRR   Extremities:    Right LE:     Hip held in IR, extremity shortened compared to contralateral side, incision well healed, no drainage/erythema.     TTP over hip/lateral thigh. Non-tender to palpation over knee, leg, ankle/foot.     Unable to perform ROM at hip due to pain. No pain with ROM knee/ankle.     + TA/Gsc/EHL/FHL.     SILT DP/SP/sural/saph/tibial distributions.     DP/PT palpable, toes warm/well-perfused.     LABS/IMAGING:  Recent Labs   Lab Test 09/03/24  1436 09/03/24  0545 09/02/24  1351 09/02/24  0612 09/01/24  1345 09/01/24  0628 08/31/24  1015 08/31/24  0308   HGB 9.1* 8.4* 8.3* 7.6*  7.6*   < > 8.6*  8.6*   < > 7.0*   WBC  --  6.2  --  5.1  --  8.7  --  8.0    < > = values in this interval not displayed.     Recent Labs   Lab Test 09/03/24  0545 09/02/24  0612 09/01/24  0628 08/31/24  0308   CHLORIDE 99 103 101 101   CO2 28 26 25 26   BUN 8.4 10.1 10.8 15.2   PHOS 3.3 3.6 3.1 3.8     Recent Labs   Lab Test 08/30/24  1235   INR 0.95       Imaging:  Review of x-rays shows posteriorly dislocated right CHRISS components. No fracture identified.     PROCEDURES:  Patient's correct site was verified on x-rays prior to any type of reduction attempt. Sedation was  provided by the emergency department.    Patient's dislocation was noted to be posterior. This was confirmed on radiographs. Once the  patient was adequately sedated and relaxed by the emergency department. The patient's hip  was flexed to 90 degrees. Axial load was then applied in this 90 degree flexed position as well  as internal rotation to unlock the patient's femoral head. Axial traction was then applied as  gentle external rotation was then applied. An audible clunk was heard as the patient's hip  reduced. It was then taken to a neutral extension position and abducted. Pillows were placed  between the legs. Concentric reduction of the hip was verified on x-rays. This was then verified  with lateral x-rays.  Patient remained neurovascular intact following the procedure.    IMPRESSION AND PLAN:  A 74 year old old with right posterior prosthetic hip dislocation after recent two stage revision CHRISS w/ Dr Dudley for infection (enterococcus faecium- 5/7/24, 8/30/24).  Patient dislocated with flexion and internal rotation of the hip while performing PT exercises. Successfully reduced in the ED. Post reduction XR reviewed. Okay for discharge from ortho perspective, will message for follow up with Dr Dudley's team to review hip precautions. Remain TTWB RLE w/ walker. Strict posterior hip precautions. KI on at all times except hygiene. Hip abduction pillow in place while in bed. Questions answered.    - Xrays/imaging: postreduction films adequate  - Activity: Up ad amilcar; strict poster hip precautions.  KI on at all times except hygiene. Hip abduction pillow in place while in bed.  - Weight Bearing: TTWB RLE, prior  - Diet: OK for regular diet from orthopaedic perspective  - Dispo: Home  - Follow-up: in clinic in around 2 weeks with Dr. Dudley's team, ortho to message schedulers    Patient was discussed with Dr. Monae. Orthopaedic staff for this patient is Dr. Geraldine Nevarez MD  Orthopaedic Surgery Resident  [facility]  10/13/2024

## 2024-10-13 NOTE — ED NOTES
Pt has walked to the bathroom using a walker. He has urinated without problem. He is denying nausea and pain., His resp rate when asleep is 10-14. His 02 sats are in the mid 90's when asleep. MD has spoken the pt and he is ready for discharge. Family is here and will drive him home

## 2024-10-13 NOTE — ED NOTES
Pt wide awake and visiting with staff. He has attempted to urinate. He was only able to void 50 ml.

## 2024-10-13 NOTE — ED NOTES
Pt transported here with a displaced hip. His R hip is out. Pt received pain meds in the rig. He has occasional c/o nausea

## 2024-10-13 NOTE — ED TRIAGE NOTES
Received pain meds in the rig     Triage Assessment (Adult)       Row Name 10/13/24 0736          Triage Assessment    Airway WDL WDL        Respiratory WDL    Respiratory WDL X  02 sats decrease into 80's when he falls asleep. Placed on 02        Skin Circulation/Temperature WDL    Skin Circulation/Temperature WDL WDL        Cardiac WDL    Cardiac WDL WDL        Peripheral/Neurovascular WDL    Peripheral Neurovascular WDL WDL        Cognitive/Neuro/Behavioral WDL    Cognitive/Neuro/Behavioral WDL WDL

## 2024-10-13 NOTE — DISCHARGE INSTRUCTIONS
Return to the emergency department if you have any further concerns.    Keep the knee immobilizer on except for at all times bathing.  Use the hip pillow while you are in bed.  No change in weightbearing instructions.  Please follow-up in 2 weeks to see Dr. Dudley, your orthopedic doctor

## 2024-10-13 NOTE — ED NOTES
Pt set up for hip replacement and conscious sedation. Orthopedics and Dr Stone in the room. Pause for the cause and identification done. Room is ready with ambu bag and suction at bedside

## 2024-10-13 NOTE — ED NOTES
Bed: ED02  Expected date:   Expected time:   Means of arrival:   Comments:  [facility] hip reduction

## 2024-10-15 NOTE — PROGRESS NOTES
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 Size 46 femoral head, 52-mm, acetabulum, Tobramycin Simplex cement.  ( Keyur Balderrama MD ) [facility]  3/18/2010, L TKA, Jaquan NexGen LPS Flex, 10mm insert, (Keyur Balderrama) [facility]  3/22/2013, Angel/Akin Bunionectomy, bilateral feet (R. Sticha) [facility]  3/10/2014, screw removal right foot (R. Sticha) [facility]  5/2/2014, screw removal left foot, (R. Sticha), [facility]  2/27/2020, Right CMC arthroplasty and Left hand thumb trigger finger release (JAQUI Prasad), [facility]  4/25/2024, R hip aspirate: WBC 21k, PMNs 89%, Culture Enterococcus faecalis, serum Chr 3.7 nl, serum Co 4.2 elevated.  5/7/24, Explant Right surface replacement Total Hip Arthroplasty and vancomycin and tobramycin impregnated cement spacer implantation, (Luis Enrique), [facility]  E Faecalis.  Ampicillin IV x 6 weeks.    8/30/24, Explantation of Right Hip Antibiotic Spacer and Revision Right Total Hip Arthroplasty, (Luis Enrique), [facility]  10/13/24, right hip dislocation and relocation with sedation in ED    I met with Faith to review his situation after recent postoperative dislocation 6 weeks postoperatively.  He was on the floor getting up with a hip flexion when he dislocated the hip.  He is wearing a knee immobilizer brace but it keeps sliding on his leg.    I spoke with him about posterior hip joint precautions and emphasized maintaining knee separation (hip abduction) and avoiding excessive flexion of the hip joint.  Also avoiding internal rotation of his extremity.    We reviewed activities that he may or may not perform.  Specifically he should not drive until the brace is discontinued,, he should not ski this winter  season and he should not ride a bike until June 2025.    He was given instructions for performing strengthening exercises of hip flexion and hip abduction.         Assessment and Plan:   Assessment:  Postoperative dislocation.  Emphasized the likelihood of reoperation should he develop recurrent dislocation.    Plan:  May begin full weightbearing on right lower extremity now.  Change knee immobilizer to hinged HK AFO for proper fit.  Brace should be locked in full extension at all times.  He may remove the brace for showering purposes and hygiene only.  He is to continue the brace for total of 6 weeks after his dislocation episode.  Next follow-up visit at his 1 year anniversary for recheck.    MD Tory Jesus Family Professor  Oncology and Adult Reconstructive Surgery  Dept Orthopaedic Surgery, [facility]  [phone] office, [phone] pager  [email or web address]      Total combined visit time and work time before and after clinic visit, independent of trainee, on encounter date = 20 min

## 2024-10-21 ENCOUNTER — OFFICE VISIT (OUTPATIENT)
Dept: ORTHOPEDICS | Facility: CLINIC | Age: 74
End: 2024-10-21
Payer: COMMERCIAL

## 2024-10-21 ENCOUNTER — ANCILLARY PROCEDURE (OUTPATIENT)
Dept: GENERAL RADIOLOGY | Facility: CLINIC | Age: 74
End: 2024-10-21
Attending: ORTHOPAEDIC SURGERY
Payer: COMMERCIAL

## 2024-10-21 DIAGNOSIS — Z87.828: ICD-10-CM

## 2024-10-21 DIAGNOSIS — Z96.649 STATUS POST REVISION OF TOTAL HIP: ICD-10-CM

## 2024-10-21 DIAGNOSIS — Z96.649 STATUS POST REVISION OF TOTAL HIP: Primary | ICD-10-CM

## 2024-10-21 PROCEDURE — 73502 X-RAY EXAM HIP UNI 2-3 VIEWS: CPT | Mod: RT | Performed by: RADIOLOGY

## 2024-10-21 PROCEDURE — 99213 OFFICE O/P EST LOW 20 MIN: CPT | Mod: 24 | Performed by: ORTHOPAEDIC SURGERY

## 2024-10-21 NOTE — LETTER
10/21/2024      Kwabena Solorio  [address]  [address]  [place] [address]      Dear Colleague,    Thank you for referring your patient, Kwabena Solorio, to the [facility] [place]. Please see a copy of my visit note below.          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 Size 46 femoral head, 52-mm, acetabulum, Tobramycin Simplex cement.  ( Keyur Balderrama MD ) [facility]  3/18/2010, L TKA, Jaquan NexGen LPS Flex, 10mm insert, (Keyur Balderrama) [facility]  3/22/2013, Angel/Akin Bunionectomy, bilateral feet (R. Sticha) [facility]  3/10/2014, screw removal right foot (R. Sticha) [facility]  5/2/2014, screw removal left foot, (R. Sticha), [facility]  2/27/2020, Right CMC arthroplasty and Left hand thumb trigger finger release (JAQUI Prasad), [facility]  4/25/2024, R hip aspirate: WBC 21k, PMNs 89%, Culture Enterococcus faecalis, serum Chr 3.7 nl, serum Co 4.2 elevated.  5/7/24, Explant Right surface replacement Total Hip Arthroplasty and vancomycin and tobramycin impregnated cement spacer implantation, (Luis Enrique), [facility]  E Faecalis.  Ampicillin IV x 6 weeks.    8/30/24, Explantation of Right Hip Antibiotic Spacer and Revision Right Total Hip Arthroplasty, (Luis Enrique), [facility]  10/13/24, right hip dislocation and relocation with sedation in ED    I met with Faith to review his situation after recent postoperative dislocation 6 weeks postoperatively.  He was on the floor getting up with a hip flexion when he dislocated the hip.  He is wearing a knee immobilizer brace but it keeps sliding on his leg.    I spoke with him about posterior hip joint precautions and emphasized maintaining knee separation (hip abduction)  and avoiding excessive flexion of the hip joint.  Also avoiding internal rotation of his extremity.    We reviewed activities that he may or may not perform.  Specifically he should not drive until the brace is discontinued,, he should not ski this winter season and he should not ride a bike until June 2025.    He was given instructions for performing strengthening exercises of hip flexion and hip abduction.         Assessment and Plan:   Assessment:  Postoperative dislocation.  Emphasized the likelihood of reoperation should he develop recurrent dislocation.    Plan:  May begin full weightbearing on right lower extremity now.  Change knee immobilizer to hinged HK AFO for proper fit.  Brace should be locked in full extension at all times.  He may remove the brace for showering purposes and hygiene only.  He is to continue the brace for total of 6 weeks after his dislocation episode.  Next follow-up visit at his 1 year anniversary for recheck.    Rd Dudley MD  [facility] [place] Family Professor  Oncology and Adult Reconstructive Surgery  Dept Orthopaedic Surgery, [facility]  [phone] office, [phone] pager  [email or web address]      Total combined visit time and work time before and after clinic visit, independent of trainee, on encounter date = 20 min          Again, thank you for allowing me to participate in the care of your patient.        Sincerely,        Rd Dudley MD

## 2024-10-21 NOTE — NURSING NOTE
Reason For Visit:   Chief Complaint   Patient presents with    RECHECK     Follow up Dislocation of internal right hip prosthesis,       There were no vitals taken for this visit.    Pain Assessment  Patient Currently in Pain: Renato Hodges CMA

## 2024-12-15 ENCOUNTER — HEALTH MAINTENANCE LETTER (OUTPATIENT)
Age: 74
End: 2024-12-15

## 2025-06-15 ENCOUNTER — MYC MEDICAL ADVICE (OUTPATIENT)
Dept: ORTHOPEDICS | Facility: CLINIC | Age: 75
End: 2025-06-15
Payer: COMMERCIAL

## 2025-07-24 DIAGNOSIS — R20.0 NUMBNESS AND TINGLING OF BOTH LEGS BELOW KNEES: ICD-10-CM

## 2025-07-24 DIAGNOSIS — M47.27 LUMBOSACRAL SPONDYLOSIS WITH RADICULOPATHY: ICD-10-CM

## 2025-07-24 DIAGNOSIS — R20.2 NUMBNESS AND TINGLING OF BOTH LEGS BELOW KNEES: ICD-10-CM

## 2025-07-24 DIAGNOSIS — M47.16 LUMBAR SPONDYLOSIS WITH MYELOPATHY: ICD-10-CM

## 2025-07-24 DIAGNOSIS — R20.2 PARESTHESIA OF BOTH FEET: Primary | ICD-10-CM

## 2025-08-01 ENCOUNTER — ANCILLARY PROCEDURE (OUTPATIENT)
Dept: GENERAL RADIOLOGY | Facility: CLINIC | Age: 75
End: 2025-08-01
Attending: ORTHOPAEDIC SURGERY
Payer: COMMERCIAL

## 2025-08-01 ENCOUNTER — OFFICE VISIT (OUTPATIENT)
Dept: NEUROSURGERY | Facility: CLINIC | Age: 75
End: 2025-08-01
Payer: COMMERCIAL

## 2025-08-01 VITALS
DIASTOLIC BLOOD PRESSURE: 90 MMHG | HEART RATE: 50 BPM | WEIGHT: 167 LBS | SYSTOLIC BLOOD PRESSURE: 149 MMHG | BODY MASS INDEX: 27.82 KG/M2 | HEIGHT: 65 IN

## 2025-08-01 DIAGNOSIS — M47.16 LUMBAR SPONDYLOSIS WITH MYELOPATHY: ICD-10-CM

## 2025-08-01 DIAGNOSIS — Z98.1 S/P SPINAL FUSION: Primary | ICD-10-CM

## 2025-08-01 DIAGNOSIS — R20.2 PARESTHESIA OF BOTH FEET: ICD-10-CM

## 2025-08-01 DIAGNOSIS — R20.2 NUMBNESS AND TINGLING OF BOTH LEGS BELOW KNEES: ICD-10-CM

## 2025-08-01 DIAGNOSIS — R20.0 NUMBNESS AND TINGLING OF BOTH LEGS BELOW KNEES: ICD-10-CM

## 2025-08-01 DIAGNOSIS — M54.16 LUMBAR RADICULOPATHY: ICD-10-CM

## 2025-08-01 DIAGNOSIS — M47.27 LUMBOSACRAL SPONDYLOSIS WITH RADICULOPATHY: ICD-10-CM

## 2025-08-01 PROCEDURE — 3077F SYST BP >= 140 MM HG: CPT | Performed by: ORTHOPAEDIC SURGERY

## 2025-08-01 PROCEDURE — 72110 X-RAY EXAM L-2 SPINE 4/>VWS: CPT | Performed by: STUDENT IN AN ORGANIZED HEALTH CARE EDUCATION/TRAINING PROGRAM

## 2025-08-01 PROCEDURE — 99214 OFFICE O/P EST MOD 30 MIN: CPT | Performed by: ORTHOPAEDIC SURGERY

## 2025-08-01 PROCEDURE — 3080F DIAST BP >= 90 MM HG: CPT | Performed by: ORTHOPAEDIC SURGERY

## 2025-08-01 RX ORDER — TRIAMCINOLONE ACETONIDE 1 MG/G
CREAM TOPICAL
COMMUNITY
Start: 2025-01-20

## 2025-08-01 RX ORDER — CALCIUM CARBONATE 500(1250)
1 TABLET ORAL 2 TIMES DAILY
COMMUNITY

## 2025-08-01 RX ORDER — SILVER SULFADIAZINE 10 MG/G
CREAM TOPICAL
COMMUNITY

## 2025-08-01 RX ORDER — OMEPRAZOLE 20 MG/1
20 CAPSULE, DELAYED RELEASE ORAL
COMMUNITY
Start: 2025-06-24 | End: 2026-06-24

## 2025-08-04 ENCOUNTER — PATIENT OUTREACH (OUTPATIENT)
Dept: CARE COORDINATION | Facility: CLINIC | Age: 75
End: 2025-08-04
Payer: COMMERCIAL

## 2025-08-06 ENCOUNTER — PATIENT OUTREACH (OUTPATIENT)
Dept: CARE COORDINATION | Facility: CLINIC | Age: 75
End: 2025-08-06
Payer: COMMERCIAL

## 2025-08-18 ENCOUNTER — TELEPHONE (OUTPATIENT)
Dept: NEUROSURGERY | Facility: CLINIC | Age: 75
End: 2025-08-18
Payer: COMMERCIAL

## 2025-08-25 DIAGNOSIS — Z96.649 STATUS POST REVISION OF TOTAL HIP: Primary | ICD-10-CM

## 2025-08-28 ENCOUNTER — OFFICE VISIT (OUTPATIENT)
Dept: ORTHOPEDICS | Facility: CLINIC | Age: 75
End: 2025-08-28
Payer: COMMERCIAL

## 2025-08-28 DIAGNOSIS — Z96.649 STATUS POST REVISION OF TOTAL HIP: Primary | ICD-10-CM

## 2025-08-28 ASSESSMENT — HOOS JR: HOOS JR TOTAL INTERVAL SCORE: 100

## 2025-08-29 ENCOUNTER — TRANSFERRED RECORDS (OUTPATIENT)
Dept: HEALTH INFORMATION MANAGEMENT | Facility: CLINIC | Age: 75
End: 2025-08-29
Payer: COMMERCIAL

## (undated) DEVICE — GOWN IMPERVIOUS SPECIALTY XLG/XLONG 32474

## (undated) DEVICE — STRAP STIRRUP W/SLIP 30187-030

## (undated) DEVICE — BONE CEMENT MIXEVAC HI VAC W/CARTRIDGE 0306-563-000

## (undated) DEVICE — BONE CLEANING TIP INTERPULSE FEMORAL CANAL 0210-008-000

## (undated) DEVICE — DRSG MEPILEX BORDER FLEX 8.7X9.8" 282455

## (undated) DEVICE — LINEN DRAPE 54X72" 5467

## (undated) DEVICE — TUBE SUCTION STD KAMVAC 5300-00-500

## (undated) DEVICE — LINEN TOWEL PACK X30 5481

## (undated) DEVICE — LINEN GOWN OVERSIZE 5408

## (undated) DEVICE — BLADE SAW SAGITTAL STRK 25X90X1.27MM HD SYS 6 6125-127-090

## (undated) DEVICE — SU VICRYL 0 CT-1 27" J340H

## (undated) DEVICE — BONE CLEANING TIP INTERPULSE  0210-010-000

## (undated) DEVICE — SUCTION TIP YANKAUER STR K87

## (undated) DEVICE — SU PDS II 2-0 CT-1 27" Z339H

## (undated) DEVICE — SOL NACL 0.9% IRRIG 3000ML BAG 2B7477

## (undated) DEVICE — DRAPE IOBAN INCISE 23X17" 6650EZ

## (undated) DEVICE — DRAIN JACKSON PRATT RESERVOIR 400ML SU130-1000

## (undated) DEVICE — KIT CULTURE TRANSPORT SYS A.C.T. II DUAL ANEROBE R124022

## (undated) DEVICE — PACK TOTAL HIP W/POUCH RIVERSIDE LATEX FREE

## (undated) DEVICE — BLADE CLIPPER SGL USE 9680

## (undated) DEVICE — PREP DURAPREP 26ML APL 8630

## (undated) DEVICE — BLADE KNIFE SURG 15 371115

## (undated) DEVICE — ESU GROUND PAD UNIVERSAL W/O CORD

## (undated) DEVICE — DRAPE STOCKINETTE 8" 8586

## (undated) DEVICE — SOL WATER IRRIG 1000ML BOTTLE 2F7114

## (undated) DEVICE — Device

## (undated) DEVICE — DRSG TEGADERM ALGINATE AG 4X5" 90303

## (undated) DEVICE — DRAPE MAYO STAND 23X54 8337

## (undated) DEVICE — NDL 18GA 1.5" 305196

## (undated) DEVICE — SPONGE LAP 18X18" X8435

## (undated) DEVICE — ROD SYN REAMER STR W/BALL TIP 3.0X950MM 351.76S

## (undated) DEVICE — SOL NACL 0.9% IRRIG 1000ML BOTTLE 2F7124

## (undated) DEVICE — SUCTION FRAZIER 12FR W/HANDLE K73

## (undated) DEVICE — SYR 05ML LL W/O NDL

## (undated) DEVICE — LINEN BACK PACK 5440

## (undated) DEVICE — DRAIN JACKSON PRATT ROUND W/TROCAR 15FR LF JP-HUR151

## (undated) DEVICE — DRSG TEGADERM 4X10" 1627

## (undated) DEVICE — FASTENER CATH STAYFIX 685ME

## (undated) DEVICE — DRSG DRAIN 4X4" 7086

## (undated) DEVICE — PREP POVIDONE-IODINE 7.5% SCRUB 4OZ BOTTLE MDS093945

## (undated) DEVICE — ESU ELEC BLADE 6" COATED E1450-6

## (undated) DEVICE — DRAIN JACKSON PRATT 15FR ROUND SU130-1323

## (undated) DEVICE — GLOVE BIOGEL PI ULTRATOUCH SZ 6.5 41165

## (undated) DEVICE — LINEN TOWEL PACK X5 5464

## (undated) DEVICE — LINEN MAYO STAND COVER OVERSIZE PACK 5458

## (undated) DEVICE — PREP CHLORAPREP 26ML TINTED HI-LITE ORANGE 930815

## (undated) DEVICE — TRAY PREP DRY SKIN SCRUB 067

## (undated) DEVICE — DRSG STERI STRIP 1/2X4" R1547

## (undated) DEVICE — SUCTION MANIFOLD NEPTUNE 2 SYS 4 PORT 0702-020-000

## (undated) DEVICE — PREP DURAPREP REMOVER 4OZ 8611

## (undated) DEVICE — SUCTION IRR SYSTEM W/O TIP INTERPULSE HANDPIECE 0210-100-000

## (undated) DEVICE — CONTAINER SPECIMEN 4OZ STERILE 17099

## (undated) DEVICE — SU SILK 2-0 SH 30" K833H

## (undated) DEVICE — STRAP KNEE/BODY 31143004

## (undated) DEVICE — BLADE SAW RECIP STRK 77.5X11X1.23MM 0277-096-326

## (undated) DEVICE — SU PDS II 0 CT-1 27" Z340H

## (undated) DEVICE — GLOVE BIOGEL PI ULTRATOUCH SZ 7.0 41170

## (undated) DEVICE — POSITIONER ABDUCTION PILLOW FOAM MED FP-ABDUCTM

## (undated) DEVICE — GLOVE BIOGEL PI MICRO INDICATOR UNDERGLOVE SZ 8.0 48980

## (undated) DEVICE — GLOVE BIOGEL PI MICRO INDICATOR UNDERGLOVE SZ 7.5 48975

## (undated) DEVICE — IMPLANTABLE DEVICE
Type: IMPLANTABLE DEVICE | Site: HIP | Status: NON-FUNCTIONAL
Removed: 2024-08-30

## (undated) DEVICE — SU PDS II 0 CTX 36" Z370T

## (undated) DEVICE — EYE PREP BETADINE 5% SOLUTION 30ML 0065-0411-30

## (undated) DEVICE — SU PDS II 3-0 PS-1 18" Z683G

## (undated) DEVICE — DRSG TEGADERM 4X4 3/4" 1626W

## (undated) DEVICE — ROD SYN REAMER BALL TIP 2.5X950MM  351.706S

## (undated) DEVICE — SU ETHIBOND 1 CTX CR 8/18" CX30D

## (undated) DEVICE — CABLE AND SLEEVE SET
Type: IMPLANTABLE DEVICE | Site: HIP | Status: NON-FUNCTIONAL
Brand: DALL-MILES
Removed: 2024-09-02

## (undated) DEVICE — WIPES FOLEY CARE SURESTEP PROVON DFC100

## (undated) DEVICE — CATH TRAY FOLEY SURESTEP 16FR WDRAIN BAG STLK LATEX A300316A

## (undated) DEVICE — SU DERMABOND ADVANCED .7ML DNX12

## (undated) DEVICE — DECANTER VIAL 2006S

## (undated) DEVICE — GLOVE BIOGEL PI MICRO INDICATOR UNDERGLOVE SZ 6.5 48965

## (undated) DEVICE — SPECIMEN CONTAINER 5OZ STERILE 2600SA

## (undated) RX ORDER — FENTANYL CITRATE 50 UG/ML
INJECTION, SOLUTION INTRAMUSCULAR; INTRAVENOUS
Status: DISPENSED
Start: 2024-08-30

## (undated) RX ORDER — HYDROMORPHONE HYDROCHLORIDE 1 MG/ML
INJECTION, SOLUTION INTRAMUSCULAR; INTRAVENOUS; SUBCUTANEOUS
Status: DISPENSED
Start: 2024-05-07

## (undated) RX ORDER — EPHEDRINE SULFATE 50 MG/ML
INJECTION, SOLUTION INTRAMUSCULAR; INTRAVENOUS; SUBCUTANEOUS
Status: DISPENSED
Start: 2024-08-30

## (undated) RX ORDER — ACETAMINOPHEN 325 MG/1
TABLET ORAL
Status: DISPENSED
Start: 2024-08-30

## (undated) RX ORDER — SODIUM CHLORIDE, SODIUM LACTATE, POTASSIUM CHLORIDE, CALCIUM CHLORIDE 600; 310; 30; 20 MG/100ML; MG/100ML; MG/100ML; MG/100ML
INJECTION, SOLUTION INTRAVENOUS
Status: DISPENSED
Start: 2024-05-08

## (undated) RX ORDER — EPHEDRINE SULFATE 50 MG/ML
INJECTION, SOLUTION INTRAMUSCULAR; INTRAVENOUS; SUBCUTANEOUS
Status: DISPENSED
Start: 2024-05-07

## (undated) RX ORDER — SODIUM CHLORIDE, SODIUM LACTATE, POTASSIUM CHLORIDE, CALCIUM CHLORIDE 600; 310; 30; 20 MG/100ML; MG/100ML; MG/100ML; MG/100ML
INJECTION, SOLUTION INTRAVENOUS
Status: DISPENSED
Start: 2024-05-07

## (undated) RX ORDER — SODIUM CHLORIDE, SODIUM LACTATE, POTASSIUM CHLORIDE, CALCIUM CHLORIDE 600; 310; 30; 20 MG/100ML; MG/100ML; MG/100ML; MG/100ML
INJECTION, SOLUTION INTRAVENOUS
Status: DISPENSED
Start: 2024-08-30

## (undated) RX ORDER — FENTANYL CITRATE-0.9 % NACL/PF 10 MCG/ML
PLASTIC BAG, INJECTION (ML) INTRAVENOUS
Status: DISPENSED
Start: 2024-05-07

## (undated) RX ORDER — ACETAMINOPHEN 325 MG/1
TABLET ORAL
Status: DISPENSED
Start: 2024-05-07

## (undated) RX ORDER — VANCOMYCIN HYDROCHLORIDE 1 G/20ML
INJECTION, POWDER, LYOPHILIZED, FOR SOLUTION INTRAVENOUS
Status: DISPENSED
Start: 2024-05-07

## (undated) RX ORDER — FENTANYL CITRATE 50 UG/ML
INJECTION, SOLUTION INTRAMUSCULAR; INTRAVENOUS
Status: DISPENSED
Start: 2024-05-07

## (undated) RX ORDER — PROPOFOL 10 MG/ML
INJECTION, EMULSION INTRAVENOUS
Status: DISPENSED
Start: 2024-08-30

## (undated) RX ORDER — HYDROMORPHONE HYDROCHLORIDE 1 MG/ML
INJECTION, SOLUTION INTRAMUSCULAR; INTRAVENOUS; SUBCUTANEOUS
Status: DISPENSED
Start: 2024-08-30

## (undated) RX ORDER — TRANEXAMIC ACID 650 MG/1
TABLET ORAL
Status: DISPENSED
Start: 2024-05-07

## (undated) RX ORDER — LIDOCAINE HYDROCHLORIDE 10 MG/ML
INJECTION, SOLUTION EPIDURAL; INFILTRATION; INTRACAUDAL; PERINEURAL
Status: DISPENSED
Start: 2024-05-10

## (undated) RX ORDER — FENTANYL CITRATE 50 UG/ML
INJECTION, SOLUTION INTRAMUSCULAR; INTRAVENOUS
Status: DISPENSED
Start: 2024-05-10

## (undated) RX ORDER — ONDANSETRON 2 MG/ML
INJECTION INTRAMUSCULAR; INTRAVENOUS
Status: DISPENSED
Start: 2024-05-07

## (undated) RX ORDER — CELECOXIB 200 MG/1
CAPSULE ORAL
Status: DISPENSED
Start: 2024-05-07

## (undated) RX ORDER — FENTANYL CITRATE-0.9 % NACL/PF 10 MCG/ML
PLASTIC BAG, INJECTION (ML) INTRAVENOUS
Status: DISPENSED
Start: 2024-08-30

## (undated) RX ORDER — TRANEXAMIC ACID 650 MG/1
TABLET ORAL
Status: DISPENSED
Start: 2024-08-30

## (undated) RX ORDER — CEFAZOLIN SODIUM/WATER 2 G/20 ML
SYRINGE (ML) INTRAVENOUS
Status: DISPENSED
Start: 2024-08-30

## (undated) RX ORDER — LIDOCAINE HYDROCHLORIDE 10 MG/ML
INJECTION, SOLUTION EPIDURAL; INFILTRATION; INTRACAUDAL; PERINEURAL
Status: DISPENSED
Start: 2024-08-12

## (undated) RX ORDER — CEFAZOLIN SODIUM/WATER 2 G/20 ML
SYRINGE (ML) INTRAVENOUS
Status: DISPENSED
Start: 2024-05-07

## (undated) RX ORDER — DEXAMETHASONE SODIUM PHOSPHATE 4 MG/ML
INJECTION, SOLUTION INTRA-ARTICULAR; INTRALESIONAL; INTRAMUSCULAR; INTRAVENOUS; SOFT TISSUE
Status: DISPENSED
Start: 2024-05-07

## (undated) RX ORDER — CELECOXIB 200 MG/1
CAPSULE ORAL
Status: DISPENSED
Start: 2024-08-30

## (undated) RX ORDER — PROPOFOL 10 MG/ML
INJECTION, EMULSION INTRAVENOUS
Status: DISPENSED
Start: 2024-05-07

## (undated) RX ORDER — HEPARIN SODIUM,PORCINE 10 UNIT/ML
VIAL (ML) INTRAVENOUS
Status: DISPENSED
Start: 2024-05-10

## (undated) RX ORDER — LIDOCAINE HYDROCHLORIDE 20 MG/ML
JELLY TOPICAL
Status: DISPENSED
Start: 2024-08-30

## (undated) RX ORDER — LIDOCAINE HYDROCHLORIDE 10 MG/ML
INJECTION, SOLUTION EPIDURAL; INFILTRATION; INTRACAUDAL; PERINEURAL
Status: DISPENSED
Start: 2024-04-25